# Patient Record
Sex: FEMALE | Race: WHITE | NOT HISPANIC OR LATINO | Employment: FULL TIME | URBAN - METROPOLITAN AREA
[De-identification: names, ages, dates, MRNs, and addresses within clinical notes are randomized per-mention and may not be internally consistent; named-entity substitution may affect disease eponyms.]

---

## 2017-07-21 ENCOUNTER — OFFICE VISIT (OUTPATIENT)
Dept: OBSTETRICS AND GYNECOLOGY | Facility: CLINIC | Age: 27
End: 2017-07-21
Attending: OBSTETRICS & GYNECOLOGY
Payer: COMMERCIAL

## 2017-07-21 VITALS
WEIGHT: 193.13 LBS | HEIGHT: 62 IN | SYSTOLIC BLOOD PRESSURE: 100 MMHG | BODY MASS INDEX: 35.54 KG/M2 | DIASTOLIC BLOOD PRESSURE: 70 MMHG

## 2017-07-21 DIAGNOSIS — N92.6 IRREGULAR PERIODS: Primary | ICD-10-CM

## 2017-07-21 LAB
B-HCG UR QL: NEGATIVE
CTP QC/QA: YES

## 2017-07-21 PROCEDURE — 81025 URINE PREGNANCY TEST: CPT | Mod: QW,S$GLB,, | Performed by: OBSTETRICS & GYNECOLOGY

## 2017-07-21 PROCEDURE — 99999 PR PBB SHADOW E&M-EST. PATIENT-LVL II: CPT | Mod: PBBFAC,,, | Performed by: OBSTETRICS & GYNECOLOGY

## 2017-07-21 PROCEDURE — 99213 OFFICE O/P EST LOW 20 MIN: CPT | Mod: S$GLB,,, | Performed by: OBSTETRICS & GYNECOLOGY

## 2017-07-21 NOTE — PROGRESS NOTES
"CC:irregular menses    HPI:Armida Garrido is a 28 yo female who presents for discussion of new onset of irregular menses.  Prior to this month, had very regular menses.  Noted in June that she had a period 6/2/17, had light spotting 6/18/17, symptoms of menses 7/2-3 but no bleeding.  No current contraception, UPT is negative here.  Of note, the patient was dx'd with hashimoto's thyroiditis in 4/2017, met with endocrinology on 6/2017 and was started on magnesium and selenium, no other supplementation.  Interested in pregnancy in January.    ROS:  GENERAL: Feeling well overall. Denies fever or chills.   SKIN: Denies rash or lesions.   HEAD: Denies head injury or headache.   NODES: Denies enlarged lymph nodes.   CHEST: Denies chest pain or shortness of breath.   CARDIOVASCULAR: Denies palpitations or left sided chest pain.   ABDOMEN: No abdominal pain, constipation, diarrhea, nausea, vomiting or rectal bleeding.   URINARY: No dysuria, hematuria, or burning on urination.  REPRODUCTIVE: See HPI.   BREASTS: Denies pain, lumps, or nipple discharge.   HEMATOLOGIC: No easy bruisability or excessive bleeding.   MUSCULOSKELETAL: Denies joint pain or swelling.   NEUROLOGIC: Denies syncope or weakness.   PSYCHIATRIC: Denies depression, anxiety or mood swings.    PE:   /70   Ht 5' 2" (1.575 m)   Wt 87.6 kg (193 lb 2 oz)   LMP 06/02/2017 (Exact Date)   BMI 35.32 kg/m²     APPEARANCE: Well nourished, well developed, White female in no acute distress.  NODES: no cervical, supraclavicular, or inguinal lymphadenopathy  PELVIC: deferred  Discussion only visit, approximate face to face time was 20 minutes      Diagnosis:  1. Irregular periods        Plan:   Likely irregularities due to thyroid issues, offered OCPs to regulate, patient declines at this time.  Will discuss new mestrual irregularities with Endo and see if any changes are needed.    Orders Placed This Encounter    POCT urine pregnancy         Follow-up with me " as needed.    Fiorella Carpenter, DO

## 2018-04-03 ENCOUNTER — TELEPHONE (OUTPATIENT)
Dept: OBSTETRICS AND GYNECOLOGY | Facility: CLINIC | Age: 28
End: 2018-04-03

## 2018-04-03 ENCOUNTER — PATIENT MESSAGE (OUTPATIENT)
Dept: OBSTETRICS AND GYNECOLOGY | Facility: CLINIC | Age: 28
End: 2018-04-03

## 2018-04-03 DIAGNOSIS — N91.2 AMENORRHEA: Primary | ICD-10-CM

## 2018-04-04 ENCOUNTER — PATIENT MESSAGE (OUTPATIENT)
Dept: OBSTETRICS AND GYNECOLOGY | Facility: CLINIC | Age: 28
End: 2018-04-04

## 2018-04-12 ENCOUNTER — PATIENT MESSAGE (OUTPATIENT)
Dept: OBSTETRICS AND GYNECOLOGY | Facility: CLINIC | Age: 28
End: 2018-04-12

## 2018-04-12 DIAGNOSIS — E06.3 HASHIMOTO'S THYROIDITIS: Primary | ICD-10-CM

## 2018-04-13 NOTE — TELEPHONE ENCOUNTER
Pt stated that she has Hashimoto's thyroiditis and had lab work done stating her thyroid antibodies were 12. Pt wanted to know if there is any concern  she should have now that she  Is preg.

## 2018-05-03 ENCOUNTER — OFFICE VISIT (OUTPATIENT)
Dept: OBSTETRICS AND GYNECOLOGY | Facility: CLINIC | Age: 28
End: 2018-05-03
Attending: OBSTETRICS & GYNECOLOGY
Payer: COMMERCIAL

## 2018-05-03 ENCOUNTER — LAB VISIT (OUTPATIENT)
Dept: LAB | Facility: OTHER | Age: 28
End: 2018-05-03
Attending: OBSTETRICS & GYNECOLOGY
Payer: COMMERCIAL

## 2018-05-03 VITALS
DIASTOLIC BLOOD PRESSURE: 74 MMHG | BODY MASS INDEX: 37.13 KG/M2 | WEIGHT: 201.75 LBS | HEIGHT: 62 IN | SYSTOLIC BLOOD PRESSURE: 112 MMHG

## 2018-05-03 DIAGNOSIS — N91.2 AMENORRHEA: Primary | ICD-10-CM

## 2018-05-03 DIAGNOSIS — Z36.89 ESTABLISH GESTATIONAL AGE, ULTRASOUND: ICD-10-CM

## 2018-05-03 DIAGNOSIS — D25.9 LEIOMYOMA IN PREGNANCY, UTERINE, ANTEPARTUM: ICD-10-CM

## 2018-05-03 DIAGNOSIS — E06.3 HASHIMOTO'S THYROIDITIS: ICD-10-CM

## 2018-05-03 DIAGNOSIS — N91.2 AMENORRHEA: ICD-10-CM

## 2018-05-03 DIAGNOSIS — O34.10 LEIOMYOMA IN PREGNANCY, UTERINE, ANTEPARTUM: ICD-10-CM

## 2018-05-03 DIAGNOSIS — O36.80X0 ENCOUNTER TO DETERMINE FETAL VIABILITY OF PREGNANCY, SINGLE OR UNSPECIFIED FETUS: ICD-10-CM

## 2018-05-03 LAB
ABO + RH BLD: NORMAL
ANION GAP SERPL CALC-SCNC: 14 MMOL/L
BASOPHILS # BLD AUTO: 0.02 K/UL
BASOPHILS NFR BLD: 0.2 %
BLD GP AB SCN CELLS X3 SERPL QL: NORMAL
BUN SERPL-MCNC: 9 MG/DL
CALCIUM SERPL-MCNC: 9.4 MG/DL
CHLORIDE SERPL-SCNC: 99 MMOL/L
CO2 SERPL-SCNC: 21 MMOL/L
CREAT SERPL-MCNC: 0.7 MG/DL
DIFFERENTIAL METHOD: ABNORMAL
EOSINOPHIL # BLD AUTO: 0.1 K/UL
EOSINOPHIL NFR BLD: 0.4 %
ERYTHROCYTE [DISTWIDTH] IN BLOOD BY AUTOMATED COUNT: 14.4 %
EST. GFR  (AFRICAN AMERICAN): >60 ML/MIN/1.73 M^2
EST. GFR  (NON AFRICAN AMERICAN): >60 ML/MIN/1.73 M^2
ESTIMATED AVG GLUCOSE: 97 MG/DL
GLUCOSE SERPL-MCNC: 81 MG/DL
HBA1C MFR BLD HPLC: 5 %
HCT VFR BLD AUTO: 39.1 %
HGB BLD-MCNC: 12.7 G/DL
LYMPHOCYTES # BLD AUTO: 1.8 K/UL
LYMPHOCYTES NFR BLD: 15 %
MCH RBC QN AUTO: 28.3 PG
MCHC RBC AUTO-ENTMCNC: 32.5 G/DL
MCV RBC AUTO: 87 FL
MONOCYTES # BLD AUTO: 0.5 K/UL
MONOCYTES NFR BLD: 4.3 %
NEUTROPHILS # BLD AUTO: 9.7 K/UL
NEUTROPHILS NFR BLD: 79.9 %
PLATELET # BLD AUTO: 256 K/UL
PMV BLD AUTO: 11.6 FL
POTASSIUM SERPL-SCNC: 3.6 MMOL/L
RBC # BLD AUTO: 4.49 M/UL
SODIUM SERPL-SCNC: 134 MMOL/L
T4 FREE SERPL-MCNC: 1.15 NG/DL
THYROPEROXIDASE IGG SERPL-ACNC: 7.7 IU/ML
TSH SERPL DL<=0.005 MIU/L-ACNC: 1.18 UIU/ML
WBC # BLD AUTO: 12.09 K/UL

## 2018-05-03 PROCEDURE — 83036 HEMOGLOBIN GLYCOSYLATED A1C: CPT

## 2018-05-03 PROCEDURE — 3008F BODY MASS INDEX DOCD: CPT | Mod: CPTII,S$GLB,, | Performed by: OBSTETRICS & GYNECOLOGY

## 2018-05-03 PROCEDURE — 36415 COLL VENOUS BLD VENIPUNCTURE: CPT

## 2018-05-03 PROCEDURE — 86376 MICROSOMAL ANTIBODY EACH: CPT

## 2018-05-03 PROCEDURE — 84439 ASSAY OF FREE THYROXINE: CPT

## 2018-05-03 PROCEDURE — 86762 RUBELLA ANTIBODY: CPT

## 2018-05-03 PROCEDURE — 84443 ASSAY THYROID STIM HORMONE: CPT

## 2018-05-03 PROCEDURE — 99214 OFFICE O/P EST MOD 30 MIN: CPT | Mod: S$GLB,,, | Performed by: OBSTETRICS & GYNECOLOGY

## 2018-05-03 PROCEDURE — 85025 COMPLETE CBC W/AUTO DIFF WBC: CPT

## 2018-05-03 PROCEDURE — 87340 HEPATITIS B SURFACE AG IA: CPT

## 2018-05-03 PROCEDURE — 86850 RBC ANTIBODY SCREEN: CPT

## 2018-05-03 PROCEDURE — 86592 SYPHILIS TEST NON-TREP QUAL: CPT

## 2018-05-03 PROCEDURE — 99999 PR PBB SHADOW E&M-EST. PATIENT-LVL III: CPT | Mod: PBBFAC,,, | Performed by: OBSTETRICS & GYNECOLOGY

## 2018-05-03 PROCEDURE — 87491 CHLMYD TRACH DNA AMP PROBE: CPT

## 2018-05-03 PROCEDURE — 86703 HIV-1/HIV-2 1 RESULT ANTBDY: CPT

## 2018-05-03 PROCEDURE — 80048 BASIC METABOLIC PNL TOTAL CA: CPT

## 2018-05-03 PROCEDURE — 87086 URINE CULTURE/COLONY COUNT: CPT

## 2018-05-03 PROCEDURE — 76817 TRANSVAGINAL US OBSTETRIC: CPT | Mod: S$GLB,,, | Performed by: OBSTETRICS & GYNECOLOGY

## 2018-05-03 RX ORDER — CETIRIZINE HYDROCHLORIDE 10 MG/1
10 TABLET ORAL DAILY
COMMUNITY

## 2018-05-03 NOTE — PROCEDURES
Procedures   Obstetrical ultrasound completed today.  See report in imaging section of [facility].

## 2018-05-03 NOTE — PROGRESS NOTES
"HPI: Pt is a 27 y.o. female who presents complaining of missed menses and (+) home UPT. She denies vaginal bleeding or abdominal pain. She does have nausea and vomiting.  history of hashimotos, needs lab work, no current endocrinologist.  Interested in anueploidy screen, discussed NT.  LMP 3/4/18      ROS:  GENERAL: Feeling well overall.   SKIN: Denies rash or lesions.   HEAD: Denies head injury or headache.   NODES: Denies enlarged lymph nodes.   CHEST: Denies chest pain or shortness of breath.   CARDIOVASCULAR: Denies palpitations or left sided chest pain.   ABDOMEN: No abdominal pain, constipation, diarrhea or rectal bleeding.   URINARY: No dysuria, hematuria, or burning on urination.  REPRODUCTIVE: See HPI.   BREASTS: Denies pain, lumps, or nipple discharge.   HEMATOLOGIC: No easy bruisability or excessive bleeding.   MUSCULOSKELETAL: Denies joint pain or swelling.   NEUROLOGIC: Denies syncope or weakness.   PSYCHIATRIC: Denies depression, anxiety or mood swings.    PE:   /74   Ht 5' 2" (1.575 m)   Wt 91.5 kg (201 lb 11.5 oz)   LMP 03/04/2018   BMI 36.90 kg/m²     APPEARANCE: Well nourished, well developed, in no acute distress.  AFFECT: WNL, alert and oriented x 3.  SKIN: No acne or hirsutism.  NECK: Neck symmetric, without masses or thyromegaly.  NODES: No inguinal, cervical, axillary or femoral lymph node enlargement.  CHEST: Good respiratory effort.   ABDOMEN: Soft. No tenderness or masses. No hepatosplenomegaly. No hernias.  BREASTS: Symmetrical, no skin changes or visible lesions. No palpable masses, adenopathy, or nipple discharge bilaterally.  PELVIC: External female genitalia without lesions. Female hair distribution. Adequate perineal body, Normal urethral meatus. Vagina moist and well rugated without lesions or discharge. There is no significant cystocele or rectocele. Cervix pink without lesions, discharge or tenderness. Uterus is 8 week size, regular, mobile and nontender. Adnexa without " masses.  EXTREMITIES: No edema.    PROCEDURES:  - UPT: positive  - Urine dip: negative  - Dating U/S: to be scheduled with GynUS      Diagnosis:  1. Amenorrhea    2. Hashimoto's thyroiditis        Plan:     Orders Placed This Encounter    Urine culture    C. trachomatis/N. gonorrhoeae by AMP DNA Cervix    Basic metabolic panel    HIV-1 and HIV-2 antibodies    RPR    Hepatitis B surface antigen    Rubella antibody, IgG    Hemoglobin A1c    CBC auto differential    Cystic Fibrosis Mutation Panel    TSH    T4, free    THYROID PEROXIDASE ANTIBODY    Ambulatory Referral to Endocrinology    POCT urine pregnancy    Type & Screen - Ob Profile    US MFM Procedure ([facility])       - Rx: Prenatal vitamins  - She does want 1st trimester screening with MFM.     Patient was counseled today on routine 1st trimester precautions, including vaginal bleeding and abdominal pain. We also discussed proper weight gain based on the [place] [employer]'s recommendations based on her pre-pregnancy weight, foods to avoid in pregnancy (i.e. sushi, fish that are high in mercury, cold deli meat, and unpasteurized cheeses), environmental precautions such as cat litter, and prenatal vitamin options (i.e. stool softener, DHA).     Total face to face time spent with the patient was 30 minutes and over half spent in counseling on the above related issues.     Follow-up with me in 4 weeks for OB check

## 2018-05-03 NOTE — PROGRESS NOTES
HPI: Pt is a 27 y.o. female who presents complaining of missed menses and (+) home UPT. She denies vaginal bleeding or abdominal pain. She does have nausea and vomiting.   3/4/18    ROS:  GENERAL: Feeling well overall.   SKIN: Denies rash or lesions.   HEAD: Denies head injury or headache.   NODES: Denies enlarged lymph nodes.   CHEST: Denies chest pain or shortness of breath.   CARDIOVASCULAR: Denies palpitations or left sided chest pain.   ABDOMEN: No abdominal pain, constipation, diarrhea or rectal bleeding.   URINARY: No dysuria, hematuria, or burning on urination.  REPRODUCTIVE: See HPI.   BREASTS: Denies pain, lumps, or nipple discharge.   HEMATOLOGIC: No easy bruisability or excessive bleeding.   MUSCULOSKELETAL: Denies joint pain or swelling.   NEUROLOGIC: Denies syncope or weakness.   PSYCHIATRIC: Denies depression, anxiety or mood swings.    PE:   APPEARANCE: Well nourished, well developed, in no acute distress.  AFFECT: WNL, alert and oriented x 3.  SKIN: No acne or hirsutism.  NECK: Neck symmetric, without masses or thyromegaly.  NODES: No inguinal, cervical, axillary or femoral lymph node enlargement.  CHEST: Good respiratory effort.   ABDOMEN: Soft. No tenderness or masses. No hepatosplenomegaly. No hernias.  BREASTS: Symmetrical, no skin changes or visible lesions. No palpable masses, adenopathy, or nipple discharge bilaterally.  PELVIC: External female genitalia without lesions. Female hair distribution. Adequate perineal body, Normal urethral meatus. Vagina moist and well rugated without lesions or discharge. There is no significant cystocele or rectocele. Cervix pink without lesions, discharge or tenderness. Uterus is 8 week size, regular, mobile and nontender. Adnexa without masses.  EXTREMITIES: No edema.    PROCEDURES:  - UPT: positive  - Urine dip: negative  - Dating U/S: to be scheduled with GynUS      Diagnosis:  1. Amenorrhea    2. Hashimoto's thyroiditis        Plan:     Orders Placed This  Encounter    Urine culture    C. trachomatis/N. gonorrhoeae by AMP DNA Cervix    Basic metabolic panel    HIV-1 and HIV-2 antibodies    RPR    Hepatitis B surface antigen    Rubella antibody, IgG    Hemoglobin A1c    CBC auto differential    Cystic Fibrosis Mutation Panel    TSH    T4, free    Ambulatory Referral to Endocrinology    POCT urine pregnancy    Type & Screen - Ob Profile       - Rx: Prenatal vitamins  - She does want 1st trimester screening with MFM.     Patient was counseled today on routine 1st trimester precautions, including vaginal bleeding and abdominal pain. We also discussed proper weight gain based on the [place] [employer]'s recommendations based on her pre-pregnancy weight, foods to avoid in pregnancy (i.e. sushi, fish that are high in mercury, cold deli meat, and unpasteurized cheeses), environmental precautions such as cat litter, and prenatal vitamin options (i.e. stool softener, DHA).     Total face to face time spent with the patient was 30 minutes and over half spent in counseling on the above related issues.     Follow-up with me in 4 weeks for OB check

## 2018-05-04 LAB
C TRACH DNA SPEC QL NAA+PROBE: NOT DETECTED
HBV SURFACE AG SERPL QL IA: NEGATIVE
HIV 1+2 AB+HIV1 P24 AG SERPL QL IA: NEGATIVE
N GONORRHOEA DNA SPEC QL NAA+PROBE: NOT DETECTED
RPR SER QL: NORMAL
RUBV IGG SER-ACNC: 15 IU/ML
RUBV IGG SER-IMP: REACTIVE

## 2018-05-05 LAB — BACTERIA UR CULT: NO GROWTH

## 2018-05-10 ENCOUNTER — PATIENT MESSAGE (OUTPATIENT)
Dept: OBSTETRICS AND GYNECOLOGY | Facility: CLINIC | Age: 28
End: 2018-05-10

## 2018-05-18 ENCOUNTER — PATIENT MESSAGE (OUTPATIENT)
Dept: OBSTETRICS AND GYNECOLOGY | Facility: CLINIC | Age: 28
End: 2018-05-18

## 2018-05-19 ENCOUNTER — ROUTINE PRENATAL (OUTPATIENT)
Dept: OBSTETRICS AND GYNECOLOGY | Facility: CLINIC | Age: 28
End: 2018-05-19
Payer: COMMERCIAL

## 2018-05-19 VITALS — BODY MASS INDEX: 37.1 KG/M2 | DIASTOLIC BLOOD PRESSURE: 80 MMHG | SYSTOLIC BLOOD PRESSURE: 122 MMHG | WEIGHT: 202.81 LBS

## 2018-05-19 DIAGNOSIS — Z34.01 ENCOUNTER FOR SUPERVISION OF NORMAL FIRST PREGNANCY IN FIRST TRIMESTER: Primary | ICD-10-CM

## 2018-05-19 PROCEDURE — 99999 PR PBB SHADOW E&M-EST. PATIENT-LVL III: CPT | Mod: PBBFAC,,, | Performed by: ADVANCED PRACTICE MIDWIFE

## 2018-05-19 PROCEDURE — 0502F SUBSEQUENT PRENATAL CARE: CPT | Mod: S$GLB,,, | Performed by: ADVANCED PRACTICE MIDWIFE

## 2018-05-19 NOTE — PROGRESS NOTES
Pt in with report of onset of spotting during the week. Pt reports spotting was brown in color, denies any bleeding, lof.Exam today with scant amt pink discharge, cervix- LTC. POs FHTs per doppler. Small labial cyst noted L labia, no bleeding noted at site. Discussed spotting in early pregnancy- precautions given. Discussed labial cyst, advised warm compresses, gentle washing. RTC if worsens.

## 2018-05-31 ENCOUNTER — OFFICE VISIT (OUTPATIENT)
Dept: MATERNAL FETAL MEDICINE | Facility: CLINIC | Age: 28
End: 2018-05-31
Attending: OBSTETRICS & GYNECOLOGY
Payer: COMMERCIAL

## 2018-05-31 ENCOUNTER — ROUTINE PRENATAL (OUTPATIENT)
Dept: OBSTETRICS AND GYNECOLOGY | Facility: CLINIC | Age: 28
End: 2018-05-31
Attending: OBSTETRICS & GYNECOLOGY
Payer: COMMERCIAL

## 2018-05-31 ENCOUNTER — LAB VISIT (OUTPATIENT)
Dept: LAB | Facility: OTHER | Age: 28
End: 2018-05-31
Attending: OBSTETRICS & GYNECOLOGY
Payer: COMMERCIAL

## 2018-05-31 VITALS — BODY MASS INDEX: 37.22 KG/M2 | WEIGHT: 203.5 LBS | SYSTOLIC BLOOD PRESSURE: 100 MMHG | DIASTOLIC BLOOD PRESSURE: 64 MMHG

## 2018-05-31 VITALS — BODY MASS INDEX: 37.34 KG/M2 | WEIGHT: 204.13 LBS

## 2018-05-31 DIAGNOSIS — Z36.89 ENCOUNTER FOR FETAL ANATOMIC SURVEY: ICD-10-CM

## 2018-05-31 DIAGNOSIS — Z36.82 ENCOUNTER FOR ANTENATAL SCREENING FOR NUCHAL TRANSLUCENCY: Primary | ICD-10-CM

## 2018-05-31 DIAGNOSIS — N91.2 AMENORRHEA: ICD-10-CM

## 2018-05-31 DIAGNOSIS — Z36.82 ENCOUNTER FOR ANTENATAL SCREENING FOR NUCHAL TRANSLUCENCY: ICD-10-CM

## 2018-05-31 DIAGNOSIS — Z34.90 PREGNANCY WITH ONE FETUS, ANTEPARTUM: Primary | ICD-10-CM

## 2018-05-31 PROCEDURE — 81508 FTL CGEN ABNOR TWO PROTEINS: CPT

## 2018-05-31 PROCEDURE — 99999 PR PBB SHADOW E&M-EST. PATIENT-LVL I: CPT | Mod: PBBFAC,,, | Performed by: OBSTETRICS & GYNECOLOGY

## 2018-05-31 PROCEDURE — 0502F SUBSEQUENT PRENATAL CARE: CPT | Mod: S$GLB,,, | Performed by: OBSTETRICS & GYNECOLOGY

## 2018-05-31 PROCEDURE — 76813 OB US NUCHAL MEAS 1 GEST: CPT | Mod: S$GLB,,, | Performed by: OBSTETRICS & GYNECOLOGY

## 2018-05-31 PROCEDURE — 99999 PR PBB SHADOW E&M-EST. PATIENT-LVL II: CPT | Mod: PBBFAC,,, | Performed by: OBSTETRICS & GYNECOLOGY

## 2018-05-31 PROCEDURE — 99499 UNLISTED E&M SERVICE: CPT | Mod: S$GLB,,, | Performed by: OBSTETRICS & GYNECOLOGY

## 2018-05-31 PROCEDURE — 36415 COLL VENOUS BLD VENIPUNCTURE: CPT

## 2018-05-31 RX ORDER — FLUTICASONE PROPIONATE 50 MCG
1 SPRAY, SUSPENSION (ML) NASAL DAILY
Qty: 9.9 ML | Refills: 2 | Status: SHIPPED | OUTPATIENT
Start: 2018-05-31 | End: 2019-04-04

## 2018-05-31 NOTE — PROGRESS NOTES
NT today, reviewed course of care.  HA- rec tylenol and mag oxide, also chiropractor and getting new bite guard as has TMJ  Constipation: stool softener PRN  F/U 4 weeks, TSH and sequential II at that time.

## 2018-06-04 LAB
# FETUSES US: NORMAL
AGE AT DELIVERY: 29
B-HCG MOM SERPL: NORMAL
B-HCG SERPL-ACNC: 153.2 IU/ML
FET CRL US.MEAS: 63 MM
FET NASAL BONE LENGTH US.MEAS: NORMAL MM
FET NUCHAL FOLD MOM THICKNESS US.MEAS: NORMAL
FET NUCHAL FOLD THICKNESS US.MEAS: 1.7 MM
FET TS 21 RISK FROM MAT AGE: NORMAL
GA (DAYS): 5 D
GA (WEEKS): 12 WK
IDDM PATIENT QL: NORMAL
INTEGRATED SCN PATIENT-IMP: NEGATIVE
PAPP-A MOM SERPL: NORMAL
PAPP-A SERPL-MCNC: 698.7 NG/ML
SEQUENTIAL SCREEN I INTERP.: NORMAL
SMOKING STATUS FTND: NO
TS 18 RISK FETUS: NORMAL
TS 21 RISK FETUS: NORMAL
US DATE: NORMAL

## 2018-06-28 ENCOUNTER — ROUTINE PRENATAL (OUTPATIENT)
Dept: OBSTETRICS AND GYNECOLOGY | Facility: CLINIC | Age: 28
End: 2018-06-28
Attending: OBSTETRICS & GYNECOLOGY
Payer: COMMERCIAL

## 2018-06-28 ENCOUNTER — LAB VISIT (OUTPATIENT)
Dept: LAB | Facility: OTHER | Age: 28
End: 2018-06-28
Attending: OBSTETRICS & GYNECOLOGY
Payer: COMMERCIAL

## 2018-06-28 VITALS
BODY MASS INDEX: 38.91 KG/M2 | WEIGHT: 212.75 LBS | DIASTOLIC BLOOD PRESSURE: 68 MMHG | SYSTOLIC BLOOD PRESSURE: 110 MMHG

## 2018-06-28 DIAGNOSIS — Z34.90 PREGNANCY WITH ONE FETUS, ANTEPARTUM: Primary | ICD-10-CM

## 2018-06-28 DIAGNOSIS — E06.3 HASHIMOTO'S THYROIDITIS: ICD-10-CM

## 2018-06-28 DIAGNOSIS — Z34.90 PREGNANCY WITH ONE FETUS, ANTEPARTUM: ICD-10-CM

## 2018-06-28 LAB
T4 FREE SERPL-MCNC: 0.97 NG/DL
TSH SERPL DL<=0.005 MIU/L-ACNC: 0.94 UIU/ML

## 2018-06-28 PROCEDURE — 84439 ASSAY OF FREE THYROXINE: CPT

## 2018-06-28 PROCEDURE — 36415 COLL VENOUS BLD VENIPUNCTURE: CPT

## 2018-06-28 PROCEDURE — 81511 FTL CGEN ABNOR FOUR ANAL: CPT

## 2018-06-28 PROCEDURE — 84443 ASSAY THYROID STIM HORMONE: CPT

## 2018-06-28 PROCEDURE — 0502F SUBSEQUENT PRENATAL CARE: CPT | Mod: S$GLB,,, | Performed by: OBSTETRICS & GYNECOLOGY

## 2018-06-28 PROCEDURE — 99999 PR PBB SHADOW E&M-EST. PATIENT-LVL II: CPT | Mod: PBBFAC,,, | Performed by: OBSTETRICS & GYNECOLOGY

## 2018-06-28 NOTE — PROGRESS NOTES
Starting to feel some movement.  Nausea improved.  Headaches have improved somewhat with magnesium ppx.  Jaw feels better.   Sequential II and TSH today.  F/U 4 weeks.  U/S discussed

## 2018-07-02 LAB
# FETUSES US: NORMAL
AFP MOM SERPL: 0.56
AFP SERPL-MCNC: 16.4 NG/ML
AGE AT DELIVERY: 29
B-HCG MOM SERPL: 1.75
B-HCG SERPL-ACNC: 42.3 IU/ML
COLLECT DATE BLD: NORMAL
COLLECT DATE: NORMAL
FET NASAL BONE LENGTH US.MEAS: NORMAL MM
FET NUCHAL FOLD MOM THICKNESS US.MEAS: 1.33
FET NUCHAL FOLD THICKNESS US.MEAS: 1.7 MM
FET TS 21 RISK FROM MAT AGE: NORMAL
GA (DAYS): 5 D
GA (WEEKS): 16 WK
GA METHOD: NORMAL
GEST. AGE (DAYS) 2ND SAMPLE (SS2): 5
GEST. AGE (WKS) 1ST SAMPLE (SS2): 12
IDDM PATIENT QL: NORMAL
INHIBIN A MOM SERPL: 1.34
INHIBIN A SERPL-MCNC: 198.4 PG/ML
INTEGRATED SCN PATIENT-IMP: NEGATIVE
PAPP-A MOM SERPL: 1.11
PAPP-A SERPL-MCNC: 698.7 NG/ML
SEQUENTIAL SCREEN PART 2 INTERP: NORMAL
TS 18 RISK FETUS: NORMAL
TS 21 RISK FETUS: NORMAL
U ESTRIOL MOM SERPL: 1.18
U ESTRIOL SERPL-MCNC: 0.97 NG/ML

## 2018-07-08 ENCOUNTER — PATIENT MESSAGE (OUTPATIENT)
Dept: OBSTETRICS AND GYNECOLOGY | Facility: CLINIC | Age: 28
End: 2018-07-08

## 2018-07-17 ENCOUNTER — HOSPITAL ENCOUNTER (EMERGENCY)
Facility: HOSPITAL | Age: 28
Discharge: HOME | End: 2018-07-17
Attending: EMERGENCY MEDICINE
Payer: COMMERCIAL

## 2018-07-17 VITALS
TEMPERATURE: 98 F | RESPIRATION RATE: 16 BRPM | WEIGHT: 210 LBS | HEIGHT: 65 IN | HEART RATE: 73 BPM | OXYGEN SATURATION: 99 % | BODY MASS INDEX: 34.99 KG/M2 | DIASTOLIC BLOOD PRESSURE: 79 MMHG | SYSTOLIC BLOOD PRESSURE: 137 MMHG

## 2018-07-17 DIAGNOSIS — R10.9 ABDOMINAL PAIN DURING PREGNANCY IN SECOND TRIMESTER: Primary | ICD-10-CM

## 2018-07-17 DIAGNOSIS — E87.6 HYPOKALEMIA: ICD-10-CM

## 2018-07-17 DIAGNOSIS — O26.892 ABDOMINAL PAIN DURING PREGNANCY IN SECOND TRIMESTER: Primary | ICD-10-CM

## 2018-07-17 DIAGNOSIS — R10.11 RIGHT UPPER QUADRANT ABDOMINAL PAIN: ICD-10-CM

## 2018-07-17 LAB
ALBUMIN SERPL-MCNC: 2.8 G/DL (ref 3.3–5.5)
ALBUMIN SERPL-MCNC: 3.1 G/DL (ref 3.3–5.5)
ALP SERPL-CCNC: 64 U/L (ref 42–141)
ALP SERPL-CCNC: 70 U/L (ref 42–141)
B-HCG UR QL: POSITIVE
BILIRUB SERPL-MCNC: 0.4 MG/DL (ref 0.2–1.6)
BILIRUB SERPL-MCNC: 0.4 MG/DL (ref 0.2–1.6)
BILIRUBIN, POC UA: NEGATIVE
BLOOD, POC UA: NEGATIVE
BUN SERPL-MCNC: 8 MG/DL (ref 7–22)
CALCIUM SERPL-MCNC: 9.2 MG/DL (ref 8–10.3)
CHLORIDE SERPL-SCNC: 101 MMOL/L (ref 98–108)
CLARITY, POC UA: CLEAR
COLOR, POC UA: YELLOW
CREAT SERPL-MCNC: 0.5 MG/DL (ref 0.6–1.2)
CTP QC/QA: YES
GLUCOSE SERPL-MCNC: 95 MG/DL (ref 73–118)
GLUCOSE, POC UA: NEGATIVE
KETONES, POC UA: ABNORMAL
LEUKOCYTE EST, POC UA: NEGATIVE
NITRITE, POC UA: NEGATIVE
PH UR STRIP: 5.5 [PH]
POC ALT (SGPT): 20 U/L (ref 10–47)
POC ALT (SGPT): 23 U/L (ref 10–47)
POC AMYLASE: 45 U/L (ref 14–97)
POC AST (SGOT): 20 U/L (ref 11–38)
POC AST (SGOT): 20 U/L (ref 11–38)
POC GGT: 12 U/L (ref 5–65)
POC TCO2: 26 MMOL/L (ref 18–33)
POTASSIUM BLD-SCNC: 3.5 MMOL/L (ref 3.6–5.1)
PROTEIN, POC UA: NEGATIVE
PROTEIN, POC: 6.9 G/DL (ref 6.4–8.1)
PROTEIN, POC: 6.9 G/DL (ref 6.4–8.1)
SODIUM BLD-SCNC: 137 MMOL/L (ref 128–145)
SPECIFIC GRAVITY, POC UA: >=1.03
UROBILINOGEN, POC UA: 0.2 E.U./DL

## 2018-07-17 PROCEDURE — 82150 ASSAY OF AMYLASE: CPT

## 2018-07-17 PROCEDURE — 81025 URINE PREGNANCY TEST: CPT | Performed by: EMERGENCY MEDICINE

## 2018-07-17 PROCEDURE — 80053 COMPREHEN METABOLIC PANEL: CPT

## 2018-07-17 PROCEDURE — 85025 COMPLETE CBC W/AUTO DIFF WBC: CPT

## 2018-07-17 PROCEDURE — 25000003 PHARM REV CODE 250: Performed by: EMERGENCY MEDICINE

## 2018-07-17 PROCEDURE — 99284 EMERGENCY DEPT VISIT MOD MDM: CPT | Mod: 25

## 2018-07-17 RX ORDER — ACETAMINOPHEN 500 MG
1000 TABLET ORAL
Status: COMPLETED | OUTPATIENT
Start: 2018-07-17 | End: 2018-07-17

## 2018-07-17 RX ORDER — POTASSIUM CHLORIDE 20 MEQ/1
40 TABLET, EXTENDED RELEASE ORAL
Status: COMPLETED | OUTPATIENT
Start: 2018-07-17 | End: 2018-07-17

## 2018-07-17 RX ADMIN — LIDOCAINE HYDROCHLORIDE: 20 SOLUTION ORAL; TOPICAL at 08:07

## 2018-07-17 RX ADMIN — POTASSIUM CHLORIDE 40 MEQ: 1500 TABLET, EXTENDED RELEASE ORAL at 08:07

## 2018-07-17 RX ADMIN — ACETAMINOPHEN 1000 MG: 500 TABLET, FILM COATED ORAL at 09:07

## 2018-07-18 ENCOUNTER — ROUTINE PRENATAL (OUTPATIENT)
Dept: OBSTETRICS AND GYNECOLOGY | Facility: CLINIC | Age: 28
End: 2018-07-18
Attending: OBSTETRICS & GYNECOLOGY
Payer: COMMERCIAL

## 2018-07-18 ENCOUNTER — PATIENT MESSAGE (OUTPATIENT)
Dept: OBSTETRICS AND GYNECOLOGY | Facility: CLINIC | Age: 28
End: 2018-07-18

## 2018-07-18 ENCOUNTER — TELEPHONE (OUTPATIENT)
Dept: OBSTETRICS AND GYNECOLOGY | Facility: CLINIC | Age: 28
End: 2018-07-18

## 2018-07-18 VITALS
DIASTOLIC BLOOD PRESSURE: 70 MMHG | BODY MASS INDEX: 35.88 KG/M2 | WEIGHT: 215.63 LBS | SYSTOLIC BLOOD PRESSURE: 110 MMHG

## 2018-07-18 DIAGNOSIS — R10.11 RUQ PAIN: Primary | ICD-10-CM

## 2018-07-18 PROCEDURE — 0502F SUBSEQUENT PRENATAL CARE: CPT | Mod: S$GLB,,, | Performed by: OBSTETRICS & GYNECOLOGY

## 2018-07-18 PROCEDURE — 99999 PR PBB SHADOW E&M-EST. PATIENT-LVL II: CPT | Mod: PBBFAC,,, | Performed by: OBSTETRICS & GYNECOLOGY

## 2018-07-18 NOTE — ED PROVIDER NOTES
Encounter Date: 2018    SCRIBE #1 NOTE: I, Jimy Clarke, am scribing for, and in the presence of,  Dr. Tovar. I have scribed the entire note.       History     Chief Complaint   Patient presents with    Abdominal Pain     pt reports pain in right upper abdomen that radiates to her back. pt reports nausea but denies vomiting or diarrhea. pt reports being 19 weeks pregnant.     Time patient was seen by the provider: 7:40 PM      The patient is a pregnant 28 y.o. female  at approximately 19 weeks and 4 days gestation by LMP 2018 who presents to the ED with a complaint of progressively worsening RUQ abdominal pain that started intermittently 2 weeks ago but has since gotten worse and is constant today. She notes that her abdominal pain radiates to her right back. She describes the pain as burning and stabbing in quality. She rates the pain as a 5/10 in severity, but at it's worst it gets to an 8/10. She notes that eating and laying down at night makes the pain worse.  Her pain is alleviated by lying on her right side. She reports associated intermittent constipation and nausea. Her last bowel movement was today but scant.  She denies any fever, dysuria, frequency, hematuria, vaginal bleeding, vaginal discharge, or leakage of vaginal fluid .      She has been evaluated previously for this pregnancy by Dr. Carpenter at [facility]. She messaged them about this problem a week ago and was advised to go to urgent care.             The history is provided by the patient.     Review of patient's allergies indicates:   Allergen Reactions    Ibuprofen Hives     Past Medical History:   Diagnosis Date    Acne     Hashimoto's thyroiditis 2017     Past Surgical History:   Procedure Laterality Date    CYST REMOVAL      Left ear @ age 2    TONSILLECTOMY, ADENOIDECTOMY       Family History   Problem Relation Age of Onset    Miscarriages / Stillbirths Mother     Breast cancer Neg Hx     Cancer Neg Hx      Colon cancer Neg Hx     Diabetes Neg Hx     Eclampsia Neg Hx     Hypertension Neg Hx     Stroke Neg Hx      labor Neg Hx     Ovarian cancer Neg Hx      Social History   Substance Use Topics    Smoking status: Never Smoker    Smokeless tobacco: Never Used    Alcohol use Yes      Comment: social not since pregnancy     Review of Systems   Constitutional: Negative for fever.   Respiratory: Negative for cough and shortness of breath.    Cardiovascular: Negative for chest pain.   Gastrointestinal: Positive for abdominal pain (RUQ), constipation and nausea. Negative for diarrhea and vomiting.   Genitourinary: Negative for difficulty urinating, dysuria, hematuria, vaginal bleeding and vaginal discharge.   Neurological: Negative for dizziness, light-headedness and headaches.   All other systems reviewed and are negative.      Physical Exam     Initial Vitals [18 1850]   BP Pulse Resp Temp SpO2   (!) 159/87 88 18 98.7 °F (37.1 °C) 100 %      MAP       --         Physical Exam    Nursing note and vitals reviewed.  Constitutional: She appears well-developed and well-nourished.   HENT:   Head: Normocephalic and atraumatic.   Nose: Nose normal.   Eyes: Conjunctivae are normal.   Neck: Normal range of motion. Neck supple.   Cardiovascular: Normal rate.   Pulmonary/Chest: Effort normal.   Abdominal: Soft. Bowel sounds are normal. She exhibits no distension and no mass. There is no hepatosplenomegaly. There is no tenderness. There is no rigidity, no rebound, no guarding, no CVA tenderness, no tenderness at McBurney's point and negative Interiano's sign.            Musculoskeletal: Normal range of motion.   Neurological: She is alert and oriented to person, place, and time.   Skin: Skin is warm and dry.   Psychiatric: She has a normal mood and affect. Her behavior is normal.         ED Course   Procedures  Labs Reviewed   POCT URINE PREGNANCY - Abnormal; Notable for the following:        Result Value    POC Preg  Test, Ur Positive (*)     All other components within normal limits   POCT URINALYSIS W/O SCOPE - Abnormal; Notable for the following:     Glucose, UA Negative (*)     Bilirubin, UA Negative (*)     Ketones, UA 1+ (*)     Spec Grav UA >=1.030 (*)     Blood, UA Negative (*)     Protein, UA Negative (*)     Nitrite, UA Negative (*)     Leukocytes, UA Negative (*)     All other components within normal limits   POCT LIVER PANEL - Abnormal; Notable for the following:     Albumin, POC 3.1 (*)     All other components within normal limits   POCT CMP - Abnormal; Notable for the following:     Albumin, POC 2.8 (*)     POC Creatinine 0.5 (*)     POC Potassium 3.5 (*)     All other components within normal limits   POCT CBC   POCT URINALYSIS W/O SCOPE   POCT CMP   POCT LIVER PANEL          Imaging Results          US Abdomen Complete (Final result)  Result time 07/17/18 21:04:11    Final result by Quita Bueno MD (07/17/18 21:04:11)                 Impression:      1. No acute process seen.  Gallbladder contracted and suboptimally evaluated secondary to nonfasting status.  2. Mild hepatosplenomegaly.      Electronically signed by: Quita Bueno MD  Date:    07/17/2018  Time:    21:04             Narrative:    EXAMINATION:  US ABDOMEN COMPLETE    CLINICAL HISTORY:  Right upper quadrant pain    TECHNIQUE:  Complete abdominal ultrasound (including pancreas, aorta, liver, gallbladder, common bile duct, IVC, kidneys, and spleen) was performed.    COMPARISON:  None    FINDINGS:  The liver is mildly enlarged measuring 18.9cm.  Hepatic parenchyma is homogeneous without evidence for masses.  No intra- or extrahepatic biliary ductal dilatation. The common bile duct measures 0.4 cm.  The gallbladder is contracted and suboptimally evaluated.  No evidence for cholelithiasis.  Sonographic Interiano's sign is negative. The visualized portions of the pancreas, IVC, and aorta appear normal. The right kidney measures 10.5 cm.  Left kidney  measures 12.2 cm.  Spleen is mildly enlarged measuring 13.1 cm. No ascites.                                 Medical Decision Making:   Clinical Tests:   Lab Tests: Ordered and Reviewed       <> Summary of Lab: White count 14.7 H&H 12.1 and 35.6 platelets 213 MCV 84.2 RDW 13.3  Radiological Study: Ordered and Reviewed  ED Management:  I discussed with patient that the evaluation in the ED does not suggest any emergent or acutely life threatening condition requiring immediate intervention beyond what was provided in the ED, and I believe the patient is safe for discharge.  Regardless, an unremarkable evaluation in the ED does not preclude the development or presence of a serious or life threatening condition. As such, patient was instructed to return immediately for any worsening or change in current symptoms.              Scribe Attestation:   Scribe #1: I performed the above scribed service and the documentation accurately describes the services I performed. I attest to the accuracy of the note.          Labs Reviewed  Admission on 07/17/2018   Component Date Value Ref Range Status    POC Preg Test, Ur 07/17/2018 Positive* Negative Final     Acceptable 07/17/2018 Yes   Final    Albumin, POC 07/17/2018 3.1* 3.3 - 5.5 g/dL Final    Alkaline Phosphatase, POC 07/17/2018 64  42 - 141 U/L Final    ALT (SGPT), POC 07/17/2018 20  10 - 47 U/L Final    Amylase, POC 07/17/2018 45  14 - 97 U/L Final    AST (SGOT), POC 07/17/2018 20  11 - 38 U/L Final    POC GGT 07/17/2018 12  5 - 65 U/L Final    Bilirubin 07/17/2018 0.4  0.2 - 1.6 mg/dL Final    Protein 07/17/2018 6.9  6.4 - 8.1 g/dL Final    Albumin, POC 07/17/2018 2.8* 3.3 - 5.5 g/dL Final    Alkaline Phosphatase, POC 07/17/2018 70  42 - 141 U/L Final    ALT (SGPT), POC 07/17/2018 23  10 - 47 U/L Final    AST (SGOT), POC 07/17/2018 20  11 - 38 U/L Final    POC BUN 07/17/2018 8  7 - 22 mg/dL Final    Calcium, POC 07/17/2018 9.2  8.0 - 10.3 mg/dL  Final    POC Chloride 07/17/2018 101  98 - 108 mmol/L Final    POC Creatinine 07/17/2018 0.5* 0.6 - 1.2 mg/dL Final    POC Glucose 07/17/2018 95  73 - 118 mg/dL Final    POC Potassium 07/17/2018 3.5* 3.6 - 5.1 mmol/L Final    POC Sodium 07/17/2018 137  128 - 145 mmol/L Final    Bilirubin 07/17/2018 0.4  0.2 - 1.6 mg/dL Final    POC TCO2 07/17/2018 26  18 - 33 mmol/L Final    Protein 07/17/2018 6.9  6.4 - 8.1 g/dL Final    Glucose, UA 07/17/2018 Negative*  Final    Bilirubin, UA 07/17/2018 Negative*  Final    Ketones, UA 07/17/2018 1+*  Final    Spec Grav UA 07/17/2018 >=1.030*  Final    Blood, UA 07/17/2018 Negative*  Final    PH, UA 07/17/2018 5.5   Final    Protein, UA 07/17/2018 Negative*  Final    Urobilinogen, UA 07/17/2018 0.2  E.U./dL Final    Nitrite, UA 07/17/2018 Negative*  Final    Leukocytes, UA 07/17/2018 Negative*  Final    Color, UA 07/17/2018 Yellow   Final    Clarity, UA 07/17/2018 Clear   Final        Imaging Reviewed    Imaging Results          US Abdomen Complete (Final result)  Result time 07/17/18 21:04:11    Final result by Quita Bueno MD (07/17/18 21:04:11)                 Impression:      1. No acute process seen.  Gallbladder contracted and suboptimally evaluated secondary to nonfasting status.  2. Mild hepatosplenomegaly.      Electronically signed by: Quita Bueno MD  Date:    07/17/2018  Time:    21:04             Narrative:    EXAMINATION:  US ABDOMEN COMPLETE    CLINICAL HISTORY:  Right upper quadrant pain    TECHNIQUE:  Complete abdominal ultrasound (including pancreas, aorta, liver, gallbladder, common bile duct, IVC, kidneys, and spleen) was performed.    COMPARISON:  None    FINDINGS:  The liver is mildly enlarged measuring 18.9cm.  Hepatic parenchyma is homogeneous without evidence for masses.  No intra- or extrahepatic biliary ductal dilatation. The common bile duct measures 0.4 cm.  The gallbladder is contracted and suboptimally evaluated.  No  evidence for cholelithiasis.  Sonographic Interiano's sign is negative. The visualized portions of the pancreas, IVC, and aorta appear normal. The right kidney measures 10.5 cm.  Left kidney measures 12.2 cm.  Spleen is mildly enlarged measuring 13.1 cm. No ascites.                                Medications given in ED    Medications   (pyxis) gi cocktail (mylanta 30 mL, lidocaine 2 % viscous 10 mL, dicyclomine 10 mL) 50 mL ( Oral Given 7/17/18 2000)   potassium chloride SA CR tablet 40 mEq (40 mEq Oral Given 7/17/18 2057)   acetaminophen tablet 1,000 mg (1,000 mg Oral Given 7/17/18 2106)       This document was produced by a scribe under my direction and in my presence. I agree with the content of the note and have made any necessary edits.     Fili Tovar MD         Note was created using voice recognition software. Note may have occasional typographical errors that may not have been identified and edited despite good yonathan initial review prior to signing.          Clinical Impression:     1. Abdominal pain during pregnancy in second trimester    2. Right upper quadrant abdominal pain    3. Hypokalemia            Disposition:   Disposition: Discharged  Condition: Stable                        Fili Tovar MD  08/09/18 0162

## 2018-07-18 NOTE — PROGRESS NOTES
Presents to discuss RUQ pain, started with no inciting factor, no associated shortness of breath, worse at night, intensifies with laying on back, sometimes improves with actually laying on right side, some nausea, no vomiting.  Dull and achy throughout the day and worsens significantly with laying on back.  Went to the ED last night, labs largely normal, u/s of abdomen was non-diagnostic though she was not fasting.  TTP over ribs on the right and on sternum.  Discussed chiropractor and also repeat GB u/s.

## 2018-07-20 ENCOUNTER — OFFICE VISIT (OUTPATIENT)
Dept: MATERNAL FETAL MEDICINE | Facility: CLINIC | Age: 28
End: 2018-07-20
Attending: OBSTETRICS & GYNECOLOGY
Payer: COMMERCIAL

## 2018-07-20 DIAGNOSIS — Z36.89 ENCOUNTER FOR FETAL ANATOMIC SURVEY: ICD-10-CM

## 2018-07-20 DIAGNOSIS — O99.210 MATERNAL OBESITY AFFECTING PREGNANCY, ANTEPARTUM: ICD-10-CM

## 2018-07-20 PROCEDURE — 76811 OB US DETAILED SNGL FETUS: CPT | Mod: S$GLB,,, | Performed by: OBSTETRICS & GYNECOLOGY

## 2018-07-20 PROCEDURE — 99499 UNLISTED E&M SERVICE: CPT | Mod: S$GLB,,, | Performed by: OBSTETRICS & GYNECOLOGY

## 2018-07-20 NOTE — LETTER
July 20, 2018      Fiorella Carpenter,   [address] [address]           [facility]  [address]  [facility] [address]  Phone: [phone]          Patient: Armida Medina   MR Number: [id number]   YOB: 1990   Date of Visit: 7/20/2018       Dear Dr. Fiorella Carpenter:    Thank you for referring Armida Medina to me for evaluation. Attached you will find relevant portions of my assessment and plan of care.    If you have questions, please do not hesitate to call me. I look forward to following Armida Medina along with you.    Sincerely,    Cristina Garcia MD    Enclosure  CC:  No Recipients    If you would like to receive this communication electronically, please contact [email or web address] or [phone] to request more information on [facility] access.    For providers and/or their staff who would like to refer a patient to [facility], please contact us through our one-stop-shop provider referral line, [facility], at [phone].    If you feel you have received this communication in error or would no longer like to receive these types of communications, please e-mail [email or web address]

## 2018-07-23 DIAGNOSIS — Z36.89 ENCOUNTER FOR ULTRASOUND TO CHECK FETAL GROWTH: Primary | ICD-10-CM

## 2018-07-24 ENCOUNTER — HOSPITAL ENCOUNTER (OUTPATIENT)
Dept: RADIOLOGY | Facility: OTHER | Age: 28
Discharge: HOME | End: 2018-07-24
Attending: OBSTETRICS & GYNECOLOGY
Payer: COMMERCIAL

## 2018-07-24 DIAGNOSIS — R10.11 RUQ PAIN: ICD-10-CM

## 2018-07-24 PROCEDURE — 76705 ECHO EXAM OF ABDOMEN: CPT | Mod: 26,,, | Performed by: RADIOLOGY

## 2018-07-24 PROCEDURE — 76705 ECHO EXAM OF ABDOMEN: CPT | Mod: TC

## 2018-07-25 ENCOUNTER — PATIENT MESSAGE (OUTPATIENT)
Dept: OBSTETRICS AND GYNECOLOGY | Facility: CLINIC | Age: 28
End: 2018-07-25

## 2018-07-26 ENCOUNTER — TELEPHONE (OUTPATIENT)
Dept: OBSTETRICS AND GYNECOLOGY | Facility: CLINIC | Age: 28
End: 2018-07-26

## 2018-07-30 ENCOUNTER — ROUTINE PRENATAL (OUTPATIENT)
Dept: OBSTETRICS AND GYNECOLOGY | Facility: CLINIC | Age: 28
End: 2018-07-30
Payer: COMMERCIAL

## 2018-07-30 VITALS
BODY MASS INDEX: 36.25 KG/M2 | DIASTOLIC BLOOD PRESSURE: 63 MMHG | WEIGHT: 217.81 LBS | SYSTOLIC BLOOD PRESSURE: 114 MMHG

## 2018-07-30 DIAGNOSIS — Z3A.21 21 WEEKS GESTATION OF PREGNANCY: Primary | ICD-10-CM

## 2018-07-30 PROCEDURE — 0502F SUBSEQUENT PRENATAL CARE: CPT | Mod: S$GLB,,, | Performed by: NURSE PRACTITIONER

## 2018-07-30 PROCEDURE — 99999 PR PBB SHADOW E&M-EST. PATIENT-LVL III: CPT | Mod: PBBFAC,,, | Performed by: NURSE PRACTITIONER

## 2018-07-30 NOTE — PROGRESS NOTES
Here for routine OB appt at 21w1d, with no complaints. RUQ pain improved. Discussed GTT. + FM. Has not done gender reveal yet. Will f/u with MFM in 6 weeks for scan r/t fibroid. Anatomy WNL. Denies VB and cramping.  Pain, bleeding, and PTL precautions given.  F/U scheduled 4 weeks with GTT

## 2018-08-29 ENCOUNTER — ROUTINE PRENATAL (OUTPATIENT)
Dept: OBSTETRICS AND GYNECOLOGY | Facility: CLINIC | Age: 28
End: 2018-08-29
Attending: OBSTETRICS & GYNECOLOGY
Payer: COMMERCIAL

## 2018-08-29 ENCOUNTER — LAB VISIT (OUTPATIENT)
Dept: LAB | Facility: OTHER | Age: 28
End: 2018-08-29
Payer: COMMERCIAL

## 2018-08-29 VITALS
WEIGHT: 227.06 LBS | SYSTOLIC BLOOD PRESSURE: 108 MMHG | DIASTOLIC BLOOD PRESSURE: 70 MMHG | BODY MASS INDEX: 37.79 KG/M2

## 2018-08-29 DIAGNOSIS — E06.3 HASHIMOTO'S DISEASE: Primary | ICD-10-CM

## 2018-08-29 DIAGNOSIS — E06.3 HASHIMOTO'S THYROIDITIS: ICD-10-CM

## 2018-08-29 DIAGNOSIS — Z3A.21 21 WEEKS GESTATION OF PREGNANCY: ICD-10-CM

## 2018-08-29 DIAGNOSIS — Z34.90 PREGNANCY WITH ONE FETUS, ANTEPARTUM: ICD-10-CM

## 2018-08-29 LAB
BASOPHILS # BLD AUTO: 0.01 K/UL
BASOPHILS NFR BLD: 0.1 %
DIFFERENTIAL METHOD: ABNORMAL
EOSINOPHIL # BLD AUTO: 0.1 K/UL
EOSINOPHIL NFR BLD: 0.5 %
ERYTHROCYTE [DISTWIDTH] IN BLOOD BY AUTOMATED COUNT: 14 %
GLUCOSE SERPL-MCNC: 116 MG/DL
HCT VFR BLD AUTO: 34.6 %
HGB BLD-MCNC: 11.1 G/DL
LYMPHOCYTES # BLD AUTO: 1.5 K/UL
LYMPHOCYTES NFR BLD: 12.8 %
MCH RBC QN AUTO: 28.2 PG
MCHC RBC AUTO-ENTMCNC: 32.1 G/DL
MCV RBC AUTO: 88 FL
MONOCYTES # BLD AUTO: 0.5 K/UL
MONOCYTES NFR BLD: 4.4 %
NEUTROPHILS # BLD AUTO: 9.9 K/UL
NEUTROPHILS NFR BLD: 81.9 %
PLATELET # BLD AUTO: 232 K/UL
PMV BLD AUTO: 11.7 FL
RBC # BLD AUTO: 3.93 M/UL
WBC # BLD AUTO: 12.04 K/UL

## 2018-08-29 PROCEDURE — 0502F SUBSEQUENT PRENATAL CARE: CPT | Mod: S$GLB,,, | Performed by: OBSTETRICS & GYNECOLOGY

## 2018-08-29 PROCEDURE — 99999 PR PBB SHADOW E&M-EST. PATIENT-LVL III: CPT | Mod: PBBFAC,,, | Performed by: OBSTETRICS & GYNECOLOGY

## 2018-08-29 PROCEDURE — 82950 GLUCOSE TEST: CPT

## 2018-08-29 PROCEDURE — 85025 COMPLETE CBC W/AUTO DIFF WBC: CPT

## 2018-08-29 PROCEDURE — 36415 COLL VENOUS BLD VENIPUNCTURE: CPT

## 2018-08-29 NOTE — PROGRESS NOTES
Doing well, reviewed last anatomy and upcoming.  Will get TSH drawn with next ultrasound.  Ob glucose done today, all questions answered.  Precautions reviewed.  F/u in 4 weeks.

## 2018-09-04 ENCOUNTER — PROCEDURE VISIT (OUTPATIENT)
Dept: MATERNAL FETAL MEDICINE | Facility: CLINIC | Age: 28
End: 2018-09-04
Attending: OBSTETRICS & GYNECOLOGY
Payer: COMMERCIAL

## 2018-09-04 ENCOUNTER — LAB VISIT (OUTPATIENT)
Dept: LAB | Facility: OTHER | Age: 28
End: 2018-09-04
Attending: OBSTETRICS & GYNECOLOGY
Payer: COMMERCIAL

## 2018-09-04 DIAGNOSIS — E06.3 HASHIMOTO'S DISEASE: ICD-10-CM

## 2018-09-04 DIAGNOSIS — Z36.89 ENCOUNTER FOR ULTRASOUND TO CHECK FETAL GROWTH: ICD-10-CM

## 2018-09-04 DIAGNOSIS — D21.9 MYOMA: ICD-10-CM

## 2018-09-04 LAB
T4 FREE SERPL-MCNC: 0.88 NG/DL
TSH SERPL DL<=0.005 MIU/L-ACNC: 1.16 UIU/ML

## 2018-09-04 PROCEDURE — 36415 COLL VENOUS BLD VENIPUNCTURE: CPT

## 2018-09-04 PROCEDURE — 84439 ASSAY OF FREE THYROXINE: CPT

## 2018-09-04 PROCEDURE — 84443 ASSAY THYROID STIM HORMONE: CPT

## 2018-09-04 PROCEDURE — 76816 OB US FOLLOW-UP PER FETUS: CPT | Mod: S$GLB,,, | Performed by: OBSTETRICS & GYNECOLOGY

## 2018-09-26 ENCOUNTER — ROUTINE PRENATAL (OUTPATIENT)
Dept: OBSTETRICS AND GYNECOLOGY | Facility: CLINIC | Age: 28
End: 2018-09-26
Attending: OBSTETRICS & GYNECOLOGY
Payer: COMMERCIAL

## 2018-09-26 VITALS — SYSTOLIC BLOOD PRESSURE: 114 MMHG | DIASTOLIC BLOOD PRESSURE: 76 MMHG | BODY MASS INDEX: 39.11 KG/M2 | WEIGHT: 235 LBS

## 2018-09-26 DIAGNOSIS — Z34.90 PREGNANCY WITH ONE FETUS, ANTEPARTUM: ICD-10-CM

## 2018-09-26 DIAGNOSIS — O34.10 UTERINE FIBROID IN PREGNANCY: Primary | ICD-10-CM

## 2018-09-26 DIAGNOSIS — D25.9 UTERINE FIBROID IN PREGNANCY: Primary | ICD-10-CM

## 2018-09-26 DIAGNOSIS — E06.3 HASHIMOTO'S THYROIDITIS: ICD-10-CM

## 2018-09-26 PROCEDURE — 99999 PR PBB SHADOW E&M-EST. PATIENT-LVL III: CPT | Mod: PBBFAC,,, | Performed by: OBSTETRICS & GYNECOLOGY

## 2018-09-26 PROCEDURE — 0502F SUBSEQUENT PRENATAL CARE: CPT | Mod: S$GLB,,, | Performed by: OBSTETRICS & GYNECOLOGY

## 2018-09-26 NOTE — PROGRESS NOTES
Discussed peds and birth control options.   Will consider options  Thyroid looking good, discussed.

## 2018-10-02 ENCOUNTER — PATIENT MESSAGE (OUTPATIENT)
Dept: OBSTETRICS AND GYNECOLOGY | Facility: CLINIC | Age: 28
End: 2018-10-02

## 2018-10-03 ENCOUNTER — PROCEDURE VISIT (OUTPATIENT)
Dept: MATERNAL FETAL MEDICINE | Facility: CLINIC | Age: 28
End: 2018-10-03
Attending: OBSTETRICS & GYNECOLOGY
Payer: COMMERCIAL

## 2018-10-03 DIAGNOSIS — O34.10 UTERINE FIBROID IN PREGNANCY: ICD-10-CM

## 2018-10-03 DIAGNOSIS — D25.9 UTERINE FIBROID IN PREGNANCY: ICD-10-CM

## 2018-10-03 PROCEDURE — 76816 OB US FOLLOW-UP PER FETUS: CPT | Mod: S$GLB,,, | Performed by: PEDIATRICS

## 2018-10-03 PROCEDURE — 99499 UNLISTED E&M SERVICE: CPT | Mod: S$GLB,,, | Performed by: PEDIATRICS

## 2018-10-03 NOTE — PROGRESS NOTES
Indication  ========    Follow up Growth/CSP.    History  ======    General History  Height 165 cm  Height (ft) 5 ft  Height (in) 5 in  Other: BMI = 35    Pregnancy History  ==============    Maternal Lab Tests  Test: Sequential Screen  Result: negative  DSR 1:460  T18 1:40,000  Wants to know gender: no    Maternal Assessment  =================    Height 165 cm  Height (ft) 5 ft  Height (in) 5 in    Method  ======    Transabdominal ultrasound examination, 2D Color Doppler, Voluson E10. View: Good view.    Pregnancy  =========    Powell pregnancy. Number of fetuses: 1.    Dating  ======    Cycle: regular cycle  Ultrasound examination on: 10/3/2018  GA by U/S based upon: AC, BPD, Femur, HC  GA by U/S 30 w + 6 d  DAKSHA by U/S: 12/6/2018  Assigned: Dating performed on 05/3/2018, based on the LMP  Assigned GA 30 w + 3 d  Assigned DAKSHA: 12/9/2018    General Evaluation  ==============    Cardiac activity: present.  bpm.  Fetal movements: visualized.  Presentation: breech, head maternal RUQ.  Placenta:  Placental site: posterior, left.  Umbilical cord: Cord vessels: 3 vessel cord.  Amniotic fluid: Amount of AF: normal amount. MVP 7.2 cm.    Fetal Biometry  ============    Fetal Biometry  BPD 78.6 mm 31w 4d Hadlock  .7 mm 32w 6d Cruz  .0 mm 32w 1d Hadlock  .2 mm 30w 4d Hadlock  Femur 55.7 mm 29w 2d Hadlock  EFW 1,565 g 34% Man  Calculated by: Hadlock (BPD-HC-AC-FL)  EFW (lb) 3 lb  EFW (oz) 7 oz  Cephalic index 0.77  HC / AC 1.11  FL / BPD 0.71  FL / AC 0.21  MVP 7.2 cm   bpm    Fetal Anatomy  ============    Cranium: appears normal  Cavum septi pellucidi: normal  Posterior fossa: documented previously  4-chamber view: documented previously  Stomach: normal  Kidneys: normal  Bladder: normal  Wants to know gender: no  Other: A full anatomic survey has been previously performed.    Impression  =========    The fetal anatomic survey was completed today, and no fetal structural  abnormalities were noted. Bladder and CSP appear normal.    Interval fetal growth has been normal, and the AFV is normal.    NOTE:   Patient has history of Hashimoto's thyroiditis, but TSH and Free T4 are normal.  She is on no medications.          F/U as clinically indicated.        Recommendation  ==============    Thank you again for allowing us to participate in the care of your patients. If you have any questions concerning today's consultation, feel free  to contact me or one of my partners. We can be reached at [phone] during normal business hours. If you have a question after normal  business hours, please contact Labor and Delivery at [phone].

## 2018-10-10 ENCOUNTER — ROUTINE PRENATAL (OUTPATIENT)
Dept: OBSTETRICS AND GYNECOLOGY | Facility: CLINIC | Age: 28
End: 2018-10-10
Attending: OBSTETRICS & GYNECOLOGY
Payer: COMMERCIAL

## 2018-10-10 VITALS — WEIGHT: 239 LBS | BODY MASS INDEX: 39.77 KG/M2 | SYSTOLIC BLOOD PRESSURE: 118 MMHG | DIASTOLIC BLOOD PRESSURE: 76 MMHG

## 2018-10-10 DIAGNOSIS — O34.10 UTERINE FIBROID IN PREGNANCY: ICD-10-CM

## 2018-10-10 DIAGNOSIS — E06.3 HASHIMOTO'S THYROIDITIS: ICD-10-CM

## 2018-10-10 DIAGNOSIS — D25.9 UTERINE FIBROID IN PREGNANCY: ICD-10-CM

## 2018-10-10 DIAGNOSIS — Z34.90 PREGNANCY WITH ONE FETUS, ANTEPARTUM: Primary | ICD-10-CM

## 2018-10-10 PROCEDURE — 99999 PR PBB SHADOW E&M-EST. PATIENT-LVL III: CPT | Mod: PBBFAC,,, | Performed by: OBSTETRICS & GYNECOLOGY

## 2018-10-10 PROCEDURE — 0502F SUBSEQUENT PRENATAL CARE: CPT | Mod: S$GLB,,, | Performed by: OBSTETRICS & GYNECOLOGY

## 2018-10-12 NOTE — PROGRESS NOTES
Patient seen and examined.  No complaints, denies VB/LOF/Ctxns, reports good fetal movement. Precautions for Bleeding/ ROM/ Decreased fetal movement/ Pre-E reviewed.  Discussed fibroid and continued need for growth scans.  Doing well with chiropractor.  F/U scheduled 2 weeks

## 2018-10-24 ENCOUNTER — LAB VISIT (OUTPATIENT)
Dept: LAB | Facility: OTHER | Age: 28
End: 2018-10-24
Attending: OBSTETRICS & GYNECOLOGY
Payer: COMMERCIAL

## 2018-10-24 ENCOUNTER — CLINICAL SUPPORT (OUTPATIENT)
Dept: OBSTETRICS AND GYNECOLOGY | Facility: CLINIC | Age: 28
End: 2018-10-24
Attending: OBSTETRICS & GYNECOLOGY
Payer: COMMERCIAL

## 2018-10-24 VITALS
SYSTOLIC BLOOD PRESSURE: 118 MMHG | DIASTOLIC BLOOD PRESSURE: 70 MMHG | WEIGHT: 247.81 LBS | BODY MASS INDEX: 41.24 KG/M2

## 2018-10-24 DIAGNOSIS — J45.20 MILD INTERMITTENT CHILDHOOD ASTHMA WITHOUT COMPLICATION: ICD-10-CM

## 2018-10-24 DIAGNOSIS — E06.3 HASHIMOTO'S THYROIDITIS: ICD-10-CM

## 2018-10-24 DIAGNOSIS — E06.3 HASHIMOTO'S THYROIDITIS: Primary | ICD-10-CM

## 2018-10-24 DIAGNOSIS — O26.03 EXCESSIVE WEIGHT GAIN DURING PREGNANCY IN THIRD TRIMESTER: ICD-10-CM

## 2018-10-24 DIAGNOSIS — N91.2 AMENORRHEA: ICD-10-CM

## 2018-10-24 PROBLEM — J45.909 CHILDHOOD ASTHMA WITHOUT COMPLICATION: Status: ACTIVE | Noted: 2018-10-24

## 2018-10-24 LAB
T4 FREE SERPL-MCNC: 0.78 NG/DL
TSH SERPL DL<=0.005 MIU/L-ACNC: 0.89 UIU/ML

## 2018-10-24 PROCEDURE — 84439 ASSAY OF FREE THYROXINE: CPT

## 2018-10-24 PROCEDURE — 36415 COLL VENOUS BLD VENIPUNCTURE: CPT

## 2018-10-24 PROCEDURE — 99999 PR PBB SHADOW E&M-EST. PATIENT-LVL III: CPT | Mod: PBBFAC,,, | Performed by: OBSTETRICS & GYNECOLOGY

## 2018-10-24 PROCEDURE — 0502F PR SUBSEQUENT PRENATAL CARE: ICD-10-PCS | Mod: S$GLB,,, | Performed by: OBSTETRICS & GYNECOLOGY

## 2018-10-24 PROCEDURE — 90715 TDAP VACCINE 7 YRS/> IM: CPT | Mod: S$GLB,,, | Performed by: OBSTETRICS & GYNECOLOGY

## 2018-10-24 PROCEDURE — 99999 PR PBB SHADOW E&M-EST. PATIENT-LVL III: ICD-10-PCS | Mod: PBBFAC,,, | Performed by: OBSTETRICS & GYNECOLOGY

## 2018-10-24 PROCEDURE — 0502F SUBSEQUENT PRENATAL CARE: CPT | Mod: S$GLB,,, | Performed by: OBSTETRICS & GYNECOLOGY

## 2018-10-24 PROCEDURE — 90471 IMMUNIZATION ADMIN: CPT | Mod: S$GLB,,, | Performed by: OBSTETRICS & GYNECOLOGY

## 2018-10-24 PROCEDURE — 99999 PR PBB SHADOW E&M-EST. PATIENT-LVL II: CPT | Mod: PBBFAC,,,

## 2018-10-24 PROCEDURE — 81220 CFTR GENE COM VARIANTS: CPT

## 2018-10-24 PROCEDURE — 84443 ASSAY THYROID STIM HORMONE: CPT

## 2018-10-24 RX ORDER — ALBUTEROL SULFATE 90 UG/1
2 AEROSOL, METERED RESPIRATORY (INHALATION) EVERY 6 HOURS PRN
Qty: 18 G | Refills: 0 | Status: SHIPPED | OUTPATIENT
Start: 2018-10-24 | End: 2018-11-28

## 2018-10-24 NOTE — LETTER
October 24, 2018    Armida Medina  [address] [facility] [place] [address]             [facility] [address]  [address] [place] [address] [address]  [facility] [address]  Phone: [phone]  Fax: [phone] To Whom It May Concern:    Armida Medina is currently pregnant. She is currently 33 weeks and 3 days as today and patient estimated due date is 12/9/2018.If you have any questions or concerns, please don't hesitate to call [phone].          Sincerely,          Fiorella Carpenter DO

## 2018-10-25 ENCOUNTER — TELEPHONE (OUTPATIENT)
Dept: OBSTETRICS AND GYNECOLOGY | Facility: CLINIC | Age: 28
End: 2018-10-25

## 2018-10-25 ENCOUNTER — PATIENT MESSAGE (OUTPATIENT)
Dept: OBSTETRICS AND GYNECOLOGY | Facility: CLINIC | Age: 28
End: 2018-10-25

## 2018-10-27 LAB — CFTR MUT ANL BLD/T: NORMAL

## 2018-11-05 ENCOUNTER — PROCEDURE VISIT (OUTPATIENT)
Dept: MATERNAL FETAL MEDICINE | Facility: CLINIC | Age: 28
End: 2018-11-05
Attending: OBSTETRICS & GYNECOLOGY
Payer: COMMERCIAL

## 2018-11-05 DIAGNOSIS — O34.10 UTERINE FIBROID IN PREGNANCY: ICD-10-CM

## 2018-11-05 DIAGNOSIS — D25.9 UTERINE FIBROID IN PREGNANCY: ICD-10-CM

## 2018-11-05 PROCEDURE — 99499 UNLISTED E&M SERVICE: CPT | Mod: S$GLB,,, | Performed by: OBSTETRICS & GYNECOLOGY

## 2018-11-05 PROCEDURE — 76816 OB US FOLLOW-UP PER FETUS: CPT | Mod: S$GLB,,, | Performed by: OBSTETRICS & GYNECOLOGY

## 2018-11-08 ENCOUNTER — ROUTINE PRENATAL (OUTPATIENT)
Dept: OBSTETRICS AND GYNECOLOGY | Facility: CLINIC | Age: 28
End: 2018-11-08
Attending: OBSTETRICS & GYNECOLOGY
Payer: COMMERCIAL

## 2018-11-08 ENCOUNTER — LAB VISIT (OUTPATIENT)
Dept: LAB | Facility: OTHER | Age: 28
End: 2018-11-08
Attending: OBSTETRICS & GYNECOLOGY
Payer: COMMERCIAL

## 2018-11-08 ENCOUNTER — PATIENT MESSAGE (OUTPATIENT)
Dept: OBSTETRICS AND GYNECOLOGY | Facility: CLINIC | Age: 28
End: 2018-11-08

## 2018-11-08 VITALS
WEIGHT: 248.69 LBS | SYSTOLIC BLOOD PRESSURE: 110 MMHG | BODY MASS INDEX: 41.38 KG/M2 | DIASTOLIC BLOOD PRESSURE: 76 MMHG

## 2018-11-08 DIAGNOSIS — Z34.93 PREGNANCY WITH ONE FETUS IN THIRD TRIMESTER: ICD-10-CM

## 2018-11-08 DIAGNOSIS — Z34.93 PREGNANCY WITH ONE FETUS IN THIRD TRIMESTER: Primary | ICD-10-CM

## 2018-11-08 LAB
BASOPHILS # BLD AUTO: 0.02 K/UL
BASOPHILS NFR BLD: 0.2 %
DIFFERENTIAL METHOD: ABNORMAL
EOSINOPHIL # BLD AUTO: 0.1 K/UL
EOSINOPHIL NFR BLD: 0.5 %
ERYTHROCYTE [DISTWIDTH] IN BLOOD BY AUTOMATED COUNT: 14.8 %
HCT VFR BLD AUTO: 38.5 %
HGB BLD-MCNC: 12.1 G/DL
LYMPHOCYTES # BLD AUTO: 1.5 K/UL
LYMPHOCYTES NFR BLD: 12.1 %
MCH RBC QN AUTO: 27.4 PG
MCHC RBC AUTO-ENTMCNC: 31.4 G/DL
MCV RBC AUTO: 87 FL
MONOCYTES # BLD AUTO: 0.8 K/UL
MONOCYTES NFR BLD: 6.1 %
NEUTROPHILS # BLD AUTO: 10.2 K/UL
NEUTROPHILS NFR BLD: 80.8 %
PLATELET # BLD AUTO: 235 K/UL
PMV BLD AUTO: 13.2 FL
RBC # BLD AUTO: 4.42 M/UL
RPR SER QL: NORMAL
WBC # BLD AUTO: 12.56 K/UL

## 2018-11-08 PROCEDURE — 85025 COMPLETE CBC W/AUTO DIFF WBC: CPT

## 2018-11-08 PROCEDURE — 36415 COLL VENOUS BLD VENIPUNCTURE: CPT

## 2018-11-08 PROCEDURE — 86703 HIV-1/HIV-2 1 RESULT ANTBDY: CPT

## 2018-11-08 PROCEDURE — 99999 PR PBB SHADOW E&M-EST. PATIENT-LVL III: CPT | Mod: PBBFAC,,, | Performed by: OBSTETRICS & GYNECOLOGY

## 2018-11-08 PROCEDURE — 87081 CULTURE SCREEN ONLY: CPT

## 2018-11-08 PROCEDURE — 86592 SYPHILIS TEST NON-TREP QUAL: CPT

## 2018-11-08 PROCEDURE — 0502F SUBSEQUENT PRENATAL CARE: CPT | Mod: S$GLB,,, | Performed by: OBSTETRICS & GYNECOLOGY

## 2018-11-08 NOTE — PROGRESS NOTES
Patient seen and examined.  No complaints, denies VB/LOF/Ctxns, reports good fetal movement. Precautions for Bleeding/ ROM/ Decreased fetal movement/ Pre-E reviewed.  Discussed labs  options for management of breech discussed- will consider and let me know.  F/U scheduled 1 weeks

## 2018-11-09 LAB — HIV 1+2 AB+HIV1 P24 AG SERPL QL IA: NEGATIVE

## 2018-11-10 LAB — BACTERIA SPEC AEROBE CULT: NORMAL

## 2018-11-16 ENCOUNTER — ROUTINE PRENATAL (OUTPATIENT)
Dept: OBSTETRICS AND GYNECOLOGY | Facility: CLINIC | Age: 28
End: 2018-11-16
Attending: OBSTETRICS & GYNECOLOGY
Payer: COMMERCIAL

## 2018-11-16 VITALS
BODY MASS INDEX: 42.59 KG/M2 | DIASTOLIC BLOOD PRESSURE: 70 MMHG | WEIGHT: 255.94 LBS | SYSTOLIC BLOOD PRESSURE: 108 MMHG

## 2018-11-16 DIAGNOSIS — E06.3 HASHIMOTO'S DISEASE: Primary | ICD-10-CM

## 2018-11-16 PROCEDURE — 0502F SUBSEQUENT PRENATAL CARE: CPT | Mod: S$GLB,,, | Performed by: OBSTETRICS & GYNECOLOGY

## 2018-11-16 PROCEDURE — 99999 PR PBB SHADOW E&M-EST. PATIENT-LVL III: CPT | Mod: PBBFAC,,, | Performed by: OBSTETRICS & GYNECOLOGY

## 2018-11-16 NOTE — PROGRESS NOTES
Patient seen and examined.  No complaints, denies VB/LOF/Ctxns, reports good fetal movement. Precautions for Bleeding/ ROM/ Decreased fetal movement/ Pre-E reviewed.  Verified still breech on U/S today  F/U scheduled 1 weeks

## 2018-11-21 ENCOUNTER — ANESTHESIA EVENT (OUTPATIENT)
Dept: OBSTETRICS AND GYNECOLOGY | Facility: OTHER | Age: 28
End: 2018-11-21
Payer: COMMERCIAL

## 2018-11-21 ENCOUNTER — HOSPITAL ENCOUNTER (INPATIENT)
Facility: OTHER | Age: 28
LOS: 3 days | Discharge: HOME | End: 2018-11-24
Attending: OBSTETRICS & GYNECOLOGY | Admitting: OBSTETRICS & GYNECOLOGY
Payer: COMMERCIAL

## 2018-11-21 ENCOUNTER — ROUTINE PRENATAL (OUTPATIENT)
Dept: OBSTETRICS AND GYNECOLOGY | Facility: CLINIC | Age: 28
End: 2018-11-21
Payer: COMMERCIAL

## 2018-11-21 ENCOUNTER — ANESTHESIA (OUTPATIENT)
Dept: OBSTETRICS AND GYNECOLOGY | Facility: OTHER | Age: 28
End: 2018-11-21
Payer: COMMERCIAL

## 2018-11-21 VITALS
BODY MASS INDEX: 42.89 KG/M2 | DIASTOLIC BLOOD PRESSURE: 92 MMHG | WEIGHT: 257.69 LBS | SYSTOLIC BLOOD PRESSURE: 140 MMHG

## 2018-11-21 DIAGNOSIS — Z98.891 S/P CESAREAN SECTION: Primary | ICD-10-CM

## 2018-11-21 DIAGNOSIS — O14.13 PREECLAMPSIA, SEVERE, THIRD TRIMESTER: ICD-10-CM

## 2018-11-21 DIAGNOSIS — Z3A.37 37 WEEKS GESTATION OF PREGNANCY: ICD-10-CM

## 2018-11-21 DIAGNOSIS — Z3A.37 37 WEEKS GESTATION OF PREGNANCY: Primary | ICD-10-CM

## 2018-11-21 LAB
ABO + RH BLD: NORMAL
ALBUMIN SERPL BCP-MCNC: 2.6 G/DL
ALP SERPL-CCNC: 154 U/L
ALT SERPL W/O P-5'-P-CCNC: 9 U/L
ANION GAP SERPL CALC-SCNC: 9 MMOL/L
AST SERPL-CCNC: 11 U/L
BASOPHILS # BLD AUTO: 0.02 K/UL
BASOPHILS NFR BLD: 0.1 %
BILIRUB SERPL-MCNC: 0.1 MG/DL
BLD GP AB SCN CELLS X3 SERPL QL: NORMAL
BUN SERPL-MCNC: 12 MG/DL
CALCIUM SERPL-MCNC: 9.5 MG/DL
CHLORIDE SERPL-SCNC: 105 MMOL/L
CO2 SERPL-SCNC: 23 MMOL/L
CREAT SERPL-MCNC: 0.7 MG/DL
CREAT UR-MCNC: 138.2 MG/DL
DIFFERENTIAL METHOD: ABNORMAL
EOSINOPHIL # BLD AUTO: 0.1 K/UL
EOSINOPHIL NFR BLD: 0.7 %
ERYTHROCYTE [DISTWIDTH] IN BLOOD BY AUTOMATED COUNT: 15.1 %
EST. GFR  (AFRICAN AMERICAN): >60 ML/MIN/1.73 M^2
EST. GFR  (NON AFRICAN AMERICAN): >60 ML/MIN/1.73 M^2
GLUCOSE SERPL-MCNC: 85 MG/DL
HCT VFR BLD AUTO: 37.6 %
HGB BLD-MCNC: 12 G/DL
LYMPHOCYTES # BLD AUTO: 2.1 K/UL
LYMPHOCYTES NFR BLD: 15.8 %
MCH RBC QN AUTO: 27.8 PG
MCHC RBC AUTO-ENTMCNC: 31.9 G/DL
MCV RBC AUTO: 87 FL
MONOCYTES # BLD AUTO: 0.9 K/UL
MONOCYTES NFR BLD: 6.7 %
NEUTROPHILS # BLD AUTO: 10.2 K/UL
NEUTROPHILS NFR BLD: 76.4 %
PLATELET # BLD AUTO: 220 K/UL
PMV BLD AUTO: 12.9 FL
POTASSIUM SERPL-SCNC: 4.4 MMOL/L
PROT SERPL-MCNC: 7.1 G/DL
PROT UR-MCNC: 896 MG/DL
PROT/CREAT UR: 6.48 MG/G{CREAT}
RBC # BLD AUTO: 4.32 M/UL
SODIUM SERPL-SCNC: 137 MMOL/L
WBC # BLD AUTO: 13.37 K/UL

## 2018-11-21 PROCEDURE — 96374 THER/PROPH/DIAG INJ IV PUSH: CPT

## 2018-11-21 PROCEDURE — 82570 ASSAY OF URINE CREATININE: CPT | Mod: 91

## 2018-11-21 PROCEDURE — 86901 BLOOD TYPING SEROLOGIC RH(D): CPT

## 2018-11-21 PROCEDURE — 99285 EMERGENCY DEPT VISIT HI MDM: CPT | Mod: 25

## 2018-11-21 PROCEDURE — 37000009 HC ANESTHESIA EA ADD 15 MINS: Performed by: OBSTETRICS & GYNECOLOGY

## 2018-11-21 PROCEDURE — 59510 CESAREAN DELIVERY: CPT | Mod: ,,, | Performed by: ANESTHESIOLOGY

## 2018-11-21 PROCEDURE — 63600175 PHARM REV CODE 636 W HCPCS: Performed by: STUDENT IN AN ORGANIZED HEALTH CARE EDUCATION/TRAINING PROGRAM

## 2018-11-21 PROCEDURE — 25000003 PHARM REV CODE 250: Performed by: OBSTETRICS & GYNECOLOGY

## 2018-11-21 PROCEDURE — 27800517 HC TRAY,EPIDURAL-CONTINUOUS: Performed by: ANESTHESIOLOGY

## 2018-11-21 PROCEDURE — 37000008 HC ANESTHESIA 1ST 15 MINUTES: Performed by: OBSTETRICS & GYNECOLOGY

## 2018-11-21 PROCEDURE — 99999 PR PBB SHADOW E&M-EST. PATIENT-LVL III: CPT | Mod: PBBFAC,,, | Performed by: NURSE PRACTITIONER

## 2018-11-21 PROCEDURE — 36000684 HC CESAREAN SECTION, UNSCHEDULED

## 2018-11-21 PROCEDURE — 11000001 HC ACUTE MED/SURG PRIVATE ROOM

## 2018-11-21 PROCEDURE — 63600175 PHARM REV CODE 636 W HCPCS: Performed by: ANESTHESIOLOGY

## 2018-11-21 PROCEDURE — 59510 CESAREAN DELIVERY: CPT | Mod: ,,, | Performed by: OBSTETRICS & GYNECOLOGY

## 2018-11-21 PROCEDURE — 25000003 PHARM REV CODE 250: Performed by: STUDENT IN AN ORGANIZED HEALTH CARE EDUCATION/TRAINING PROGRAM

## 2018-11-21 PROCEDURE — 51702 INSERT TEMP BLADDER CATH: CPT

## 2018-11-21 PROCEDURE — 0502F SUBSEQUENT PRENATAL CARE: CPT | Mod: S$GLB,,, | Performed by: NURSE PRACTITIONER

## 2018-11-21 PROCEDURE — 63600175 PHARM REV CODE 636 W HCPCS: Performed by: OBSTETRICS & GYNECOLOGY

## 2018-11-21 PROCEDURE — 85025 COMPLETE CBC W/AUTO DIFF WBC: CPT | Mod: 91

## 2018-11-21 PROCEDURE — 80053 COMPREHEN METABOLIC PANEL: CPT | Mod: 91

## 2018-11-21 PROCEDURE — 96376 TX/PRO/DX INJ SAME DRUG ADON: CPT

## 2018-11-21 PROCEDURE — 25000003 PHARM REV CODE 250: Performed by: ANESTHESIOLOGY

## 2018-11-21 RX ORDER — FENTANYL/BUPIVACAINE/NS/PF 2MCG/ML-.1
PLASTIC BAG, INJECTION (ML) INJECTION
Status: DISPENSED
Start: 2018-11-21 | End: 2018-11-22

## 2018-11-21 RX ORDER — OXYTOCIN/RINGER'S LACTATE 20/1000 ML
41.7 PLASTIC BAG, INJECTION (ML) INTRAVENOUS CONTINUOUS
Status: ACTIVE | OUTPATIENT
Start: 2018-11-21 | End: 2018-11-21

## 2018-11-21 RX ORDER — ACETAMINOPHEN 10 MG/ML
INJECTION, SOLUTION INTRAVENOUS
Status: DISCONTINUED | OUTPATIENT
Start: 2018-11-21 | End: 2018-11-21

## 2018-11-21 RX ORDER — MAGNESIUM SULFATE HEPTAHYDRATE 40 MG/ML
2 INJECTION, SOLUTION INTRAVENOUS CONTINUOUS
Status: DISCONTINUED | OUTPATIENT
Start: 2018-11-21 | End: 2018-11-22

## 2018-11-21 RX ORDER — LABETALOL HYDROCHLORIDE 5 MG/ML
40 INJECTION, SOLUTION INTRAVENOUS ONCE
Status: COMPLETED | OUTPATIENT
Start: 2018-11-21 | End: 2018-11-21

## 2018-11-21 RX ORDER — OXYTOCIN/RINGER'S LACTATE 20/1000 ML
333 PLASTIC BAG, INJECTION (ML) INTRAVENOUS CONTINUOUS
Status: ACTIVE | OUTPATIENT
Start: 2018-11-21 | End: 2018-11-21

## 2018-11-21 RX ORDER — ONDANSETRON HYDROCHLORIDE 4 MG/5ML
4 SOLUTION ORAL ONCE
Status: DISCONTINUED | OUTPATIENT
Start: 2018-11-21 | End: 2018-11-24 | Stop reason: HOSPADM

## 2018-11-21 RX ORDER — SODIUM CHLORIDE, SODIUM LACTATE, POTASSIUM CHLORIDE, CALCIUM CHLORIDE 600; 310; 30; 20 MG/100ML; MG/100ML; MG/100ML; MG/100ML
INJECTION, SOLUTION INTRAVENOUS CONTINUOUS PRN
Status: DISCONTINUED | OUTPATIENT
Start: 2018-11-21 | End: 2018-11-21

## 2018-11-21 RX ORDER — ACETAMINOPHEN 500 MG
1000 TABLET ORAL ONCE
Status: COMPLETED | OUTPATIENT
Start: 2018-11-21 | End: 2018-11-21

## 2018-11-21 RX ORDER — ONDANSETRON 2 MG/ML
INJECTION INTRAMUSCULAR; INTRAVENOUS
Status: DISCONTINUED | OUTPATIENT
Start: 2018-11-21 | End: 2018-11-21

## 2018-11-21 RX ORDER — MORPHINE SULFATE 0.5 MG/ML
INJECTION, SOLUTION EPIDURAL; INTRATHECAL; INTRAVENOUS
Status: DISCONTINUED | OUTPATIENT
Start: 2018-11-21 | End: 2018-11-21

## 2018-11-21 RX ORDER — ALBUTEROL SULFATE 0.83 MG/ML
2.5 SOLUTION RESPIRATORY (INHALATION) EVERY 4 HOURS PRN
Status: CANCELLED | OUTPATIENT
Start: 2018-11-21

## 2018-11-21 RX ORDER — ONDANSETRON 8 MG/1
8 TABLET, ORALLY DISINTEGRATING ORAL EVERY 8 HOURS PRN
Status: CANCELLED | OUTPATIENT
Start: 2018-11-21

## 2018-11-21 RX ORDER — MAGNESIUM SULFATE HEPTAHYDRATE 40 MG/ML
4 INJECTION, SOLUTION INTRAVENOUS ONCE
Status: COMPLETED | OUTPATIENT
Start: 2018-11-21 | End: 2018-11-21

## 2018-11-21 RX ORDER — SODIUM CITRATE AND CITRIC ACID MONOHYDRATE 334; 500 MG/5ML; MG/5ML
30 SOLUTION ORAL
Status: DISCONTINUED | OUTPATIENT
Start: 2018-11-21 | End: 2018-11-24 | Stop reason: HOSPADM

## 2018-11-21 RX ORDER — LABETALOL HYDROCHLORIDE 5 MG/ML
80 INJECTION, SOLUTION INTRAVENOUS ONCE
Status: COMPLETED | OUTPATIENT
Start: 2018-11-21 | End: 2018-11-21

## 2018-11-21 RX ORDER — SODIUM CHLORIDE, SODIUM LACTATE, POTASSIUM CHLORIDE, CALCIUM CHLORIDE 600; 310; 30; 20 MG/100ML; MG/100ML; MG/100ML; MG/100ML
1000 INJECTION, SOLUTION INTRAVENOUS CONTINUOUS
Status: ACTIVE | OUTPATIENT
Start: 2018-11-21 | End: 2018-11-22

## 2018-11-21 RX ORDER — OXYTOCIN/RINGER'S LACTATE 20/1000 ML
10 PLASTIC BAG, INJECTION (ML) INTRAVENOUS ONCE
Status: DISCONTINUED | OUTPATIENT
Start: 2018-11-21 | End: 2018-11-24 | Stop reason: HOSPADM

## 2018-11-21 RX ORDER — ONDANSETRON 2 MG/ML
INJECTION INTRAMUSCULAR; INTRAVENOUS
Status: COMPLETED
Start: 2018-11-21 | End: 2018-11-21

## 2018-11-21 RX ORDER — ONDANSETRON 2 MG/ML
4 INJECTION INTRAMUSCULAR; INTRAVENOUS ONCE
Status: COMPLETED | OUTPATIENT
Start: 2018-11-21 | End: 2018-11-21

## 2018-11-21 RX ORDER — MUPIROCIN 20 MG/G
OINTMENT TOPICAL
Status: CANCELLED | OUTPATIENT
Start: 2018-11-21

## 2018-11-21 RX ORDER — FENTANYL CITRATE 50 UG/ML
INJECTION, SOLUTION INTRAMUSCULAR; INTRAVENOUS
Status: DISCONTINUED | OUTPATIENT
Start: 2018-11-21 | End: 2018-11-21

## 2018-11-21 RX ORDER — MISOPROSTOL 200 UG/1
800 TABLET ORAL
Status: DISCONTINUED | OUTPATIENT
Start: 2018-11-21 | End: 2018-11-24 | Stop reason: HOSPADM

## 2018-11-21 RX ORDER — NIFEDIPINE 20 MG/1
20 CAPSULE ORAL ONCE
Status: DISCONTINUED | OUTPATIENT
Start: 2018-11-21 | End: 2018-11-21

## 2018-11-21 RX ORDER — NALBUPHINE HYDROCHLORIDE 10 MG/ML
INJECTION, SOLUTION INTRAMUSCULAR; INTRAVENOUS; SUBCUTANEOUS
Status: DISCONTINUED | OUTPATIENT
Start: 2018-11-21 | End: 2018-11-21

## 2018-11-21 RX ORDER — BUPIVACAINE HYDROCHLORIDE 2.5 MG/ML
INJECTION, SOLUTION EPIDURAL; INFILTRATION; INTRACAUDAL
Status: DISPENSED
Start: 2018-11-21 | End: 2018-11-22

## 2018-11-21 RX ORDER — SODIUM CHLORIDE, SODIUM LACTATE, POTASSIUM CHLORIDE, CALCIUM CHLORIDE 600; 310; 30; 20 MG/100ML; MG/100ML; MG/100ML; MG/100ML
INJECTION, SOLUTION INTRAVENOUS CONTINUOUS
Status: DISCONTINUED | OUTPATIENT
Start: 2018-11-21 | End: 2018-11-24 | Stop reason: HOSPADM

## 2018-11-21 RX ORDER — OXYTOCIN/RINGER'S LACTATE 20/1000 ML
41.65 PLASTIC BAG, INJECTION (ML) INTRAVENOUS CONTINUOUS
Status: DISPENSED | OUTPATIENT
Start: 2018-11-21 | End: 2018-11-22

## 2018-11-21 RX ORDER — CEFAZOLIN SODIUM 1 G/3ML
INJECTION, POWDER, FOR SOLUTION INTRAMUSCULAR; INTRAVENOUS
Status: DISCONTINUED | OUTPATIENT
Start: 2018-11-21 | End: 2018-11-21

## 2018-11-21 RX ORDER — FENTANYL CITRATE 50 UG/ML
INJECTION, SOLUTION INTRAMUSCULAR; INTRAVENOUS
Status: COMPLETED
Start: 2018-11-21 | End: 2018-11-21

## 2018-11-21 RX ORDER — LIDOCAINE HCL/EPINEPHRINE/PF 2%-1:200K
VIAL (ML) INJECTION
Status: DISCONTINUED | OUTPATIENT
Start: 2018-11-21 | End: 2018-11-21

## 2018-11-21 RX ORDER — OXYTOCIN 10 [USP'U]/ML
INJECTION, SOLUTION INTRAMUSCULAR; INTRAVENOUS
Status: DISCONTINUED | OUTPATIENT
Start: 2018-11-21 | End: 2018-11-21

## 2018-11-21 RX ORDER — ONDANSETRON 2 MG/ML
4 INJECTION INTRAMUSCULAR; INTRAVENOUS EVERY 6 HOURS PRN
Status: DISCONTINUED | OUTPATIENT
Start: 2018-11-21 | End: 2018-11-24 | Stop reason: HOSPADM

## 2018-11-21 RX ORDER — CALCIUM GLUCONATE 98 MG/ML
1 INJECTION, SOLUTION INTRAVENOUS
Status: DISCONTINUED | OUTPATIENT
Start: 2018-11-21 | End: 2018-11-22

## 2018-11-21 RX ORDER — NIFEDIPINE 10 MG/1
10 CAPSULE ORAL ONCE
Status: COMPLETED | OUTPATIENT
Start: 2018-11-21 | End: 2018-11-21

## 2018-11-21 RX ORDER — NALBUPHINE HYDROCHLORIDE 10 MG/ML
INJECTION, SOLUTION INTRAMUSCULAR; INTRAVENOUS; SUBCUTANEOUS
Status: COMPLETED
Start: 2018-11-21 | End: 2018-11-21

## 2018-11-21 RX ORDER — LABETALOL HYDROCHLORIDE 5 MG/ML
20 INJECTION, SOLUTION INTRAVENOUS ONCE
Status: COMPLETED | OUTPATIENT
Start: 2018-11-21 | End: 2018-11-21

## 2018-11-21 RX ORDER — PHENYLEPHRINE HYDROCHLORIDE 10 MG/ML
INJECTION INTRAVENOUS
Status: DISCONTINUED | OUTPATIENT
Start: 2018-11-21 | End: 2018-11-21

## 2018-11-21 RX ADMIN — SODIUM CHLORIDE, SODIUM LACTATE, POTASSIUM CHLORIDE, AND CALCIUM CHLORIDE 1000 ML: .6; .31; .03; .02 INJECTION, SOLUTION INTRAVENOUS at 06:11

## 2018-11-21 RX ADMIN — Medication 5 ML: at 07:11

## 2018-11-21 RX ADMIN — LIDOCAINE HYDROCHLORIDE,EPINEPHRINE BITARTRATE 10 ML: 20; .005 INJECTION, SOLUTION EPIDURAL; INFILTRATION; INTRACAUDAL; PERINEURAL at 08:11

## 2018-11-21 RX ADMIN — LABETALOL HYDROCHLORIDE 40 MG: 5 INJECTION INTRAVENOUS at 06:11

## 2018-11-21 RX ADMIN — SODIUM CITRATE AND CITRIC ACID MONOHYDRATE 30 ML: 500; 334 SOLUTION ORAL at 06:11

## 2018-11-21 RX ADMIN — FENTANYL CITRATE 100 MCG: 50 INJECTION, SOLUTION INTRAMUSCULAR; INTRAVENOUS at 07:11

## 2018-11-21 RX ADMIN — OXYTOCIN 3 UNITS: 10 INJECTION, SOLUTION INTRAMUSCULAR; INTRAVENOUS at 08:11

## 2018-11-21 RX ADMIN — LIDOCAINE HYDROCHLORIDE,EPINEPHRINE BITARTRATE 5 ML: 20; .005 INJECTION, SOLUTION EPIDURAL; INFILTRATION; INTRACAUDAL; PERINEURAL at 08:11

## 2018-11-21 RX ADMIN — LABETALOL HYDROCHLORIDE 80 MG: 5 INJECTION INTRAVENOUS at 06:11

## 2018-11-21 RX ADMIN — Medication 1.5 MG: at 08:11

## 2018-11-21 RX ADMIN — MAGNESIUM SULFATE HEPTAHYDRATE 4 G: 40 INJECTION, SOLUTION INTRAVENOUS at 06:11

## 2018-11-21 RX ADMIN — CEFAZOLIN 2 G: 330 INJECTION, POWDER, FOR SOLUTION INTRAMUSCULAR; INTRAVENOUS at 08:11

## 2018-11-21 RX ADMIN — PHENYLEPHRINE HYDROCHLORIDE 100 MCG: 10 INJECTION INTRAVENOUS at 08:11

## 2018-11-21 RX ADMIN — PROMETHAZINE HYDROCHLORIDE 6.25 MG: 25 INJECTION INTRAMUSCULAR; INTRAVENOUS at 11:11

## 2018-11-21 RX ADMIN — ACETAMINOPHEN 1000 MG: 500 TABLET, FILM COATED ORAL at 05:11

## 2018-11-21 RX ADMIN — PHENYLEPHRINE HYDROCHLORIDE 100 MCG: 10 INJECTION INTRAVENOUS at 09:11

## 2018-11-21 RX ADMIN — ONDANSETRON 4 MG: 2 INJECTION INTRAMUSCULAR; INTRAVENOUS at 11:11

## 2018-11-21 RX ADMIN — NIFEDIPINE 10 MG: 10 CAPSULE, LIQUID FILLED ORAL at 05:11

## 2018-11-21 RX ADMIN — ONDANSETRON 4 MG: 2 INJECTION INTRAMUSCULAR; INTRAVENOUS at 08:11

## 2018-11-21 RX ADMIN — ACETAMINOPHEN 1000 MG: 10 INJECTION, SOLUTION INTRAVENOUS at 08:11

## 2018-11-21 RX ADMIN — Medication 41.65 MILLI-UNITS/MIN: at 09:11

## 2018-11-21 RX ADMIN — PHENYLEPHRINE HYDROCHLORIDE 200 MCG: 10 INJECTION INTRAVENOUS at 08:11

## 2018-11-21 RX ADMIN — NALBUPHINE HYDROCHLORIDE 2.5 MG: 10 INJECTION, SOLUTION INTRAMUSCULAR; INTRAVENOUS; SUBCUTANEOUS at 08:11

## 2018-11-21 RX ADMIN — MAGNESIUM SULFATE HEPTAHYDRATE 2 G/HR: 40 INJECTION, SOLUTION INTRAVENOUS at 09:11

## 2018-11-21 RX ADMIN — SODIUM CHLORIDE, SODIUM LACTATE, POTASSIUM CHLORIDE, AND CALCIUM CHLORIDE: 600; 310; 30; 20 INJECTION, SOLUTION INTRAVENOUS at 08:11

## 2018-11-21 RX ADMIN — LABETALOL HYDROCHLORIDE 20 MG: 5 INJECTION INTRAVENOUS at 06:11

## 2018-11-21 NOTE — PROGRESS NOTES
Here for routine OB appt at 37w3d, with no complaints.  Reports good FM.  Denies LOF, denies VB, denies contractions. Baby still breech, planning for scheduled c/s on 12/4.   Reviewed warning signs of Labor and Preeclampsia.  Daily FM counts reinforced. Reviewed labs and GBS negative. Initial BP elevated 140/92, repeat 150/100. Needs thyroid labs today. Denies headaches, vision changes, or chest pain.  F/U scheduled 1 week  Labs today including pre-eclampsia labs

## 2018-11-22 LAB
BASOPHILS # BLD AUTO: 0.02 K/UL
BASOPHILS NFR BLD: 0.1 %
DIFFERENTIAL METHOD: ABNORMAL
EOSINOPHIL # BLD AUTO: 0 K/UL
EOSINOPHIL NFR BLD: 0 %
ERYTHROCYTE [DISTWIDTH] IN BLOOD BY AUTOMATED COUNT: 15.3 %
HCT VFR BLD AUTO: 32.1 %
HGB BLD-MCNC: 10.3 G/DL
LYMPHOCYTES # BLD AUTO: 1.8 K/UL
LYMPHOCYTES NFR BLD: 10 %
MCH RBC QN AUTO: 28.1 PG
MCHC RBC AUTO-ENTMCNC: 32.1 G/DL
MCV RBC AUTO: 88 FL
MONOCYTES # BLD AUTO: 0.8 K/UL
MONOCYTES NFR BLD: 4.1 %
NEUTROPHILS # BLD AUTO: 15.7 K/UL
NEUTROPHILS NFR BLD: 85.5 %
PLATELET # BLD AUTO: 198 K/UL
PMV BLD AUTO: 12.6 FL
RBC # BLD AUTO: 3.67 M/UL
WBC # BLD AUTO: 18.38 K/UL

## 2018-11-22 PROCEDURE — 25000003 PHARM REV CODE 250: Performed by: STUDENT IN AN ORGANIZED HEALTH CARE EDUCATION/TRAINING PROGRAM

## 2018-11-22 PROCEDURE — 36415 COLL VENOUS BLD VENIPUNCTURE: CPT

## 2018-11-22 PROCEDURE — 63600175 PHARM REV CODE 636 W HCPCS: Performed by: STUDENT IN AN ORGANIZED HEALTH CARE EDUCATION/TRAINING PROGRAM

## 2018-11-22 PROCEDURE — 11000001 HC ACUTE MED/SURG PRIVATE ROOM

## 2018-11-22 PROCEDURE — 85025 COMPLETE CBC W/AUTO DIFF WBC: CPT

## 2018-11-22 RX ORDER — OXYCODONE HYDROCHLORIDE 5 MG/1
10 TABLET ORAL EVERY 4 HOURS PRN
Status: DISCONTINUED | OUTPATIENT
Start: 2018-11-22 | End: 2018-11-24 | Stop reason: HOSPADM

## 2018-11-22 RX ORDER — CETIRIZINE HYDROCHLORIDE 5 MG/1
5 TABLET ORAL DAILY
Status: DISCONTINUED | OUTPATIENT
Start: 2018-11-22 | End: 2018-11-24 | Stop reason: HOSPADM

## 2018-11-22 RX ORDER — DIPHENHYDRAMINE HCL 25 MG
25 CAPSULE ORAL EVERY 4 HOURS PRN
Status: DISCONTINUED | OUTPATIENT
Start: 2018-11-22 | End: 2018-11-24 | Stop reason: HOSPADM

## 2018-11-22 RX ORDER — OXYCODONE AND ACETAMINOPHEN 5; 325 MG/1; MG/1
1 TABLET ORAL EVERY 4 HOURS PRN
Status: DISCONTINUED | OUTPATIENT
Start: 2018-11-22 | End: 2018-11-24 | Stop reason: HOSPADM

## 2018-11-22 RX ORDER — SODIUM CHLORIDE, SODIUM LACTATE, POTASSIUM CHLORIDE, CALCIUM CHLORIDE 600; 310; 30; 20 MG/100ML; MG/100ML; MG/100ML; MG/100ML
INJECTION, SOLUTION INTRAVENOUS CONTINUOUS
Status: ACTIVE | OUTPATIENT
Start: 2018-11-22 | End: 2018-11-22

## 2018-11-22 RX ORDER — HYDROCORTISONE 25 MG/G
CREAM TOPICAL 3 TIMES DAILY PRN
Status: DISCONTINUED | OUTPATIENT
Start: 2018-11-22 | End: 2018-11-24 | Stop reason: HOSPADM

## 2018-11-22 RX ORDER — MUPIROCIN 20 MG/G
1 OINTMENT TOPICAL 2 TIMES DAILY
Status: DISCONTINUED | OUTPATIENT
Start: 2018-11-22 | End: 2018-11-24 | Stop reason: HOSPADM

## 2018-11-22 RX ORDER — DIPHENHYDRAMINE HYDROCHLORIDE 50 MG/ML
25 INJECTION INTRAMUSCULAR; INTRAVENOUS EVERY 4 HOURS PRN
Status: DISCONTINUED | OUTPATIENT
Start: 2018-11-22 | End: 2018-11-24 | Stop reason: HOSPADM

## 2018-11-22 RX ORDER — ACETAMINOPHEN 325 MG/1
650 TABLET ORAL EVERY 6 HOURS PRN
Status: DISCONTINUED | OUTPATIENT
Start: 2018-11-22 | End: 2018-11-24 | Stop reason: HOSPADM

## 2018-11-22 RX ORDER — OXYCODONE HYDROCHLORIDE 5 MG/1
5 TABLET ORAL EVERY 4 HOURS PRN
Status: DISCONTINUED | OUTPATIENT
Start: 2018-11-22 | End: 2018-11-24 | Stop reason: HOSPADM

## 2018-11-22 RX ORDER — ACETAMINOPHEN 325 MG/1
650 TABLET ORAL EVERY 6 HOURS
Status: DISPENSED | OUTPATIENT
Start: 2018-11-22 | End: 2018-11-23

## 2018-11-22 RX ORDER — BISACODYL 10 MG
10 SUPPOSITORY, RECTAL RECTAL ONCE AS NEEDED
Status: ACTIVE | OUTPATIENT
Start: 2018-11-22 | End: 2018-11-22

## 2018-11-22 RX ORDER — ADHESIVE BANDAGE
30 BANDAGE TOPICAL 2 TIMES DAILY PRN
Status: DISCONTINUED | OUTPATIENT
Start: 2018-11-22 | End: 2018-11-24 | Stop reason: HOSPADM

## 2018-11-22 RX ORDER — SIMETHICONE 80 MG
1 TABLET,CHEWABLE ORAL EVERY 6 HOURS PRN
Status: DISCONTINUED | OUTPATIENT
Start: 2018-11-22 | End: 2018-11-24 | Stop reason: HOSPADM

## 2018-11-22 RX ORDER — NALBUPHINE HYDROCHLORIDE 10 MG/ML
2.5 INJECTION, SOLUTION INTRAMUSCULAR; INTRAVENOUS; SUBCUTANEOUS
Status: DISCONTINUED | OUTPATIENT
Start: 2018-11-22 | End: 2018-11-24 | Stop reason: HOSPADM

## 2018-11-22 RX ORDER — OXYCODONE AND ACETAMINOPHEN 10; 325 MG/1; MG/1
1 TABLET ORAL EVERY 4 HOURS PRN
Status: DISCONTINUED | OUTPATIENT
Start: 2018-11-22 | End: 2018-11-24 | Stop reason: HOSPADM

## 2018-11-22 RX ORDER — DOCUSATE SODIUM 100 MG/1
200 CAPSULE, LIQUID FILLED ORAL 2 TIMES DAILY
Status: DISCONTINUED | OUTPATIENT
Start: 2018-11-22 | End: 2018-11-24 | Stop reason: HOSPADM

## 2018-11-22 RX ORDER — ACETAMINOPHEN 325 MG/1
650 TABLET ORAL EVERY 6 HOURS
Status: DISCONTINUED | OUTPATIENT
Start: 2018-11-23 | End: 2018-11-22

## 2018-11-22 RX ORDER — BUTALBITAL, ACETAMINOPHEN AND CAFFEINE 50; 325; 40 MG/1; MG/1; MG/1
1 TABLET ORAL ONCE
Status: COMPLETED | OUTPATIENT
Start: 2018-11-22 | End: 2018-11-22

## 2018-11-22 RX ORDER — AMOXICILLIN 250 MG
1 CAPSULE ORAL NIGHTLY PRN
Status: DISCONTINUED | OUTPATIENT
Start: 2018-11-22 | End: 2018-11-24 | Stop reason: HOSPADM

## 2018-11-22 RX ADMIN — DOCUSATE SODIUM 200 MG: 100 CAPSULE, LIQUID FILLED ORAL at 08:11

## 2018-11-22 RX ADMIN — ACETAMINOPHEN 650 MG: 325 TABLET ORAL at 03:11

## 2018-11-22 RX ADMIN — NALBUPHINE HYDROCHLORIDE 2.5 MG: 10 INJECTION, SOLUTION INTRAMUSCULAR; INTRAVENOUS; SUBCUTANEOUS at 03:11

## 2018-11-22 RX ADMIN — OXYCODONE HYDROCHLORIDE 10 MG: 5 TABLET ORAL at 01:11

## 2018-11-22 RX ADMIN — NALBUPHINE HYDROCHLORIDE 2.5 MG: 10 INJECTION, SOLUTION INTRAMUSCULAR; INTRAVENOUS; SUBCUTANEOUS at 09:11

## 2018-11-22 RX ADMIN — ACETAMINOPHEN 650 MG: 325 TABLET ORAL at 09:11

## 2018-11-22 RX ADMIN — MUPIROCIN 1 G: 20 OINTMENT TOPICAL at 09:11

## 2018-11-22 RX ADMIN — NALBUPHINE HYDROCHLORIDE 2.5 MG: 10 INJECTION, SOLUTION INTRAMUSCULAR; INTRAVENOUS; SUBCUTANEOUS at 05:11

## 2018-11-22 RX ADMIN — DIPHENHYDRAMINE HYDROCHLORIDE 25 MG: 25 CAPSULE ORAL at 02:11

## 2018-11-22 RX ADMIN — BUTALBITAL, ACETAMINOPHEN AND CAFFEINE 1 TABLET: 50; 325; 40 TABLET ORAL at 06:11

## 2018-11-22 RX ADMIN — PROMETHAZINE HYDROCHLORIDE 6.25 MG: 25 INJECTION, SOLUTION INTRAMUSCULAR; INTRAVENOUS at 12:11

## 2018-11-22 RX ADMIN — OXYCODONE HYDROCHLORIDE 5 MG: 5 TABLET ORAL at 02:11

## 2018-11-22 RX ADMIN — OXYCODONE HYDROCHLORIDE 10 MG: 5 TABLET ORAL at 09:11

## 2018-11-22 RX ADMIN — DOCUSATE SODIUM 200 MG: 100 CAPSULE, LIQUID FILLED ORAL at 09:11

## 2018-11-22 RX ADMIN — OXYCODONE HYDROCHLORIDE 10 MG: 5 TABLET ORAL at 04:11

## 2018-11-22 RX ADMIN — CETIRIZINE HYDROCHLORIDE 5 MG: 5 TABLET, FILM COATED ORAL at 08:11

## 2018-11-22 NOTE — ANESTHESIA POSTPROCEDURE EVALUATION
"Anesthesia Post Evaluation    Patient: Armida Medina    Procedure(s) Performed: Procedure(s) (LRB):   SECTION (N/A)    Final Anesthesia Type: epidural  Patient location during evaluation: floor  Patient participation: Yes- Able to Participate  Level of consciousness: awake and alert  Post-procedure vital signs: reviewed and stable  Pain management: adequate  Airway patency: patent  PONV status at discharge: No PONV  Anesthetic complications: no      Cardiovascular status: blood pressure returned to baseline  Respiratory status: unassisted  Hydration status: euvolemic  Follow-up not needed.        Visit Vitals  /71   Pulse 70   Temp 36.8 °C (98.3 °F) (Oral)   Resp 18   Ht 5' 5" (1.651 m)   Wt 116.9 kg (257 lb 11.5 oz)   LMP 2018   SpO2 (!) 92%   Breastfeeding? Unknown   BMI 42.89 kg/m²       Pain/David Score: Pain Rating Prior to Med Admin: 7 (2018  9:17 AM)  Pain Rating Post Med Admin: 4 (2018 10:00 AM)        "

## 2018-11-22 NOTE — H&P
"   History and Physical     28 y.o.  @ 37w3d p/w severe range Bps and headache. Denies past medical history or past surgical history. She states she has a fibroid "at the top of her uterus."    In the OB ED BPs were severe-range despite 10 mg procardia and 20 mg IV labetalol. She also has a HA. US confirms breech presentation. She was diagnosed with pre-E with SF, started on magnesium, and taken to the OR for delivery.           Review of patient's allergies indicates:   Allergen Reactions    Ibuprofen Hives           Past Medical History:   Diagnosis Date    Acne      Hashimoto's thyroiditis 2017            Past Surgical History:   Procedure Laterality Date    CYST REMOVAL         Left ear @ age 2    TONSILLECTOMY, ADENOIDECTOMY                Family History   Problem Relation Age of Onset    Miscarriages / Stillbirths Mother      Breast cancer Neg Hx      Cancer Neg Hx      Colon cancer Neg Hx      Diabetes Neg Hx      Eclampsia Neg Hx      Hypertension Neg Hx      Stroke Neg Hx       labor Neg Hx      Ovarian cancer Neg Hx        Social History            Tobacco Use    Smoking status: Never Smoker    Smokeless tobacco: Never Used   Substance Use Topics    Alcohol use: Yes       Comment: social not since pregnancy    Drug use: No      Review of Systems   Constitutional: Negative for fever.   Eyes: Positive for visual disturbance.   Respiratory: Negative for shortness of breath.    Cardiovascular: Negative for chest pain.   Gastrointestinal: Negative for abdominal pain.   Genitourinary: Negative for vaginal bleeding.   Musculoskeletal: Negative for back pain.   Skin: Negative for rash.   Neurological: Positive for headaches. Negative for light-headedness.   Hematological: Does not bruise/bleed easily.   Psychiatric/Behavioral: The patient is not nervous/anxious.          Physical Exam             Initial Vitals [18 1736]   BP Pulse Resp Temp SpO2   (!) 215/114 88 -- -- --     "   MAP           --              Physical Exam     Vitals reviewed.  Constitutional: She appears well-developed and well-nourished. She is not diaphoretic. No distress.   Cardiovascular: Normal rate, regular rhythm, normal heart sounds and intact distal pulses.   Pulmonary/Chest: Breath sounds normal.   Abdominal: Soft. There is no tenderness. There is no guarding.   Neurological: She is alert and oriented to person, place, and time. She has normal strength. No sensory deficit.   Psychiatric: She has a normal mood and affect. Her behavior is normal. Judgment and thought content normal.            ED Course   Fetal non-stress test  Date/Time: 11/21/2018 6:11 PM  Performed by: Jigna Camara MD  Authorized by: Jigna Camara MD      Nonstress Test:     Variability:  6-25 BPM    Decelerations:  None    Accelerations:  15 bpm    Baseline:  140    Contractions:  Not present  Biophysical Profile:     Nonstress Test Interpretation: reactive      Overall Impression:  Reassuring        Labs Reviewed   COMPREHENSIVE METABOLIC PANEL   CBC W/ AUTO DIFFERENTIAL   PROTEIN / CREATININE RATIO, URINE   TYPE & SCREEN         Imaging Results    None            Medical Decision Making:   ED Management:  Breech presentation  Severe PreE by blood pressures and protein on urine dip  Procardia 10mg given in RENATA prior to IV  Continued severe range BP - labetalol 20mg IV push ordered  PreE labs pending  Tylenol for headache  MgSO4 to prevent seizures  Consents signed for primary CS for breech fetal presentation confirmed on BSUS, consents for blood done      Clinical Impression:   The primary encounter diagnosis was Preeclampsia, severe, third trimester. A diagnosis of 37 weeks gestation of pregnancy was also pertinent to this visit.     Ignacio Perera M.D.  Obstetrics & Gynecology  PGY-2     Sawyer Lake MD

## 2018-11-22 NOTE — ANESTHESIA PREPROCEDURE EVALUATION
11/21/2018  Armida Medina is a 28 y.o., female on her 1st pregnancy who had a HA today and took her BP at work and it was 's. She came to Ob ED with SBP >200 and an intermittent HA which is better after Tylenol. She received Procardia and 40 mg of IV labetalol. She is also getting bolused with Magnesium. BP is slowly coming down. Pt denies any other symptoms. Baby was confirmed as breech by US.     Anesthesia Evaluation         Review of Systems  Anesthesia Hx:  No problems with previous Anesthesia GETA before without issues for a tonsillectomy   Social:  Social Alcohol Use, Non-Smoker   Cardiovascular:   Exercise tolerance: good Hypertension    Pulmonary:  Pulmonary Normal    Endocrine:  Endocrine Normal        Physical Exam  General:  Well nourished, Obesity    Airway/Jaw/Neck:  Airway Findings: Mouth Opening: Normal Tongue: Normal  General Airway Assessment: Adult  Mallampati: II  TM Distance: Normal, at least 6 cm  Jaw/Neck Findings:     Neck ROM: Normal ROM      Dental:  Dental Findings: In tact   Chest/Lungs:  Chest/Lungs Findings: Clear to auscultation, Normal Respiratory Rate     Heart/Vascular:  Heart Findings: Rate: Normal  Rhythm: Regular Rhythm  Sounds: Normal        Mental Status:  Mental Status Findings:  Cooperative, Alert and Oriented         Anesthesia Plan  Type of Anesthesia, risks & benefits discussed:  Anesthesia Type:  spinal  Patient's Preference:   Intra-op Monitoring Plan: standard ASA monitors  Intra-op Monitoring Plan Comments:   Post Op Pain Control Plan: multimodal analgesia, intrathecal opioid and IV/PO Opioids PRN  Post Op Pain Control Plan Comments:   Induction:    Beta Blocker:         Informed Consent: Patient understands risks and agrees with Anesthesia plan.  Questions answered. Anesthesia consent signed with patient.  ASA Score: 3  emergent   Day of Surgery  Review of History & Physical:            Ready For Surgery From Anesthesia Perspective.

## 2018-11-22 NOTE — PROGRESS NOTES
POSTPARTUM PROGRESS NOTE     Armida Medina is a 28 y.o. female POD #1 status post Primary  section at 37w3d in a pregnancy complicated by pre-E with SF, including HA and elevated BP. Patient is doing well this morning. She denies nausea, vomiting, fever or chills.  Patient reports moderate abdominal pain that is adequately relieved by IV pain medications. Lochia is mild and stable. Patient is voiding via bar catheter. She has not passed flatus, and has not had BM.  Patient does plan to breast feed. Undecided for contraception.    She c/o itching since anesthesia.    Objective:       Temp:  [97.9 °F (36.6 °C)] 97.9 °F (36.6 °C)  Pulse:  [] 62  Resp:  [16-18] 16  SpO2:  [93 %-98 %] 94 %  BP: (100-237)/() 143/81    General:   alert, appears stated age and cooperative   Lungs:   clear to auscultation bilaterally   Heart:   regular rate and rhythm, S1, S2 normal, no murmur, click, rub or gallop   Abdomen:  soft, non-tender; bowel sounds normal; no masses,  no organomegaly   Uterus:  firm located at the umblicus.    Incision: Bandage in place, clean, dry and intact   Extremities: peripheral pulses normal, no pedal edema, no clubbing or cyanosis     Lab Review  No results found for this or any previous visit (from the past 4 hour(s)).    I/O    Intake/Output Summary (Last 24 hours) at 2018 0350  Last data filed at 2018 0319  Gross per 24 hour   Intake 569.17 ml   Output 1435 ml   Net -865.83 ml        Assessment:     Patient Active Problem List   Diagnosis    Pregnancy with one fetus, antepartum    Hashimoto's thyroiditis    Childhood asthma without complication    Excessive weight gain during pregnancy in third trimester    Preeclampsia, severe, third trimester        Plan:   1. Postpartum care:  - Patient doing well. Continue routine management and advances.  - Continue IVPO pain meds. Pain well controlled.  - Heme: H/h . Post-op CBC pending  - remove bar 24 hours after  delivery  - Encourage ambulation  - Circumcision - baby in NICU  - Contraception - declines at present  - Lactation plans to breast feed/pump    2. Pre-E with SF  - no symptoms of pre-e. Has not required BP meds since delivery  - On mag sulfate for seizure ppx; continue fo r24 hours PP (delivered at 2030 on 11/21)  - no signs or symptoms of mag toxicity   - BP: (100-237)/() 130/69  - UOP:  mL/hr    Dispo: As patient meets milestones, will plan to discharge on POD#3-4.    Ignacio Perera

## 2018-11-22 NOTE — L&D DELIVERY NOTE
[facility]   Section   Operative Note    SUMMARY     Procedure Date: 2018    Procedure: Low transverse  delivery via Pfannenstiel incision    Indications: Pre eclampsia with severe features    Pre-operative Diagnosis: IUP at 37w3d gestational age    Post-operative Diagnosis: Same    Surgeon: Dr. Sawyer Lake     Assistants: Dr. Ignacio Perera, PGY-2    Anesthesia:  Spinal anesthesia    Findings:   1. IUP  2. Fetus in the breech position  3. Single viable male infant delivered via low transverse uterine incision  4. Umbilical arterial blood and cord blood samples obtained  5. Placenta delivered spontaneously  6. Large (approximately 10cm) submucosal fundal fibroid    Complications:  None. Patient tolerated the procedure well.     Estimated Blood Loss: 820mL           Total IV Fluids: 2000mL     UOP: 100mL    Specimens:   1. Single viable male infant  2. Placenta (discarded)    PreOp CBC:   Recent Labs   Lab 18  1840   WBC 13.37*   RBC 4.32   HGB 12.0   HCT 37.6      MCV 87   MCH 27.8   MCHC 31.9*              Disposition: PACU - hemodynamically stable           Condition: Stable.    Procedure Details:     The patient was seen in the OB ED, where BP was severely elevated despote IV BP medications. The indication for delivery was explained. The risks, benefits, complications, treatment options, and expected outcomes were discussed with the patient. Questions were answered, the patient voiced understanding and gave verbal and written informed consent for  delivery as well as blood transfusion if necessary.     Interval history and exam of the surgery site were done. The patient was taken to the operating room, was identified as Armida Medina, and the procedure verified as  delivery. A Time-Out was held and the above information was confirmed by the patient.    After induction of spinal anesthesia, the patient was placed in the dorsal supine position  with a left lateral tilt. A bar catheter was placed. She was prepped and draped in the usual sterile fashion. Preoperative antibiotics were administered and an allis test was performed demonstrating adequate anesthesia.  A Pfannenstiel incision was made and carried down through the subcutaneous tissue to the fascia. Fascial incision was made and extended transversely with curved denney scissors. The fascia was grasped with Kocher clamps and  from the underlying rectus tissue superiorly and inferiorly. The peritoneum was identified, found to be free of adherent bowel, and was entered bluntly. Peritoneal incision was extended vertically with lateral blunt traction. The bladder blade was inserted, the vesico-uterine peritoneum was elevated and incised transversely, and the bladder flap was freed from the lower uterine segment. The bladder blade was reinserted to keep the bladder out of the operating field. A low transverse uterine incision was made with knife and extended horizontally with finger fracture in the cephalocaudad direction. The amniotic sac was ruptured and the infant was noted to be in the breech position. The fetal pelvis was brought to the incision and elevated out of the maternal pelvis. The patient delivered a single viable male infant without difficulty.  Infant weighed 2950 grams with Apgar scores of 8 and 9 at one and five minutes respectively. The oropharynx and nares of the infant were suctioned, and the umbilical cord was clamped and cut. The infant was handed to the NICU team. Umbilical artery blood and cord blood was obtained for evaluation. The placenta was delivered intact, appeared normal, and was discarded. The uterus was exteriorized. The uterine cavity was swept with a clean lap. A large fundal fibroid was appreciated, projecting into the endometrial cavity. The uterine incision was closed with running locked sutures of  #1 chromic. Tension was released from the uterus and the  uterine incision was examined and was noted to be hemostatic. The uterine outline, tubes and ovaries were identified and appeared normal. The posterior cul de sac was irrigated and the uterus was returned to the abdominal cavity. The uterine incision was reinspected and good hemostasis was noted. The abdominal cavity was irrigated to remove clots. The rectus muscles were brought together with #1 chromic in a figure-of-eight suture. The fascia was then reapproximated with running sutures of looped 0 PDS. The subcutaneous fat layer was reapproximated with interrupted sutures in plain gut. The skin incision was closed with 4-0 Monocryl in a running fashion and the area was cleaned and sterile dressing placed.    Instrument, sponge, and needle counts were correct prior the abdominal closure and at the conclusion of the case.     Armida Medina tolerated procedure well and was transferred to recovery in stable condition.    Ignacio Perera M.D.  Obstetrics & Gynecology  PGY-2     Delivery Information for  Héctor Medina    Birth information:  YOB: 2019   Time of birth: 8:41 PM   Sex: male   Head Delivery Date/Time: 2018  8:41 PM   Delivery type: , Low Transverse   Gestational Age: 37w3d    Delivery Providers    Delivering clinician:  Sawyer Lake Jr., MD   Provider Role    Ignacio Perera MD Resident    Kelsey Cope RN Registered Nurse    Ayanna Mead RN Registered Nurse    Cami ZAMBRANO [facility]    Jenise Chaudhary MD Anesthesiologist    José Miguel Adkins MD Resident            Measurements    Weight:  2950 g  Length:  48.9 cm  Head circumference:  34.9 cm  Chest circumference:  30.5 cm         Apgars    Living status:  Living  Apgars:   1 min.:   5 min.:   10 min.:   15 min.:   20 min.:     Skin color:   0  1       Heart rate:   2  2       Reflex irritability:   2  2       Muscle tone:   2  2       Respiratory effort:   2  2       Total:    8  9       Apgars assigned by:  NICU         Operative Delivery    Forceps attempted?:  No  Vacuum extractor attempted?:  No         Shoulder Dystocia    Shoulder dystocia present?:  No           Presentation    Presentation:  Kali Breech           Interventions/Resuscitation    Method:  NICU Attended       Cord    Vessels:  3 vessels  Complications:  None  Delayed Cord Clamping?:  No  Cord Clamped Date/Time:  2018  8:41 PM  Cord Blood Disposition:  Sent with Baby  Gases Sent?:  No  Stem Cell Collection (by MD):  No       Placenta    Placenta delivery date/time:  2018  Placenta removal:  Manual removal  Placenta appearance:  Intact  Placenta disposition:  discarded           Labor Events:       labor: No     Labor Onset Date/Time:         Dilation Complete Date/Time:         Start Pushing Date/Time:       Rupture Date/Time:              Rupture type:           Fluid Amount:        Fluid Color:        Fluid Odor:        Membrane Status (PeriCalm):        Rupture Date/Time (PeriCalm):        Fluid Amount (PeriCalm):        Fluid Color (PeriCalm):         steroids: None     Antibiotics given for GBS: No     Induction: none     Indications for induction:        Augmentation:       Indications for augmentation:       Labor complications: None     Additional complications:          Cervical ripening:                     Delivery:      Episiotomy:       Indication for Episiotomy:       Perineal Lacerations:   Repaired:      Periurethral Laceration:   Repaired:     Labial Laceration:   Repaired:     Sulcus Laceration:   Repaired:     Vaginal Laceration:   Repaired:     Cervical Laceration:   Repaired:     Repair suture:       Repair # of packets:       Vaginal delivery QBL (mL): 0      QBL (mL): 820     Combined Blood Loss (mL): 820     Vaginal Sweep Performed:       Surgicount Correct: Yes       Other providers:       Anesthesia    Method:  Spinal          Details (if  applicable):  Trial of Labor No    Categorization: Primary    Priority: Emergency   Indications for : Breech;Other (Add Comments)   Incision Type: low transverse     Additional  information:  Forceps:    Vacuum:    Breech:    Observed anomalies    Other (Comments): C/S for severe pre-eclampsia. NICU called to attend delivery due to breech presentation.         Sawyer Lake MD

## 2018-11-22 NOTE — ANESTHESIA PROCEDURE NOTES
Epidural    Patient location during procedure: pre-op   Reason for block: primary anesthetic   Diagnosis: Preeclampsia/Breech for CS   Start time: 11/21/2018 7:43 PM  Timeout: 11/21/2018 7:43 PM  Staffing  Anesthesiologist: Jenise Chaudhary MD  Performed: anesthesiologist   Preanesthetic Checklist  Completed: patient identified, site marked, surgical consent, pre-op evaluation, timeout performed, IV checked, risks and benefits discussed, monitors and equipment checked, anesthesia consent given, hand hygiene performed and patient being monitored  Preparation  Patient position: sitting  Prep: ChloraPrep  Patient monitoring: Pulse Ox and Blood Pressure  Epidural  Skin Anesthetic: lidocaine 1%  Skin Wheal: 5 mL  Administration type: single shot  Approach: midline  Interspace: L4-5  Injection technique: FRANC air  Needle and Epidural Catheter  Needle type: Tuohy   Needle gauge: 17  Needle length: 3.5 inches  Needle insertion depth: 7 cm  Catheter type: springwound and multi-orifice  Catheter size: 19 G  Catheter at skin depth: 11 cm  Test dose: 3 mL of lidocaine 1.5% with Epi 1-to-200,000  Additional Documentation: incremental injection, no significant pain on injection, negative aspiration for heme and CSF, no significant complaints from patient, no signs/symptoms of IV or SA injection and no paresthesia on injection  Needle localization: anatomical landmarks  Assessment  Ease of block: easy  Patient's tolerance of the procedure: comfortable throughout block and no complaints  Post dural Puncture Headache?: No

## 2018-11-22 NOTE — PROGRESS NOTES
"[employer]hony pump, tubing, collections containers and labels brought to bedside.  Discussed proper pump setting of initiation phase.  Instructed on proper usage of pump and to adjust suction according to maximum comfort level.  Verified appropriate flange fit.  Educated on the frequency and duration of pumping in order to promote and maintain a full milk supply.  Hands on pumping technique reviewed.  Encouraged hand expression after pumping.  Instructed on cleaning of breast pump parts.  Written instructions also given.  Pt verbalized understanding and appropriate recall for proper milk handling, collection, labeling, storage and transportation.

## 2018-11-22 NOTE — ED PROVIDER NOTES
"Encounter Date: 2018       History   No chief complaint on file.    28 y.o.  @ 37w3d p/w severe range Bps and headache. Denies past medical history or past surgical history. She states she has a fibroid "at the top of her uterus"          Review of patient's allergies indicates:   Allergen Reactions    Ibuprofen Hives     Past Medical History:   Diagnosis Date    Acne     Hashimoto's thyroiditis 2017     Past Surgical History:   Procedure Laterality Date    CYST REMOVAL      Left ear @ age 2    TONSILLECTOMY, ADENOIDECTOMY       Family History   Problem Relation Age of Onset    Miscarriages / Stillbirths Mother     Breast cancer Neg Hx     Cancer Neg Hx     Colon cancer Neg Hx     Diabetes Neg Hx     Eclampsia Neg Hx     Hypertension Neg Hx     Stroke Neg Hx      labor Neg Hx     Ovarian cancer Neg Hx      Social History     Tobacco Use    Smoking status: Never Smoker    Smokeless tobacco: Never Used   Substance Use Topics    Alcohol use: Yes     Comment: social not since pregnancy    Drug use: No     Review of Systems   Constitutional: Negative for fever.   Eyes: Positive for visual disturbance.   Respiratory: Negative for shortness of breath.    Cardiovascular: Negative for chest pain.   Gastrointestinal: Negative for abdominal pain.   Genitourinary: Negative for vaginal bleeding.   Musculoskeletal: Negative for back pain.   Skin: Negative for rash.   Neurological: Positive for headaches. Negative for light-headedness.   Hematological: Does not bruise/bleed easily.   Psychiatric/Behavioral: The patient is not nervous/anxious.        Physical Exam     Initial Vitals [18 1736]   BP Pulse Resp Temp SpO2   (!) 215/114 88 -- -- --      MAP       --         Physical Exam    Vitals reviewed.  Constitutional: She appears well-developed and well-nourished. She is not diaphoretic. No distress.   Cardiovascular: Normal rate, regular rhythm, normal heart sounds and intact distal " pulses.   Pulmonary/Chest: Breath sounds normal.   Abdominal: Soft. There is no tenderness. There is no guarding.   Neurological: She is alert and oriented to person, place, and time. She has normal strength. No sensory deficit.   Psychiatric: She has a normal mood and affect. Her behavior is normal. Judgment and thought content normal.         ED Course   Fetal non-stress test  Date/Time: 11/21/2018 6:11 PM  Performed by: Jigna Camara MD  Authorized by: Jigna Camara MD     Nonstress Test:     Variability:  6-25 BPM    Decelerations:  None    Accelerations:  15 bpm    Baseline:  140    Contractions:  Not present  Biophysical Profile:     Nonstress Test Interpretation: reactive      Overall Impression:  Reassuring      Labs Reviewed   COMPREHENSIVE METABOLIC PANEL   CBC W/ AUTO DIFFERENTIAL   PROTEIN / CREATININE RATIO, URINE   TYPE & SCREEN          Imaging Results    None          Medical Decision Making:   ED Management:  Breech presentation  Severe PreE by blood pressures and protein on urine dip  Procardia 10mg given in RENATA prior to IV  Continued severe range BP - labetalol 20mg IV push ordered  PreE labs pending  Tylenol for headache  MgSO4 to prevent seizures  Consents signed for primary CS for breech fetal presentation confirmed on BSUS, consents for blood done                      Clinical Impression:   The primary encounter diagnosis was Preeclampsia, severe, third trimester. A diagnosis of 37 weeks gestation of pregnancy was also pertinent to this visit.                             Jigna Camara MD  11/21/18 1814       Jigna Camara MD  11/21/18 1816

## 2018-11-22 NOTE — PROGRESS NOTES
Dr. Adkins notified of the following: pt c/o persistent nausea despite receiving zofran IV and phenergan, dizziness, and a HA that is worse when she moves her head. Pt's pulse ox 94-95%. MD to evaluate pt in person. Pt safety maintained. Will continue to monitor.     0031: Dr. Adkins at bedside.     0040: Dr. Adkins orders pt to receive a 500 cc LR bolus and a second dose of 6.25 mg of phenergan. MD states ok to run bolus with mag. No other orders given. Pt safety maintained. Will continue to monitor.

## 2018-11-22 NOTE — LACTATION NOTE
11/22/18 0900   Maternal Infant Assessment   Breast Density soft;Bilateral:   Areola elastic;Bilateral:   Nipple(s) flat;Bilateral:  (inverted tips)   Pain/Comfort Assessments   Pain Assessment Performed Yes       Number Scale   Presence of Pain denies   Location - Side Bilateral   Location nipple(s)   Pain Rating: Activity 0   Maternal Infant Feeding   Maternal Preparation hand hygiene   Maternal Emotional State assist needed  (with use of breastpump and hand expression; )   Comfort Measures Before/During Feeding moist heat to breast(s)   Time Spent (min) 30-60 min   Comfort Measures Following Feeding expressed milk applied   Equipment Type/Education   Breast Pump Type double electric, hospital grade   Breast Pump Flange Type hard   Breast Pump Flange Size 24 mm   Breast Pumping pumped until emptied;Bilateral Breasts:   Pumping Frequency (times) (second breastpumping)   Lactation Interventions   Attachment Promotion counseling provided;family involvement promoted;privacy provided   Breastfeeding Assistance electric breast pump used;milk expression/pumping;support offered   Maternal Breastfeeding Support encouragement offered;lactation counseling provided;maternal hydration promoted;maternal nutrition promoted   With patient's permission assisted with use of breastpump and hand expression; advised her on imagery, relaxation and breastpumping techniques to facilitate milk transfer; patient pumped glistens of colostrum and hand expressed gtts of colostrum into storage container; provided NICU Lactation folder and basic NICU lactation education; partner at bedside;

## 2018-11-22 NOTE — TRANSFER OF CARE
"Anesthesia Transfer of Care Note    Patient: Armida Medina    Procedure(s) Performed: Procedure(s) (LRB):   SECTION (N/A)    Patient location: Labor and Delivery    Anesthesia Type: epidural    Transport from OR: Transported from OR on room air with adequate spontaneous ventilation    Post pain: adequate analgesia    Post assessment: no apparent anesthetic complications    Post vital signs: stable    Level of consciousness: awake, alert and oriented    Nausea/Vomiting: no nausea/vomiting    Complications: none          Last vitals:   Visit Vitals  /73   Pulse 67   Resp 18   Ht 5' 5" (1.651 m)   Wt 116.9 kg (257 lb 11.5 oz)   LMP 2018   SpO2 97%   BMI 42.89 kg/m²     "

## 2018-11-23 PROCEDURE — 99024 POSTOP FOLLOW-UP VISIT: CPT | Mod: ,,, | Performed by: OBSTETRICS & GYNECOLOGY

## 2018-11-23 PROCEDURE — 25000003 PHARM REV CODE 250: Performed by: STUDENT IN AN ORGANIZED HEALTH CARE EDUCATION/TRAINING PROGRAM

## 2018-11-23 PROCEDURE — 11000001 HC ACUTE MED/SURG PRIVATE ROOM

## 2018-11-23 RX ORDER — LABETALOL 200 MG/1
200 TABLET, FILM COATED ORAL EVERY 12 HOURS
Status: DISCONTINUED | OUTPATIENT
Start: 2018-11-23 | End: 2018-11-24 | Stop reason: HOSPADM

## 2018-11-23 RX ADMIN — SIMETHICONE CHEW TAB 80 MG 80 MG: 80 TABLET ORAL at 04:11

## 2018-11-23 RX ADMIN — SIMETHICONE CHEW TAB 80 MG 80 MG: 80 TABLET ORAL at 09:11

## 2018-11-23 RX ADMIN — MUPIROCIN 1 G: 20 OINTMENT TOPICAL at 09:11

## 2018-11-23 RX ADMIN — OXYCODONE HYDROCHLORIDE AND ACETAMINOPHEN 1 TABLET: 10; 325 TABLET ORAL at 10:11

## 2018-11-23 RX ADMIN — DOCUSATE SODIUM 200 MG: 100 CAPSULE, LIQUID FILLED ORAL at 10:11

## 2018-11-23 RX ADMIN — LABETALOL HYDROCHLORIDE 200 MG: 200 TABLET, FILM COATED ORAL at 09:11

## 2018-11-23 RX ADMIN — LABETALOL HYDROCHLORIDE 200 MG: 200 TABLET, FILM COATED ORAL at 10:11

## 2018-11-23 RX ADMIN — DOCUSATE SODIUM 200 MG: 100 CAPSULE, LIQUID FILLED ORAL at 09:11

## 2018-11-23 RX ADMIN — OXYCODONE HYDROCHLORIDE AND ACETAMINOPHEN 1 TABLET: 10; 325 TABLET ORAL at 07:11

## 2018-11-23 RX ADMIN — OXYCODONE HYDROCHLORIDE AND ACETAMINOPHEN 1 TABLET: 10; 325 TABLET ORAL at 01:11

## 2018-11-23 RX ADMIN — OXYCODONE HYDROCHLORIDE AND ACETAMINOPHEN 1 TABLET: 10; 325 TABLET ORAL at 12:11

## 2018-11-23 NOTE — PROGRESS NOTES
Pt complaining of dull headache 3/10. /78. . MD notified. Awaiting new orders. Continue to monitor.

## 2018-11-23 NOTE — LACTATION NOTE
Pt desires assistance latching infant. Unable to sustain latch on either breast in FB or cr-cr; infant tongue thrusts nipple out of mouth and unable to pull breast deep enough in mouth. LC provided teaching and demonstration of nipple shield use, including risks and benefits, and need to pump after each feeding. LC assisted pt to apply nipple shield and latch infant, and to ensure deeper latch. With assist, pt able to latch infant deeply with active sucks, rocker jaw motion, and occasional swallows. After feeding on both sides, LC assists pt to double pump both breasts on initiate, and then demonstrated pt how to finger feed the 1ml of EBM output from pump. Infant still rooting. Pt decides to provide formula supplement after feeding, in cup. LC assisted pt to cup feed 10ml in slow paced manner. LC encouraged pt to feed infant on demand, breastfeed on both breasts STS, then pump and provide supplement via cup. All questions answered; pt verbalized understanding.

## 2018-11-23 NOTE — DISCHARGE SUMMARY
Delivery Discharge Summary  Obstetrics      Primary OB Clinician: Sawyer Lake Jr., MD      Admission date: 2018  Discharge date: 2018    Disposition: To home, self care    Discharge Diagnosis List:      Patient Active Problem List   Diagnosis    Pregnancy with one fetus, antepartum    Hashimoto's thyroiditis    Childhood asthma without complication    Excessive weight gain during pregnancy in third trimester    Preeclampsia, severe, third trimester    S/P  section       Procedure: , due to breech presentation    Hospital Course:  Armida Medina is a 28 y.o. now , POD #3 who was admitted on 2018 at 37w3d for pre-eclampsia with severe features being BP and headache. Patient was subsequently admitted to labor and delivery unit with signed consents. Decision made to proceed with  as baby was breech.    Please see delivery note for further details. Her postpartum course was complicated by pre-eclampsia with severe features. She received magnesium sulfate for 24 hours after delivery. She continued to have elevated BP and was started on labetalol 200 mg BID. On discharge day, patient's pain is controlled with oral pain medications. Pt is tolerating ambulation without SOB or CP, and regular diet without N/V. Reports lochia is mild. Denies any HA, vision changes, F/C, LE swelling. Denies any breast pain/soreness.    Pt in stable condition and ready for discharge. She has been instructed to start and/or continue medications and follow up with her obstetrics provider as listed below.    Pertinent studies:  Postpartum CBC  Lab Results   Component Value Date    WBC 18.38 (H) 2018    HGB 10.3 (L) 2018    HCT 32.1 (L) 2018    MCV 88 2018     2018       Immunization History   Administered Date(s) Administered    Tdap 10/24/2018        Delivery:    Episiotomy:     Lacerations:     Repair suture:     Repair # of packets:     Blood  loss (ml): 0     Birth information:  YOB: 2019   Time of birth: 8:41 PM   Sex: male   Delivery type: , Low Transverse   Gestational Age: 37w3d    Delivery Clinician:      Other providers:       Additional  information:  Forceps:    Vacuum:    Breech:    Observed anomalies      Living?:           APGARS  One minute Five minutes Ten minutes   Skin color:         Heart rate:         Grimace:         Muscle tone:         Breathing:         Totals: 8  9        Placenta: Delivered:       appearance      Patient Instructions:   Current Discharge Medication List      START taking these medications    Details   docusate sodium (COLACE) 100 MG capsule Take 2 capsules (200 mg total) by mouth 2 (two) times daily.  Refills: 0      labetalol (NORMODYNE) 200 MG tablet Take 1 tablet (200 mg total) by mouth every 12 (twelve) hours.  Qty: 60 tablet, Refills: 11      oxyCODONE-acetaminophen (PERCOCET) 5-325 mg per tablet Take 1 tablet by mouth every 4 (four) hours as needed.  Qty: 20 tablet, Refills: 0         CONTINUE these medications which have NOT CHANGED    Details   albuterol (VENTOLIN HFA) 90 mcg/actuation inhaler Inhale 2 puffs into the lungs every 6 (six) hours as needed for Wheezing. Rescue  Qty: 18 g, Refills: 0    Associated Diagnoses: Mild intermittent childhood asthma without complication      cetirizine (ZYRTEC) 10 MG tablet Take 10 mg by mouth once daily.      fluticasone (FLONASE) 50 mcg/actuation nasal spray 1 spray (50 mcg total) by Each Nare route once daily.  Qty: 9.9 mL, Refills: 2      prenatal no122/iron/folic acid (PRENATAL MULTI ORAL) Take by mouth.             Discharge Procedure Orders   Diet Adult Regular     Other restrictions (specify):   Order Comments: Pelvic rest for at least 6 weeks. Nothing in vagina - no sex, tampons, or douching for 6 weeks     Notify your health care provider if you experience any of the following:   Order Comments: Heavy vaginal bleeding saturating more  than 1 pad per hour for at least 2 hours.     Notify your health care provider if you experience any of the following:  increased confusion or weakness     Notify your health care provider if you experience any of the following:  persistent dizziness, light-headedness, or visual disturbances     Notify your health care provider if you experience any of the following:  worsening rash     Notify your health care provider if you experience any of the following:  severe persistent headache     Notify your health care provider if you experience any of the following:  difficulty breathing or increased cough     Notify your health care provider if you experience any of the following:  redness, tenderness, or signs of infection (pain, swelling, redness, odor or green/yellow discharge around incision site)     Notify your health care provider if you experience any of the following:  severe uncontrolled pain     Notify your health care provider if you experience any of the following:  persistent nausea and vomiting or diarrhea     Notify your health care provider if you experience any of the following:  temperature >100.4     Activity as tolerated       Follow-up Information     Fiorella Carpenter DO. Schedule an appointment as soon as possible for a visit in 1 week.    Specialty:  Obstetrics and Gynecology  Why:  Blood Pressure Check  Contact information:  [address] [place] [id number]  [phone]                    Armida Nguyễn MD  OBGYN PGY-1

## 2018-11-23 NOTE — PROGRESS NOTES
"Pt reports she pumped 5x yesterday, slept through the night. LC encouraged pt to pump at least once at night. Pt reports she has attempted to latch baby this morning and will try again today at 11am. LC provided tips for latching with inverted tips. LC assisted pt to double pump breasts, with warm compresses, breast massage before and during pumping, and hand expressio afterwards. Pt c/o discomfort with pumping on R nipple; LC noted areola pulling into flange. LC switched flange to 21mm and pt reported "no pain". Gtts obtained. EBM labeled and stored in refrigerator. LC demonstrated use of sterilization bag and sterilized pump parts. All questions answered. LC name and number on white board.   "

## 2018-11-23 NOTE — NURSING
Upper level notified that pt's last bp was 147/78 and asked if it was ok for pt to get off of the mag since its been 24 hours since she's been on it. Doctor said that it was fine for pt to get off of the magnesium and to recheck pt's bp in an hour.

## 2018-11-23 NOTE — PLAN OF CARE
Problem: Patient Care Overview  Goal: Plan of Care Review  Outcome: Outcome(s) achieved Date Met: 11/23/18  Based on pt's stated feeding goals, the Plan of care for today is for pt to do skin-to-skin, to feed frequently on demand, to observe for signs of effective milk transfer, to monitor voids and stools, and to call for assistance. Pt to offer both breasts, use nipple shield if unable to latch, then to follow feeding with double breast pumping and provide infant supplement with cup or spoon.  Pt verbalizes understanding.

## 2018-11-23 NOTE — PROGRESS NOTES
POSTPARTUM PROGRESS NOTE     Armida Medina is a 28 y.o. female POD #2 status post Primary  section at 37w3d in a pregnancy complicated by pre-E with SF, including HA and elevated BP. Patient is doing well this morning. She denies nausea, vomiting, fever or chills.  Patient reports moderate abdominal pain that is adequately relieved by IV pain medications. Lochia is mild and stable. Patient is voiding and ambulating without difficulty. She has not passed flatus, and has not had BM.  Patient does plan to breast feed. Undecided for contraception.      Objective:       Temp:  [97.8 °F (36.6 °C)-98.8 °F (37.1 °C)] 98.8 °F (37.1 °C)  Pulse:  [] 84  Resp:  [18] 18  SpO2:  [92 %-99 %] 97 %  BP: (100-173)/(56-88) 158/88    General:   alert, appears stated age and cooperative   Lungs:   clear to auscultation bilaterally   Heart:   regular rate and rhythm, S1, S2 normal, no murmur, click, rub or gallop   Abdomen:  soft, non-tender; bowel sounds normal; no masses,  no organomegaly   Uterus:  firm located at the umblicus.    Incision: Incision healing well, no erythema, drainage   Extremities: peripheral pulses normal, no pedal edema, no clubbing or cyanosis     Lab Review  No results found for this or any previous visit (from the past 4 hour(s)).    I/O    Intake/Output Summary (Last 24 hours) at 2018 0024  Last data filed at 2018  Gross per 24 hour   Intake 1010 ml   Output 4175 ml   Net -3165 ml        Assessment:     Patient Active Problem List   Diagnosis    Pregnancy with one fetus, antepartum    Hashimoto's thyroiditis    Childhood asthma without complication    Excessive weight gain during pregnancy in third trimester    Preeclampsia, severe, third trimester        Plan:   1. Postpartum care:  - Patient doing well. Continue routine management and advances.  - Continue IVPO pain meds. Pain well controlled.  - Heme: H/h 12 > 10.3/32.1  - Encourage ambulation  - Circumcision - baby  in NICU  - Contraception - declines at present  - Lactation plans to breast feed/pump    2. Pre-E with SF  - s/p magnesium for seizure prophylaxis  - asymptomatic  - no symptoms of pre-e  - Has not required BP meds since delivery  - BP: (109-173)/(56-88) 138/70  - will continue to monitor      Dispo: As patient meets milestones, will plan to discharge on POD#3-4.      Digna Cherry MD   OBGYN, PGY-1

## 2018-11-23 NOTE — PROGRESS NOTES
POSTPARTUM PROGRESS NOTE     Armida Medina is a 28 y.o. female POD #3 status post Primary  section at 37w3d in a pregnancy complicated by pre-eclampsia with severe features. Patient is doing well this morning. She denies nausea, vomiting, fever or chills. No headache, vision changes, chest pain, shortness of breath, RUQ pain.  Patient reports moderate abdominal pain that is adequately relieved by oral pain medications. Lochia is mild and stable. Patient is voiding without difficulty and ambulating with no difficulty. She has not passed flatus, and has not had BM.  Patient does plan to breast feed. Undecided for contraception.     Objective:       Temp:  [97.8 °F (36.6 °C)-98.8 °F (37.1 °C)] 98.4 °F (36.9 °C)  Pulse:  [72-86] 81  Resp:  [18] 18  SpO2:  [95 %-99 %] 98 %  BP: (117-173)/(60-88) 148/84    General:   alert, appears stated age and cooperative   Lungs:   clear to auscultation bilaterally   Heart:   regular rate and rhythm, S1, S2 normal, no murmur, click, rub or gallop   Abdomen:  soft, non-tender; bowel sounds normal; no masses,  no organomegaly   Uterus:  firm located at the umblicus.        Incision: Clean, dry and intact   Extremities: no edema, redness or tenderness in the calves or thighs     Lab Review  No results found for this or any previous visit (from the past 4 hour(s)).    I/O    Intake/Output Summary (Last 24 hours) at 2018 1221  Last data filed at 2018 0400  Gross per 24 hour   Intake 960 ml   Output 2570 ml   Net -1610 ml        Assessment:     Patient Active Problem List   Diagnosis    Pregnancy with one fetus, antepartum    Hashimoto's thyroiditis    Childhood asthma without complication    Excessive weight gain during pregnancy in third trimester    Preeclampsia, severe, third trimester        Plan:   1. Postpartum care:  - Patient doing well. Continue routine management and advances.  - Continue IVPO pain meds. Pain well controlled.  - Heme: H/h  >  10.3/32.1  - Encourage ambulation  - Circumcision - deferred, baby in NICU  - Contraception - declines at present  - Lactation plans to breast feed/pump     2. Pre-E with SF  - BP: (128-172)/(60-84) 128/60  - Started labetalol 200 mg BID yesterday  - s/p magnesium for seizure prophylaxis  - Asymptomatic      Dispo: As patient meets milestones, will plan to discharge POD #4.    Armida Nguyễn MD  OBGYN PGY-1

## 2018-11-24 VITALS
RESPIRATION RATE: 18 BRPM | TEMPERATURE: 98 F | BODY MASS INDEX: 42.93 KG/M2 | HEIGHT: 65 IN | SYSTOLIC BLOOD PRESSURE: 135 MMHG | DIASTOLIC BLOOD PRESSURE: 71 MMHG | HEART RATE: 82 BPM | OXYGEN SATURATION: 97 % | WEIGHT: 257.69 LBS

## 2018-11-24 PROBLEM — Z98.891 S/P CESAREAN SECTION: Status: ACTIVE | Noted: 2018-11-24

## 2018-11-24 PROCEDURE — 25000003 PHARM REV CODE 250: Performed by: STUDENT IN AN ORGANIZED HEALTH CARE EDUCATION/TRAINING PROGRAM

## 2018-11-24 RX ORDER — LABETALOL 200 MG/1
200 TABLET, FILM COATED ORAL EVERY 12 HOURS
Qty: 60 TABLET | Refills: 11 | Status: SHIPPED | OUTPATIENT
Start: 2018-11-24 | End: 2019-04-04

## 2018-11-24 RX ORDER — OXYCODONE AND ACETAMINOPHEN 5; 325 MG/1; MG/1
1 TABLET ORAL EVERY 4 HOURS PRN
Qty: 20 TABLET | Refills: 0 | Status: SHIPPED | OUTPATIENT
Start: 2018-11-24 | End: 2018-11-28

## 2018-11-24 RX ORDER — DOCUSATE SODIUM 100 MG/1
200 CAPSULE, LIQUID FILLED ORAL 2 TIMES DAILY
Refills: 0 | COMMUNITY
Start: 2018-11-24 | End: 2018-11-28

## 2018-11-24 RX ADMIN — OXYCODONE HYDROCHLORIDE AND ACETAMINOPHEN 1 TABLET: 10; 325 TABLET ORAL at 05:11

## 2018-11-24 RX ADMIN — DOCUSATE SODIUM 200 MG: 100 CAPSULE, LIQUID FILLED ORAL at 08:11

## 2018-11-24 RX ADMIN — OXYCODONE HYDROCHLORIDE AND ACETAMINOPHEN 1 TABLET: 5; 325 TABLET ORAL at 12:11

## 2018-11-24 RX ADMIN — LABETALOL HYDROCHLORIDE 200 MG: 200 TABLET, FILM COATED ORAL at 08:11

## 2018-11-24 NOTE — PLAN OF CARE
Problem: Breastfeeding (Adult,Obstetrics,Pediatric)  Goal: Signs and Symptoms of Listed Potential Problems Will be Absent, Minimized or Managed (Breastfeeding)  Signs and symptoms of listed potential problems will be absent, minimized or managed by discharge/transition of care (reference Breastfeeding (Adult,Obstetrics,Pediatric) CPG).  Outcome: Ongoing (interventions implemented as appropriate)  Provided lactation discharge instructions;  Requested patient call lactation for latch assistance; patient will breastfeed baby on cue until content at least 8 times in 24 hours observing for signs of milk transfer, using nipple shield prn; she will use breastpump for breast stimulation pc if nipple shield used; she will wake baby prn;

## 2018-11-24 NOTE — PLAN OF CARE
Problem: Patient Care Overview  Goal: Plan of Care Review  Outcome: Outcome(s) achieved Date Met: 11/24/18  Pt ambulating, voiding, and passing flatus. Pt tolerating PO well and there is no SS of distress at the time. Pts pain well controlled throughout shift by oral pain medication. Pt's bleeding has been light throughout shift and fundus is firm.  MB care guide reviewed on previous shift. All questions answered. Pt verbalized understanding to follow up with provider in 1 week for BP check and 6 weeks for post partum check. ID band verified. Pt stable at this time.

## 2018-11-24 NOTE — LACTATION NOTE
11/24/18 1205   Lactation Interventions   Attachment Promotion counseling provided;face-to-face positioning promoted;family involvement promoted;infant-mother separation minimized;privacy provided;role responsibility promoted;skin-to-skin contact encouraged   Breastfeeding Assistance both breasts offered each feeding;feeding cue recognition promoted;milk expression/pumping;support offered   Maternal Breastfeeding Support diary/feeding log utilized;encouragement offered;lactation counseling provided;maternal hydration promoted;maternal nutrition promoted;maternal rest encouraged   Patient using nipple shield while breastfeeding; requested she call lactation for assistance with breastfeeding and use of breastpump/hand expression; provided lactation discharge education; reviewed Mother's Breastfeeding Guide; patient's partner at bedside;

## 2018-11-24 NOTE — LACTATION NOTE
Patient preparing for breakfast; she requested lactation return later; provided patient with this consultant's [employer] phone # and asked that she call lactation when ready for breastfeeding assistance;

## 2018-11-25 ENCOUNTER — NURSE TRIAGE (OUTPATIENT)
Dept: ADMINISTRATIVE | Facility: CLINIC | Age: 28
End: 2018-11-25

## 2018-11-25 ENCOUNTER — OFFICE VISIT (OUTPATIENT)
Dept: URGENT CARE | Facility: CLINIC | Age: 28
End: 2018-11-25
Payer: COMMERCIAL

## 2018-11-25 VITALS
HEIGHT: 65 IN | TEMPERATURE: 97 F | HEART RATE: 84 BPM | WEIGHT: 257 LBS | DIASTOLIC BLOOD PRESSURE: 90 MMHG | OXYGEN SATURATION: 98 % | SYSTOLIC BLOOD PRESSURE: 152 MMHG | BODY MASS INDEX: 42.82 KG/M2

## 2018-11-25 DIAGNOSIS — R35.0 FREQUENCY OF URINATION: Primary | ICD-10-CM

## 2018-11-25 DIAGNOSIS — N30.01 ACUTE CYSTITIS WITH HEMATURIA: ICD-10-CM

## 2018-11-25 LAB
BILIRUB UR QL STRIP: NEGATIVE
GLUCOSE UR QL STRIP: NEGATIVE
KETONES UR QL STRIP: NEGATIVE
LEUKOCYTE ESTERASE UR QL STRIP: POSITIVE
PH, POC UA: 7.5 (ref 5–8)
POC BLOOD, URINE: POSITIVE
POC NITRATES, URINE: POSITIVE
PROT UR QL STRIP: POSITIVE
SP GR UR STRIP: 1 (ref 1–1.03)
UROBILINOGEN UR STRIP-ACNC: ABNORMAL (ref 0.1–1.1)

## 2018-11-25 PROCEDURE — 81003 URINALYSIS AUTO W/O SCOPE: CPT | Mod: QW,S$GLB,, | Performed by: NURSE PRACTITIONER

## 2018-11-25 PROCEDURE — 99203 OFFICE O/P NEW LOW 30 MIN: CPT | Mod: 25,S$GLB,, | Performed by: NURSE PRACTITIONER

## 2018-11-25 PROCEDURE — 3008F BODY MASS INDEX DOCD: CPT | Mod: CPTII,S$GLB,, | Performed by: NURSE PRACTITIONER

## 2018-11-25 RX ORDER — PHENAZOPYRIDINE HYDROCHLORIDE 100 MG/1
100 TABLET, FILM COATED ORAL 3 TIMES DAILY PRN
Qty: 20 TABLET | Refills: 0 | Status: SHIPPED | OUTPATIENT
Start: 2018-11-25 | End: 2019-04-04

## 2018-11-25 RX ORDER — NITROFURANTOIN 25; 75 MG/1; MG/1
100 CAPSULE ORAL 2 TIMES DAILY
Qty: 10 CAPSULE | Refills: 0 | Status: SHIPPED | OUTPATIENT
Start: 2018-11-25 | End: 2018-11-30

## 2018-11-25 NOTE — PROGRESS NOTES
"Subjective:       Patient ID: Armida Medina is a 28 y.o. female.    Vitals:  height is 5' 5" (1.651 m) and weight is 116.6 kg (257 lb). Her temperature is 97.2 °F (36.2 °C). Her blood pressure is 152/90 (abnormal) and her pulse is 84. Her oxygen saturation is 98%.     Chief Complaint: Urinary Tract Infection    Pt reports she had a  5 days ago , after the bar was pulled 4 days ago she began to having increase in urinary frequency with flank pain       Urinary Tract Infection    This is a new problem. The current episode started in the past 7 days. The pain is at a severity of 9/10. The pain is mild. She is not sexually active. Associated symptoms include flank pain, frequency and urgency. Pertinent negatives include no chills, hematuria, nausea, vomiting or rash. She has tried nothing for the symptoms.       Constitution: Negative for chills and fever.   Neck: Negative for painful lymph nodes.   Gastrointestinal: Negative for abdominal pain, nausea and vomiting.   Genitourinary: Positive for dysuria, frequency, urgency and flank pain. Negative for urine decreased, hematuria, history of kidney stones, painful menstruation, irregular menstruation, missed menses, heavy menstrual bleeding, ovarian cysts, genital trauma, vaginal pain, vaginal discharge, vaginal bleeding, vaginal sores, vaginal odor, painful intercourse, genital sore, painful ejaculation and pelvic pain.   Musculoskeletal: Negative for back pain.   Skin: Negative for rash and lesion.   Hematologic/Lymphatic: Negative for swollen lymph nodes.       Objective:      Physical Exam   Constitutional: She is oriented to person, place, and time. She appears well-developed and well-nourished.   HENT:   Head: Normocephalic and atraumatic.   Right Ear: External ear normal.   Left Ear: External ear normal.   Nose: Nose normal. No nasal deformity. No epistaxis.   Mouth/Throat: Oropharynx is clear and moist and mucous membranes are normal.   Eyes: " Conjunctivae and lids are normal.   Neck: Trachea normal, normal range of motion and phonation normal. Neck supple.   Cardiovascular: Normal rate, regular rhythm, normal heart sounds and normal pulses.   Pulmonary/Chest: Effort normal and breath sounds normal.   Abdominal: Soft. Normal appearance and bowel sounds are normal. She exhibits no distension and no mass. There is no tenderness. There is no CVA tenderness.   Neurological: She is alert and oriented to person, place, and time.   Skin: Skin is warm, dry and intact.   Psychiatric: She has a normal mood and affect. Her speech is normal and behavior is normal. Cognition and memory are normal.   Nursing note and vitals reviewed.      Results for orders placed or performed in visit on 11/25/18   POCT Urinalysis, Dipstick, Automated, W/O Scope   Result Value Ref Range    POC Blood, Urine Positive (A) Negative    POC Bilirubin, Urine Negative Negative    POC Urobilinogen, Urine neg 0.1 - 1.1    POC Ketones, Urine Negative Negative    POC Protein, Urine Positive (A) Negative    POC Nitrates, Urine Positive (A) Negative    POC Glucose, Urine Negative Negative    pH, UA 7.5 5 - 8    POC Specific Gravity, Urine 1.005 1.003 - 1.029    POC Leukocytes, Urine Positive (A) Negative       Assessment:       1. Frequency of urination    2. Acute cystitis with hematuria        Plan:         Frequency of urination  -     POCT Urinalysis, Dipstick, Automated, W/O Scope    Acute cystitis with hematuria    Other orders  -     phenazopyridine (PYRIDIUM) 100 MG tablet; Take 1 tablet (100 mg total) by mouth 3 (three) times daily as needed for Pain.  Dispense: 20 tablet; Refill: 0  -     nitrofurantoin, macrocrystal-monohydrate, (MACROBID) 100 MG capsule; Take 1 capsule (100 mg total) by mouth 2 (two) times daily. for 5 days  Dispense: 10 capsule; Refill: 0      Mother breastfeeding and denies child having hyperbilirubinemia or GP6D.       Understanding Urinary Tract Infections  (UTIs)  Most UTIs are caused by bacteria, although they may also be caused by viruses or fungi. Bacteria from the bowel are the most common source of infection. The infection may start because of any of the following:  · Sexual activity. During sex, bacteria can travel from the penis, vagina, or rectum into the urethra.   · Bacteria on the skin outside the rectum may travel into the urethra. This is more common in women since the rectum and urethra are closer to each other than in men. Wiping from front to back after using the toilet and keeping the area clean can help prevent germs from getting to the urethra.  · Blockage of urine flow through the urinary tract. If urine sits too long, germs may start to grow out of control.      Parts of the urinary tract  The infection can occur in any part of the urinary tract.  · The kidneys collect and store urine.  · The ureters carry urine from the kidneys to the bladder.  · The bladder holds urine until you are ready to let it out.  · The urethra carries urine from the bladder out of the body. It is shorter in women, so bacteria can move through it more easily. The urethra is longer in men, so a UTI is less likely to reach the bladder or kidneys in men.  Date Last Reviewed: 1/1/2017  © 4076-8978 [employer]. [address] [place] [address], [place] [id number]. All rights reserved. This information is not intended as a substitute for professional medical care. Always follow your healthcare professional's instructions.    Please return here or go to the Emergency Department for any concerns or worsening of condition.  If you were prescribed antibiotics, please take them to completion.  If you were prescribed a narcotic medication, do not drive or operate heavy equipment or machinery while taking these medications.  Please follow up with your primary care doctor or specialist as needed.  Please drink plenty of fluids.  Please get plenty of rest.  If you were prescribed  Pyridium (phenazopyridine), please be aware that if you wear contact lens that this medication may stain your contacts.  While taking this medication it is recommended that you do not wear your contacts until 24 hours after your last dose.  Please follow up with your primary care doctor or specialist as needed.    If you  smoke, please stop smoking.

## 2018-11-25 NOTE — PATIENT INSTRUCTIONS
Understanding Urinary Tract Infections (UTIs)  Most UTIs are caused by bacteria, although they may also be caused by viruses or fungi. Bacteria from the bowel are the most common source of infection. The infection may start because of any of the following:  · Sexual activity. During sex, bacteria can travel from the penis, vagina, or rectum into the urethra.   · Bacteria on the skin outside the rectum may travel into the urethra. This is more common in women since the rectum and urethra are closer to each other than in men. Wiping from front to back after using the toilet and keeping the area clean can help prevent germs from getting to the urethra.  · Blockage of urine flow through the urinary tract. If urine sits too long, germs may start to grow out of control.      Parts of the urinary tract  The infection can occur in any part of the urinary tract.  · The kidneys collect and store urine.  · The ureters carry urine from the kidneys to the bladder.  · The bladder holds urine until you are ready to let it out.  · The urethra carries urine from the bladder out of the body. It is shorter in women, so bacteria can move through it more easily. The urethra is longer in men, so a UTI is less likely to reach the bladder or kidneys in men.  Date Last Reviewed: 1/1/2017  © 3366-4750 [employer]. [address] [place] [address], [place] [address]. All rights reserved. This information is not intended as a substitute for professional medical care. Always follow your healthcare professional's instructions.    Please return here or go to the Emergency Department for any concerns or worsening of condition.  If you were prescribed antibiotics, please take them to completion.  If you were prescribed a narcotic medication, do not drive or operate heavy equipment or machinery while taking these medications.  Please follow up with your primary care doctor or specialist as needed.  Please drink plenty of fluids.  Please get  plenty of rest.  If you were prescribed Pyridium (phenazopyridine), please be aware that if you wear contact lens that this medication may stain your contacts.  While taking this medication it is recommended that you do not wear your contacts until 24 hours after your last dose.  Please follow up with your primary care doctor or specialist as needed.    If you  smoke, please stop smoking.

## 2018-11-25 NOTE — TELEPHONE ENCOUNTER
Reason for Disposition   All other patients with painful urination(Exception: [1] EITHER frequency or urgency AND [2] has on-call doctor)   [1] Painful urination AND [2] EITHER frequency or urgency    Protocols used: ST URINATION PAIN - FEMALE-A-, ST POSTPARTUM - URINATION PAIN-A-AH    Advised UC.

## 2018-11-26 ENCOUNTER — TELEPHONE (OUTPATIENT)
Dept: OBSTETRICS AND GYNECOLOGY | Facility: CLINIC | Age: 28
End: 2018-11-26

## 2018-11-26 ENCOUNTER — TELEPHONE (OUTPATIENT)
Dept: LACTATION | Facility: CLINIC | Age: 28
End: 2018-11-26

## 2018-11-26 NOTE — TELEPHONE ENCOUNTER
Pt continues to have difficulty with breastfeeding baby. Discuss the breastfeeding of a baby born at 37 weeks. Encouraged pt to continue to pump at least 8 times daily for 20-30 minutes using spectra breastpump. Recommend to pt if baby is not nursing after 10 minutes to pump  and bottle feed ebm.  Pt aware of opc. Pt has just left peds office and ped has given pt some added suggestions on breastfeeding. Support and encouragement given.

## 2018-11-26 NOTE — TELEPHONE ENCOUNTER
----- Message from Vu Patricia sent at 11/26/2018  4:03 PM CST -----  PLEASE CALL PT SHE NEEDS TO SCHEDULE A APPT FOR A B/P -[phone]

## 2018-11-27 ENCOUNTER — HOSPITAL ENCOUNTER (EMERGENCY)
Facility: OTHER | Age: 28
Discharge: HOME | End: 2018-11-27
Attending: OBSTETRICS & GYNECOLOGY
Payer: COMMERCIAL

## 2018-11-27 ENCOUNTER — TELEPHONE (OUTPATIENT)
Dept: OBSTETRICS AND GYNECOLOGY | Facility: CLINIC | Age: 28
End: 2018-11-27

## 2018-11-27 ENCOUNTER — PATIENT MESSAGE (OUTPATIENT)
Dept: OBSTETRICS AND GYNECOLOGY | Facility: CLINIC | Age: 28
End: 2018-11-27

## 2018-11-27 VITALS
HEART RATE: 103 BPM | SYSTOLIC BLOOD PRESSURE: 142 MMHG | TEMPERATURE: 99 F | RESPIRATION RATE: 19 BRPM | OXYGEN SATURATION: 100 % | DIASTOLIC BLOOD PRESSURE: 69 MMHG

## 2018-11-27 DIAGNOSIS — G44.209 TENSION-TYPE HEADACHE, NOT INTRACTABLE, UNSPECIFIED CHRONICITY PATTERN: ICD-10-CM

## 2018-11-27 DIAGNOSIS — L23.9 ALLERGIC CONTACT DERMATITIS, UNSPECIFIED TRIGGER: Primary | ICD-10-CM

## 2018-11-27 DIAGNOSIS — N30.00 ACUTE CYSTITIS WITHOUT HEMATURIA: ICD-10-CM

## 2018-11-27 PROCEDURE — 99283 EMERGENCY DEPT VISIT LOW MDM: CPT

## 2018-11-27 PROCEDURE — 87086 URINE CULTURE/COLONY COUNT: CPT

## 2018-11-27 PROCEDURE — 99284 EMERGENCY DEPT VISIT MOD MDM: CPT | Mod: ,,, | Performed by: OBSTETRICS & GYNECOLOGY

## 2018-11-27 PROCEDURE — 25000003 PHARM REV CODE 250: Performed by: STUDENT IN AN ORGANIZED HEALTH CARE EDUCATION/TRAINING PROGRAM

## 2018-11-27 RX ORDER — ACETAMINOPHEN 500 MG
1000 TABLET ORAL ONCE
Status: COMPLETED | OUTPATIENT
Start: 2018-11-27 | End: 2018-11-27

## 2018-11-27 RX ORDER — LABETALOL 200 MG/1
200 TABLET, FILM COATED ORAL ONCE
Status: COMPLETED | OUTPATIENT
Start: 2018-11-27 | End: 2018-11-27

## 2018-11-27 RX ORDER — HYDROCORTISONE 25 MG/G
CREAM TOPICAL 2 TIMES DAILY
Qty: 1 TUBE | Refills: 2 | Status: SHIPPED | OUTPATIENT
Start: 2018-11-27 | End: 2019-04-04

## 2018-11-27 RX ORDER — DIPHENHYDRAMINE HCL 25 MG
25 CAPSULE ORAL ONCE
Status: COMPLETED | OUTPATIENT
Start: 2018-11-27 | End: 2018-11-27

## 2018-11-27 RX ADMIN — LABETALOL HYDROCHLORIDE 200 MG: 200 TABLET, FILM COATED ORAL at 06:11

## 2018-11-27 RX ADMIN — DIPHENHYDRAMINE HYDROCHLORIDE 25 MG: 25 CAPSULE ORAL at 06:11

## 2018-11-27 RX ADMIN — ACETAMINOPHEN 1000 MG: 500 TABLET, FILM COATED ORAL at 06:11

## 2018-11-27 NOTE — TELEPHONE ENCOUNTER
Spoke with patient regarding patient having a low grade fever and a rash. Advised patient per Dr Carpenter she wants patient to go to the ER.

## 2018-11-28 ENCOUNTER — OFFICE VISIT (OUTPATIENT)
Dept: OBSTETRICS AND GYNECOLOGY | Facility: CLINIC | Age: 28
End: 2018-11-28
Payer: COMMERCIAL

## 2018-11-28 VITALS
WEIGHT: 233.94 LBS | DIASTOLIC BLOOD PRESSURE: 80 MMHG | BODY MASS INDEX: 38.98 KG/M2 | SYSTOLIC BLOOD PRESSURE: 122 MMHG | HEIGHT: 65 IN

## 2018-11-28 DIAGNOSIS — Z98.891 S/P C-SECTION: ICD-10-CM

## 2018-11-28 DIAGNOSIS — Z01.30 BP CHECK: Primary | ICD-10-CM

## 2018-11-28 DIAGNOSIS — R30.9 PAIN PASSING URINE: ICD-10-CM

## 2018-11-28 DIAGNOSIS — O26.86 PRURITIC URTICARIAL PAPULES AND PLAQUES OF PREGNANCY (PUPPP): ICD-10-CM

## 2018-11-28 PROCEDURE — 99999 PR PBB SHADOW E&M-EST. PATIENT-LVL III: CPT | Mod: PBBFAC,,, | Performed by: NURSE PRACTITIONER

## 2018-11-28 PROCEDURE — 3008F BODY MASS INDEX DOCD: CPT | Mod: CPTII,S$GLB,, | Performed by: NURSE PRACTITIONER

## 2018-11-28 PROCEDURE — 99213 OFFICE O/P EST LOW 20 MIN: CPT | Mod: S$GLB,,, | Performed by: NURSE PRACTITIONER

## 2018-11-28 RX ORDER — BETAMETHASONE DIPROPIONATE 0.5 MG/ML
LOTION, AUGMENTED TOPICAL 2 TIMES DAILY
Qty: 60 ML | Refills: 1 | Status: SHIPPED | OUTPATIENT
Start: 2018-11-28 | End: 2019-04-04

## 2018-11-28 RX ORDER — METHYLPREDNISOLONE 4 MG/1
TABLET ORAL
Qty: 1 PACKAGE | Refills: 0 | Status: SHIPPED | OUTPATIENT
Start: 2018-11-28 | End: 2019-04-04

## 2018-11-28 NOTE — PROGRESS NOTES
CC: PP BP check    HPI: Pt is a 28 y.o.  female who presents for a PP BP check.  BP today is 122/80.  Denies any HA, BV or epigastric pain.  She is currently on Labetalol 200 mg BID.  Pt is c/o extreme pain at the end of her urine stream- reports she calls out in pain and is in tears with void.  She is currently on day 4 of Macrobid.  In and out cath Urine culture is still pending.  Reports some low grade fevers. Pt also c/o pruritic rash to her shins, forearms and abdomen. She is using hydrocortisone with no relief.      ROS:  GENERAL: Feeling well overall. Denies fever or chills.   SKIN: + rash.  No lesions.   HEAD: Denies head injury or headache.   NODES: Denies enlarged lymph nodes.   CHEST: Denies chest pain or shortness of breath.   CARDIOVASCULAR: Denies palpitations or left sided chest pain.   ABDOMEN: No abdominal pain, constipation, diarrhea, nausea, vomiting or rectal bleeding.   URINARY: No dysuria, hematuria, or burning on urination.  REPRODUCTIVE: See HPI.   BREASTS: Denies pain, lumps, or nipple discharge.   HEMATOLOGIC: No easy bruisability or excessive bleeding.   MUSCULOSKELETAL: Denies joint pain or swelling.   NEUROLOGIC: Denies syncope or weakness.   PSYCHIATRIC: Denies depression, anxiety or mood swings.    PE:   APPEARANCE: Well nourished, well developed, White female in no acute distress.  VULVA: No lesions. Normal external female genitalia.  URETHRAL MEATUS: Normal size and location, no lesions, no prolapse.  URETHRA: No masses, tenderness, or prolapse.  VAGINA: Moist. No lesions or lacerations noted. No abnormal discharge present. No odor present.   CERVIX: No lesions or discharge. No cervical motion tenderness.   UTERUS: Normal size, regular shape, mobile, non-tender.  ADNEXA: No tenderness. No fullness or masses palpated in the adnexal regions.   ANUS PERINEUM: Normal.  INTEGUMENTARY: + raised erythematous bumps to bilateral shins, forearms and to abdomen    Diagnosis:  1. BP check     2. Pruritic urticarial papules and plaques of pregnancy (puppp)    3. Pain passing urine    4. S/P     5. Pre-eclampsia, delivered        Plan:   Reviewed S/S of Pre E.  Discussed still at risk for Pre E until 6 weeks PP  BP stable on Labetalol  Continue Labetalol   Continue Macrobid- awaiting UC results  Uribel PRN for bladder spasms  Medrol dose pack and Diprolene lotion for suspected PP PUPPS  Discussed comfort measures - cool, wet compresses, oatmeal baths, and antihistamines (such as Benadryl or Zyrtec).   ER precautions reviewed   Plan of care reviewed with Dr. Carpenter          Orders Placed This Encounter    methen-m.blue-s.phos-phsal-hyo (URIBEL) 118-10-40.8-36 mg Cap    methylPREDNISolone (MEDROL DOSEPACK) 4 mg tablet    augmented betamethasone (DIPROLENE) 0.05 % lotion         Follow-up with Dr. Carpenter for PP visit in 5 weeks or  PRN no resolution of symptoms.    Shaina Donovan, LASHAYP-C

## 2018-11-28 NOTE — ED PROVIDER NOTES
Encounter Date: 2018       History     Chief Complaint   Patient presents with    Rash     legs and belly button      28 y.o.  POD#6 s/p primary CS for PreE with severe features. She was discharged home on labetalol 200mg BID. Today she came in for rash that developed a few days ago. She has not used any new creams, soaps, perfume. She also complains of a mild headache. She was diagnosed with a UTI on  (urine dip with +nitrates) but a culture was not sent and she is on Macrobid 100mg BID but is still having UTI symptoms.          Review of patient's allergies indicates:   Allergen Reactions    Ibuprofen Hives     Past Medical History:   Diagnosis Date    Acne     Hashimoto's thyroiditis 2017     Past Surgical History:   Procedure Laterality Date     SECTION N/A 2018    Procedure:  SECTION;  Surgeon: Sawyer Lake Jr., MD;  [facility];  Service: OB/GYN;  Laterality: N/A;     SECTION N/A 2018    Performed by Sawyer Lake Jr., MD [facility]&    CYST REMOVAL      Left ear @ age 2    TONSILLECTOMY, ADENOIDECTOMY       Family History   Problem Relation Age of Onset    Miscarriages / Stillbirths Mother     Breast cancer Neg Hx     Cancer Neg Hx     Colon cancer Neg Hx     Diabetes Neg Hx     Eclampsia Neg Hx     Hypertension Neg Hx     Stroke Neg Hx      labor Neg Hx     Ovarian cancer Neg Hx      Social History     Tobacco Use    Smoking status: Never Smoker    Smokeless tobacco: Never Used   Substance Use Topics    Alcohol use: Yes     Comment: social not since pregnancy    Drug use: No     Review of Systems   Constitutional: Negative for fever.   Eyes: Negative for visual disturbance.   Respiratory: Negative for shortness of breath.    Cardiovascular: Negative for chest pain.   Gastrointestinal: Negative for abdominal pain.   Genitourinary: Positive for dysuria. Negative for vaginal bleeding.   Musculoskeletal: Negative  for back pain.   Skin: Positive for rash and wound.   Neurological: Positive for headaches. Negative for light-headedness.   Hematological: Does not bruise/bleed easily.   Psychiatric/Behavioral: The patient is not nervous/anxious.        Physical Exam     Initial Vitals [11/27/18 1744]   BP Pulse Resp Temp SpO2   (!) 173/96 96 19 98.8 °F (37.1 °C) 97 %      MAP       --         Physical Exam    Vitals reviewed.  Constitutional: She appears well-developed and well-nourished. She is not diaphoretic. No distress.   Cardiovascular: Normal rate, regular rhythm, normal heart sounds and intact distal pulses.   Pulmonary/Chest: Breath sounds normal.   Abdominal: Soft. There is no tenderness. There is no guarding.   Firm fundus below umbilicus   Neurological: She is alert and oriented to person, place, and time. She has normal strength. No sensory deficit.   Skin: Rash (small erythematous bumps in bilateral pattern along anterior shins, abdominal fold, and forearm ) noted.   Psychiatric: She has a normal mood and affect. Her behavior is normal. Judgment and thought content normal.         ED Course   Procedures  Labs Reviewed   CULTURE, URINE          Imaging Results    None          Medical Decision Making:   ED Management:  BP series: 157/72, 149/66, 142/69; patient received her evening dose of labetalol 200 BID in [place]  HA resolved with tylenol   Patient with continued sxs of UTI after starting Macrobid BID a few days ago. Straight cath for urine culture was sent off to determine if her antibiotics need to be changed.  Rash is consistent with contact dermatitis and steroid cream Rx provided. Discussed for her to follow up with primary care physician if not improving within 2 days of using steroid cream.  Questions answered.                   ED Course as of Nov 28 0949   Tue Nov 27, 2018   1824 Contact dermatitis on b/l leg shins, abdomen, forearms  [LB]   1850 [place] resolving  [LB]      ED Course User Index  [LB] Jigna ZAMBRANO  MD Jax     Clinical Impression:   The primary encounter diagnosis was Allergic contact dermatitis, unspecified trigger. Diagnoses of Acute cystitis without hematuria, Tension-type headache, not intractable, unspecified chronicity pattern, and Postpartum hypertension were also pertinent to this visit.                             Jigna Camara MD  11/27/18 1902       Jigna Camara MD  11/28/18 0955

## 2018-11-28 NOTE — DISCHARGE INSTRUCTIONS
Prescription cream sent to pharmacy.  Notified to follow up with doctor in 2 days if not better.  Pt verbalizes understanding.

## 2018-11-29 ENCOUNTER — PATIENT MESSAGE (OUTPATIENT)
Dept: OBSTETRICS AND GYNECOLOGY | Facility: CLINIC | Age: 28
End: 2018-11-29

## 2018-11-29 LAB — BACTERIA UR CULT: NO GROWTH

## 2018-11-30 ENCOUNTER — HOSPITAL ENCOUNTER (OUTPATIENT)
Dept: RADIOLOGY | Facility: HOSPITAL | Age: 28
Discharge: HOME | End: 2018-11-30
Attending: OBSTETRICS & GYNECOLOGY
Payer: COMMERCIAL

## 2018-11-30 ENCOUNTER — TELEPHONE (OUTPATIENT)
Dept: OBSTETRICS AND GYNECOLOGY | Facility: CLINIC | Age: 28
End: 2018-11-30

## 2018-11-30 ENCOUNTER — TELEPHONE (OUTPATIENT)
Dept: LACTATION | Facility: CLINIC | Age: 28
End: 2018-11-30

## 2018-11-30 DIAGNOSIS — R10.84 GENERALIZED ABDOMINAL PAIN: ICD-10-CM

## 2018-11-30 DIAGNOSIS — R10.84 GENERALIZED ABDOMINAL PAIN: Primary | ICD-10-CM

## 2018-11-30 PROCEDURE — 74177 CT ABD & PELVIS W/CONTRAST: CPT | Mod: 26,,, | Performed by: RADIOLOGY

## 2018-11-30 PROCEDURE — 74177 CT ABD & PELVIS W/CONTRAST: CPT | Mod: TC

## 2018-11-30 PROCEDURE — 25500020 PHARM REV CODE 255: Performed by: OBSTETRICS & GYNECOLOGY

## 2018-11-30 RX ADMIN — IOHEXOL 15 ML: 300 INJECTION, SOLUTION INTRAVENOUS at 05:11

## 2018-11-30 RX ADMIN — IOHEXOL 100 ML: 350 INJECTION, SOLUTION INTRAVENOUS at 05:11

## 2018-11-30 NOTE — TELEPHONE ENCOUNTER
"Pt reports she stopped BFing, is now just giving formula. Pt reports it was "too difficult" and she is happy just to give formula. Pt denies any breast discomfort or severe engorgement. Instructed pt to wear supportive bra and use ice packs as needed. Pt verbalized understanding.  "

## 2018-12-06 ENCOUNTER — PATIENT MESSAGE (OUTPATIENT)
Dept: OBSTETRICS AND GYNECOLOGY | Facility: CLINIC | Age: 28
End: 2018-12-06

## 2018-12-06 ENCOUNTER — TELEPHONE (OUTPATIENT)
Dept: OBSTETRICS AND GYNECOLOGY | Facility: CLINIC | Age: 28
End: 2018-12-06

## 2018-12-07 ENCOUNTER — LAB VISIT (OUTPATIENT)
Dept: LAB | Facility: HOSPITAL | Age: 28
End: 2018-12-07
Attending: OBSTETRICS & GYNECOLOGY
Payer: COMMERCIAL

## 2018-12-07 ENCOUNTER — OFFICE VISIT (OUTPATIENT)
Dept: DERMATOLOGY | Facility: CLINIC | Age: 28
End: 2018-12-07
Payer: COMMERCIAL

## 2018-12-07 ENCOUNTER — ROUTINE PRENATAL (OUTPATIENT)
Dept: OBSTETRICS AND GYNECOLOGY | Facility: CLINIC | Age: 28
End: 2018-12-07
Attending: OBSTETRICS & GYNECOLOGY
Payer: COMMERCIAL

## 2018-12-07 VITALS — DIASTOLIC BLOOD PRESSURE: 80 MMHG | SYSTOLIC BLOOD PRESSURE: 124 MMHG

## 2018-12-07 DIAGNOSIS — L27.0 DRUG ERUPTION: ICD-10-CM

## 2018-12-07 DIAGNOSIS — O26.03 EXCESSIVE WEIGHT GAIN DURING PREGNANCY IN THIRD TRIMESTER: ICD-10-CM

## 2018-12-07 DIAGNOSIS — J45.20 MILD INTERMITTENT CHILDHOOD ASTHMA WITHOUT COMPLICATION: ICD-10-CM

## 2018-12-07 DIAGNOSIS — Z34.90 PREGNANCY WITH ONE FETUS, ANTEPARTUM: ICD-10-CM

## 2018-12-07 DIAGNOSIS — L29.9 PRURITUS: ICD-10-CM

## 2018-12-07 DIAGNOSIS — E06.3 HASHIMOTO'S THYROIDITIS: ICD-10-CM

## 2018-12-07 DIAGNOSIS — R21 RASH: Primary | ICD-10-CM

## 2018-12-07 DIAGNOSIS — R21 RASH: ICD-10-CM

## 2018-12-07 LAB
ALBUMIN SERPL BCP-MCNC: 2.9 G/DL
ALP SERPL-CCNC: 162 U/L
ALT SERPL W/O P-5'-P-CCNC: 14 U/L
ANION GAP SERPL CALC-SCNC: 11 MMOL/L
AST SERPL-CCNC: 11 U/L
BASOPHILS # BLD AUTO: 0.07 K/UL
BASOPHILS NFR BLD: 0.6 %
BILIRUB SERPL-MCNC: 0.3 MG/DL
BUN SERPL-MCNC: 10 MG/DL
CALCIUM SERPL-MCNC: 9.7 MG/DL
CHLORIDE SERPL-SCNC: 103 MMOL/L
CO2 SERPL-SCNC: 27 MMOL/L
CREAT SERPL-MCNC: 0.7 MG/DL
DIFFERENTIAL METHOD: ABNORMAL
EOSINOPHIL # BLD AUTO: 0.8 K/UL
EOSINOPHIL NFR BLD: 6.7 %
ERYTHROCYTE [DISTWIDTH] IN BLOOD BY AUTOMATED COUNT: 15.1 %
EST. GFR  (AFRICAN AMERICAN): >60 ML/MIN/1.73 M^2
EST. GFR  (NON AFRICAN AMERICAN): >60 ML/MIN/1.73 M^2
GLUCOSE SERPL-MCNC: 75 MG/DL
HCT VFR BLD AUTO: 37.3 %
HGB BLD-MCNC: 11.1 G/DL
IMM GRANULOCYTES # BLD AUTO: 0.07 K/UL
IMM GRANULOCYTES NFR BLD AUTO: 0.6 %
LYMPHOCYTES # BLD AUTO: 1.8 K/UL
LYMPHOCYTES NFR BLD: 14.8 %
MCH RBC QN AUTO: 26.4 PG
MCHC RBC AUTO-ENTMCNC: 29.8 G/DL
MCV RBC AUTO: 89 FL
MONOCYTES # BLD AUTO: 0.7 K/UL
MONOCYTES NFR BLD: 5.3 %
NEUTROPHILS # BLD AUTO: 8.9 K/UL
NEUTROPHILS NFR BLD: 72 %
NRBC BLD-RTO: 0 /100 WBC
PLATELET # BLD AUTO: 446 K/UL
PMV BLD AUTO: 10.7 FL
POTASSIUM SERPL-SCNC: 3.8 MMOL/L
PROT SERPL-MCNC: 7.7 G/DL
RBC # BLD AUTO: 4.21 M/UL
SODIUM SERPL-SCNC: 141 MMOL/L
T4 FREE SERPL-MCNC: 1.11 NG/DL
TSH SERPL DL<=0.005 MIU/L-ACNC: 1.49 UIU/ML
WBC # BLD AUTO: 12.31 K/UL

## 2018-12-07 PROCEDURE — 99213 OFFICE O/P EST LOW 20 MIN: CPT | Mod: S$GLB,,, | Performed by: OBSTETRICS & GYNECOLOGY

## 2018-12-07 PROCEDURE — 80053 COMPREHEN METABOLIC PANEL: CPT

## 2018-12-07 PROCEDURE — 88305 TISSUE EXAM BY PATHOLOGIST: CPT | Performed by: PATHOLOGY

## 2018-12-07 PROCEDURE — 85025 COMPLETE CBC W/AUTO DIFF WBC: CPT

## 2018-12-07 PROCEDURE — 11100 PR BIOPSY OF SKIN LESION: CPT | Mod: S$GLB,,, | Performed by: DERMATOLOGY

## 2018-12-07 PROCEDURE — 36415 COLL VENOUS BLD VENIPUNCTURE: CPT | Mod: PO

## 2018-12-07 PROCEDURE — 84443 ASSAY THYROID STIM HORMONE: CPT

## 2018-12-07 PROCEDURE — 84439 ASSAY OF FREE THYROXINE: CPT

## 2018-12-07 PROCEDURE — 99203 OFFICE O/P NEW LOW 30 MIN: CPT | Mod: S$GLB,,, | Performed by: DERMATOLOGY

## 2018-12-07 RX ORDER — BETAMETHASONE SODIUM PHOSPHATE AND BETAMETHASONE ACETATE 3; 3 MG/ML; MG/ML
6 INJECTION, SUSPENSION INTRA-ARTICULAR; INTRALESIONAL; INTRAMUSCULAR; SOFT TISSUE
Status: DISCONTINUED | OUTPATIENT
Start: 2018-12-07 | End: 2019-04-04

## 2018-12-07 RX ORDER — TRIAMCINOLONE ACETONIDE 40 MG/ML
40 INJECTION, SUSPENSION INTRA-ARTICULAR; INTRAMUSCULAR
Status: DISCONTINUED | OUTPATIENT
Start: 2018-12-07 | End: 2019-04-04

## 2018-12-07 RX ORDER — TRIAMCINOLONE ACETONIDE 1 MG/G
CREAM TOPICAL
Qty: 454 G | Refills: 1 | Status: SHIPPED | OUTPATIENT
Start: 2018-12-07 | End: 2019-04-04

## 2018-12-07 NOTE — PROGRESS NOTES
"  Subjective:       Patient ID:  Armida Medina is a 28 y.o. female who presents for   Chief Complaint   Patient presents with    Rash     all over     Rash  - Initial  Affected locations: left lower leg, right lower leg, left upper leg, right upper leg, left arm and right arm  Duration: 2 weeks  Signs / symptoms: itching, redness and spreading  Severity: moderate  Timing: constant  Aggravated by: heat  Relieving factors/Treatments tried: OTC hydrocortisone, Rx topical steroids and antihistamines (and medrol dose pack)  Improvement on treatment: no relief    Itching started on lower legs immediately following  delivery on ; rash started on lower legs 5-6 days later (on  or ). Started near ankles and has since worked its way up. Was splotchy at first, now starting to coalesce. Now on arms as well.    She got one dose of Ancef at the time of delivery.  She was started on labetalol after delivery for pre-eclampsia--stopped 2 days ago.  She took Macrobid for UTI on .    Review of Systems   Constitutional: Positive for fever ("low grade" 98-99, highest was 100.3 ). Negative for chills, fatigue and malaise.   HENT: Negative for mouth sores, trouble swallowing, lip swelling and tongue swelling.         No facial swelling   Eyes: Negative for itching and eye irritation.   Genitourinary: Negative for genital sores.   Musculoskeletal: Negative for joint swelling and arthralgias.   Skin: Positive for itching, rash and dry skin. Negative for recent sunburn.   Hematologic/Lymphatic: Does not bruise/bleed easily.      Patient reports a history of "mild psoriasis." No history of eczema.    Objective:    Physical Exam   Constitutional: She appears well-developed and well-nourished. No distress.   HENT:   Mouth/Throat: Lips normal.    Eyes: Lids are normal.  No conjunctival no injection.   Neurological: She is alert and oriented to person, place, and time. She is not disoriented.   Psychiatric: She " has a normal mood and affect.   Skin:   Areas Examined (abnormalities noted in diagram):   Head / Face Inspection Performed  Neck Inspection Performed  Chest / Axilla Inspection Performed  Abdomen Inspection Performed  Back Inspection Performed  RUE Inspected  LUE Inspection Performed  RLE Inspected  LLE Inspection Performed  Nails and Digits Inspection Performed              Diagram Legend     Erythematous scaling macule/papule c/w actinic keratosis       Vascular papule c/w angioma      Pigmented verrucoid papule/plaque c/w seborrheic keratosis      Yellow umbilicated papule c/w sebaceous hyperplasia      Irregularly shaped tan macule c/w lentigo     1-2 mm smooth white papules consistent with Milia      Movable subcutaneous cyst with punctum c/w epidermal inclusion cyst      Subcutaneous movable cyst c/w pilar cyst      Firm pink to brown papule c/w dermatofibroma      Pedunculated fleshy papule(s) c/w skin tag(s)      Evenly pigmented macule c/w junctional nevus     Mildly variegated pigmented, slightly irregular-bordered macule c/w mildly atypical nevus      Flesh colored to evenly pigmented papule c/w intradermal nevus       Pink pearly papule/plaque c/w basal cell carcinoma      Erythematous hyperkeratotic cursted plaque c/w SCC      Surgical scar with no sign of skin cancer recurrence      Open and closed comedones      Inflammatory papules and pustules      Verrucoid papule consistent consistent with wart     Erythematous eczematous patches and plaques     Dystrophic onycholytic nail with subungual debris c/w onychomycosis     Umbilicated papule    Erythematous-base heme-crusted tan verrucoid plaque consistent with inflamed seborrheic keratosis     Erythematous Silvery Scaling Plaque c/w Psoriasis     See annotation      Assessment / Plan:      Pathology Orders:     Normal Orders This Visit    Tissue Specimen To Pathology, Dermatology     Questions:    Directional Terms:  Other(comment)    Clinical  information:  drug eruption vs other    Specific Site:  Right thigh        Rash  -     Tissue Specimen To Pathology, Dermatology  Punch biopsy procedure note:  Risk, benefits, and alternatives are discussed with the patient, including risk of infection, scar, recurrence, and need for additional treatment of site. The patient agrees to the procedure by verbal consent. The area is marked and prepped with alcohol.  Approximately 1 mL of lidocaine 1% with epinephrine is used for local anesthesia. A 4 mm punch is used to remove part or all of the lesion. The specimen is sent for pathology. Hemostasis is obtained and the defect is closed with 4-0 nylon. The area is then dressed and bandaged. The patient tolerated the procedure well without adverse event. Written instructions on wound care were given and were reviewed with the patient, who is to call for any signs of bleeding or infection. The patient will be notified of the pathology results.    Drug eruption, suspected  Since pruritus started immediately following delivery, suspect Ancef as the trigger of this eruption; however, cannot rule out labetalol or Macrobid.  Will check labs to rule out DRESS syndrome:  -     CBC auto differential; Future  -     Comprehensive metabolic panel; Future  -  Will follow labs TSH and free T4 as ordered by Dr. Carpenter  If concern for DRESS based on biopsy and labs, will check EKG, and consider CXR if symptomatic.    -     triamcinolone acetonide 0.1% (KENALOG) 0.1 % cream; Apply to affected areas of body bid prn rash  Dispense: 454 g; Refill: 1    -     betamethasone acetate-betamethasone sodium phosphate injection 6 mg IM x 1 today  -     triamcinolone acetonide injection 40 mg IM x 1 today    Pruritus  Recommended CeraVe Itch Relief cream/lotion or Sarna sensitive lotion. Can store these in the refrigerator for an added cooling effect.    Hashimoto's thyroiditis  Will follow labs TSH and free T4 as ordered by Dr. Carpenter as DRESS syndrome  can cause thyroiditis as well.    Follow-up in about 2 weeks (around 12/21/2018) for follow up and suture removal.

## 2018-12-07 NOTE — LETTER
December 7, 2018      Fiorella Carpenter,   [address] [address]           [facility] - Dermatology  [address] [place] [id number]  Phone: [phone]  Fax: [phone]          Patient: Armida Medina   MR Number: [id number]   YOB: 1990   Date of Visit: 12/7/2018       Dear Dr. Fiorella Carpenter:    Thank you for referring Armida Medina to me for evaluation. Attached you will find relevant portions of my assessment and plan of care.    If you have questions, please do not hesitate to call me. I look forward to following Armida Medina along with you.    Sincerely,    Teresa Landaverde MD    Enclosure  CC:  No Recipients    If you would like to receive this communication electronically, please contact [email or web address] or [phone] to request more information on [facility] access.    For providers and/or their staff who would like to refer a patient to [facility], please contact us through our one-stop-shop provider referral line, [facility], at [phone].    If you feel you have received this communication in error or would no longer like to receive these types of communications, please e-mail [email or web address]

## 2018-12-10 DIAGNOSIS — L27.0 DRUG ERUPTION: Primary | ICD-10-CM

## 2018-12-17 ENCOUNTER — TELEPHONE (OUTPATIENT)
Dept: OBSTETRICS AND GYNECOLOGY | Facility: CLINIC | Age: 28
End: 2018-12-17

## 2018-12-18 ENCOUNTER — LAB VISIT (OUTPATIENT)
Dept: LAB | Facility: HOSPITAL | Age: 28
End: 2018-12-18
Attending: DERMATOLOGY
Payer: COMMERCIAL

## 2018-12-18 DIAGNOSIS — L27.0 DRUG ERUPTION: ICD-10-CM

## 2018-12-18 LAB
ALBUMIN SERPL BCP-MCNC: 3.2 G/DL
ALP SERPL-CCNC: 148 U/L
ALT SERPL W/O P-5'-P-CCNC: 15 U/L
ANION GAP SERPL CALC-SCNC: 12 MMOL/L
AST SERPL-CCNC: 13 U/L
BASOPHILS # BLD AUTO: 0.06 K/UL
BASOPHILS NFR BLD: 0.5 %
BILIRUB SERPL-MCNC: 0.3 MG/DL
BUN SERPL-MCNC: 13 MG/DL
CALCIUM SERPL-MCNC: 9.2 MG/DL
CHLORIDE SERPL-SCNC: 105 MMOL/L
CO2 SERPL-SCNC: 23 MMOL/L
CREAT SERPL-MCNC: 0.8 MG/DL
DIFFERENTIAL METHOD: ABNORMAL
EOSINOPHIL # BLD AUTO: 0.4 K/UL
EOSINOPHIL NFR BLD: 3.7 %
ERYTHROCYTE [DISTWIDTH] IN BLOOD BY AUTOMATED COUNT: 15.1 %
EST. GFR  (AFRICAN AMERICAN): >60 ML/MIN/1.73 M^2
EST. GFR  (NON AFRICAN AMERICAN): >60 ML/MIN/1.73 M^2
GLUCOSE SERPL-MCNC: 96 MG/DL
HCT VFR BLD AUTO: 38.7 %
HGB BLD-MCNC: 11.6 G/DL
IMM GRANULOCYTES # BLD AUTO: 0.03 K/UL
IMM GRANULOCYTES NFR BLD AUTO: 0.3 %
LYMPHOCYTES # BLD AUTO: 1.7 K/UL
LYMPHOCYTES NFR BLD: 15.8 %
MCH RBC QN AUTO: 26.2 PG
MCHC RBC AUTO-ENTMCNC: 30 G/DL
MCV RBC AUTO: 88 FL
MONOCYTES # BLD AUTO: 0.5 K/UL
MONOCYTES NFR BLD: 4.5 %
NEUTROPHILS # BLD AUTO: 8.3 K/UL
NEUTROPHILS NFR BLD: 75.2 %
NRBC BLD-RTO: 0 /100 WBC
PLATELET # BLD AUTO: 377 K/UL
PMV BLD AUTO: 11.3 FL
POTASSIUM SERPL-SCNC: 4 MMOL/L
PROT SERPL-MCNC: 7.5 G/DL
RBC # BLD AUTO: 4.42 M/UL
SODIUM SERPL-SCNC: 140 MMOL/L
WBC # BLD AUTO: 11.04 K/UL

## 2018-12-18 PROCEDURE — 85025 COMPLETE CBC W/AUTO DIFF WBC: CPT

## 2018-12-18 PROCEDURE — 80053 COMPREHEN METABOLIC PANEL: CPT

## 2018-12-18 PROCEDURE — 36415 COLL VENOUS BLD VENIPUNCTURE: CPT | Mod: PO

## 2018-12-18 NOTE — PROGRESS NOTES
Patient here for BP check, also has noted a diffuse, very itchy rash, minimal improvement after steroids and topical creams.  Bp is stable, no issues.  Will see Derm today  F/U as scheduled or if elevated BP.

## 2018-12-20 ENCOUNTER — OFFICE VISIT (OUTPATIENT)
Dept: DERMATOLOGY | Facility: CLINIC | Age: 28
End: 2018-12-20
Payer: COMMERCIAL

## 2018-12-20 DIAGNOSIS — L85.3 XEROSIS CUTIS: ICD-10-CM

## 2018-12-20 DIAGNOSIS — L29.9 PRURITUS: ICD-10-CM

## 2018-12-20 DIAGNOSIS — L81.0 POSTINFLAMMATORY HYPERPIGMENTATION: ICD-10-CM

## 2018-12-20 DIAGNOSIS — L27.0 DRUG ERUPTION: Primary | ICD-10-CM

## 2018-12-20 PROCEDURE — 99214 OFFICE O/P EST MOD 30 MIN: CPT | Mod: S$GLB,,, | Performed by: DERMATOLOGY

## 2018-12-20 NOTE — PROGRESS NOTES
Subjective:       Patient ID:  Armida Medina is a 28 y.o. female who presents for   Chief Complaint   Patient presents with    Rash     follow up, itching      Pt is here today for a follow up on rash. Pt states that she is doing much better and has not had to use triamcinolone for 3 days.   She complains of discoloration on legs where rash was. No longer itching or red.      Rash  - Follow-up  Symptom course: improving  Currently using: triamcinolone cream.  Affected locations: right buttock  Signs / symptoms: itching  Severity: mild    Dry Skin  - Initial  Affected locations: diffuse  Duration: several months.  Signs / symptoms: itching and dryness  Severity: mild to moderate  Timing: constant  Aggravated by: cold weather  Relieving factors/Treatments tried: nothing      Review of Systems   Skin: Positive for itching, rash and dry skin.   Hematologic/Lymphatic: Does not bruise/bleed easily.        Objective:    Physical Exam   Constitutional: She appears well-developed and well-nourished. No distress.   Eyes: Lids are normal.  No conjunctival no injection.   Neurological: She is alert and oriented to person, place, and time. She is not disoriented.   Psychiatric: She has a normal mood and affect.   Skin:   Areas Examined (abnormalities noted in diagram):   Head / Face Inspection Performed  Neck Inspection Performed  Genitals / Buttocks / Groin Inspection Performed  Back Inspection Performed  RUE Inspected  LUE Inspection Performed  RLE Inspected  LLE Inspection Performed              Diagram Legend     Erythematous scaling macule/papule c/w actinic keratosis       Vascular papule c/w angioma      Pigmented verrucoid papule/plaque c/w seborrheic keratosis      Yellow umbilicated papule c/w sebaceous hyperplasia      Irregularly shaped tan macule c/w lentigo     1-2 mm smooth white papules consistent with Milia      Movable subcutaneous cyst with punctum c/w epidermal inclusion cyst      Subcutaneous movable  cyst c/w pilar cyst      Firm pink to brown papule c/w dermatofibroma      Pedunculated fleshy papule(s) c/w skin tag(s)      Evenly pigmented macule c/w junctional nevus     Mildly variegated pigmented, slightly irregular-bordered macule c/w mildly atypical nevus      Flesh colored to evenly pigmented papule c/w intradermal nevus       Pink pearly papule/plaque c/w basal cell carcinoma      Erythematous hyperkeratotic cursted plaque c/w SCC      Surgical scar with no sign of skin cancer recurrence      Open and closed comedones      Inflammatory papules and pustules      Verrucoid papule consistent consistent with wart     Erythematous eczematous patches and plaques     Dystrophic onycholytic nail with subungual debris c/w onychomycosis     Umbilicated papule    Erythematous-base heme-crusted tan verrucoid plaque consistent with inflamed seborrheic keratosis     Erythematous Silvery Scaling Plaque c/w Psoriasis     See annotation      Assessment / Plan:        Drug eruption  Pruritus  Repeat labs were reviewed and are within normal limits.  Improving. Continue TAC 0.1% cream on any residual areas of itch/rash/redness.  Rec'd avoiding cephalosporins in the future.    Xerosis cutis  - Good skin care regimen was discussed, including limiting to one bath or shower per day, using lukewarm water with mild soap, and applying a moisturizing cream to skin 1-2 times per day.   - Recommended Dove sensitive skin soap, CeraVe moisturizing cream or healing ointment, and All Free and Clear detergent.  - Handout was reviewed with and provided to the patient.    Postinflammatory hyperpigmentation  This is a normal discoloration of the skin after an inflammatory process has resolved. It may take months to years to fade.  Patient instructed on importance of daily sun protection with a broad-spectrum sunscreen of SPF 30 or higher. Sun avoidance and topical protection discussed.     Suture was removed.    Follow-up if symptoms worsen  or fail to improve.

## 2019-01-03 ENCOUNTER — POSTPARTUM VISIT (OUTPATIENT)
Dept: OBSTETRICS AND GYNECOLOGY | Facility: CLINIC | Age: 29
End: 2019-01-03
Attending: OBSTETRICS & GYNECOLOGY
Payer: COMMERCIAL

## 2019-01-03 VITALS
WEIGHT: 222.44 LBS | HEIGHT: 63 IN | DIASTOLIC BLOOD PRESSURE: 72 MMHG | SYSTOLIC BLOOD PRESSURE: 108 MMHG | BODY MASS INDEX: 39.41 KG/M2

## 2019-01-03 DIAGNOSIS — E06.3 HASHIMOTO'S THYROIDITIS: ICD-10-CM

## 2019-01-03 DIAGNOSIS — Z98.891 S/P CESAREAN SECTION: ICD-10-CM

## 2019-01-03 DIAGNOSIS — O14.13 PREECLAMPSIA, SEVERE, THIRD TRIMESTER: ICD-10-CM

## 2019-01-03 PROCEDURE — 99999 PR PBB SHADOW E&M-EST. PATIENT-LVL III: ICD-10-PCS | Mod: PBBFAC,,, | Performed by: OBSTETRICS & GYNECOLOGY

## 2019-01-03 PROCEDURE — 99999 PR PBB SHADOW E&M-EST. PATIENT-LVL III: CPT | Mod: PBBFAC,,, | Performed by: OBSTETRICS & GYNECOLOGY

## 2019-01-23 ENCOUNTER — PATIENT MESSAGE (OUTPATIENT)
Dept: OBSTETRICS AND GYNECOLOGY | Facility: CLINIC | Age: 29
End: 2019-01-23

## 2019-01-31 NOTE — PROGRESS NOTES
U/S discussed. Size appropriate for dates, though measuring ahead, breech.  tdap today as well.  precautions given.  F/U in 2 weeks

## 2019-02-22 RX ORDER — NORGESTIMATE AND ETHINYL ESTRADIOL 0.25-0.035
1 KIT ORAL DAILY
Qty: 30 TABLET | Refills: 11 | Status: SHIPPED | OUTPATIENT
Start: 2019-02-22 | End: 2019-04-04

## 2019-03-13 PROBLEM — R19.7 DIARRHEA: Status: ACTIVE | Noted: 2019-03-13

## 2019-03-13 PROBLEM — O14.13 PREECLAMPSIA, SEVERE, THIRD TRIMESTER: Status: RESOLVED | Noted: 2018-11-21 | Resolved: 2019-03-13

## 2019-03-13 PROBLEM — D64.9 NORMOCYTIC ANEMIA: Status: ACTIVE | Noted: 2019-03-13

## 2019-04-03 NOTE — PROGRESS NOTES
Subjective:       Patient ID: Armida Medina is a 28 y.o. female who  has a past medical history of Acne, Allergy, Asthma, Hashimoto's thyroiditis (06/2017), and Preeclampsia, severe, third trimester (11/21/2018).    Chief Complaint: Establish Care and Diarrhea     History was obtained from the patient and supplemented through chart review.  The patient has not seen a PCP in our system.    Followed by OBGYN for postpartum care.    Has a 5 month old baby.  Is not breast feeding.  Works as a nurse for [employer].  Was referred to me by Bambi and Dr. Anton.    HPI    Diarrhea:  This started 5 months ago about a week the birth of her child.  Was treated with Macrobid at the time for uncomplicated UTI.  Since then, it has not had any other antibiotics.  Does work at a dialysis center.  Will have very watery diarrhea followed by constipation.  No blood or mucous in her stool.  Can be yellow and burns.  Sometimes has large volume diarrhea.  Is not particularly foul smelling.  Will have diffuse abdominal cramping at night but sometimes has this without diarrhea.  Denies abdominal pain. Denies symptoms of heartburn or sour taste in the back of her throat.  Is not associated with stress.  No association with food, except possibly with fried food.  H/o dairy intolerance and only has it with coffee.  Will have lactose free milk with cereal.  Diarrhea went away for a few weeks.  Varies and occurs about once to twice a week.  Denies fever.  Has not travel to developing countries.  Had slight nausea today.  No vomiting; is able tolerate p.o..    Denies Urinary symptoms such as dysuria, vaginal discharge.  Has had 2 menstrual cycles since the birth of her child.  Her periods have been heavy.  Her OBGYN, Dr. Carpenter suggested birth control, but she did not take it due to concern for side effects.  The abdominal cramping is not related to her periods.  History of Hashimoto's.     History of PreEclampsia:    Was taking labetalol 200.   This was able to be discontinued a week after birth.     Hashimoto's thyroiditis:  Prior to pregnancy.  Was monitored during pregnancy.  Had mild elevation of TPO  to 7.7.  TFTs have been normal.  Mom and maternal grandmother with hypothyroidism.    Fatigue has improved.  Has had difficulty losing weight despite diet and exercise.  Has hair loss.  Has always had cold intolerance.  She denies skin changes, palpitations.  Lab Results   Component Value Date    TSH 1.492 12/07/2018    FREET4 1.11 12/07/2018     Normocytic anemia:  Noted during pregnancy.  Has had heavier menstrual cycles since pregnancy.  Follows with OBGYN as above.  No hematochezia.  No results found for: IRON, TIBC, FERRITIN, SATURATEDIRO  No results found for: SIQYCMAW15     Childhood asthma:  Symptoms are well controlled.  No acute issues.  Did use a rescue inhaler last week after going to the gym.    Obesity:    BMI 39.  Difficulty with weight loss despite diet and exercise.  Will discuss during next visit.    Review of Systems   Constitutional: Positive for activity change. Negative for fever and unexpected weight change.   HENT: Negative for hearing loss, rhinorrhea and trouble swallowing.    Eyes: Negative for discharge and visual disturbance.   Respiratory: Negative for chest tightness and wheezing.    Cardiovascular: Negative for chest pain and palpitations.   Gastrointestinal: Positive for constipation and diarrhea. Negative for blood in stool and vomiting.   Endocrine: Negative for polydipsia and polyuria.   Genitourinary: Negative for difficulty urinating, dysuria, hematuria and menstrual problem.   Musculoskeletal: Negative for arthralgias, joint swelling and neck pain.   Skin: Negative for rash and wound.   Neurological: Negative for weakness and headaches.   Hematological: Negative for adenopathy.   Psychiatric/Behavioral: Negative for confusion and dysphoric mood.       Past Medical History:   Diagnosis Date    Acne     Allergy      Asthma     Hashimoto's thyroiditis 2017    Preeclampsia, severe, third trimester 2018     Past Surgical History:   Procedure Laterality Date     SECTION N/A 2018    Performed by Sawyer Lake Jr., MD at [facility] L&D    CYST REMOVAL      Left ear @ age 2    TONSILLECTOMY, ADENOIDECTOMY       Family History   Problem Relation Age of Onset    No Known Problems Father     Miscarriages / Stillbirths Mother     Hypothyroidism Mother     Coronary artery disease Maternal Grandmother         s/p CABG 4v    Hypothyroidism Maternal Grandmother     Coronary artery disease Paternal Grandmother         s/p CABG    Breast cancer Neg Hx     Cancer Neg Hx     Colon cancer Neg Hx     Diabetes Neg Hx     Eclampsia Neg Hx     Hypertension Neg Hx     Stroke Neg Hx      labor Neg Hx     Ovarian cancer Neg Hx     Melanoma Neg Hx      Social History     Socioeconomic History    Marital status:      Spouse name: Not on file    Number of children: Not on file    Years of education: Not on file    Highest education level: Not on file   Occupational History    Not on file   Social Needs    Financial resource strain: Not hard at all    Food insecurity:     Worry: Never true     Inability: Never true    Transportation needs:     Medical: No     Non-medical: No   Tobacco Use    Smoking status: Never Smoker    Smokeless tobacco: Never Used   Substance and Sexual Activity    Alcohol use: Yes     Frequency: Never     Drinks per session: Patient refused     Binge frequency: Never     Comment: social not since pregnancy    Drug use: No    Sexual activity: Yes     Partners: Male     Birth control/protection: None   Lifestyle    Physical activity:     Days per week: 3 days     Minutes per session: 60 min    Stress: Only a little   Relationships    Social connections:     Talks on phone: Twice a week     Gets together: Twice a week     Attends Gnosticist service: Not on file      "Active member of club or organization: No     Attends meetings of clubs or organizations: Never     Relationship status:    Other Topics Concern    Are you pregnant or think you may be? No    Breast-feeding No   Social History Narrative    Not on file     Objective:      Vitals:    04/04/19 1502   BP: 110/60   Pulse: 64   SpO2: 100%   Weight: 102.2 kg (225 lb 5 oz)   Height: 5' 3" (1.6 m)      Physical Exam   Constitutional: She appears well-developed and well-nourished. No distress.   HENT:   Head: Normocephalic and atraumatic.   Nose: Nose normal.   Mouth/Throat: Oropharynx is clear and moist. No oropharyngeal exudate.   Eyes: Pupils are equal, round, and reactive to light. Conjunctivae and EOM are normal. Right eye exhibits no discharge. Left eye exhibits no discharge. No scleral icterus.   Neck: Neck supple. No tracheal deviation present. No thyromegaly present.   Cardiovascular: Normal rate, regular rhythm, normal heart sounds and intact distal pulses.   No murmur heard.  Pulmonary/Chest: Effort normal and breath sounds normal. No respiratory distress. She has no wheezes.   Abdominal: Soft. Bowel sounds are normal. She exhibits no distension. There is no tenderness. There is no guarding.   Musculoskeletal: She exhibits no edema or deformity.   Lymphadenopathy:     She has no cervical adenopathy.   Neurological: She is alert. No cranial nerve deficit. Gait normal.   Skin: Skin is warm and dry. Capillary refill takes less than 2 seconds. She is not diaphoretic. No erythema.   Psychiatric: She has a normal mood and affect. Her behavior is normal.         Lab Results   Component Value Date    WBC 11.04 12/18/2018    HGB 11.6 (L) 12/18/2018    HCT 38.7 12/18/2018     (H) 12/18/2018    ALT 15 12/18/2018    AST 13 12/18/2018     12/18/2018    K 4.0 12/18/2018     12/18/2018    CREATININE 0.8 12/18/2018    BUN 13 12/18/2018    CO2 23 12/18/2018    TSH 1.492 12/07/2018    HGBA1C 5.0 " 05/03/2018       The ASCVD Risk score ([address] L[address] Jr., et al., 2013) failed to calculate for the following reasons:    The 2013 ASCVD risk score is only valid for ages 40 to 79    (Imaging have been independently reviewed)  CT abdomen  with thick-walled bladder, concerning for cystitis    Assessment:       1. Diarrhea, unspecified type    2. Preeclampsia, severe, third trimester    3. Hashimoto's thyroiditis    4. Normocytic anemia    5. Mild intermittent childhood asthma without complication    6. Obesity (BMI 30-39.9)    7. Encounter for lipid screening for cardiovascular disease    8. Encounter for screening for diabetes mellitus          Plan:       Armida was seen today for establish care and diarrhea.    Diagnoses and all orders for this visit:    Diarrhea, unspecified type  Comments:  DDx C diff, infectious, IBS, thyroid (h/o hashimotos). Stool studies including C diff, TFTs. If neg, will do trial of Imodium. RTC 6 wks. Consider GI referral.  Orders:  -     Cancel: WBC, Stool; Future  -     Cancel: Stool Exam-Ova,Cysts,Parasites; Future  -     Cancel: Stool culture; Future  -     Cancel: Clostridium difficile EIA; Future  -     Cancel: Comprehensive metabolic panel; Future  -     Cancel: TSH; Future  -     Cancel: T4, free; Future  -     WBC, Stool; Future  -     Stool Exam-Ova,Cysts,Parasites; Future  -     Stool culture; Future  -     Clostridium difficile EIA; Future  -     Comprehensive metabolic panel; Future  -     TSH; Future  -     T4, free; Future  -     WBC, Stool  -     Stool Exam-Ova,Cysts,Parasites  -     Stool culture  -     Clostridium difficile EIA  -     Comprehensive metabolic panel  -     TSH  -     T4, free    Preeclampsia, severe, third trimester  Comments:  Resolved.  BP control today.  Will continue to monitor.    Hashimoto's thyroiditis  Comments:  +FHx. Elevated TPO.  Has had normal TFTs.  Recheck due to watery stools.  Will need to recheck TFTs periodically.  Orders:  -      Cancel: TSH; Future  -     Cancel: T4, free; Future  -     TSH; Future  -     T4, free; Future  -     TSH  -     T4, free    Normocytic anemia  Comments:  During pregnancy, but has also had heavier menstrual cycle since pregnancy.  Follows with OBGYN.  Is considering OCPs.  Orders:  -     Cancel: Ferritin; Future  -     Cancel: Iron and TIBC; Future  -     Cancel: Vitamin B12; Future  -     Ferritin; Future  -     Iron and TIBC; Future  -     Vitamin B12; Future  -     Ferritin  -     Iron and TIBC  -     Vitamin B12    Mild intermittent childhood asthma without complication  Comments:  Well controlled.  Continue inhaler p.r.n..    Obesity (BMI 30-39.9)  Comments:  BMI 39.  Difficulty with weight loss despite diet and exercise.  Checking TFTs. Will discuss during next visit.    Encounter for lipid screening for cardiovascular disease  Comments:  Checking due to Hashimoto's, obesity, preeclampsia  Orders:  -     Cancel: Lipid panel; Future    Encounter for screening for diabetes mellitus  Comments:  Checking due to Hashimoto's, obesity, preeclampsia  Orders:  -     Cancel: Hemoglobin A1c; Future  -     Lipid panel; Future  -     Lipid panel    Other orders  -     Cancel: Lipase; Future  -     Hemoglobin A1c; Future  -     Hemoglobin A1c         Side effects of medication(s) were discussed in detail and patient voiced understanding.  Patient will call back for any issues or complications.     RTC in 6 week(s) or sooner PRN for diarrhea.

## 2019-04-04 ENCOUNTER — OFFICE VISIT (OUTPATIENT)
Dept: INTERNAL MEDICINE | Facility: CLINIC | Age: 29
End: 2019-04-04
Payer: COMMERCIAL

## 2019-04-04 VITALS
HEIGHT: 63 IN | BODY MASS INDEX: 39.92 KG/M2 | WEIGHT: 225.31 LBS | HEART RATE: 64 BPM | DIASTOLIC BLOOD PRESSURE: 60 MMHG | SYSTOLIC BLOOD PRESSURE: 110 MMHG | OXYGEN SATURATION: 100 %

## 2019-04-04 DIAGNOSIS — J45.20 MILD INTERMITTENT CHILDHOOD ASTHMA WITHOUT COMPLICATION: ICD-10-CM

## 2019-04-04 DIAGNOSIS — O14.13 PREECLAMPSIA, SEVERE, THIRD TRIMESTER: ICD-10-CM

## 2019-04-04 DIAGNOSIS — D64.9 NORMOCYTIC ANEMIA: ICD-10-CM

## 2019-04-04 DIAGNOSIS — R19.7 DIARRHEA, UNSPECIFIED TYPE: Primary | ICD-10-CM

## 2019-04-04 DIAGNOSIS — E66.9 OBESITY (BMI 30-39.9): ICD-10-CM

## 2019-04-04 DIAGNOSIS — E06.3 HASHIMOTO'S THYROIDITIS: ICD-10-CM

## 2019-04-04 DIAGNOSIS — Z13.1 ENCOUNTER FOR SCREENING FOR DIABETES MELLITUS: ICD-10-CM

## 2019-04-04 DIAGNOSIS — Z13.220 ENCOUNTER FOR LIPID SCREENING FOR CARDIOVASCULAR DISEASE: ICD-10-CM

## 2019-04-04 DIAGNOSIS — Z13.6 ENCOUNTER FOR LIPID SCREENING FOR CARDIOVASCULAR DISEASE: ICD-10-CM

## 2019-04-04 PROCEDURE — 3008F PR BODY MASS INDEX (BMI) DOCUMENTED: ICD-10-PCS | Mod: CPTII,S$GLB,, | Performed by: INTERNAL MEDICINE

## 2019-04-04 PROCEDURE — 99205 PR OFFICE/OUTPT VISIT, NEW, LEVL V, 60-74 MIN: ICD-10-PCS | Mod: S$GLB,,, | Performed by: INTERNAL MEDICINE

## 2019-04-04 PROCEDURE — 99205 OFFICE O/P NEW HI 60 MIN: CPT | Mod: S$GLB,,, | Performed by: INTERNAL MEDICINE

## 2019-04-04 PROCEDURE — 99999 PR PBB SHADOW E&M-EST. PATIENT-LVL III: ICD-10-PCS | Mod: PBBFAC,,, | Performed by: INTERNAL MEDICINE

## 2019-04-04 PROCEDURE — 3008F BODY MASS INDEX DOCD: CPT | Mod: CPTII,S$GLB,, | Performed by: INTERNAL MEDICINE

## 2019-04-04 PROCEDURE — 99999 PR PBB SHADOW E&M-EST. PATIENT-LVL III: CPT | Mod: PBBFAC,,, | Performed by: INTERNAL MEDICINE

## 2019-04-08 ENCOUNTER — TELEPHONE (OUTPATIENT)
Dept: INTERNAL MEDICINE | Facility: CLINIC | Age: 29
End: 2019-04-08

## 2019-04-08 DIAGNOSIS — R19.7 DIARRHEA, UNSPECIFIED TYPE: Primary | ICD-10-CM

## 2019-04-08 NOTE — TELEPHONE ENCOUNTER
Notified patient that referral signed and faxed to [facility] in [place] per patient's request.

## 2019-04-08 NOTE — TELEPHONE ENCOUNTER
----- Message from [facility] sent at 4/5/2019  1:02 PM CDT -----  Contact: Shree [facility] [phone]  Name of Who is Calling:Shree        What is the request in detail: Shree is requesting a test code for some labs that were sent over to [employer].Shree stated that it is regarding test 84949 and [employer] is not recognizing that test code. Shree is requesting that clinical staff contact pt back at #[phone]. Please contact to further discuss and advise.      Can the clinic reply by [facility]: No      What Number to Call Back if not in [facility]: [phone]

## 2019-04-08 NOTE — TELEPHONE ENCOUNTER
Patient has the right code for the C-Diff test because the code you put on it was wrong according to [facility] so she needs it reprinted with the right code (56407) on it to have the test done

## 2019-04-14 LAB
ALBUMIN SERPL-MCNC: 4.2 G/DL (ref 3.6–5.1)
ALBUMIN/GLOB SERPL: 1.4 (CALC) (ref 1–2.5)
ALP SERPL-CCNC: 143 U/L (ref 33–115)
ALT SERPL-CCNC: 21 U/L (ref 6–29)
AST SERPL-CCNC: 20 U/L (ref 10–30)
BILIRUB SERPL-MCNC: 0.4 MG/DL (ref 0.2–1.2)
BUN SERPL-MCNC: 12 MG/DL (ref 7–25)
BUN/CREAT SERPL: ABNORMAL (CALC) (ref 6–22)
CALCIUM SERPL-MCNC: 9.3 MG/DL (ref 8.6–10.2)
CHLORIDE SERPL-SCNC: 105 MMOL/L (ref 98–110)
CHOLEST SERPL-MCNC: 204 MG/DL
CHOLEST/HDLC SERPL: 4.1 (CALC)
CO2 SERPL-SCNC: 25 MMOL/L (ref 20–32)
CREAT SERPL-MCNC: 0.78 MG/DL (ref 0.5–1.1)
FERRITIN SERPL-MCNC: 8 NG/ML (ref 10–154)
GFRSERPLBLD MDRD-ARVRAT: 103 ML/MIN/1.73M2
GLOBULIN SER CALC-MCNC: 3 G/DL (CALC) (ref 1.9–3.7)
GLUCOSE SERPL-MCNC: 84 MG/DL (ref 65–99)
HBA1C MFR BLD: 5.2 % OF TOTAL HGB
HDLC SERPL-MCNC: 50 MG/DL
IRON SATN MFR SERPL: 11 % (CALC) (ref 11–50)
IRON SERPL-MCNC: 43 MCG/DL (ref 40–190)
LDLC SERPL CALC-MCNC: 125 MG/DL (CALC)
NONHDLC SERPL-MCNC: 154 MG/DL (CALC)
POTASSIUM SERPL-SCNC: 4.5 MMOL/L (ref 3.5–5.3)
PROT SERPL-MCNC: 7.2 G/DL (ref 6.1–8.1)
SODIUM SERPL-SCNC: 140 MMOL/L (ref 135–146)
T4 FREE SERPL-MCNC: 1.1 NG/DL (ref 0.8–1.8)
TIBC SERPL-MCNC: 400 MCG/DL (CALC) (ref 250–450)
TRIGL SERPL-MCNC: 175 MG/DL
TSH SERPL-ACNC: 2.82 MIU/L
VIT B12 SERPL-MCNC: 339 PG/ML (ref 200–1100)

## 2019-04-17 LAB
CAMPYLOBACTER STL CULT: NORMAL
E COLI SXT STL QL IA: NORMAL
O+P STL TRI STN: NORMAL
SALM + SHIG STL CULT: NORMAL
WBC STL QL MICRO: NORMAL

## 2019-04-19 DIAGNOSIS — R19.7 DIARRHEA, UNSPECIFIED TYPE: ICD-10-CM

## 2019-04-19 DIAGNOSIS — D50.9 IRON DEFICIENCY ANEMIA, UNSPECIFIED IRON DEFICIENCY ANEMIA TYPE: ICD-10-CM

## 2019-04-19 DIAGNOSIS — E78.2 MIXED HYPERLIPIDEMIA: Primary | ICD-10-CM

## 2019-04-19 LAB — C DIFF STL QL CULT: NORMAL

## 2019-04-19 RX ORDER — PRAVASTATIN SODIUM 20 MG/1
20 TABLET ORAL DAILY
Qty: 90 TABLET | Refills: 0 | Status: SHIPPED | OUTPATIENT
Start: 2019-04-19 | End: 2019-07-16 | Stop reason: SDUPTHER

## 2019-04-19 RX ORDER — FERROUS SULFATE 325(65) MG
325 TABLET, DELAYED RELEASE (ENTERIC COATED) ORAL 2 TIMES DAILY
Qty: 180 TABLET | Refills: 1 | Status: SHIPPED | OUTPATIENT
Start: 2019-04-19 | End: 2019-07-18

## 2019-04-22 ENCOUNTER — TELEPHONE (OUTPATIENT)
Dept: INTERNAL MEDICINE | Facility: CLINIC | Age: 29
End: 2019-04-22

## 2019-04-22 NOTE — TELEPHONE ENCOUNTER
Pt received message through my chart, message stated   Your labs are normal, including checking for electrolytes, liver and kidney function, diabetes, thyroid disorder.       Your iron levels are very low!  I will prescribe iron to be taken twice a day.  It may cause your stools to become dark or green.  You may also experience constipation.  If this occurs, you may take an over the counter stool softener.  Be sure to stay hydrated as well.  We can repeat your iron panel in about 3 months to ensure that it has improved, which I have already ordered through [employer].     Your stool studies was thankfully not consistent with a bacterial/parasitic infection or C diff.  However, given your diarrhea and persistent iron deficiency anemia, we should check for malabsorption from celiac disease, which is a blood test.     Finally, your cholesterol is elevated, which places you at increased risk for heart attacks and strokes.  We should start a medication to help lower your cholesterol to decrease your risk for heart attacks and strokes.  I will send a prescription to your pharmacy for pravastatin to be taken once a day.  If you develop severe, frequent muscle aches, please stop the medication and notify our office to schedule an appointment.       I encourage you to try to exercise for about 30 minutes a day, 5 days a week; it can even be as simple as brisk walking.  In addition, try to eat a low fat diet by avoiding fried food, butter, red meat, cheese and saturated fat.  Try to limit sugar, sweets and refined grains, which are found in white bread, white rice, most forms of pasta and packaged snack foods.  You can also try to eat more fiber through fruits and vegetables, oats, beans, nuts, and fish.    Associated Results     Result Notes for Clostridium Difficile Cult w/REFL Toxin B,PCR     Notes recorded by Zaina Gutierrez MD on 4/19/2019 at 2:35 PM CDT  Needs repeat iron labs in 3 months.  Also needs tissue transglutaminase  lab (blood test) now.  All labs have been ordered through [employer].  Please help arrange this.  Thanks!    Your labs are normal, including checking for electrolytes, liver and kidney function, diabetes, thyroid disorder.      Your iron levels are very low!  I will prescribe iron to be taken twice a day.  It may cause your stools to become dark or green.  You may also experience constipation.  If this occurs, you may take an over the counter stool softener.  Be sure to stay hydrated as well.  We can repeat your iron panel in about 3 months to ensure that it has improved, which I have already ordered through [employer].    Your stool studies was thankfully not consistent with a bacterial/parasitic infection or C diff.  However, given your diarrhea and persistent iron deficiency anemia, we should check for malabsorption from celiac disease, which is a blood test.    Finally, your cholesterol is elevated, which places you at increased risk for heart attacks and strokes.  We should start a medication to help lower your cholesterol to decrease your risk for heart attacks and strokes.  I will send a prescription to your pharmacy for pravastatin to be taken once a day.  If you develop severe, frequent muscle aches, please stop the medication and notify our office to schedule an appointment.      I encourage you to try to exercise for about 30 minutes a day, 5 days a week; it can even be as simple as brisk walking.  In addition, try to eat a low fat diet by avoiding fried food, butter, red meat, cheese and saturated fat.  Try to limit sugar, sweets and refined grains, which are found in white bread, white rice, most forms of pasta and packaged snack foods.  You can also try to eat more fiber through fruits and vegetables, oats, beans, nuts, and fish.

## 2019-05-08 ENCOUNTER — PATIENT MESSAGE (OUTPATIENT)
Dept: INTERNAL MEDICINE | Facility: CLINIC | Age: 29
End: 2019-05-08

## 2019-05-08 DIAGNOSIS — Z78.9 STATIN INTOLERANCE: Primary | ICD-10-CM

## 2019-05-14 ENCOUNTER — PATIENT OUTREACH (OUTPATIENT)
Dept: ADMINISTRATIVE | Facility: HOSPITAL | Age: 29
End: 2019-05-14

## 2019-05-14 LAB
25(OH)D3 SERPL-MCNC: 16 NG/ML (ref 30–100)
ALBUMIN SERPL-MCNC: 4.1 G/DL (ref 3.6–5.1)
ALBUMIN/GLOB SERPL: 1.5 (CALC) (ref 1–2.5)
ALP SERPL-CCNC: 178 U/L (ref 33–115)
ALT SERPL-CCNC: 29 U/L (ref 6–29)
AST SERPL-CCNC: 23 U/L (ref 10–30)
BILIRUB SERPL-MCNC: 0.3 MG/DL (ref 0.2–1.2)
BUN SERPL-MCNC: 12 MG/DL (ref 7–25)
BUN/CREAT SERPL: ABNORMAL (CALC) (ref 6–22)
CALCIUM SERPL-MCNC: 9.3 MG/DL (ref 8.6–10.2)
CHLORIDE SERPL-SCNC: 105 MMOL/L (ref 98–110)
CK SERPL-CCNC: 46 U/L (ref 29–143)
CO2 SERPL-SCNC: 28 MMOL/L (ref 20–32)
CREAT SERPL-MCNC: 0.75 MG/DL (ref 0.5–1.1)
GFRSERPLBLD MDRD-ARVRAT: 108 ML/MIN/1.73M2
GLOBULIN SER CALC-MCNC: 2.7 G/DL (CALC) (ref 1.9–3.7)
GLUCOSE SERPL-MCNC: 90 MG/DL (ref 65–99)
POTASSIUM SERPL-SCNC: 4.4 MMOL/L (ref 3.5–5.3)
PROT SERPL-MCNC: 6.8 G/DL (ref 6.1–8.1)
SODIUM SERPL-SCNC: 140 MMOL/L (ref 135–146)

## 2019-07-16 ENCOUNTER — TELEPHONE (OUTPATIENT)
Dept: INTERNAL MEDICINE | Facility: CLINIC | Age: 29
End: 2019-07-16

## 2019-07-16 DIAGNOSIS — E78.2 MIXED HYPERLIPIDEMIA: ICD-10-CM

## 2019-07-16 RX ORDER — PRAVASTATIN SODIUM 20 MG/1
TABLET ORAL
Qty: 90 TABLET | Refills: 3 | Status: SHIPPED | OUTPATIENT
Start: 2019-07-16 | End: 2019-09-03

## 2019-08-28 ENCOUNTER — PATIENT MESSAGE (OUTPATIENT)
Dept: OBSTETRICS AND GYNECOLOGY | Facility: CLINIC | Age: 29
End: 2019-08-28

## 2019-09-03 ENCOUNTER — OFFICE VISIT (OUTPATIENT)
Dept: INTERNAL MEDICINE | Facility: CLINIC | Age: 29
End: 2019-09-03
Payer: COMMERCIAL

## 2019-09-03 VITALS
OXYGEN SATURATION: 100 % | SYSTOLIC BLOOD PRESSURE: 110 MMHG | HEIGHT: 63 IN | WEIGHT: 214.31 LBS | BODY MASS INDEX: 37.97 KG/M2 | HEART RATE: 69 BPM | DIASTOLIC BLOOD PRESSURE: 62 MMHG

## 2019-09-03 DIAGNOSIS — E78.2 MIXED HYPERLIPIDEMIA: ICD-10-CM

## 2019-09-03 DIAGNOSIS — J45.20 MILD INTERMITTENT CHILDHOOD ASTHMA WITHOUT COMPLICATION: ICD-10-CM

## 2019-09-03 DIAGNOSIS — J00 ACUTE NASOPHARYNGITIS: Primary | ICD-10-CM

## 2019-09-03 DIAGNOSIS — T46.6X5A STATIN MYOPATHY: ICD-10-CM

## 2019-09-03 DIAGNOSIS — G72.0 STATIN MYOPATHY: ICD-10-CM

## 2019-09-03 DIAGNOSIS — E55.9 VITAMIN D DEFICIENCY: ICD-10-CM

## 2019-09-03 PROBLEM — J02.9 SORE THROAT: Status: ACTIVE | Noted: 2019-09-03

## 2019-09-03 PROCEDURE — 3074F SYST BP LT 130 MM HG: CPT | Mod: CPTII,S$GLB,, | Performed by: INTERNAL MEDICINE

## 2019-09-03 PROCEDURE — 99215 PR OFFICE/OUTPT VISIT, EST, LEVL V, 40-54 MIN: ICD-10-PCS | Mod: S$GLB,,, | Performed by: INTERNAL MEDICINE

## 2019-09-03 PROCEDURE — 3074F PR MOST RECENT SYSTOLIC BLOOD PRESSURE < 130 MM HG: ICD-10-PCS | Mod: CPTII,S$GLB,, | Performed by: INTERNAL MEDICINE

## 2019-09-03 PROCEDURE — 99999 PR PBB SHADOW E&M-EST. PATIENT-LVL III: ICD-10-PCS | Mod: PBBFAC,,, | Performed by: INTERNAL MEDICINE

## 2019-09-03 PROCEDURE — 3078F PR MOST RECENT DIASTOLIC BLOOD PRESSURE < 80 MM HG: ICD-10-PCS | Mod: CPTII,S$GLB,, | Performed by: INTERNAL MEDICINE

## 2019-09-03 PROCEDURE — 3078F DIAST BP <80 MM HG: CPT | Mod: CPTII,S$GLB,, | Performed by: INTERNAL MEDICINE

## 2019-09-03 PROCEDURE — 99215 OFFICE O/P EST HI 40 MIN: CPT | Mod: S$GLB,,, | Performed by: INTERNAL MEDICINE

## 2019-09-03 PROCEDURE — 99999 PR PBB SHADOW E&M-EST. PATIENT-LVL III: CPT | Mod: PBBFAC,,, | Performed by: INTERNAL MEDICINE

## 2019-09-03 PROCEDURE — 3008F PR BODY MASS INDEX (BMI) DOCUMENTED: ICD-10-PCS | Mod: CPTII,S$GLB,, | Performed by: INTERNAL MEDICINE

## 2019-09-03 PROCEDURE — 3008F BODY MASS INDEX DOCD: CPT | Mod: CPTII,S$GLB,, | Performed by: INTERNAL MEDICINE

## 2019-09-03 RX ORDER — BENZONATATE 100 MG/1
100 CAPSULE ORAL 3 TIMES DAILY PRN
Qty: 20 CAPSULE | Refills: 0 | Status: SHIPPED | OUTPATIENT
Start: 2019-09-03 | End: 2019-09-18

## 2019-09-03 NOTE — PROGRESS NOTES
Subjective:       Patient ID: Armida Medina is a 29 y.o. female who  has a past medical history of Acne, Allergy, Asthma, Hashimoto's thyroiditis (06/2017), and Preeclampsia, severe, third trimester (11/21/2018).    Chief Complaint: Sore Throat     History was obtained from the patient and supplemented through chart review.  There were no ER or clinic visits since our last appointment.    Has a 2 y/o child.  Is not breast feeding.  Works as a nurse for [employer].  Was referred to me by Bambi and Dr. Anton.    Sore Throat    This is a new problem. The current episode started in the past 7 days (5 days ago.). The problem has been waxing and waning. Neither side of throat is experiencing more pain than the other. There has been no fever. The pain is at a severity of 6/10. The pain is moderate. Associated symptoms include congestion, coughing, diarrhea, ear pain and a hoarse voice. Pertinent negatives include no abdominal pain, drooling, ear discharge, headaches, plugged ear sensation, neck pain, shortness of breath, stridor, swollen glands, trouble swallowing or vomiting. She has had no exposure to strep or mono. She has tried NSAIDs (Alevve) for the symptoms. The treatment provided mild relief.     Started 6 nights ago with post nasal drip.  Then went to the [employer] concert 4 nights ago and was yelling.   AM nasal congestion.  Dry cough varies.  No chest tightness or difficulty expectorating.  No wheezing. Not needing her inhaler.  No body aches.  Son had viral URI with cough and was on 10 days of amoxicillin for otitis.      Bought a house in 2016 and always has allergy issues in the summer.      Childhood asthma:  Symptoms are well controlled.  Has not needed to use her inhaler.  No acute issues.      HLD, statin myopathy:  Had joint aches to pravastatin 20.  W/u for myopathy revealed low Vit D; is only taking prenatal vitamins; Seeing dietitian through work.  Lab Results   Component Value Date    LDLCALC 125 (H)  04/12/2019     The ASCVD Risk score (Sally DOMINGO Jr., et al., 2013) failed to calculate for the following reasons:    The 2013 ASCVD risk score is only valid for ages 40 to 79    Vitamin D deficiency:  Level 16   No results found for: [id number]         Not addressed today:  Diarrhea:  Chronic about a week the birth of her child.  Was treated with Macrobid at the time for uncomplicated UTI.  Since then, it has not had any other antibiotics.  Does work at a dialysis center.  Will have very watery diarrhea followed by constipation.  No blood or mucous in her stool.  Can be yellow and burns.  Sometimes has large volume diarrhea.  Is not particularly foul smelling.  Will have diffuse abdominal cramping at night but sometimes has this without diarrhea.  Denies abdominal pain. Denies symptoms of heartburn or sour taste in the back of her throat.  Is not associated with stress.  No association with food, except possibly with fried food.  H/o dairy intolerance and only has it with coffee.  Will have lactose free milk with cereal.  Diarrhea went away for a few weeks.  Varies and occurs about once to twice a week.  Denies fever.  Has not travel to developing countries.  Had slight nausea today.  No vomiting; is able tolerate p.o..    Denies Urinary symptoms such as dysuria, vaginal discharge.  Her periods have been heavy.  Her OBGYN, Dr. Carpenter suggested birth control, but she did not take it due to concern for side effects.  The abdominal cramping is not related to her periods.  History of Hashimoto's   Stool studies, TFTs negative.  If neg, consider trial of Imodium. Consider GI referral.      Hashimoto's thyroiditis:  Prior to pregnancy.  Was monitored during pregnancy.  Had mild elevation of TPO  to 7.7.  TFTs have been normal.  Mom and maternal grandmother with hypothyroidism.    Fatigue has improved.  Has had difficulty losing weight despite diet and exercise.  Has hair loss.  Has always had cold intolerance.  She denies  skin changes, palpitations.  +FHx. Elevated TPO.  Check TSH annually.  Lab Results   Component Value Date    TSH 2.82 04/12/2019    FREET4 1.11 12/07/2018     STACIE:  Noted during pregnancy.  Has had heavier menstrual cycles since pregnancy.  Follows with OBGYN as above.  Is considering OCPs.  No hematochezia.  Only taking PNV.    Follows with OBGYN; considering OCPs.  Pt declined testing for celiac d/t cost.  Cont iron supplement.  Lab Results   Component Value Date    IRON 43 04/12/2019    TIBC 400 04/12/2019    FERRITIN 8 (L) 04/12/2019     Lab Results   Component Value Date    [id number] 339 04/12/2019     Obesity:    BMI 37.  Has lost some weight.  Seeing dietitian through her workplace.  BMI 37.  Will discuss during next visit.    Review of Systems   Constitutional: Positive for activity change. Negative for fever and unexpected weight change.   HENT: Positive for congestion, ear pain, hoarse voice and sore throat. Negative for drooling, ear discharge, hearing loss, rhinorrhea and trouble swallowing.    Eyes: Negative for discharge and visual disturbance.   Respiratory: Positive for cough. Negative for chest tightness, shortness of breath, wheezing and stridor.    Cardiovascular: Negative for chest pain and palpitations.   Gastrointestinal: Positive for constipation and diarrhea. Negative for abdominal pain, blood in stool and vomiting.   Endocrine: Negative for polydipsia and polyuria.   Genitourinary: Negative for difficulty urinating, dysuria, hematuria and menstrual problem.   Musculoskeletal: Negative for arthralgias, joint swelling and neck pain.   Skin: Negative for rash and wound.   Neurological: Negative for weakness and headaches.   Hematological: Negative for adenopathy.   Psychiatric/Behavioral: Negative for confusion and dysphoric mood.       I personally reviewed Past Medical History, Past Surgical History, Social History, and Family History.    Objective:      Vitals:    09/03/19 0753   BP: 110/62  "  Pulse: 69   SpO2: 100%   Weight: 97.2 kg (214 lb 4.6 oz)   Height: 5' 3" (1.6 m)      Physical Exam   Constitutional: She appears well-developed and well-nourished. No distress.   HENT:   Head: Normocephalic and atraumatic.   Right Ear: Tympanic membrane, external ear and ear canal normal.   Left Ear: Tympanic membrane, external ear and ear canal normal.   Nose: Nose normal. No mucosal edema.   Mouth/Throat: Posterior oropharyngeal erythema present. No oropharyngeal exudate or posterior oropharyngeal edema.   Eyes: Pupils are equal, round, and reactive to light. Conjunctivae and EOM are normal. Right eye exhibits no discharge. Left eye exhibits no discharge. No scleral icterus.   Neck: Neck supple. No tracheal deviation present. No thyromegaly present.   Cardiovascular: Normal rate, regular rhythm, normal heart sounds and intact distal pulses.   No murmur heard.  Pulmonary/Chest: Effort normal and breath sounds normal. No respiratory distress. She has no wheezes.   Abdominal: Soft. Bowel sounds are normal. She exhibits no distension. There is no tenderness. There is no guarding.   Musculoskeletal: She exhibits no edema or deformity.   Lymphadenopathy:     She has no cervical adenopathy.   Neurological: She is alert. No cranial nerve deficit. Gait normal.   Skin: Skin is warm and dry. Capillary refill takes less than 2 seconds. She is not diaphoretic. No erythema.   Psychiatric: She has a normal mood and affect. Her behavior is normal.         Lab Results   Component Value Date    WBC 11.04 12/18/2018    HGB 11.6 (L) 12/18/2018    HCT 38.7 12/18/2018     (H) 12/18/2018    CHOL 204 (H) 04/12/2019    TRIG 175 (H) 04/12/2019    HDL 50 (L) 04/12/2019    ALT 29 05/13/2019    AST 23 05/13/2019     05/13/2019    K 4.4 05/13/2019     05/13/2019    CREATININE 0.75 05/13/2019    BUN 12 05/13/2019    CO2 28 05/13/2019    TSH 2.82 04/12/2019    HGBA1C 5.2 04/12/2019       The ASCVD Risk score (Sallyshannon LANE Jr., " et al., 2013) failed to calculate for the following reasons:    The 2013 ASCVD risk score is only valid for ages 40 to 79    (Imaging have been independently reviewed)  CT abdomen  with thick-walled bladder, concerning for cystitis    Assessment:       1. Acute nasopharyngitis    2. Mild intermittent childhood asthma without complication    3. Mixed hyperlipidemia    4. Statin myopathy    5. Vitamin D deficiency          Plan:       Armida was seen today for sore throat.    Diagnoses and all orders for this visit:    Acute nasopharyngitis  Comments:  Centor 0. Rec supportive tx, hydration, NSAIDs, antihistamines, nasal saline.  Orders:  -     benzonatate (TESSALON) 100 MG capsule; Take 1 capsule (100 mg total) by mouth 3 (three) times daily as needed for Cough.    Mild intermittent childhood asthma without complication  Comments:  Well controlled.  Continue inhaler p.r.n..    Mixed hyperlipidemia  Comments:  Rec Vit D. Repeat FLP through [facility] off statin.  Consider restarting pravastatin every other day.  Seeing OSH dietitian.  RTC to discuss results.  Orders:  -     Lipid panel; Future  -     Lipid panel    Statin myopathy  Comments:  as above.    Vitamin D deficiency  Comments:  Rec increasing to 800-1000 units.           Side effects of medication(s) were discussed in detail and patient voiced understanding.  Patient will call back for any issues or complications.     RTC in 1 month or sooner PRN for HLD with repeat FLP, diarrhea.

## 2019-09-03 NOTE — PATIENT INSTRUCTIONS
I recommend plenty of rest and hydration.  Tylenol and NSAIDs, such as ibuprofen, Motrin, naproxen can be helpful for sore throat, headache, ear pain, muscle and joint pain, sneezing, fatigue.  Chloroseptic spray for sore throat.  Over-the-counter antihistamines (Allegra, Claritin, Zyrtec) for runny nose and decongestants (Sudafed) can also be helpful.  Nasal saline once to twice a day.  Hand washing can help prevent the spread of respiratory illnesses, especially from younger children.    Statin myopathy:  G72.0  Vitamin D Deficiency:  E55.9    Your vitamin-D level is low.  You should take an over-the-counter Vitamin-D supplement 800-1000 international units daily.

## 2019-09-06 ENCOUNTER — PATIENT MESSAGE (OUTPATIENT)
Dept: INTERNAL MEDICINE | Facility: CLINIC | Age: 29
End: 2019-09-06

## 2019-09-06 DIAGNOSIS — J00 ACUTE NASOPHARYNGITIS: Primary | ICD-10-CM

## 2019-09-06 RX ORDER — PSEUDOEPHEDRINE HCL 120 MG/1
120 TABLET, FILM COATED, EXTENDED RELEASE ORAL 2 TIMES DAILY
Qty: 20 TABLET | Refills: 0 | Status: SHIPPED | OUTPATIENT
Start: 2019-09-06 | End: 2020-11-25

## 2019-09-17 ENCOUNTER — PATIENT OUTREACH (OUTPATIENT)
Dept: ADMINISTRATIVE | Facility: HOSPITAL | Age: 29
End: 2019-09-17

## 2019-09-18 ENCOUNTER — OFFICE VISIT (OUTPATIENT)
Dept: INTERNAL MEDICINE | Facility: CLINIC | Age: 29
End: 2019-09-18
Payer: COMMERCIAL

## 2019-09-18 VITALS
SYSTOLIC BLOOD PRESSURE: 118 MMHG | DIASTOLIC BLOOD PRESSURE: 64 MMHG | HEIGHT: 63 IN | OXYGEN SATURATION: 98 % | BODY MASS INDEX: 37.23 KG/M2 | WEIGHT: 210.13 LBS | HEART RATE: 73 BPM

## 2019-09-18 DIAGNOSIS — H65.92 LEFT OTITIS MEDIA WITH EFFUSION: Primary | ICD-10-CM

## 2019-09-18 DIAGNOSIS — E55.9 VITAMIN D DEFICIENCY: ICD-10-CM

## 2019-09-18 DIAGNOSIS — E78.2 MIXED HYPERLIPIDEMIA: ICD-10-CM

## 2019-09-18 DIAGNOSIS — J45.20 MILD INTERMITTENT CHILDHOOD ASTHMA WITHOUT COMPLICATION: ICD-10-CM

## 2019-09-18 PROBLEM — J00 ACUTE NASOPHARYNGITIS: Status: RESOLVED | Noted: 2019-09-03 | Resolved: 2019-09-18

## 2019-09-18 PROBLEM — H92.02 LEFT EAR PAIN: Status: ACTIVE | Noted: 2019-09-18

## 2019-09-18 PROCEDURE — 99999 PR PBB SHADOW E&M-EST. PATIENT-LVL IV: CPT | Mod: PBBFAC,,, | Performed by: INTERNAL MEDICINE

## 2019-09-18 PROCEDURE — 3008F BODY MASS INDEX DOCD: CPT | Mod: CPTII,S$GLB,, | Performed by: INTERNAL MEDICINE

## 2019-09-18 PROCEDURE — 3078F PR MOST RECENT DIASTOLIC BLOOD PRESSURE < 80 MM HG: ICD-10-PCS | Mod: CPTII,S$GLB,, | Performed by: INTERNAL MEDICINE

## 2019-09-18 PROCEDURE — 3074F SYST BP LT 130 MM HG: CPT | Mod: CPTII,S$GLB,, | Performed by: INTERNAL MEDICINE

## 2019-09-18 PROCEDURE — 99999 PR PBB SHADOW E&M-EST. PATIENT-LVL IV: ICD-10-PCS | Mod: PBBFAC,,, | Performed by: INTERNAL MEDICINE

## 2019-09-18 PROCEDURE — 3074F PR MOST RECENT SYSTOLIC BLOOD PRESSURE < 130 MM HG: ICD-10-PCS | Mod: CPTII,S$GLB,, | Performed by: INTERNAL MEDICINE

## 2019-09-18 PROCEDURE — 3078F DIAST BP <80 MM HG: CPT | Mod: CPTII,S$GLB,, | Performed by: INTERNAL MEDICINE

## 2019-09-18 PROCEDURE — 99215 PR OFFICE/OUTPT VISIT, EST, LEVL V, 40-54 MIN: ICD-10-PCS | Mod: S$GLB,,, | Performed by: INTERNAL MEDICINE

## 2019-09-18 PROCEDURE — 99215 OFFICE O/P EST HI 40 MIN: CPT | Mod: S$GLB,,, | Performed by: INTERNAL MEDICINE

## 2019-09-18 PROCEDURE — 3008F PR BODY MASS INDEX (BMI) DOCUMENTED: ICD-10-PCS | Mod: CPTII,S$GLB,, | Performed by: INTERNAL MEDICINE

## 2019-09-18 RX ORDER — DOXYCYCLINE HYCLATE 100 MG
100 TABLET ORAL EVERY 12 HOURS
Qty: 14 TABLET | Refills: 0 | Status: CANCELLED | OUTPATIENT
Start: 2019-09-18 | End: 2019-09-25

## 2019-09-18 NOTE — PROGRESS NOTES
Subjective:       Patient ID: Armida Medina is a 29 y.o. female who  has a past medical history of Acne, Allergy, Asthma, Hashimoto's thyroiditis (06/2017), and Preeclampsia, severe, third trimester (11/21/2018).    Chief Complaint: Otalgia (left)     History was obtained from the patient and supplemented through chart review.  There were no ER or clinic visits since our last appointment.    Has a 2 y/o child.  Is not breast feeding.  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 Anton.    Otalgia    There is pain in the left ear. This is a new problem. The current episode started 1 to 4 weeks ago. The problem occurs every few hours. The problem has been waxing and waning. There has been no fever. The pain is mild. Associated symptoms include hearing loss and rhinorrhea. Pertinent negatives include no coughing, drainage, ear discharge, rash or sore throat. Her past medical history is significant for a chronic ear infection. There is no history of hearing loss or a tympanostomy tube.     Some 2 weeks ago for URI with postnasal drip, nasal congestion, dry cough.  Her symptoms resolved with Zyrtec, Claritin D, Flonase.  Has some intermittent rhinorrhea.  However, feels that fluid has gone to her left ear.  Reports ear fullness, can hear a pulsating, but no drainage, ear pain, fever.  Has been taking Sudafed, Zyrtec.  No change.  Bought a house in 2016 and always has allergy issues in the summer.      Childhood asthma:  Symptoms are well controlled.  Has not needed to use her inhaler.  No acute issues.      HLD, statin myopathy:  Had joint aches to pravastatin 20.  W/u for myopathy revealed low Vit D; is only taking prenatal vitamins; Seeing dietitian through work.  Lab Results   Component Value Date    LDLCALC 125 (H) 04/12/2019     The ASCVD Risk score (Henderson DOMINGO Jr., et al., 2013) failed to calculate for the following reasons:    The 2013 ASCVD risk score is only valid for ages 40 to  79    Vitamin D deficiency:  Level 16.  Rec increasing to 800-1000 units.     No results found for: [id number]         Not addressed today:  Diarrhea:  Chronic about a week the birth of her child.  Was treated with Macrobid at the time for uncomplicated UTI.  Since then, it has not had any other antibiotics.  Does work at a dialysis center.  Will have very watery diarrhea followed by constipation.  No blood or mucous in her stool.  Can be yellow and burns.  Sometimes has large volume diarrhea.  Is not particularly foul smelling.  Will have diffuse abdominal cramping at night but sometimes has this without diarrhea.  Denies abdominal pain. Denies symptoms of heartburn or sour taste in the back of her throat.  Is not associated with stress.  No association with food, except possibly with fried food.  H/o dairy intolerance and only has it with coffee.  Will have lactose free milk with cereal.  Diarrhea went away for a few weeks.  Varies and occurs about once to twice a week.  Denies fever.  Has not travel to developing countries.  Had slight nausea today.  No vomiting; is able tolerate p.o..    Denies Urinary symptoms such as dysuria, vaginal discharge.  Her periods have been heavy.  Her OBGYN, Dr. Carpenter suggested birth control, but she did not take it due to concern for side effects.  The abdominal cramping is not related to her periods.  History of Hashimoto's   Stool studies, TFTs negative.  If neg, consider trial of Imodium. Consider GI referral.      Hashimoto's thyroiditis:  Prior to pregnancy.  Was monitored during pregnancy.  Had mild elevation of TPO  to 7.7.  TFTs have been normal.  Mom and maternal grandmother with hypothyroidism.    Fatigue has improved.  Has had difficulty losing weight despite diet and exercise.  Has hair loss.  Has always had cold intolerance.  She denies skin changes, palpitations.  +FHx. Elevated TPO.  Check TSH annually.  Lab Results   Component Value Date    TSH 2.82 04/12/2019     "FREET4 1.11 12/07/2018     STACIE:  Noted during pregnancy.  Has had heavier menstrual cycles since pregnancy.  Follows with OBGYN as above.  Is considering OCPs.  No hematochezia.  Only taking PNV.    Follows with OBGYN; considering OCPs.  Pt declined testing for celiac d/t cost.  Cont iron supplement.  Lab Results   Component Value Date    IRON 43 04/12/2019    TIBC 400 04/12/2019    FERRITIN 8 (L) 04/12/2019     Lab Results   Component Value Date    [id number] 339 04/12/2019     Obesity:    BMI 37.  Has lost some weight.  Seeing dietitian through her workplace.  BMI 37.  Will discuss during next visit.    Review of Systems   Constitutional: Negative for fever and unexpected weight change.   HENT: Positive for hearing loss and rhinorrhea. Negative for congestion, ear discharge, ear pain and sore throat.    Eyes: Negative for discharge and visual disturbance.   Respiratory: Negative for cough, chest tightness and wheezing.    Cardiovascular: Negative for chest pain and palpitations.   Gastrointestinal: Positive for constipation. Negative for blood in stool.   Endocrine: Negative for polydipsia and polyuria.   Genitourinary: Negative for difficulty urinating, dysuria, hematuria and menstrual problem.   Musculoskeletal: Negative for arthralgias and joint swelling.   Skin: Negative for rash and wound.   Neurological: Negative for dizziness and weakness.   Hematological: Negative for adenopathy.   Psychiatric/Behavioral: Negative for confusion and dysphoric mood.       I personally reviewed Past Medical History, Past Surgical History, Social History, and Family History.    Objective:      Vitals:    09/18/19 1525   BP: 118/64   Pulse: 73   SpO2: 98%   Weight: 95.3 kg (210 lb 1.6 oz)   Height: 5' 3" (1.6 m)      Physical Exam   Constitutional: She appears well-developed and well-nourished. No distress.   HENT:   Head: Normocephalic and atraumatic.   Right Ear: Tympanic membrane, external ear and ear canal normal.   Left " Ear: Tympanic membrane, external ear and ear canal normal.   Nose: Nose normal. No mucosal edema.   Mouth/Throat: Posterior oropharyngeal erythema present. No oropharyngeal exudate or posterior oropharyngeal edema.   Eyes: Pupils are equal, round, and reactive to light. Conjunctivae and EOM are normal. Right eye exhibits no discharge. Left eye exhibits no discharge. No scleral icterus.   Neck: Neck supple. No tracheal deviation present. No thyromegaly present.   Cardiovascular: Normal rate, regular rhythm, normal heart sounds and intact distal pulses.   No murmur heard.  Pulmonary/Chest: Effort normal and breath sounds normal. No respiratory distress. She has no wheezes.   Abdominal: Soft. Bowel sounds are normal. She exhibits no distension. There is no tenderness. There is no guarding.   Musculoskeletal: She exhibits no edema or deformity.   Lymphadenopathy:     She has no cervical adenopathy.   Neurological: She is alert. No cranial nerve deficit. Gait normal.   Skin: Skin is warm and dry. Capillary refill takes less than 2 seconds. She is not diaphoretic. No erythema.   Psychiatric: She has a normal mood and affect. Her behavior is normal.         Lab Results   Component Value Date    WBC 11.04 12/18/2018    HGB 11.6 (L) 12/18/2018    HCT 38.7 12/18/2018     (H) 12/18/2018    CHOL 204 (H) 04/12/2019    TRIG 175 (H) 04/12/2019    HDL 50 (L) 04/12/2019    ALT 29 05/13/2019    AST 23 05/13/2019     05/13/2019    K 4.4 05/13/2019     05/13/2019    CREATININE 0.75 05/13/2019    BUN 12 05/13/2019    CO2 28 05/13/2019    TSH 2.82 04/12/2019    HGBA1C 5.2 04/12/2019       The ASCVD Risk score (Sally DC Jr., et al., 2013) failed to calculate for the following reasons:    The 2013 ASCVD risk score is only valid for ages 40 to 79    (Imaging have been independently reviewed)  CT abdomen  with thick-walled bladder, concerning for cystitis    Assessment:       1. Left otitis media with effusion    2.  Mild intermittent childhood asthma without complication    3. Mixed hyperlipidemia    4. Vitamin D deficiency          Plan:       Armida was seen today for otalgia.    Diagnoses and all orders for this visit:    Left otitis media with effusion  Comments:  TM, ear canal wnl. Not acute otitis media, so no abx indicated. Rec antihistamines, oral decongestants. Can see ENT if persistent after 12 weeks.  Orders:  -     Ambulatory Referral to ENT    Mild intermittent childhood asthma without complication  Comments:  Well controlled.  Continue inhaler p.r.n..    Mixed hyperlipidemia  Comments:  Rec Vit D. Repeat FLP through [facility] off statin.  Consider restarting pravastatin every other day.  Seeing OSH dietitian.  RTC next mo. to discuss results.    Vitamin D deficiency  Comments:  Rec 800-1000 units.    Other orders  -     Cancel: doxycycline (VIBRA-TABS) 100 MG tablet; Take 1 tablet (100 mg total) by mouth every 12 (twelve) hours. for 7 days         Side effects of medication(s) were discussed in detail and patient voiced understanding.  Patient will call back for any issues or complications.     RTC in 1 month or sooner PRN for HLD with repeat FLP, diarrhea.

## 2019-10-15 ENCOUNTER — PATIENT OUTREACH (OUTPATIENT)
Dept: ADMINISTRATIVE | Facility: OTHER | Age: 29
End: 2019-10-15

## 2019-10-17 ENCOUNTER — OFFICE VISIT (OUTPATIENT)
Dept: OBSTETRICS AND GYNECOLOGY | Facility: CLINIC | Age: 29
End: 2019-10-17
Attending: OBSTETRICS & GYNECOLOGY
Payer: COMMERCIAL

## 2019-10-17 ENCOUNTER — HOSPITAL ENCOUNTER (OUTPATIENT)
Dept: RADIOLOGY | Facility: OTHER | Age: 29
Discharge: HOME | End: 2019-10-17
Attending: OBSTETRICS & GYNECOLOGY
Payer: COMMERCIAL

## 2019-10-17 VITALS
BODY MASS INDEX: 36.09 KG/M2 | WEIGHT: 203.69 LBS | SYSTOLIC BLOOD PRESSURE: 124 MMHG | DIASTOLIC BLOOD PRESSURE: 82 MMHG | HEIGHT: 63 IN

## 2019-10-17 DIAGNOSIS — D25.0 FIBROIDS, SUBMUCOSAL: ICD-10-CM

## 2019-10-17 DIAGNOSIS — Z01.419 WELL WOMAN EXAM WITH ROUTINE GYNECOLOGICAL EXAM: Primary | ICD-10-CM

## 2019-10-17 PROCEDURE — 76830 US PELVIS COMP WITH TRANSVAG NON-OB (XPD): ICD-10-PCS | Mod: 26,,, | Performed by: RADIOLOGY

## 2019-10-17 PROCEDURE — 87624 HPV HI-RISK TYP POOLED RSLT: CPT

## 2019-10-17 PROCEDURE — 99999 PR PBB SHADOW E&M-EST. PATIENT-LVL III: ICD-10-PCS | Mod: PBBFAC,,, | Performed by: OBSTETRICS & GYNECOLOGY

## 2019-10-17 PROCEDURE — 3074F SYST BP LT 130 MM HG: CPT | Mod: CPTII,S$GLB,, | Performed by: OBSTETRICS & GYNECOLOGY

## 2019-10-17 PROCEDURE — 99395 PREV VISIT EST AGE 18-39: CPT | Mod: 25,S$GLB,, | Performed by: OBSTETRICS & GYNECOLOGY

## 2019-10-17 PROCEDURE — 3079F PR MOST RECENT DIASTOLIC BLOOD PRESSURE 80-89 MM HG: ICD-10-PCS | Mod: CPTII,S$GLB,, | Performed by: OBSTETRICS & GYNECOLOGY

## 2019-10-17 PROCEDURE — 99395 PR PREVENTIVE VISIT,EST,18-39: ICD-10-PCS | Mod: 25,S$GLB,, | Performed by: OBSTETRICS & GYNECOLOGY

## 2019-10-17 PROCEDURE — 76856 US EXAM PELVIC COMPLETE: CPT | Mod: 26,,, | Performed by: RADIOLOGY

## 2019-10-17 PROCEDURE — 76856 US PELVIS COMP WITH TRANSVAG NON-OB (XPD): ICD-10-PCS | Mod: 26,,, | Performed by: RADIOLOGY

## 2019-10-17 PROCEDURE — 76830 TRANSVAGINAL US NON-OB: CPT | Mod: 26,,, | Performed by: RADIOLOGY

## 2019-10-17 PROCEDURE — 3074F PR MOST RECENT SYSTOLIC BLOOD PRESSURE < 130 MM HG: ICD-10-PCS | Mod: CPTII,S$GLB,, | Performed by: OBSTETRICS & GYNECOLOGY

## 2019-10-17 PROCEDURE — 99999 PR PBB SHADOW E&M-EST. PATIENT-LVL III: CPT | Mod: PBBFAC,,, | Performed by: OBSTETRICS & GYNECOLOGY

## 2019-10-17 PROCEDURE — 88175 CYTOPATH C/V AUTO FLUID REDO: CPT

## 2019-10-17 PROCEDURE — 76830 TRANSVAGINAL US NON-OB: CPT | Mod: TC

## 2019-10-17 PROCEDURE — 3079F DIAST BP 80-89 MM HG: CPT | Mod: CPTII,S$GLB,, | Performed by: OBSTETRICS & GYNECOLOGY

## 2019-10-17 NOTE — PROGRESS NOTES
CC: Well woman exam    Armida Medina is a 29 y.o. female  presents for well woman exam.  LMP: Patient's last menstrual period was 10/05/2019..  Periods are regular, monthly, using condoms.  History of uterine fibroid 8x6x7 during pregnancy, had a c/s due to breech and severe pre-e.  Interested in revisiting the issue of the fibroid.  Last pap was normal, has had gardasil vaccine.    Past Medical History:   Diagnosis Date    Abnormal Pap smear of cervix     Acne     Allergy     Asthma     Diabetes mellitus     Hashimoto's thyroiditis 2017    Preeclampsia, severe, third trimester 2018     Past Surgical History:   Procedure Laterality Date     SECTION N/A 2018    Procedure:  SECTION;  Surgeon: Sawyer Lake Jr., MD;  Location: [facility];  Service: OB/GYN;  Laterality: N/A;     SECTION      CYST REMOVAL      Left ear @ age 2    TONSILLECTOMY, ADENOIDECTOMY       Social History     Socioeconomic History    Marital status:      Spouse name: Not on file    Number of children: Not on file    Years of education: Not on file    Highest education level: Not on file   Occupational History    Not on file   Social Needs    Financial resource strain: Not hard at all    Food insecurity:     Worry: Never true     Inability: Never true    Transportation needs:     Medical: No     Non-medical: No   Tobacco Use    Smoking status: Never Smoker    Smokeless tobacco: Never Used   Substance and Sexual Activity    Alcohol use: Yes     Frequency: Never     Drinks per session: Patient refused     Binge frequency: Never     Comment: social not since pregnancy    Drug use: No    Sexual activity: Yes     Partners: Male     Birth control/protection: None   Lifestyle    Physical activity:     Days per week: 3 days     Minutes per session: 60 min    Stress: Only a little   Relationships    Social connections:     Talks on phone: Twice a week     Gets together:  "Twice a week     Attends Congregational service: Not on file     Active member of club or organization: No     Attends meetings of clubs or organizations: Never     Relationship status:    Other Topics Concern    Are you pregnant or think you may be? No    Breast-feeding No   Social History Narrative    Not on file     Family History   Problem Relation Age of Onset    No Known Problems Father     Miscarriages / Stillbirths Mother     Hypothyroidism Mother     Coronary artery disease Maternal Grandmother         s/p CABG 4v    Hypothyroidism Maternal Grandmother     Coronary artery disease Paternal Grandmother         s/p CABG    Breast cancer Neg Hx     Cancer Neg Hx     Colon cancer Neg Hx     Diabetes Neg Hx     Eclampsia Neg Hx     Hypertension Neg Hx     Stroke Neg Hx      labor Neg Hx     Ovarian cancer Neg Hx     Melanoma Neg Hx      OB History        1    Para   1    Term   1            AB        Living   1       SAB        TAB        Ectopic        Multiple   0    Live Births   1                 /82   Ht 5' 3" (1.6 m)   Wt 92.4 kg (203 lb 11.3 oz)   LMP 10/05/2019   Breastfeeding? No   BMI 36.08 kg/m²       ROS:  GENERAL: Denies weight gain or weight loss. Feeling well overall.   SKIN: Denies rash or lesions.   HEAD: Denies head injury or headache.   NODES: Denies enlarged lymph nodes.   CHEST: Denies chest pain or shortness of breath.   CARDIOVASCULAR: Denies palpitations or left sided chest pain.   ABDOMEN: No abdominal pain, constipation, diarrhea, nausea, vomiting or rectal bleeding.   URINARY: No frequency, dysuria, hematuria, or burning on urination.  REPRODUCTIVE: See HPI.   BREASTS: The patient performs breast self-examination and denies pain, lumps, or nipple discharge.   HEMATOLOGIC: No easy bruisability or excessive bleeding.   MUSCULOSKELETAL: Denies joint pain or swelling.   NEUROLOGIC: Denies syncope or weakness.   PSYCHIATRIC: Denies " depression, anxiety or mood swings.    PHYSICAL EXAM:  APPEARANCE: Well nourished, well developed, in no acute distress.  AFFECT: WNL, alert and oriented x 3  SKIN: No acne or hirsutism  NECK: Neck symmetric without masses or thyromegaly  NODES: No inguinal, cervical, axillary, or femoral lymph node enlargement  CHEST: Good respiratory effect  ABDOMEN: Soft.  No tenderness or masses.  No hepatosplenomegaly.  No hernias.  BREASTS: Symmetrical, no skin changes or visible lesions.  No palpable masses, nipple discharge bilaterally.  PELVIC: Normal external genitalia without lesions.  Normal hair distribution.  Adequate perineal body, normal urethral meatus.  Vagina moist and well rugated without lesions or discharge.  Cervix pink, without lesions, discharge or tenderness.  No significant cystocele or rectocele.  Bimanual exam shows uterus to be normal size, regular, mobile and nontender.  Adnexa without masses or tenderness.    EXTREMITIES: No edema.    Well woman exam with routine gynecological exam  -     Liquid-based pap smear, screening  -     HPV High Risk Genotypes, PCR    Fibroids, submucosal  -     US Pelvis Comp with Transvag NON-OB (xpd; Future; Expected date: 10/17/2019            Patient was counseled today on A.C.S. Pap guidelines and recommendations for yearly pelvic exams, mammograms and monthly self breast exams; to see her PCP for other health maintenance.     No follow-ups on file.

## 2019-10-22 LAB
HPV HR 12 DNA CVX QL NAA+PROBE: NEGATIVE
HPV16 AG SPEC QL: NEGATIVE
HPV18 DNA SPEC QL NAA+PROBE: NEGATIVE

## 2019-11-05 ENCOUNTER — PATIENT MESSAGE (OUTPATIENT)
Dept: OBSTETRICS AND GYNECOLOGY | Facility: CLINIC | Age: 29
End: 2019-11-05

## 2019-11-30 ENCOUNTER — PATIENT MESSAGE (OUTPATIENT)
Dept: OBSTETRICS AND GYNECOLOGY | Facility: CLINIC | Age: 29
End: 2019-11-30

## 2019-12-10 ENCOUNTER — PATIENT OUTREACH (OUTPATIENT)
Dept: ADMINISTRATIVE | Facility: OTHER | Age: 29
End: 2019-12-10

## 2020-02-06 ENCOUNTER — PATIENT MESSAGE (OUTPATIENT)
Dept: OBSTETRICS AND GYNECOLOGY | Facility: CLINIC | Age: 30
End: 2020-02-06

## 2020-09-29 ENCOUNTER — PATIENT OUTREACH (OUTPATIENT)
Dept: ADMINISTRATIVE | Facility: OTHER | Age: 30
End: 2020-09-29

## 2020-09-29 NOTE — PROGRESS NOTES
Care Everywhere:   Immunization: updated  Health Maintenance: updated  Media Review:   Legacy Review:   Order placed:   Upcoming appts:  Scheduling ticket to schedule annual pcp visit sent to patient's portal

## 2020-09-30 ENCOUNTER — TELEPHONE (OUTPATIENT)
Dept: OBSTETRICS AND GYNECOLOGY | Facility: CLINIC | Age: 30
End: 2020-09-30

## 2020-09-30 ENCOUNTER — OFFICE VISIT (OUTPATIENT)
Dept: OBSTETRICS AND GYNECOLOGY | Facility: CLINIC | Age: 30
End: 2020-09-30
Attending: OBSTETRICS & GYNECOLOGY
Payer: COMMERCIAL

## 2020-09-30 VITALS
WEIGHT: 200.38 LBS | BODY MASS INDEX: 35.5 KG/M2 | DIASTOLIC BLOOD PRESSURE: 64 MMHG | SYSTOLIC BLOOD PRESSURE: 108 MMHG | HEIGHT: 63 IN

## 2020-09-30 DIAGNOSIS — Z01.419 WELL WOMAN EXAM WITH ROUTINE GYNECOLOGICAL EXAM: Primary | ICD-10-CM

## 2020-09-30 DIAGNOSIS — E55.9 VITAMIN D DEFICIENCY: ICD-10-CM

## 2020-09-30 DIAGNOSIS — E06.3 HASHIMOTO'S THYROIDITIS: ICD-10-CM

## 2020-09-30 PROCEDURE — 3078F PR MOST RECENT DIASTOLIC BLOOD PRESSURE < 80 MM HG: ICD-10-PCS | Mod: CPTII,S$GLB,, | Performed by: OBSTETRICS & GYNECOLOGY

## 2020-09-30 PROCEDURE — 99395 PREV VISIT EST AGE 18-39: CPT | Mod: S$GLB,,, | Performed by: OBSTETRICS & GYNECOLOGY

## 2020-09-30 PROCEDURE — 1126F AMNT PAIN NOTED NONE PRSNT: CPT | Mod: S$GLB,,, | Performed by: OBSTETRICS & GYNECOLOGY

## 2020-09-30 PROCEDURE — 3078F DIAST BP <80 MM HG: CPT | Mod: CPTII,S$GLB,, | Performed by: OBSTETRICS & GYNECOLOGY

## 2020-09-30 PROCEDURE — 3008F BODY MASS INDEX DOCD: CPT | Mod: CPTII,S$GLB,, | Performed by: OBSTETRICS & GYNECOLOGY

## 2020-09-30 PROCEDURE — 3074F PR MOST RECENT SYSTOLIC BLOOD PRESSURE < 130 MM HG: ICD-10-PCS | Mod: CPTII,S$GLB,, | Performed by: OBSTETRICS & GYNECOLOGY

## 2020-09-30 PROCEDURE — 99999 PR PBB SHADOW E&M-EST. PATIENT-LVL III: CPT | Mod: PBBFAC,,, | Performed by: OBSTETRICS & GYNECOLOGY

## 2020-09-30 PROCEDURE — 1126F PR PAIN SEVERITY QUANTIFIED, NO PAIN PRESENT: ICD-10-PCS | Mod: S$GLB,,, | Performed by: OBSTETRICS & GYNECOLOGY

## 2020-09-30 PROCEDURE — 99395 PR PREVENTIVE VISIT,EST,18-39: ICD-10-PCS | Mod: S$GLB,,, | Performed by: OBSTETRICS & GYNECOLOGY

## 2020-09-30 PROCEDURE — 3008F PR BODY MASS INDEX (BMI) DOCUMENTED: ICD-10-PCS | Mod: CPTII,S$GLB,, | Performed by: OBSTETRICS & GYNECOLOGY

## 2020-09-30 PROCEDURE — 3074F SYST BP LT 130 MM HG: CPT | Mod: CPTII,S$GLB,, | Performed by: OBSTETRICS & GYNECOLOGY

## 2020-09-30 PROCEDURE — 99999 PR PBB SHADOW E&M-EST. PATIENT-LVL III: ICD-10-PCS | Mod: PBBFAC,,, | Performed by: OBSTETRICS & GYNECOLOGY

## 2020-09-30 RX ORDER — ERGOCALCIFEROL 1.25 MG/1
50000 CAPSULE ORAL
Qty: 51 CAPSULE | Refills: 0 | Status: SHIPPED | OUTPATIENT
Start: 2020-09-30 | End: 2021-07-12

## 2020-09-30 NOTE — TELEPHONE ENCOUNTER
returned patient call, scheduled fib consult     ----- Message from Fiorella Carpenter DO sent at 9/30/2020  9:08 AM CDT -----  This patient is interested in robotic myomectomy.  Currently 4cm but got to 8cm during last pregnancy, considering removal prior to next IUP.  Thank you,   Jae        FEVER

## 2020-09-30 NOTE — PROGRESS NOTES
CC: Well woman exam    Armida Medina is a 30 y.o. female  presents for well woman exam.  LMP: Patient's last menstrual period was 2020..  No issues, problems, or complaints.  Pap and HPV negative last year. History of fundal fibroid.  History of c/s x 1 for breech and severe pre-e.    Past Medical History:   Diagnosis Date    Abnormal Pap smear of cervix     Acne     Allergy     Asthma     Diabetes mellitus     Hashimoto's thyroiditis 2017    Preeclampsia, severe, third trimester 2018     Past Surgical History:   Procedure Laterality Date     SECTION N/A 2018    Procedure:  SECTION;  Surgeon: Sawyer Lake Jr., MD;  [facility]: [facility];  Service: OB/GYN;  Laterality: N/A;     SECTION      CYST REMOVAL      Left ear @ age 2    TONSILLECTOMY, ADENOIDECTOMY       Social History     Socioeconomic History    Marital status:      Spouse name: Not on file    Number of children: Not on file    Years of education: Not on file    Highest education level: Not on file   Occupational History    Not on file   Social Needs    Financial resource strain: Not hard at all    Food insecurity     Worry: Never true     Inability: Never true    Transportation needs     Medical: No     Non-medical: No   Tobacco Use    Smoking status: Never Smoker    Smokeless tobacco: Never Used   Substance and Sexual Activity    Alcohol use: Yes     Frequency: Never     Drinks per session: Patient refused     Binge frequency: Never     Comment: social not since pregnancy    Drug use: No    Sexual activity: Yes     Partners: Male     Birth control/protection: None   Lifestyle    Physical activity     Days per week: 3 days     Minutes per session: 60 min    Stress: Only a little   Relationships    Social connections     Talks on phone: Twice a week     Gets together: Twice a week     Attends Confucianism service: Not on file     Active member of club or  "organization: No     Attends meetings of clubs or organizations: Never     Relationship status:    Other Topics Concern    Are you pregnant or think you may be? No    Breast-feeding No   Social History Narrative    Not on file     Family History   Problem Relation Age of Onset    No Known Problems Father     Miscarriages / Stillbirths Mother     Hypothyroidism Mother     Coronary artery disease Maternal Grandmother         s/p CABG 4v    Hypothyroidism Maternal Grandmother     Coronary artery disease Paternal Grandmother         s/p CABG    Breast cancer Neg Hx     Cancer Neg Hx     Colon cancer Neg Hx     Diabetes Neg Hx     Eclampsia Neg Hx     Hypertension Neg Hx     Stroke Neg Hx      labor Neg Hx     Ovarian cancer Neg Hx     Melanoma Neg Hx      OB History        1    Para   1    Term   1            AB        Living   1       SAB        TAB        Ectopic        Multiple   0    Live Births   1                 /64   Ht 5' 3" (1.6 m)   Wt 90.9 kg (200 lb 6.4 oz)   LMP 2020   Breastfeeding No   BMI 35.50 kg/m²       ROS:  GENERAL: Denies weight gain or weight loss. Feeling well overall.   SKIN: Denies rash or lesions.   HEAD: Denies head injury or headache.   NODES: Denies enlarged lymph nodes.   CHEST: Denies chest pain or shortness of breath.   CARDIOVASCULAR: Denies palpitations or left sided chest pain.   ABDOMEN: No abdominal pain, constipation, diarrhea, nausea, vomiting or rectal bleeding.   URINARY: No frequency, dysuria, hematuria, or burning on urination.  REPRODUCTIVE: See HPI.   BREASTS: The patient performs breast self-examination and denies pain, lumps, or nipple discharge.   HEMATOLOGIC: No easy bruisability or excessive bleeding.   MUSCULOSKELETAL: Denies joint pain or swelling.   NEUROLOGIC: Denies syncope or weakness.   PSYCHIATRIC: Denies depression, anxiety or mood swings.    PHYSICAL EXAM:  APPEARANCE: Well nourished, well " developed, in no acute distress.  AFFECT: WNL, alert and oriented x 3  SKIN: No acne or hirsutism  NECK: Neck symmetric without masses or thyromegaly  NODES: No inguinal, cervical, axillary, or femoral lymph node enlargement  CHEST: Good respiratory effect  ABDOMEN: Soft.  No tenderness or masses.  No hepatosplenomegaly.  No hernias.  BREASTS: Symmetrical, no skin changes or visible lesions.  No palpable masses, nipple discharge bilaterally.  PELVIC: Normal external genitalia without lesions.  Normal hair distribution.  Adequate perineal body, normal urethral meatus.  Vagina moist and well rugated without lesions or discharge.  Cervix pink, without lesions, discharge or tenderness.  No significant cystocele or rectocele.  Bimanual exam shows uterus to be normal size, regular, mobile and nontender.  Adnexa without masses or tenderness.    EXTREMITIES: No edema.    Well woman exam with routine gynecological exam  -     TSH; Future; Expected date: 09/30/2020  -     T4, free; Future; Expected date: 09/30/2020  -     Thyroid Antibodies; Future; Expected date: 09/30/2020  -     Vitamin D; Future; Expected date: 09/30/2020    Vitamin D deficiency  -     Vitamin D; Future; Expected date: 09/30/2020  -     ergocalciferol (VITAMIN D2) 50,000 unit Cap; Take 1 capsule (50,000 Units total) by mouth every 7 days.  Dispense: 51 capsule; Refill: 0    Hashimoto's thyroiditis  -     Cancel: TSH; Future; Expected date: 09/30/2020  -     Cancel: T4, free; Future; Expected date: 09/30/2020  -     TSH; Future; Expected date: 09/30/2020  -     T4, free; Future; Expected date: 09/30/2020  -     Thyroid Antibodies; Future; Expected date: 09/30/2020            Patient was counseled today on A.C.S. Pap guidelines and recommendations for yearly pelvic exams, mammograms and monthly self breast exams; to see her PCP for other health maintenance.     No follow-ups on file.

## 2020-10-20 ENCOUNTER — PATIENT MESSAGE (OUTPATIENT)
Dept: INTERNAL MEDICINE | Facility: CLINIC | Age: 30
End: 2020-10-20

## 2020-11-04 ENCOUNTER — PATIENT OUTREACH (OUTPATIENT)
Dept: ADMINISTRATIVE | Facility: OTHER | Age: 30
End: 2020-11-04

## 2020-11-05 ENCOUNTER — OFFICE VISIT (OUTPATIENT)
Dept: OBSTETRICS AND GYNECOLOGY | Facility: CLINIC | Age: 30
End: 2020-11-05
Attending: OBSTETRICS & GYNECOLOGY
Payer: COMMERCIAL

## 2020-11-05 VITALS
HEIGHT: 63 IN | SYSTOLIC BLOOD PRESSURE: 122 MMHG | BODY MASS INDEX: 35.24 KG/M2 | DIASTOLIC BLOOD PRESSURE: 80 MMHG | WEIGHT: 198.88 LBS

## 2020-11-05 DIAGNOSIS — Z01.818 PRE-OP TESTING: ICD-10-CM

## 2020-11-05 DIAGNOSIS — D25.2 INTRAMURAL AND SUBSEROUS LEIOMYOMA OF UTERUS: ICD-10-CM

## 2020-11-05 DIAGNOSIS — D25.1 INTRAMURAL AND SUBSEROUS LEIOMYOMA OF UTERUS: ICD-10-CM

## 2020-11-05 DIAGNOSIS — N92.0 MENORRHAGIA WITH REGULAR CYCLE: Primary | ICD-10-CM

## 2020-11-05 PROCEDURE — 99999 PR PBB SHADOW E&M-EST. PATIENT-LVL V: ICD-10-PCS | Mod: PBBFAC,,, | Performed by: OBSTETRICS & GYNECOLOGY

## 2020-11-05 PROCEDURE — 99999 PR PBB SHADOW E&M-EST. PATIENT-LVL V: CPT | Mod: PBBFAC,,, | Performed by: OBSTETRICS & GYNECOLOGY

## 2020-11-05 PROCEDURE — 99214 OFFICE O/P EST MOD 30 MIN: CPT | Mod: S$GLB,,, | Performed by: OBSTETRICS & GYNECOLOGY

## 2020-11-05 PROCEDURE — 99214 PR OFFICE/OUTPT VISIT, EST, LEVL IV, 30-39 MIN: ICD-10-PCS | Mod: S$GLB,,, | Performed by: OBSTETRICS & GYNECOLOGY

## 2020-11-05 NOTE — PROGRESS NOTES
CC: Symptoms related to fibroids    Armida Medina is a 30 y.o. female  presents for a consultation from Dr. Carpenter for management of fibroids.      She reports cycles are heavy. She uses ultra pads and tampons that she has to change every couple of hours. She reports dyspareunia.     Ultrasound shows:  FINDINGS:  The uterus measures 8.8 x 5 x 6.4 cm and contains a 4.2 cm hypoechoic focus within the fundus consistent with fibroid.  The endometrial stripe measures 1.3 cm.  The right ovary is not visualized.  The left ovary measures 2.9 x 2.1 x 2.8 cm and has a normal appearance.  Note is made of fluid within the cervix.  There is a mild amount of fluid within the pelvis.     Impression:     Fundal fibroid.     Fluid within the cervix    She desires future fertility.   Past Medical History:   Diagnosis Date    Abnormal Pap smear of cervix     Acne     Allergy     Asthma     Hashimoto's thyroiditis 2017    Preeclampsia, severe, third trimester 2018       Past Surgical History:   Procedure Laterality Date     SECTION N/A 2018    Procedure:  SECTION;  Surgeon: Sawyer Lake Jr., MD;  Location: [facility] L&D;  Service: OB/GYN;  Laterality: N/A;     SECTION      CYST REMOVAL      Left ear @ age 2    TONSILLECTOMY, ADENOIDECTOMY         Family History   Problem Relation Age of Onset    No Known Problems Father     Miscarriages / Stillbirths Mother     Hypothyroidism Mother     Coronary artery disease Maternal Grandmother         s/p CABG 4v    Hypothyroidism Maternal Grandmother     Coronary artery disease Paternal Grandmother         s/p CABG    Breast cancer Neg Hx     Cancer Neg Hx     Colon cancer Neg Hx     Diabetes Neg Hx     Eclampsia Neg Hx     Hypertension Neg Hx     Stroke Neg Hx      labor Neg Hx     Ovarian cancer Neg Hx     Melanoma Neg Hx        Social History     Tobacco Use    Smoking status: Never Smoker    Smokeless  "tobacco: Never Used   Substance Use Topics    Alcohol use: Yes     Frequency: Never     Drinks per session: Patient refused     Binge frequency: Never     Comment: social     Drug use: No       OB History    Para Term  AB Living   1 1 1 0 0 1   SAB TAB Ectopic Multiple Live Births   0 0 0 0 1      # Outcome Date GA Lbr Jacob/2nd Weight Sex Delivery Anes PTL Lv   1 Term 18 37w3d  2.95 kg (6 lb 8.1 oz) M CS-LTranv Spinal N RAFAEL       /80 (BP Location: Right arm, Patient Position: Sitting)   Ht 5' 3" (1.6 m)   Wt 90.2 kg (198 lb 13.7 oz)   LMP 10/13/2020 (Exact Date)   Breastfeeding No   BMI 35.23 kg/m²     ROS:  GENERAL: Denies weight gain or weight loss. Feeling well overall.   SKIN: Denies rash or lesions.   HEAD: Denies head injury or headache.   NODES: Denies enlarged lymph nodes.   CHEST: Denies chest pain or shortness of breath.   CARDIOVASCULAR: Denies palpitations or left sided chest pain.   ABDOMEN: No abdominal pain, constipation, diarrhea, nausea, vomiting or rectal bleeding.   URINARY: No frequency, dysuria, hematuria, or burning on urination.  REPRODUCTIVE: See HPI.   BREASTS: The patient performs breast self-examination and denies pain, lumps, or nipple discharge.   HEMATOLOGIC: No easy bruisability or excessive bleeding with the exception of menstrual cycles.  MUSCULOSKELETAL: Denies joint pain or swelling.   NEUROLOGIC: Denies syncope or weakness.   PSYCHIATRIC: Denies depression, anxiety or mood swings.    PHYSICAL EXAM:  APPEARANCE: Well nourished, well developed, in no acute distress.  AFFECT: WNL, alert and oriented x 3  SKIN: No acne or hirsutism  NECK: Neck symmetric without masses or thyromegaly  NODES: No inguinal, cervical, axillary, or femoral lymph node enlargement  CHEST: Good respiratory effect  ABDOMEN: Soft.  No tenderness or masses.  No hepatosplenomegaly.  No hernias.  PELVIC: Normal external genitalia without lesions.  Normal hair distribution.  Adequate " perineal body, normal urethral meatus.  Vagina moist and well rugated without lesions or discharge.  Cervix pink, without lesions, discharge or tenderness.  No significant cystocele or rectocele.  Bimanual exam shows uterus to be normal size, regular, mobile and nontender.  Adnexa without masses or tenderness.    EXTREMITIES: No edema.      ICD-10-CM ICD-9-CM    1. Menorrhagia with regular cycle  N92.0 626.2 CBC Auto Differential      Case Request Operating Room: ROBOTIC MYOMECTOMY, UTERUS      Case Request Operating Room: ROBOTIC MYOMECTOMY, UTERUS      Full code      Vital signs      [place] to [place]      Insert peripheral IV      POCT glucose      Notify physician if BS > 180 for hysterectomy patients      Chlorhexidine (CHG) 2% Wipes      POCT urine pregnancy      Notify Physician/Vital Signs Parameters Urine output less than 0.5mL/kg/hr (with indwelling catheter) or 30 mL/hr (without indwelling catheter) or blood glucose greater than 200 mg/dL      Notify physician      Diet NPO Except for: Medication      Place sequential compression device      Chlorohexidine Gluconate Bath      Type & Screen      CBC auto differential      Transfer patient      Admit to Phase I PACU, transfer to phase II level of care when David score is 9 out of 10      Vital signs      Straight Cath      Intake and output Per protocol      Apply warming blanket      POCT glucose      Notify Physician (specify)      Notify Physician      Notify Physician (specify)      Notify Anesthesiologist      Oxygen Continuous      Pulse Oximetry Continuous      Vital signs      Pulse Oximetry Q12H PRN      Remove dressing      Ovalle catheter - discontinue      Diet Adult Regular (IDDSI Level 7) [facility]      DISCHARGE PATIENT 1) Tolerating PO, No emesis x 2 hours 2) Ambulates with assistance appropriate to age and health status (to baseline ability) 3) Voided or has been instructed on how to respond if difficulty voiding 4) Vital signs  stable (within ...      Place in Outpatient   2. Intramural and subserous leiomyoma of uterus  D25.1 218.1 MRI Pelvis W WO Contrast    D25.2 218.2 Case Request Operating Room: ROBOTIC MYOMECTOMY, UTERUS      Case Request Operating Room: ROBOTIC MYOMECTOMY, UTERUS      Full code      Vital signs      [place] to [place]      Insert peripheral IV      POCT glucose      Notify physician if BS > 180 for hysterectomy patients      Chlorhexidine (CHG) 2% Wipes      POCT urine pregnancy      Notify Physician/Vital Signs Parameters Urine output less than 0.5mL/kg/hr (with indwelling catheter) or 30 mL/hr (without indwelling catheter) or blood glucose greater than 200 mg/dL      Notify physician      Diet NPO Except for: Medication      Place sequential compression device      Chlorohexidine Gluconate Bath      Type & Screen      CBC auto differential      Transfer patient      Admit to Phase I PACU, transfer to phase II level of care when David score is 9 out of 10      Vital signs      Straight Cath      Intake and output Per protocol      Apply warming blanket      POCT glucose      Notify Physician (specify)      Notify Physician      Notify Physician (specify)      Notify Anesthesiologist      Oxygen Continuous      Pulse Oximetry Continuous      Vital signs      Pulse Oximetry Q12H PRN      Remove dressing      Ovalle catheter - discontinue      Diet Adult Regular (IDDSI Level 7) [facility]      DISCHARGE PATIENT 1) Tolerating PO, No emesis x 2 hours 2) Ambulates with assistance appropriate to age and health status (to baseline ability) 3) Voided or has been instructed on how to respond if difficulty voiding 4) Vital signs stable (within ...      Place in Outpatient   3. Pre-op testing  Z01.818 V72.84 COVID-19 Routine Screening      COVID-19 Routine Screening         Patient was counseled today on treatment options for fibroids including expectant management, low dose oral contraceptive pills, Orhiann UFE,  myomectomy, and hysterectomy.  Patient desires a myomectomy as she desires to conceive in the next year. Discussed that in review of her MRI, she is a good candidate for a minimally invasive approach - robotic. Discussed the risk of recurrence of fibroids, the need to delay conception, and the need for CS for delivery.     DVM scheduled December 22

## 2020-11-09 ENCOUNTER — PATIENT MESSAGE (OUTPATIENT)
Dept: OBSTETRICS AND GYNECOLOGY | Facility: CLINIC | Age: 30
End: 2020-11-09

## 2020-11-09 RX ORDER — OXYCODONE HYDROCHLORIDE 5 MG/1
5 TABLET ORAL
Status: CANCELLED | OUTPATIENT
Start: 2020-11-09

## 2020-11-09 RX ORDER — ACETAMINOPHEN 325 MG/1
650 TABLET ORAL EVERY 4 HOURS PRN
Status: CANCELLED | OUTPATIENT
Start: 2020-11-09

## 2020-11-09 RX ORDER — MEPERIDINE HYDROCHLORIDE 100 MG/ML
12.5 INJECTION INTRAMUSCULAR; INTRAVENOUS; SUBCUTANEOUS ONCE AS NEEDED
Status: CANCELLED | OUTPATIENT
Start: 2020-11-09 | End: 2020-11-10

## 2020-11-09 RX ORDER — LIDOCAINE HYDROCHLORIDE 10 MG/ML
0.5 INJECTION, SOLUTION EPIDURAL; INFILTRATION; INTRACAUDAL; PERINEURAL
Status: CANCELLED | OUTPATIENT
Start: 2020-11-09

## 2020-11-09 RX ORDER — AMOXICILLIN 250 MG
1 CAPSULE ORAL 2 TIMES DAILY
Status: CANCELLED | OUTPATIENT
Start: 2020-11-09

## 2020-11-09 RX ORDER — FAMOTIDINE 20 MG/1
20 TABLET, FILM COATED ORAL
Status: CANCELLED | OUTPATIENT
Start: 2020-11-09

## 2020-11-09 RX ORDER — HYDROMORPHONE HYDROCHLORIDE 2 MG/ML
0.2 INJECTION, SOLUTION INTRAMUSCULAR; INTRAVENOUS; SUBCUTANEOUS EVERY 5 MIN PRN
Status: CANCELLED | OUTPATIENT
Start: 2020-11-09

## 2020-11-09 RX ORDER — OXYCODONE AND ACETAMINOPHEN 10; 325 MG/1; MG/1
1 TABLET ORAL EVERY 4 HOURS PRN
Status: CANCELLED | OUTPATIENT
Start: 2020-11-09

## 2020-11-09 RX ORDER — PREGABALIN 25 MG/1
50 CAPSULE ORAL
Status: CANCELLED | OUTPATIENT
Start: 2020-11-09

## 2020-11-09 RX ORDER — SODIUM CHLORIDE, SODIUM LACTATE, POTASSIUM CHLORIDE, CALCIUM CHLORIDE 600; 310; 30; 20 MG/100ML; MG/100ML; MG/100ML; MG/100ML
INJECTION, SOLUTION INTRAVENOUS CONTINUOUS
Status: CANCELLED | OUTPATIENT
Start: 2020-11-09

## 2020-11-09 RX ORDER — OXYCODONE AND ACETAMINOPHEN 5; 325 MG/1; MG/1
1 TABLET ORAL EVERY 4 HOURS PRN
Status: CANCELLED | OUTPATIENT
Start: 2020-11-09

## 2020-11-09 RX ORDER — DIPHENHYDRAMINE HCL 25 MG
25 CAPSULE ORAL EVERY 4 HOURS PRN
Status: CANCELLED | OUTPATIENT
Start: 2020-11-09

## 2020-11-09 RX ORDER — POLYETHYLENE GLYCOL 3350 17 G/17G
17 POWDER, FOR SOLUTION ORAL DAILY
Status: CANCELLED | OUTPATIENT
Start: 2020-11-09

## 2020-11-09 RX ORDER — DIPHENHYDRAMINE HYDROCHLORIDE 50 MG/ML
25 INJECTION INTRAMUSCULAR; INTRAVENOUS EVERY 4 HOURS PRN
Status: CANCELLED | OUTPATIENT
Start: 2020-11-09

## 2020-11-09 RX ORDER — HYDROMORPHONE HYDROCHLORIDE 2 MG/ML
1 INJECTION, SOLUTION INTRAMUSCULAR; INTRAVENOUS; SUBCUTANEOUS EVERY 4 HOURS PRN
Status: CANCELLED | OUTPATIENT
Start: 2020-11-09

## 2020-11-09 RX ORDER — ONDANSETRON 4 MG/1
8 TABLET, ORALLY DISINTEGRATING ORAL EVERY 8 HOURS PRN
Status: CANCELLED | OUTPATIENT
Start: 2020-11-09

## 2020-11-09 RX ORDER — ONDANSETRON 2 MG/ML
4 INJECTION INTRAMUSCULAR; INTRAVENOUS DAILY PRN
Status: CANCELLED | OUTPATIENT
Start: 2020-11-09

## 2020-11-09 RX ORDER — SODIUM CHLORIDE 0.9 % (FLUSH) 0.9 %
3 SYRINGE (ML) INJECTION
Status: CANCELLED | OUTPATIENT
Start: 2020-11-09

## 2020-11-09 RX ORDER — MUPIROCIN 20 MG/G
OINTMENT TOPICAL
Status: CANCELLED | OUTPATIENT
Start: 2020-11-09

## 2020-11-09 RX ORDER — ACETAMINOPHEN 500 MG
1000 TABLET ORAL
Status: CANCELLED | OUTPATIENT
Start: 2020-11-09

## 2020-11-17 ENCOUNTER — TELEPHONE (OUTPATIENT)
Dept: OBSTETRICS AND GYNECOLOGY | Facility: CLINIC | Age: 30
End: 2020-11-17

## 2020-11-17 NOTE — TELEPHONE ENCOUNTER
Reached out to [facility] , obtained fax number to send of clinical notes for pa for MRI , FAXED TO [phone]    ----- Message from Elizabeth Roldan sent at 11/17/2020  3:34 PM CST -----  Steffanie with [facility] # [phone] calling about pt records for authorization           Marlene ANDERSON

## 2020-11-17 NOTE — TELEPHONE ENCOUNTER
Called Emelina at [facility] , unable to reach Emelina [facility]       ----- Message from Cristina Isaacs sent at 11/17/2020  8:48 AM CST -----  Contact: RICK MUJICA [[id number]]  Type: Patient Call Back    Who called: Emelina with [facility].     What is the request in detail: She states that she was calling in regards to an authorization that is needed for the MRI as well as an order. She states that the patient called to make appointment, but they did not received the order. She has an appointment on Thursday for 8am.      Can the clinic reply by [facility]? No    Would the patient rather a call back or a response via [facility]? Call back     Best call back number: . [phone] fax. [phone]    Additional Information:

## 2020-11-25 ENCOUNTER — OFFICE VISIT (OUTPATIENT)
Dept: INTERNAL MEDICINE | Facility: CLINIC | Age: 30
End: 2020-11-25
Payer: COMMERCIAL

## 2020-11-25 VITALS
HEIGHT: 62 IN | DIASTOLIC BLOOD PRESSURE: 78 MMHG | OXYGEN SATURATION: 98 % | HEART RATE: 65 BPM | WEIGHT: 203.06 LBS | SYSTOLIC BLOOD PRESSURE: 112 MMHG | BODY MASS INDEX: 37.37 KG/M2

## 2020-11-25 DIAGNOSIS — E66.9 OBESITY (BMI 35.0-39.9 WITHOUT COMORBIDITY): ICD-10-CM

## 2020-11-25 DIAGNOSIS — Z11.59 NEED FOR HEPATITIS C SCREENING TEST: ICD-10-CM

## 2020-11-25 DIAGNOSIS — Z23 NEED FOR PNEUMOCOCCAL VACCINE: ICD-10-CM

## 2020-11-25 DIAGNOSIS — E06.3 HASHIMOTO'S THYROIDITIS: ICD-10-CM

## 2020-11-25 DIAGNOSIS — Z00.00 ANNUAL PHYSICAL EXAM: Primary | ICD-10-CM

## 2020-11-25 DIAGNOSIS — D50.9 IRON DEFICIENCY ANEMIA, UNSPECIFIED IRON DEFICIENCY ANEMIA TYPE: ICD-10-CM

## 2020-11-25 DIAGNOSIS — E78.2 MIXED HYPERLIPIDEMIA: ICD-10-CM

## 2020-11-25 DIAGNOSIS — E55.9 VITAMIN D DEFICIENCY: ICD-10-CM

## 2020-11-25 DIAGNOSIS — Z86.59 HISTORY OF DEPRESSION: ICD-10-CM

## 2020-11-25 PROBLEM — H65.92 LEFT OTITIS MEDIA WITH EFFUSION: Status: RESOLVED | Noted: 2019-09-18 | Resolved: 2020-11-25

## 2020-11-25 PROCEDURE — 99395 PR PREVENTIVE VISIT,EST,18-39: ICD-10-PCS | Mod: S$GLB,,, | Performed by: INTERNAL MEDICINE

## 2020-11-25 PROCEDURE — 3008F BODY MASS INDEX DOCD: CPT | Mod: CPTII,S$GLB,, | Performed by: INTERNAL MEDICINE

## 2020-11-25 PROCEDURE — 99999 PR PBB SHADOW E&M-EST. PATIENT-LVL III: ICD-10-PCS | Mod: PBBFAC,,, | Performed by: INTERNAL MEDICINE

## 2020-11-25 PROCEDURE — 3074F PR MOST RECENT SYSTOLIC BLOOD PRESSURE < 130 MM HG: ICD-10-PCS | Mod: CPTII,S$GLB,, | Performed by: INTERNAL MEDICINE

## 2020-11-25 PROCEDURE — 99999 PR PBB SHADOW E&M-EST. PATIENT-LVL III: CPT | Mod: PBBFAC,,, | Performed by: INTERNAL MEDICINE

## 2020-11-25 PROCEDURE — 1126F AMNT PAIN NOTED NONE PRSNT: CPT | Mod: S$GLB,,, | Performed by: INTERNAL MEDICINE

## 2020-11-25 PROCEDURE — 1126F PR PAIN SEVERITY QUANTIFIED, NO PAIN PRESENT: ICD-10-PCS | Mod: S$GLB,,, | Performed by: INTERNAL MEDICINE

## 2020-11-25 PROCEDURE — 3008F PR BODY MASS INDEX (BMI) DOCUMENTED: ICD-10-PCS | Mod: CPTII,S$GLB,, | Performed by: INTERNAL MEDICINE

## 2020-11-25 PROCEDURE — 99395 PREV VISIT EST AGE 18-39: CPT | Mod: S$GLB,,, | Performed by: INTERNAL MEDICINE

## 2020-11-25 PROCEDURE — 3074F SYST BP LT 130 MM HG: CPT | Mod: CPTII,S$GLB,, | Performed by: INTERNAL MEDICINE

## 2020-11-25 PROCEDURE — 3078F DIAST BP <80 MM HG: CPT | Mod: CPTII,S$GLB,, | Performed by: INTERNAL MEDICINE

## 2020-11-25 PROCEDURE — 3078F PR MOST RECENT DIASTOLIC BLOOD PRESSURE < 80 MM HG: ICD-10-PCS | Mod: CPTII,S$GLB,, | Performed by: INTERNAL MEDICINE

## 2020-11-25 RX ORDER — FERROUS SULFATE 325(65) MG
325 TABLET, DELAYED RELEASE (ENTERIC COATED) ORAL 2 TIMES DAILY
Qty: 180 TABLET | Refills: 3 | Status: SHIPPED | OUTPATIENT
Start: 2020-11-25 | End: 2021-06-29

## 2020-11-25 RX ORDER — BUPROPION HYDROCHLORIDE 150 MG/1
150 TABLET ORAL DAILY
Qty: 30 TABLET | Refills: 11 | Status: SHIPPED | OUTPATIENT
Start: 2020-11-25 | End: 2021-06-29

## 2020-11-25 NOTE — PROGRESS NOTES
Subjective:       Patient ID: Armida Medina is a 30 y.o. female who  has a past medical history of Abnormal Pap smear of cervix (2012), Acne, Allergy, Asthma, Hashimoto's thyroiditis (06/2017), and Preeclampsia, severe, third trimester (11/21/2018).    Chief Complaint: Annual Exam and Obesity     History was obtained from the patient and supplemented through chart review.  Following with OBGYN for menorrhagia.    Has a 2 y/o child.  Is not breast feeding.  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  Was referred to me by Mendy and Dr. Anton.  Will eventually plan on pregnancy in maybe a year.      HPI     Obesity:    Lost about 20 lb this year which was baby weight.  BMI 35.  Has seen a dietitian through her workplace.  Is frustrated with lack of weight loss.    H/o HA from TMJ/bruxism. H/o depression, anxiety, but doing well.  Has taken Wellbutrin in the past which helped with energy, anxiety.  Did not like Lexpro in the past.    Diet: Has done healthy course meals with 3 meals/day, smaller portions, but was expensive for her family. No fast foods.  Admits to eating carbs. Eats ground meat >1/2 week.  Doesn't eat late.  Breakfast: oatmeal  Lunch: salad, soup.  Dinner: waffle with peanut butter, protein shake  Snacks: none  Drinks: iced coffee from [facility] with 2 pumps. At least 2 bottles of water a day. Forgets to drink water during the day.  Sometimes diet coke.  ETOH:  none    Exercise: Barre3 3/week.     HLD, statin myopathy:    H/o joint aches with pravastatin 20.  W/u for myopathy revealed low Vit D. Diet as above.  Considering pregnancy in maybe a year.   Lab Results   Component Value Date    LDLCALC 125 (H) 04/12/2019     The ASCVD Risk score ([place]ally [place] Jr., et al., 2013) failed to calculate for the following reasons:    The 2013 ASCVD risk score is only valid for ages 40 to 79    Hashimoto's thyroiditis:  Prior to pregnancy.  Was monitored during pregnancy.  Had mild elevation of TPO to 7.7.  TFTs have been  normal.  Mom and maternal grandmother with hypothyroidism.    Fatigue has improved.  Has had difficulty losing weight despite diet and exercise.  Has hair loss.  Has always had cold intolerance.  She denies skin changes, palpitations.  Lab Results   Component Value Date    TSH 2.82 04/12/2019    FREET4 1.11 12/07/2018     STACIE:  Noted during pregnancy.  Has had heavier menstrual cycles since pregnancy.  Regular menstrual cycles. Ultrasound with uterine fibroid.  Planning on myomectomy with OBGYN.  No hematochezia. Hasn't been taking iron.  Pt previously declined testing for celiac d/t cost.    Lab Results   Component Value Date    IRON 43 04/12/2019    TIBC 400 04/12/2019    FERRITIN 8 (L) 04/12/2019     Lab Results   Component Value Date    UVFSQWOQ08 339 04/12/2019     No results found for: FOLATE    Vitamin D deficiency:  Level 16.  Taking 50K 1/week.  No results found for: [id number]              Not addressed today.  Childhood asthma:    Symptoms are well controlled.  Has not needed to use her inhaler.  No acute issues.    Well controlled.  Continue inhaler p.r.n..    Fibroid:  Following with OBGYN and planning on myomectomy.    Review of Systems   Constitutional: Negative for fever and unexpected weight change.   HENT: Negative for rhinorrhea and sneezing.    Eyes: Negative for redness and itching.   Respiratory: Negative for shortness of breath and wheezing.    Cardiovascular: Negative for chest pain and leg swelling.   Gastrointestinal: Negative for abdominal pain and diarrhea.   Genitourinary: Positive for menstrual problem. Negative for dysuria.   Musculoskeletal: Negative for gait problem and joint swelling.   Skin: Negative for color change and rash.   Neurological: Negative for dizziness and light-headedness.   Hematological: Negative for adenopathy.   Psychiatric/Behavioral: Negative for confusion. The patient is not nervous/anxious.        I personally reviewed Past Medical History, Past Surgical  "History, Social History, and Family History.    Objective:      Vitals:    11/25/20 1339   BP: 112/78   Pulse: 65   SpO2: 98%   Weight: 92.1 kg (203 lb 0.7 oz)   Height: 5' 2" (1.575 m)      Physical Exam  Constitutional:       General: She is not in acute distress.     Appearance: She is well-developed. She is not diaphoretic.   HENT:      Head: Normocephalic and atraumatic.      Nose: Nose normal.      Mouth/Throat:      Pharynx: No oropharyngeal exudate.   Eyes:      General: No scleral icterus.        Right eye: No discharge.         Left eye: No discharge.   Neck:      Musculoskeletal: Neck supple.      Thyroid: No thyromegaly.      Trachea: No tracheal deviation.   Cardiovascular:      Rate and Rhythm: Normal rate and regular rhythm.      Heart sounds: Normal heart sounds. No murmur.   Pulmonary:      Effort: Pulmonary effort is normal. No respiratory distress.      Breath sounds: Normal breath sounds. No wheezing.   Abdominal:      General: Bowel sounds are normal. There is no distension.      Palpations: Abdomen is soft.      Tenderness: There is no abdominal tenderness.   Musculoskeletal:         General: No deformity.      Right lower leg: No edema.      Left lower leg: No edema.   Lymphadenopathy:      Cervical: No cervical adenopathy.   Skin:     General: Skin is warm and dry.      Findings: No erythema.   Neurological:      Mental Status: She is alert.      Cranial Nerves: No cranial nerve deficit.      Gait: Gait normal.   Psychiatric:         Behavior: Behavior normal.           Lab Results   Component Value Date    WBC 11.04 12/18/2018    HGB 11.6 (L) 12/18/2018    HCT 38.7 12/18/2018     (H) 12/18/2018    CHOL 204 (H) 04/12/2019    TRIG 175 (H) 04/12/2019    HDL 50 (L) 04/12/2019    ALT 29 05/13/2019    AST 23 05/13/2019     05/13/2019    K 4.4 05/13/2019     05/13/2019    CREATININE 0.75 05/13/2019    BUN 12 05/13/2019    CO2 28 05/13/2019    TSH 2.82 04/12/2019    HGBA1C 5.2 " 04/12/2019       The ASCVD Risk score ([place] [address] Jr[address] et al., 2013) failed to calculate for the following reasons:    The 2013 ASCVD risk score is only valid for ages 40 to 79    (Imaging have been independently reviewed)  pelvic ultrasound with fibroid    Assessment:       1. Annual physical exam    2. Obesity (BMI 35.0-39.9 without comorbidity)    3. History of depression    4. Mixed hyperlipidemia    5. Hashimoto's thyroiditis    6. Iron deficiency anemia, unspecified iron deficiency anemia type    7. Vitamin D deficiency    8. Need for pneumococcal vaccine    9. Need for hepatitis C screening test          Plan:       Armida was seen today for annual exam and obesity.    Diagnoses and all orders for this visit:    Annual physical exam  Comments:  She will  form for her employer from clinic.  Orders:  -     Hemoglobin A1C; Future  -     Hemoglobin A1C    Obesity (BMI 35.0-39.9 without comorbidity)  Comments:  Persistent. Increase veggies, reduce red meat/carbs, hydration. Start Wellbutrin; discussed SE, lack of studies/R/B with pregnancy; will take temporarily.  Orders:  -     buPROPion (WELLBUTRIN XL) 150 MG TB24 tablet; Take 1 tablet (150 mg total) by mouth once daily.  -     Cancel: Hemoglobin A1C; Future  -     Hemoglobin A1C; Future  -     Hemoglobin A1C    History of depression  Comments:  Controlled, but start Wellbutrin for weight as above.  Orders:  -     buPROPion (WELLBUTRIN XL) 150 MG TB24 tablet; Take 1 tablet (150 mg total) by mouth once daily.    Mixed hyperlipidemia  Comments:  Stable. H/o myalgias; on Vit D. Discussed diet. Repeat FLP. Consider restarting pravastatin qOD and to stop with pregnancy.  Orders:  -     Lipid Panel; Future  -     Lipid Panel    Hashimoto's thyroiditis  Comments:  +FHx. Elevated TPO.  Reschedule TSH; monitor annually. Check A1C.  Orders:  -     Hemoglobin A1C; Future  -     Hemoglobin A1C    Iron deficiency anemia, unspecified iron deficiency anemia  type  Comments:  Follows with OBGYN. Planning on myomectomy. Not on iron supplement; restart BID. Recheck CBC, iron panel.  Orders:  -     Ferritin; Future  -     Iron and TIBC; Future  -     ferrous sulfate 325 (65 FE) MG EC tablet; Take 1 tablet (325 mg total) by mouth 2 (two) times daily.  -     Ferritin  -     Iron and TIBC    Vitamin D deficiency  Comments:  Taking 50K 1/week. Statin myalgias as above. Reschedule Vit D level.    Need for pneumococcal vaccine  Comments:  PPSV23 indicated d/t asthma. Advised to obtain vaccine at Pharmacy.    Need for hepatitis C screening test  -     Hepatitis C Antibody; Future  -     Hepatitis C Antibody    Other orders  -     Cancel: TSH; Future  -     Cancel: CBC Auto Differential; Future  -     Cancel: Comprehensive Metabolic Panel; Future  -     Cancel: Vitamin D; Future         Side effects of medication(s) were discussed in detail and patient voiced understanding.  Patient will call back for any issues or complications.     RTC in 1 year or sooner PRN for annual

## 2020-11-27 PROBLEM — R19.7 DIARRHEA: Status: RESOLVED | Noted: 2019-03-13 | Resolved: 2020-11-27

## 2020-12-01 ENCOUNTER — ANESTHESIA EVENT (OUTPATIENT)
Dept: SURGERY | Facility: HOSPITAL | Age: 30
End: 2020-12-01
Payer: COMMERCIAL

## 2020-12-11 LAB
CHOLEST SERPL-MCNC: 181 MG/DL
CHOLEST/HDLC SERPL: 4.2 (CALC)
FERRITIN SERPL-MCNC: 18 NG/ML (ref 16–154)
HBA1C MFR BLD: 5.1 % OF TOTAL HGB
HCV AB S/CO SERPL IA: 0.02
HCV AB SERPL QL IA: NORMAL
HDLC SERPL-MCNC: 43 MG/DL
IRON SATN MFR SERPL: 41 % (CALC) (ref 16–45)
IRON SERPL-MCNC: 146 MCG/DL (ref 40–190)
LDLC SERPL CALC-MCNC: 116 MG/DL (CALC)
NONHDLC SERPL-MCNC: 138 MG/DL (CALC)
TIBC SERPL-MCNC: 360 MCG/DL (CALC) (ref 250–450)
TRIGL SERPL-MCNC: 112 MG/DL

## 2020-12-14 ENCOUNTER — PATIENT MESSAGE (OUTPATIENT)
Dept: OBSTETRICS AND GYNECOLOGY | Facility: CLINIC | Age: 30
End: 2020-12-14

## 2020-12-14 ENCOUNTER — PATIENT MESSAGE (OUTPATIENT)
Dept: INTERNAL MEDICINE | Facility: CLINIC | Age: 30
End: 2020-12-14

## 2020-12-17 ENCOUNTER — PATIENT OUTREACH (OUTPATIENT)
Dept: ADMINISTRATIVE | Facility: OTHER | Age: 30
End: 2020-12-17

## 2020-12-17 NOTE — PROGRESS NOTES
Care Everywhere:   Immunization: updated, links delay  Health Maintenance: updated  Media Review:   Legacy Review:   Order placed:   Upcoming appts:

## 2020-12-18 ENCOUNTER — HOSPITAL ENCOUNTER (OUTPATIENT)
Dept: PREADMISSION TESTING | Facility: HOSPITAL | Age: 30
Discharge: HOME | End: 2020-12-18
Attending: OBSTETRICS & GYNECOLOGY
Payer: COMMERCIAL

## 2020-12-18 VITALS
BODY MASS INDEX: 37.36 KG/M2 | HEIGHT: 62 IN | OXYGEN SATURATION: 99 % | HEART RATE: 69 BPM | DIASTOLIC BLOOD PRESSURE: 81 MMHG | RESPIRATION RATE: 14 BRPM | SYSTOLIC BLOOD PRESSURE: 131 MMHG | WEIGHT: 203 LBS

## 2020-12-18 DIAGNOSIS — Z01.818 PREOP TESTING: Primary | ICD-10-CM

## 2020-12-18 RX ORDER — LIDOCAINE HYDROCHLORIDE 10 MG/ML
1 INJECTION, SOLUTION EPIDURAL; INFILTRATION; INTRACAUDAL; PERINEURAL ONCE
Status: CANCELLED | OUTPATIENT
Start: 2020-12-18 | End: 2020-12-18

## 2020-12-18 RX ORDER — SODIUM CHLORIDE, SODIUM LACTATE, POTASSIUM CHLORIDE, CALCIUM CHLORIDE 600; 310; 30; 20 MG/100ML; MG/100ML; MG/100ML; MG/100ML
INJECTION, SOLUTION INTRAVENOUS CONTINUOUS
Status: CANCELLED | OUTPATIENT
Start: 2020-12-18

## 2020-12-18 RX ORDER — SCOLOPAMINE TRANSDERMAL SYSTEM 1 MG/1
1 PATCH, EXTENDED RELEASE TRANSDERMAL
Status: CANCELLED | OUTPATIENT
Start: 2020-12-18

## 2020-12-18 NOTE — DISCHARGE INSTRUCTIONS
Your surgery is scheduled for 12/22/20.    Please report to Front Lobby on the 1st Floor at 8:15a.m.    THIS TIME IS SUBJECT TO CHANGE.  YOU WILL RECEIVE A PHONE CALL THE DAY BEFORE SURGERY BY 3:30 PM TO CONFIRM YOUR TIME OF ARRIVAL.  IF YOU HAVE NOT RECEIVED A PHONE CALL BY 3:30 PM THE DAY BEFORE YOUR SURGERY PLEASE CALL [phone]     INSTRUCTIONS IMPORTANT!!!  ¨ Do not eat or drink after 12 midnight-including water. OK to brush teeth, no   gum, candy or mints!      ____  Proceed to [facility] on 12/18/20 for additional testing.        ____  Do not wear makeup, including mascara.  ____  No powder, lotions or creams to surgical area.  ____  Please remove all jewelry, including piercings and leave at home.  ____  No money or valuables needed. Please leave at home.  ____  Please bring any documents given by your doctor.  ____  If going home the same day, arrange for a ride home. You will not be able to             drive if Anesthesia was used.  ____  Children under 18 years require a parent / guardian present the entire time             they are in surgery / recovery.  ____  Wear loose fitting clothing. Allow for dressings, bandages.  ____  Stop Aspirin, Ibuprofen, Motrin and Aleve at least 3-5 days before surgery, unless otherwise instructed by your doctor, or the nurse.   You MAY use Tylenol/acetaminophen until day of surgery.  ____  Wash the surgical area with Hibiclens the night before surgery, and again the             morning of surgery.  Be sure to rinse hibiclens off completely (if instructed by   nurse).  ____  If you take diabetic medication, do not take am of surgery unless instructed by Doctor.  ____  Call MD for temperature above 101 degrees or any other signs of infection such as Urinary (bladder) infection, Upper respiratory infection, skin boils, etc.  ____ Stop taking any Fish Oil supplement or any Vitamins that contain Vitamin E at least 5 days prior to surgery.  ____ Do Not wear  your contact lenses the day of your procedure.  You may wear your glasses.      ____Do not shave surgical site for 3 days prior to surgery.  ____ Practice Good hand washing before, during, and after procedure.      I have read or had read and explained to me, and understand the above information.  Additional comments or instructions:  For additional questions call [phone]      ANESTHESIA SIDE EFFECTS  -For the first 24 hours after surgery:  Do not drive, use heavy equipment, make important decisions, or drink alcohol  -It is normal to feel sleepy for several hours.  Rest until you are more awake.  -Have someone stay with you, if needed.  They can watch for problems and help keep you safe.  -Some possible post anesthesia side effects include: nausea and vomiting, sore throat and hoarseness, sleepiness, and dizziness.        Pre-Op Bathing Instructions    Before surgery, you can play an important role in your own health.    Because skin is not sterile, we need to be sure that your skin is as free of germs as possible. By following the instructions below, you can reduce the number of germs on your skin before surgery.    IMPORTANT: You will need to shower with a special soap called Hibiclens*, available at any pharmacy.  If you are allergic to Chlorhexidine (the antiseptic in Hibiclens), use an antibacterial soap such as Dial Soap for your preoperative shower.  You will shower with Hibiclens both the night before your surgery and the morning of your surgery.  Do not use Hibiclens on the head, face or genitals to avoid injury to those areas.    STEP #1: THE NIGHT BEFORE YOUR SURGERY     1. Do not shave the area of your body where your surgery will be performed.  2. Shower and wash your hair and body as usual with your normal soap and shampoo.  3. Rinse your hair and body thoroughly after you shower to remove all soap residue.  4. With your hand, apply one packet of Hibiclens soap to the surgical site.   5. Wash the  site gently for five (5) minutes. Do not scrub your skin too hard.   6. Do not wash with your regular soap after Hibiclens is used.  7. Rinse your body thoroughly.  8. Pat yourself dry with a clean, soft towel.  9. Do not use lotion, cream, or powder.  10. Wear clean clothes.    STEP #2: THE MORNING OF YOUR SURGERY     1. Repeat Step #1.    * Not to be used by people allergic to Chlorhexidine.

## 2020-12-18 NOTE — PRE-PROCEDURE INSTRUCTIONS
Mother Sherrell Joel  [phone]    Allergies, medical, surgical, family and psychosocial histories reviewed with patient. Periop plan of care reviewed. Preop instructions given, including medications to take and to hold. Hibiclens soap and instructions on use given. Time allotted for questions to be addressed.  Patient verbalized understanding.

## 2020-12-18 NOTE — ANESTHESIA PREPROCEDURE EVALUATION
2020  Armida Medina is a 30 y.o., female scheduled for robotic myomectomy on 2020.    Past Medical History:   Diagnosis Date    Abnormal Pap smear of cervix     Acne     Allergy     Asthma     Hashimoto's thyroiditis 2017    Preeclampsia, severe, third trimester 2018     Past Surgical History:   Procedure Laterality Date     SECTION N/A 2018    Procedure:  SECTION;  Surgeon: Sawyer Lake Jr., MD;  [facility];  Service: OB/GYN;  Laterality: N/A;    CYST REMOVAL      Left ear @ age 2    TONSILLECTOMY, ADENOIDECTOMY         Anesthesia Evaluation    I have reviewed the Patient Summary Reports.    I have reviewed the Nursing Notes.    I have reviewed the Medications.     Review of Systems  Anesthesia Hx:  No problems with previous Anesthesia  Denies Family Hx of Anesthesia complications.    Social:  Non-Smoker, Social Alcohol Use    Hematology/Oncology:  Hematology Normal        Cardiovascular:  Cardiovascular Normal Exercise tolerance: good   Denies Angina.        Pulmonary:   Asthma mild    Hepatic/GI:  Hepatic/GI Normal    Neurological:  Neurology Normal    Endocrine:  Thyroid Disease Hashimoto's Thyroiditis        Physical Exam  General:  Obesity    Airway/Jaw/Neck:  Airway Findings: Mouth Opening: Normal Tongue: Normal  General Airway Assessment: Adult  Mallampati: II  TM Distance: Normal, at least 6 cm       Chest/Lungs:  Chest/Lungs Findings: Clear to auscultation, Normal Respiratory Rate     Heart/Vascular:  Heart Findings: Rate: Normal  Rhythm: Regular Rhythm  Sounds: Normal        Mental Status:  Mental Status Findings:  Cooperative, Alert and Oriented         Anesthesia Plan  Type of Anesthesia, risks & benefits discussed:  Anesthesia Type:  general  Patient's Preference:   Intra-op Monitoring Plan: standard ASA monitors  Intra-op  Monitoring Plan Comments:   Post Op Pain Control Plan: multimodal analgesia  Post Op Pain Control Plan Comments:   Induction:   IV  Beta Blocker:  Patient is not currently on a Beta-Blocker (No further documentation required).       Informed Consent: Patient understands risks and agrees with Anesthesia plan.  Questions answered. Anesthesia consent signed with patient.  ASA Score: 2     Day of Surgery Review of History & Physical:        Anesthesia Plan Notes: Anesthesia consent to be signed prior to procedure on 12/22/2020          Ready For Surgery From Anesthesia Perspective.

## 2020-12-19 ENCOUNTER — LAB VISIT (OUTPATIENT)
Dept: INTERNAL MEDICINE | Facility: CLINIC | Age: 30
End: 2020-12-19
Payer: COMMERCIAL

## 2020-12-19 DIAGNOSIS — Z01.818 PRE-OP TESTING: ICD-10-CM

## 2020-12-19 PROCEDURE — U0003 INFECTIOUS AGENT DETECTION BY NUCLEIC ACID (DNA OR RNA); SEVERE ACUTE RESPIRATORY SYNDROME CORONAVIRUS 2 (SARS-COV-2) (CORONAVIRUS DISEASE [COVID-19]), AMPLIFIED PROBE TECHNIQUE, MAKING USE OF HIGH THROUGHPUT TECHNOLOGIES AS DESCRIBED BY CMS-2020-01-R: HCPCS

## 2020-12-20 LAB — SARS-COV-2 RNA RESP QL NAA+PROBE: NOT DETECTED

## 2020-12-21 ENCOUNTER — OFFICE VISIT (OUTPATIENT)
Dept: OBSTETRICS AND GYNECOLOGY | Facility: CLINIC | Age: 30
End: 2020-12-21
Attending: OBSTETRICS & GYNECOLOGY
Payer: COMMERCIAL

## 2020-12-21 VITALS
DIASTOLIC BLOOD PRESSURE: 68 MMHG | HEIGHT: 62 IN | WEIGHT: 203.06 LBS | BODY MASS INDEX: 37.37 KG/M2 | SYSTOLIC BLOOD PRESSURE: 106 MMHG

## 2020-12-21 DIAGNOSIS — Z01.818 PREOPERATIVE EXAM FOR GYNECOLOGIC SURGERY: Primary | ICD-10-CM

## 2020-12-21 PROCEDURE — 99999 PR PBB SHADOW E&M-EST. PATIENT-LVL III: ICD-10-PCS | Mod: PBBFAC,,, | Performed by: OBSTETRICS & GYNECOLOGY

## 2020-12-21 PROCEDURE — 1126F AMNT PAIN NOTED NONE PRSNT: CPT | Mod: S$GLB,,, | Performed by: OBSTETRICS & GYNECOLOGY

## 2020-12-21 PROCEDURE — 99999 PR PBB SHADOW E&M-EST. PATIENT-LVL III: CPT | Mod: PBBFAC,,, | Performed by: OBSTETRICS & GYNECOLOGY

## 2020-12-21 PROCEDURE — 3008F BODY MASS INDEX DOCD: CPT | Mod: CPTII,S$GLB,, | Performed by: OBSTETRICS & GYNECOLOGY

## 2020-12-21 PROCEDURE — 1126F PR PAIN SEVERITY QUANTIFIED, NO PAIN PRESENT: ICD-10-PCS | Mod: S$GLB,,, | Performed by: OBSTETRICS & GYNECOLOGY

## 2020-12-21 PROCEDURE — 3008F PR BODY MASS INDEX (BMI) DOCUMENTED: ICD-10-PCS | Mod: CPTII,S$GLB,, | Performed by: OBSTETRICS & GYNECOLOGY

## 2020-12-21 PROCEDURE — 99499 UNLISTED E&M SERVICE: CPT | Mod: S$GLB,,, | Performed by: OBSTETRICS & GYNECOLOGY

## 2020-12-21 PROCEDURE — 99499 NO LOS: ICD-10-PCS | Mod: S$GLB,,, | Performed by: OBSTETRICS & GYNECOLOGY

## 2020-12-21 NOTE — H&P (VIEW-ONLY)
CC: Preop exam    Armida Medina is a 30 y.o. female  presents for a pre-op exam for a DVM scheduled on 2020.      She reports cycles are heavy. She uses ultra pads and tampons that she has to change every couple of hours. She reports dyspareunia.      Ultrasound shows:  FINDINGS:  The uterus measures 8.8 x 5 x 6.4 cm and contains a 4.2 cm hypoechoic focus within the fundus consistent with fibroid.  The endometrial stripe measures 1.3 cm.  The right ovary is not visualized.  The left ovary measures 2.9 x 2.1 x 2.8 cm and has a normal appearance.  Note is made of fluid within the cervix.  There is a mild amount of fluid within the pelvis.     Impression:     Fundal fibroid.     Fluid within the cervix    MRI images reviewed and are consistent with ultrasound    Past Medical History:   Diagnosis Date    Abnormal Pap smear of cervix     Acne     Allergy     Asthma     Hashimoto's thyroiditis 2017    Preeclampsia, severe, third trimester 2018       Past Surgical History:   Procedure Laterality Date     SECTION N/A 2018    Procedure:  SECTION;  Surgeon: Sawyer Lake Jr., MD;  Location: [facility];  Service: OB/GYN;  Laterality: N/A;    CYST REMOVAL      Left ear @ age 2    TONSILLECTOMY, ADENOIDECTOMY         OB History    Para Term  AB Living   1 1 1 0 0 1   SAB TAB Ectopic Multiple Live Births   0 0 0 0 1      # Outcome Date GA Lbr Jacob/2nd Weight Sex Delivery Anes PTL Lv   1 Term 18 37w3d  2.95 kg (6 lb 8.1 oz) M CS-LTranv Spinal N RAFAEL       Family History   Problem Relation Age of Onset    No Known Problems Father     Miscarriages / Stillbirths Mother     Hypothyroidism Mother     Coronary artery disease Maternal Grandmother         s/p CABG 4v    Hypothyroidism Maternal Grandmother     Coronary artery disease Paternal Grandmother         s/p CABG    Breast cancer Neg Hx     Cancer Neg Hx     Colon cancer Neg Hx   "   Diabetes Neg Hx     Eclampsia Neg Hx     Hypertension Neg Hx     Stroke Neg Hx      labor Neg Hx     Ovarian cancer Neg Hx     Melanoma Neg Hx        Social History     Tobacco Use    Smoking status: Never Smoker    Smokeless tobacco: Never Used   Substance Use Topics    Alcohol use: Yes     Frequency: Never     Drinks per session: Patient refused     Binge frequency: Never     Comment: social     Drug use: No       /68 (BP Location: Right arm, Patient Position: Sitting)   Ht 5' 2" (1.575 m)   Wt 92.1 kg (203 lb 0.7 oz)   LMP 2020   BMI 37.14 kg/m²     Current Outpatient Medications on File Prior to Visit   Medication Sig Dispense Refill    buPROPion (WELLBUTRIN XL) 150 MG TB24 tablet Take 1 tablet (150 mg total) by mouth once daily. 30 tablet 11    cetirizine (ZYRTEC) 10 MG tablet Take 10 mg by mouth once daily.      ergocalciferol (VITAMIN D2) 50,000 unit Cap Take 1 capsule (50,000 Units total) by mouth every 7 days. 51 capsule 0    ferrous sulfate 325 (65 FE) MG EC tablet Take 1 tablet (325 mg total) by mouth 2 (two) times daily. 180 tablet 3     No current facility-administered medications on file prior to visit.         Review of patient's allergies indicates:   Allergen Reactions    Ibuprofen Hives and Shortness Of Breath     bronchospasm    Ancef [cefazolin] Rash        ROS:  GENERAL: Denies weight gain or weight loss. Feeling well overall.   SKIN: Denies rash or lesions.   HEAD: Denies head injury or headache.   NODES: Denies enlarged lymph nodes.   CHEST: Denies chest pain or shortness of breath.   CARDIOVASCULAR: Denies palpitations or left sided chest pain.   ABDOMEN: No abdominal pain, constipation, diarrhea, nausea, vomiting or rectal bleeding.   URINARY: No frequency, dysuria, hematuria, or burning on urination.  REPRODUCTIVE: See HPI.   HEMATOLOGIC: No easy bruisability or excessive bleeding with the exception of menstrual cycles.  MUSCULOSKELETAL: Denies " joint pain or swelling.   NEUROLOGIC: Denies syncope or weakness.   PSYCHIATRIC: Denies depression, anxiety or mood swings.    PHYSICAL EXAM:  APPEARANCE: Well nourished, well developed, in no acute distress.  AFFECT: WNL, alert and oriented x 3  SKIN: No acne or hirsutism  NECK: Neck symmetric without masses or thyromegaly  NODES: No inguinal, cervical, axillary, or femoral lymph node enlargement  CHEST: Good respiratory effect  ABDOMEN: Soft.  No tenderness or masses.  No hepatosplenomegaly.  No hernias.  PELVIC: Deferred  EXTREMITIES: No edema.      ICD-10-CM ICD-9-CM    1. Preoperative exam for gynecologic surgery  Z01.818 V72.83        Patient was counseled today on treatment options for fibroids including expectant management, low dose oral contraceptive pills, Orhiann UFE, myomectomy, and hysterectomy.  Patient desires a myomectomy as she desires to conceive in the next year. Discussed that in review of her MRI, she is a good candidate for a minimally invasive approach - robotic. Discussed the risk of recurrence of fibroids, the need to delay conception, and the need for CS for delivery.     Surgical risks discussed including but not limited to risk of bleeding, infection, and laparotomy.    I have discussed the risks, benefits, indications, and alternatives of the procedure in detail.  The patient verbalizes her understanding.  All questions answered.  Consents signed.  The patient agrees to proceed to proceed as planned.

## 2020-12-21 NOTE — PROGRESS NOTES
CC: Preop exam    Armida Medina is a 30 y.o. female  presents for a pre-op exam for a DVM scheduled on 2020.      She reports cycles are heavy. She uses ultra pads and tampons that she has to change every couple of hours. She reports dyspareunia.      Ultrasound shows:  FINDINGS:  The uterus measures 8.8 x 5 x 6.4 cm and contains a 4.2 cm hypoechoic focus within the fundus consistent with fibroid.  The endometrial stripe measures 1.3 cm.  The right ovary is not visualized.  The left ovary measures 2.9 x 2.1 x 2.8 cm and has a normal appearance.  Note is made of fluid within the cervix.  There is a mild amount of fluid within the pelvis.     Impression:     Fundal fibroid.     Fluid within the cervix    MRI images reviewed and are consistent with ultrasound    Past Medical History:   Diagnosis Date    Abnormal Pap smear of cervix     Acne     Allergy     Asthma     Hashimoto's thyroiditis 2017    Preeclampsia, severe, third trimester 2018       Past Surgical History:   Procedure Laterality Date     SECTION N/A 2018    Procedure:  SECTION;  Surgeon: Sawyer Lake Jr., MD;  Location: [facility];  Service: OB/GYN;  Laterality: N/A;    CYST REMOVAL      Left ear @ age 2    TONSILLECTOMY, ADENOIDECTOMY         OB History    Para Term  AB Living   1 1 1 0 0 1   SAB TAB Ectopic Multiple Live Births   0 0 0 0 1      # Outcome Date GA Lbr Jacob/2nd Weight Sex Delivery Anes PTL Lv   1 Term 18 37w3d  2.95 kg (6 lb 8.1 oz) M CS-LTranv Spinal N RAFAEL       Family History   Problem Relation Age of Onset    No Known Problems Father     Miscarriages / Stillbirths Mother     Hypothyroidism Mother     Coronary artery disease Maternal Grandmother         s/p CABG 4v    Hypothyroidism Maternal Grandmother     Coronary artery disease Paternal Grandmother         s/p CABG    Breast cancer Neg Hx     Cancer Neg Hx     Colon cancer Neg Hx   "   Diabetes Neg Hx     Eclampsia Neg Hx     Hypertension Neg Hx     Stroke Neg Hx      labor Neg Hx     Ovarian cancer Neg Hx     Melanoma Neg Hx        Social History     Tobacco Use    Smoking status: Never Smoker    Smokeless tobacco: Never Used   Substance Use Topics    Alcohol use: Yes     Frequency: Never     Drinks per session: Patient refused     Binge frequency: Never     Comment: social     Drug use: No       /68 (BP Location: Right arm, Patient Position: Sitting)   Ht 5' 2" (1.575 m)   Wt 92.1 kg (203 lb 0.7 oz)   LMP 2020   BMI 37.14 kg/m²     Current Outpatient Medications on File Prior to Visit   Medication Sig Dispense Refill    buPROPion (WELLBUTRIN XL) 150 MG TB24 tablet Take 1 tablet (150 mg total) by mouth once daily. 30 tablet 11    cetirizine (ZYRTEC) 10 MG tablet Take 10 mg by mouth once daily.      ergocalciferol (VITAMIN D2) 50,000 unit Cap Take 1 capsule (50,000 Units total) by mouth every 7 days. 51 capsule 0    ferrous sulfate 325 (65 FE) MG EC tablet Take 1 tablet (325 mg total) by mouth 2 (two) times daily. 180 tablet 3     No current facility-administered medications on file prior to visit.         Review of patient's allergies indicates:   Allergen Reactions    Ibuprofen Hives and Shortness Of Breath     bronchospasm    Ancef [cefazolin] Rash        ROS:  GENERAL: Denies weight gain or weight loss. Feeling well overall.   SKIN: Denies rash or lesions.   HEAD: Denies head injury or headache.   NODES: Denies enlarged lymph nodes.   CHEST: Denies chest pain or shortness of breath.   CARDIOVASCULAR: Denies palpitations or left sided chest pain.   ABDOMEN: No abdominal pain, constipation, diarrhea, nausea, vomiting or rectal bleeding.   URINARY: No frequency, dysuria, hematuria, or burning on urination.  REPRODUCTIVE: See HPI.   HEMATOLOGIC: No easy bruisability or excessive bleeding with the exception of menstrual cycles.  MUSCULOSKELETAL: Denies " joint pain or swelling.   NEUROLOGIC: Denies syncope or weakness.   PSYCHIATRIC: Denies depression, anxiety or mood swings.    PHYSICAL EXAM:  APPEARANCE: Well nourished, well developed, in no acute distress.  AFFECT: WNL, alert and oriented x 3  SKIN: No acne or hirsutism  NECK: Neck symmetric without masses or thyromegaly  NODES: No inguinal, cervical, axillary, or femoral lymph node enlargement  CHEST: Good respiratory effect  ABDOMEN: Soft.  No tenderness or masses.  No hepatosplenomegaly.  No hernias.  PELVIC: Deferred  EXTREMITIES: No edema.      ICD-10-CM ICD-9-CM    1. Preoperative exam for gynecologic surgery  Z01.818 V72.83        Patient was counseled today on treatment options for fibroids including expectant management, low dose oral contraceptive pills, Orhiann UFE, myomectomy, and hysterectomy.  Patient desires a myomectomy as she desires to conceive in the next year. Discussed that in review of her MRI, she is a good candidate for a minimally invasive approach - robotic. Discussed the risk of recurrence of fibroids, the need to delay conception, and the need for CS for delivery.     Surgical risks discussed including but not limited to risk of bleeding, infection, and laparotomy.    I have discussed the risks, benefits, indications, and alternatives of the procedure in detail.  The patient verbalizes her understanding.  All questions answered.  Consents signed.  The patient agrees to proceed to proceed as planned.

## 2020-12-22 ENCOUNTER — ANESTHESIA (OUTPATIENT)
Dept: SURGERY | Facility: HOSPITAL | Age: 30
End: 2020-12-22
Payer: COMMERCIAL

## 2020-12-22 ENCOUNTER — HOSPITAL ENCOUNTER (OUTPATIENT)
Facility: HOSPITAL | Age: 30
Discharge: HOME | End: 2020-12-22
Attending: OBSTETRICS & GYNECOLOGY | Admitting: OBSTETRICS & GYNECOLOGY
Payer: COMMERCIAL

## 2020-12-22 VITALS
TEMPERATURE: 98 F | HEIGHT: 62 IN | OXYGEN SATURATION: 98 % | DIASTOLIC BLOOD PRESSURE: 70 MMHG | BODY MASS INDEX: 37.36 KG/M2 | WEIGHT: 203 LBS | RESPIRATION RATE: 20 BRPM | SYSTOLIC BLOOD PRESSURE: 131 MMHG | HEART RATE: 75 BPM

## 2020-12-22 DIAGNOSIS — D25.1 INTRAMURAL AND SUBSEROUS LEIOMYOMA OF UTERUS: ICD-10-CM

## 2020-12-22 DIAGNOSIS — R11.0 NAUSEA: Primary | ICD-10-CM

## 2020-12-22 DIAGNOSIS — Z98.890 S/P MYOMECTOMY: Primary | ICD-10-CM

## 2020-12-22 DIAGNOSIS — N92.0 MENORRHAGIA WITH REGULAR CYCLE: ICD-10-CM

## 2020-12-22 DIAGNOSIS — D25.2 INTRAMURAL AND SUBSEROUS LEIOMYOMA OF UTERUS: ICD-10-CM

## 2020-12-22 LAB
ABO + RH BLD: NORMAL
BASOPHILS # BLD AUTO: 0.05 K/UL (ref 0–0.2)
BASOPHILS NFR BLD: 0.9 % (ref 0–1.9)
BLD GP AB SCN CELLS X3 SERPL QL: NORMAL
DIFFERENTIAL METHOD: ABNORMAL
EOSINOPHIL # BLD AUTO: 0 K/UL (ref 0–0.5)
EOSINOPHIL NFR BLD: 0.7 % (ref 0–8)
ERYTHROCYTE [DISTWIDTH] IN BLOOD BY AUTOMATED COUNT: 14.5 % (ref 11.5–14.5)
HCT VFR BLD AUTO: 40.8 % (ref 37–48.5)
HGB BLD-MCNC: 12.8 G/DL (ref 12–16)
IMM GRANULOCYTES # BLD AUTO: 0.01 K/UL (ref 0–0.04)
IMM GRANULOCYTES NFR BLD AUTO: 0.2 % (ref 0–0.5)
LYMPHOCYTES # BLD AUTO: 1.3 K/UL (ref 1–4.8)
LYMPHOCYTES NFR BLD: 22.3 % (ref 18–48)
MCH RBC QN AUTO: 27.3 PG (ref 27–31)
MCHC RBC AUTO-ENTMCNC: 31.4 G/DL (ref 32–36)
MCV RBC AUTO: 87 FL (ref 82–98)
MONOCYTES # BLD AUTO: 0.4 K/UL (ref 0.3–1)
MONOCYTES NFR BLD: 7.2 % (ref 4–15)
NEUTROPHILS # BLD AUTO: 3.9 K/UL (ref 1.8–7.7)
NEUTROPHILS NFR BLD: 68.7 % (ref 38–73)
NRBC BLD-RTO: 0 /100 WBC
PLATELET # BLD AUTO: 221 K/UL (ref 150–350)
PMV BLD AUTO: 11.3 FL (ref 9.2–12.9)
POCT GLUCOSE: 89 MG/DL (ref 70–110)
RBC # BLD AUTO: 4.69 M/UL (ref 4–5.4)
WBC # BLD AUTO: 5.66 K/UL (ref 3.9–12.7)

## 2020-12-22 PROCEDURE — 85025 COMPLETE CBC W/AUTO DIFF WBC: CPT

## 2020-12-22 PROCEDURE — 63600175 PHARM REV CODE 636 W HCPCS: Performed by: NURSE ANESTHETIST, CERTIFIED REGISTERED

## 2020-12-22 PROCEDURE — 71000015 HC POSTOP RECOV 1ST HR: Performed by: OBSTETRICS & GYNECOLOGY

## 2020-12-22 PROCEDURE — 71000033 HC RECOVERY, INTIAL HOUR: Performed by: OBSTETRICS & GYNECOLOGY

## 2020-12-22 PROCEDURE — 36000711: Performed by: OBSTETRICS & GYNECOLOGY

## 2020-12-22 PROCEDURE — 25000003 PHARM REV CODE 250: Performed by: NURSE ANESTHETIST, CERTIFIED REGISTERED

## 2020-12-22 PROCEDURE — 88305 TISSUE EXAM BY PATHOLOGIST: CPT | Mod: 26,,, | Performed by: STUDENT IN AN ORGANIZED HEALTH CARE EDUCATION/TRAINING PROGRAM

## 2020-12-22 PROCEDURE — 37000009 HC ANESTHESIA EA ADD 15 MINS: Performed by: OBSTETRICS & GYNECOLOGY

## 2020-12-22 PROCEDURE — 88305 TISSUE EXAM BY PATHOLOGIST: ICD-10-PCS | Mod: 26,,, | Performed by: STUDENT IN AN ORGANIZED HEALTH CARE EDUCATION/TRAINING PROGRAM

## 2020-12-22 PROCEDURE — 71000039 HC RECOVERY, EACH ADD'L HOUR: Performed by: OBSTETRICS & GYNECOLOGY

## 2020-12-22 PROCEDURE — 37000008 HC ANESTHESIA 1ST 15 MINUTES: Performed by: OBSTETRICS & GYNECOLOGY

## 2020-12-22 PROCEDURE — 36000710: Performed by: OBSTETRICS & GYNECOLOGY

## 2020-12-22 PROCEDURE — 25000003 PHARM REV CODE 250: Performed by: OBSTETRICS & GYNECOLOGY

## 2020-12-22 PROCEDURE — 27201423 OPTIME MED/SURG SUP & DEVICES STERILE SUPPLY: Performed by: OBSTETRICS & GYNECOLOGY

## 2020-12-22 PROCEDURE — 86850 RBC ANTIBODY SCREEN: CPT

## 2020-12-22 PROCEDURE — 63600175 PHARM REV CODE 636 W HCPCS: Performed by: NURSE PRACTITIONER

## 2020-12-22 PROCEDURE — 25000003 PHARM REV CODE 250: Performed by: NURSE PRACTITIONER

## 2020-12-22 PROCEDURE — 88305 TISSUE EXAM BY PATHOLOGIST: CPT | Performed by: STUDENT IN AN ORGANIZED HEALTH CARE EDUCATION/TRAINING PROGRAM

## 2020-12-22 PROCEDURE — 63600175 PHARM REV CODE 636 W HCPCS: Performed by: OBSTETRICS & GYNECOLOGY

## 2020-12-22 PROCEDURE — 58545 LAPAROSCOPIC MYOMECTOMY: CPT | Mod: ,,, | Performed by: OBSTETRICS & GYNECOLOGY

## 2020-12-22 PROCEDURE — 36415 COLL VENOUS BLD VENIPUNCTURE: CPT

## 2020-12-22 PROCEDURE — C1782 MORCELLATOR: HCPCS | Performed by: OBSTETRICS & GYNECOLOGY

## 2020-12-22 PROCEDURE — 58545 PR LAP,MYOMECTOMY 1-4,TOT WT 250 GMS: ICD-10-PCS | Mod: ,,, | Performed by: OBSTETRICS & GYNECOLOGY

## 2020-12-22 PROCEDURE — 71000016 HC POSTOP RECOV ADDL HR: Performed by: OBSTETRICS & GYNECOLOGY

## 2020-12-22 RX ORDER — SODIUM CHLORIDE 0.9 % (FLUSH) 0.9 %
3 SYRINGE (ML) INJECTION
Status: DISCONTINUED | OUTPATIENT
Start: 2020-12-22 | End: 2020-12-22 | Stop reason: HOSPADM

## 2020-12-22 RX ORDER — EPHEDRINE SULFATE 50 MG/ML
INJECTION, SOLUTION INTRAVENOUS
Status: DISCONTINUED | OUTPATIENT
Start: 2020-12-22 | End: 2020-12-22

## 2020-12-22 RX ORDER — MIDAZOLAM HYDROCHLORIDE 1 MG/ML
INJECTION INTRAMUSCULAR; INTRAVENOUS
Status: DISCONTINUED | OUTPATIENT
Start: 2020-12-22 | End: 2020-12-22

## 2020-12-22 RX ORDER — SODIUM CHLORIDE, SODIUM LACTATE, POTASSIUM CHLORIDE, CALCIUM CHLORIDE 600; 310; 30; 20 MG/100ML; MG/100ML; MG/100ML; MG/100ML
INJECTION, SOLUTION INTRAVENOUS CONTINUOUS
Status: DISCONTINUED | OUTPATIENT
Start: 2020-12-22 | End: 2020-12-22 | Stop reason: HOSPADM

## 2020-12-22 RX ORDER — MUPIROCIN 20 MG/G
OINTMENT TOPICAL
Status: COMPLETED | OUTPATIENT
Start: 2020-12-22 | End: 2020-12-22

## 2020-12-22 RX ORDER — LIDOCAINE HYDROCHLORIDE 10 MG/ML
0.5 INJECTION, SOLUTION EPIDURAL; INFILTRATION; INTRACAUDAL; PERINEURAL
Status: DISCONTINUED | OUTPATIENT
Start: 2020-12-22 | End: 2020-12-22 | Stop reason: HOSPADM

## 2020-12-22 RX ORDER — SCOLOPAMINE TRANSDERMAL SYSTEM 1 MG/1
1 PATCH, EXTENDED RELEASE TRANSDERMAL
Status: DISCONTINUED | OUTPATIENT
Start: 2020-12-22 | End: 2020-12-22 | Stop reason: HOSPADM

## 2020-12-22 RX ORDER — OXYCODONE AND ACETAMINOPHEN 5; 325 MG/1; MG/1
1 TABLET ORAL EVERY 4 HOURS PRN
Qty: 20 TABLET | Refills: 0 | Status: SHIPPED | OUTPATIENT
Start: 2020-12-22 | End: 2020-12-26

## 2020-12-22 RX ORDER — ACETAMINOPHEN 325 MG/1
650 TABLET ORAL EVERY 4 HOURS PRN
Status: DISCONTINUED | OUTPATIENT
Start: 2020-12-22 | End: 2020-12-22 | Stop reason: HOSPADM

## 2020-12-22 RX ORDER — ONDANSETRON 8 MG/1
8 TABLET, ORALLY DISINTEGRATING ORAL EVERY 8 HOURS PRN
Status: DISCONTINUED | OUTPATIENT
Start: 2020-12-22 | End: 2020-12-22 | Stop reason: HOSPADM

## 2020-12-22 RX ORDER — OXYCODONE AND ACETAMINOPHEN 5; 325 MG/1; MG/1
1 TABLET ORAL EVERY 4 HOURS PRN
Status: DISCONTINUED | OUTPATIENT
Start: 2020-12-22 | End: 2020-12-22 | Stop reason: HOSPADM

## 2020-12-22 RX ORDER — FAMOTIDINE 20 MG/1
20 TABLET, FILM COATED ORAL
Status: COMPLETED | OUTPATIENT
Start: 2020-12-22 | End: 2020-12-22

## 2020-12-22 RX ORDER — ROCURONIUM BROMIDE 10 MG/ML
INJECTION, SOLUTION INTRAVENOUS
Status: DISCONTINUED | OUTPATIENT
Start: 2020-12-22 | End: 2020-12-22

## 2020-12-22 RX ORDER — ONDANSETRON HYDROCHLORIDE 2 MG/ML
INJECTION, SOLUTION INTRAMUSCULAR; INTRAVENOUS
Status: DISCONTINUED | OUTPATIENT
Start: 2020-12-22 | End: 2020-12-22

## 2020-12-22 RX ORDER — OXYCODONE AND ACETAMINOPHEN 5; 325 MG/1; MG/1
1 TABLET ORAL
Status: CANCELLED | OUTPATIENT
Start: 2020-12-22

## 2020-12-22 RX ORDER — HYDROMORPHONE HYDROCHLORIDE 2 MG/ML
0.2 INJECTION, SOLUTION INTRAMUSCULAR; INTRAVENOUS; SUBCUTANEOUS EVERY 5 MIN PRN
Status: DISCONTINUED | OUTPATIENT
Start: 2020-12-22 | End: 2020-12-22 | Stop reason: HOSPADM

## 2020-12-22 RX ORDER — PREGABALIN 50 MG/1
50 CAPSULE ORAL
Status: COMPLETED | OUTPATIENT
Start: 2020-12-22 | End: 2020-12-22

## 2020-12-22 RX ORDER — HYDROMORPHONE HYDROCHLORIDE 2 MG/ML
0.2 INJECTION, SOLUTION INTRAMUSCULAR; INTRAVENOUS; SUBCUTANEOUS EVERY 5 MIN PRN
Status: CANCELLED | OUTPATIENT
Start: 2020-12-22

## 2020-12-22 RX ORDER — ONDANSETRON 4 MG/1
4 TABLET, ORALLY DISINTEGRATING ORAL EVERY 8 HOURS PRN
Qty: 20 TABLET | Refills: 0 | Status: SHIPPED | OUTPATIENT
Start: 2020-12-22 | End: 2021-06-29

## 2020-12-22 RX ORDER — BUPIVACAINE HYDROCHLORIDE 2.5 MG/ML
INJECTION, SOLUTION INFILTRATION; PERINEURAL
Status: DISCONTINUED | OUTPATIENT
Start: 2020-12-22 | End: 2020-12-22 | Stop reason: HOSPADM

## 2020-12-22 RX ORDER — DIPHENHYDRAMINE HYDROCHLORIDE 50 MG/ML
25 INJECTION INTRAMUSCULAR; INTRAVENOUS EVERY 4 HOURS PRN
Status: DISCONTINUED | OUTPATIENT
Start: 2020-12-22 | End: 2020-12-22 | Stop reason: HOSPADM

## 2020-12-22 RX ORDER — VASOPRESSIN 20 [USP'U]/ML
INJECTION, SOLUTION INTRAMUSCULAR; SUBCUTANEOUS
Status: DISCONTINUED | OUTPATIENT
Start: 2020-12-22 | End: 2020-12-22 | Stop reason: HOSPADM

## 2020-12-22 RX ORDER — AMOXICILLIN 250 MG
1 CAPSULE ORAL 2 TIMES DAILY
Status: DISCONTINUED | OUTPATIENT
Start: 2020-12-22 | End: 2020-12-22 | Stop reason: HOSPADM

## 2020-12-22 RX ORDER — LIDOCAINE HCL/PF 100 MG/5ML
SYRINGE (ML) INTRAVENOUS
Status: DISCONTINUED | OUTPATIENT
Start: 2020-12-22 | End: 2020-12-22

## 2020-12-22 RX ORDER — DEXAMETHASONE SODIUM PHOSPHATE 4 MG/ML
INJECTION, SOLUTION INTRA-ARTICULAR; INTRALESIONAL; INTRAMUSCULAR; INTRAVENOUS; SOFT TISSUE
Status: DISCONTINUED | OUTPATIENT
Start: 2020-12-22 | End: 2020-12-22

## 2020-12-22 RX ORDER — ONDANSETRON 2 MG/ML
4 INJECTION INTRAMUSCULAR; INTRAVENOUS DAILY PRN
Status: DISCONTINUED | OUTPATIENT
Start: 2020-12-22 | End: 2020-12-22 | Stop reason: HOSPADM

## 2020-12-22 RX ORDER — DIPHENHYDRAMINE HYDROCHLORIDE 50 MG/ML
25 INJECTION INTRAMUSCULAR; INTRAVENOUS EVERY 6 HOURS PRN
Status: CANCELLED | OUTPATIENT
Start: 2020-12-22

## 2020-12-22 RX ORDER — GENTAMICIN SULFATE 100 MG/100ML
INJECTION, SOLUTION INTRAVENOUS
Status: DISCONTINUED | OUTPATIENT
Start: 2020-12-22 | End: 2020-12-22

## 2020-12-22 RX ORDER — OXYCODONE HYDROCHLORIDE 5 MG/1
5 TABLET ORAL
Status: DISCONTINUED | OUTPATIENT
Start: 2020-12-22 | End: 2020-12-22 | Stop reason: HOSPADM

## 2020-12-22 RX ORDER — ACETAMINOPHEN 500 MG
1000 TABLET ORAL
Status: COMPLETED | OUTPATIENT
Start: 2020-12-22 | End: 2020-12-22

## 2020-12-22 RX ORDER — MEPERIDINE HYDROCHLORIDE 50 MG/ML
12.5 INJECTION INTRAMUSCULAR; INTRAVENOUS; SUBCUTANEOUS ONCE AS NEEDED
Status: DISCONTINUED | OUTPATIENT
Start: 2020-12-22 | End: 2020-12-22 | Stop reason: HOSPADM

## 2020-12-22 RX ORDER — PROPOFOL 10 MG/ML
VIAL (ML) INTRAVENOUS
Status: DISCONTINUED | OUTPATIENT
Start: 2020-12-22 | End: 2020-12-22

## 2020-12-22 RX ORDER — CEFAZOLIN SODIUM 2 G/50ML
2 SOLUTION INTRAVENOUS
Status: DISCONTINUED | OUTPATIENT
Start: 2020-12-22 | End: 2020-12-22 | Stop reason: HOSPADM

## 2020-12-22 RX ORDER — OXYCODONE AND ACETAMINOPHEN 10; 325 MG/1; MG/1
1 TABLET ORAL EVERY 4 HOURS PRN
Status: DISCONTINUED | OUTPATIENT
Start: 2020-12-22 | End: 2020-12-22 | Stop reason: HOSPADM

## 2020-12-22 RX ORDER — CLINDAMYCIN PHOSPHATE 900 MG/50ML
INJECTION, SOLUTION INTRAVENOUS
Status: DISCONTINUED | OUTPATIENT
Start: 2020-12-22 | End: 2020-12-22

## 2020-12-22 RX ORDER — FENTANYL CITRATE 50 UG/ML
INJECTION, SOLUTION INTRAMUSCULAR; INTRAVENOUS
Status: DISCONTINUED | OUTPATIENT
Start: 2020-12-22 | End: 2020-12-22

## 2020-12-22 RX ORDER — HYDROMORPHONE HYDROCHLORIDE 2 MG/ML
0.5 INJECTION, SOLUTION INTRAMUSCULAR; INTRAVENOUS; SUBCUTANEOUS EVERY 5 MIN PRN
Status: CANCELLED | OUTPATIENT
Start: 2020-12-22

## 2020-12-22 RX ORDER — HYDROMORPHONE HYDROCHLORIDE 2 MG/ML
1 INJECTION, SOLUTION INTRAMUSCULAR; INTRAVENOUS; SUBCUTANEOUS EVERY 4 HOURS PRN
Status: DISCONTINUED | OUTPATIENT
Start: 2020-12-22 | End: 2020-12-22 | Stop reason: HOSPADM

## 2020-12-22 RX ORDER — POLYETHYLENE GLYCOL 3350 17 G/17G
17 POWDER, FOR SOLUTION ORAL DAILY
Status: DISCONTINUED | OUTPATIENT
Start: 2020-12-22 | End: 2020-12-22 | Stop reason: HOSPADM

## 2020-12-22 RX ORDER — LIDOCAINE HYDROCHLORIDE 10 MG/ML
1 INJECTION, SOLUTION EPIDURAL; INFILTRATION; INTRACAUDAL; PERINEURAL ONCE
Status: DISCONTINUED | OUTPATIENT
Start: 2020-12-22 | End: 2020-12-22 | Stop reason: HOSPADM

## 2020-12-22 RX ORDER — NEOSTIGMINE METHYLSULFATE 1 MG/ML
INJECTION, SOLUTION INTRAVENOUS
Status: DISCONTINUED | OUTPATIENT
Start: 2020-12-22 | End: 2020-12-22

## 2020-12-22 RX ORDER — DIPHENHYDRAMINE HCL 25 MG
25 CAPSULE ORAL EVERY 4 HOURS PRN
Status: DISCONTINUED | OUTPATIENT
Start: 2020-12-22 | End: 2020-12-22 | Stop reason: HOSPADM

## 2020-12-22 RX ADMIN — PROMETHAZINE HYDROCHLORIDE 6.25 MG: 25 INJECTION INTRAMUSCULAR; INTRAVENOUS at 03:12

## 2020-12-22 RX ADMIN — FENTANYL CITRATE 50 MCG: 50 INJECTION, SOLUTION INTRAMUSCULAR; INTRAVENOUS at 01:12

## 2020-12-22 RX ADMIN — MUPIROCIN: 20 OINTMENT TOPICAL at 08:12

## 2020-12-22 RX ADMIN — PROPOFOL 150 MG: 10 INJECTION, EMULSION INTRAVENOUS at 11:12

## 2020-12-22 RX ADMIN — HYDROMORPHONE HYDROCHLORIDE 0.2 MG: 2 INJECTION, SOLUTION INTRAMUSCULAR; INTRAVENOUS; SUBCUTANEOUS at 02:12

## 2020-12-22 RX ADMIN — SODIUM CHLORIDE, SODIUM LACTATE, POTASSIUM CHLORIDE, AND CALCIUM CHLORIDE: .6; .31; .03; .02 INJECTION, SOLUTION INTRAVENOUS at 08:12

## 2020-12-22 RX ADMIN — SCOPALAMINE 1 PATCH: 1 PATCH, EXTENDED RELEASE TRANSDERMAL at 08:12

## 2020-12-22 RX ADMIN — ONDANSETRON 8 MG: 2 INJECTION, SOLUTION INTRAMUSCULAR; INTRAVENOUS at 01:12

## 2020-12-22 RX ADMIN — ONDANSETRON 8 MG: 8 TABLET, ORALLY DISINTEGRATING ORAL at 05:12

## 2020-12-22 RX ADMIN — GLYCOPYRROLATE 0.8 MG: 0.2 INJECTION, SOLUTION INTRAMUSCULAR; INTRAVITREAL at 01:12

## 2020-12-22 RX ADMIN — DEXAMETHASONE SODIUM PHOSPHATE 8 MG: 4 INJECTION, SOLUTION INTRA-ARTICULAR; INTRALESIONAL; INTRAMUSCULAR; INTRAVENOUS; SOFT TISSUE at 11:12

## 2020-12-22 RX ADMIN — SODIUM CHLORIDE, SODIUM LACTATE, POTASSIUM CHLORIDE, AND CALCIUM CHLORIDE: .6; .31; .03; .02 INJECTION, SOLUTION INTRAVENOUS at 11:12

## 2020-12-22 RX ADMIN — PREGABALIN 50 MG: 50 CAPSULE ORAL at 08:12

## 2020-12-22 RX ADMIN — ACETAMINOPHEN 1000 MG: 500 TABLET ORAL at 08:12

## 2020-12-22 RX ADMIN — CLINDAMYCIN IN 5 PERCENT DEXTROSE 900 MG: 18 INJECTION, SOLUTION INTRAVENOUS at 11:12

## 2020-12-22 RX ADMIN — MIDAZOLAM HYDROCHLORIDE 2 MG: 1 INJECTION, SOLUTION INTRAMUSCULAR; INTRAVENOUS at 11:12

## 2020-12-22 RX ADMIN — FENTANYL CITRATE 100 MCG: 50 INJECTION, SOLUTION INTRAMUSCULAR; INTRAVENOUS at 11:12

## 2020-12-22 RX ADMIN — OXYCODONE HYDROCHLORIDE AND ACETAMINOPHEN 1 TABLET: 10; 325 TABLET ORAL at 01:12

## 2020-12-22 RX ADMIN — HYDROMORPHONE HYDROCHLORIDE 0.2 MG: 2 INJECTION, SOLUTION INTRAMUSCULAR; INTRAVENOUS; SUBCUTANEOUS at 01:12

## 2020-12-22 RX ADMIN — ROCURONIUM BROMIDE 50 MG: 10 INJECTION, SOLUTION INTRAVENOUS at 11:12

## 2020-12-22 RX ADMIN — LIDOCAINE HYDROCHLORIDE 100 MG: 20 INJECTION, SOLUTION INTRAVENOUS at 11:12

## 2020-12-22 RX ADMIN — GENTAMICIN SULFATE 100 MG: 100 INJECTION, SOLUTION INTRAVENOUS at 11:12

## 2020-12-22 RX ADMIN — EPHEDRINE SULFATE 10 MG: 50 INJECTION, SOLUTION INTRAMUSCULAR; INTRAVENOUS; SUBCUTANEOUS at 12:12

## 2020-12-22 RX ADMIN — FAMOTIDINE 20 MG: 20 TABLET, FILM COATED ORAL at 08:12

## 2020-12-22 RX ADMIN — EPHEDRINE SULFATE 5 MG: 50 INJECTION, SOLUTION INTRAMUSCULAR; INTRAVENOUS; SUBCUTANEOUS at 12:12

## 2020-12-22 RX ADMIN — FENTANYL CITRATE 100 MCG: 50 INJECTION, SOLUTION INTRAMUSCULAR; INTRAVENOUS at 12:12

## 2020-12-22 RX ADMIN — NEOSTIGMINE METHYLSULFATE 5 MG: 1 INJECTION INTRAVENOUS at 01:12

## 2020-12-22 NOTE — OP NOTE
Surgery Date: 12/22/2020     SURGEON: Sylvie Zavaleta    ASSISTANT: Cheryl Edouard    PREOP DIAGNOSIS:   1. Fibroid  2. Menorrhagia    POSTOP DIAGNOSIS:    1. Fibroids  2. Menorrhagia    PROCEDURES: Robotic assisted laparoscopic myomectomy (1 fibroid)    ANESTHESIA: general    FINDINGS/KEY COMPONENTS: Uterus sounded to 8 cm. Fundal fibroid approximately 3 cm pressing on endometrial cavity. Normal tubes and ovaries bilaterally. Patient is NOT a candidate for a vaginal delivery.     ESTIMATED BLOOD LOSS: 10 cc    COMPLICATIONS: None    IV FLUIDS: 1000 cc    URINE OUTPUT: 300 cc    PROCEDURE: Patient was taking to the operating room where general anesthesia was administered and found to be adequate.  She was prepped and draped in the dorsal lithotomy position.  A weighted sterile speculum was placed in the vagina.  The anterior lip of the cervix was grasped with a single tooth tenaculum.  The uterus was sounded to approximately 8 cm.  A ADAL manipulator was placed inside the uterine cavity.  A bar catheter was inserted into the bladder.    Gloves were changed.  A Veress needle was inserted into the umbilicus under tenting of the anterior abdominal wall.  Placement into the peritoneal cavity was confirmed via saline drop test.  The abdomen was insufflated to 15mm Hg using Carbon dioxide.  A 8 mm supraumbilical skin incision was made with the scalpel.  A 8  mm trocar was advanced through this incision.  The camera was placed through the trocar.  Excellent hemostasis was noted.  The patient was placed in deep Trendelenburg.  An 8 mm left lateral skin incision was made with a scalpel and an 8 mm trocar was advanced through this incision under visualization of the camera.  A 12 mm left upper quadrant incision was made with the scalpel.  A 12 mm AirSeal trocar was advanced through the incision under visualization of the camera.  An 8 mm right lateral skin incision was made with the scalpel.  An 8 mm trocar was  advanced through this incision under visualization of the camera.  Attention was turned to the right upper quadrant.  An 8 mm skin incision was made with the scalpel and an 8 mm trocar was advanced through this incision under visualization of the camera.  Excellent hemostasis was noted.  The robot was docked into place.  Instruments were placed.  The surgeon moved to the console for the procedure.      The above findings were noted. There was a small adhesion of omentutm to the anterior abdominal wall. This was freed and found to be hemostatic. All fibroids were removed in the following fashion: 20 Units of Pitressin diluted in 100cc of Normal saline was injected into the serosa overlying the fibroid.  An incision was made with the monopolar scissors over the serosa of the fibroid.  This incision was carried down to the fibroid with the scissor.  The fibroid was grasped with a laparoscopic single tooth tenaculum.  Using the bipolar [place], the fibroid was enucleated from the underlying myometrium.  Hemostasis was maintained utilizing the bipolar cautery.  Once the fibroid was completely enucleated, it was placed in the anterior cul-de-sac for removal later.  The deepest layer of myometrium was reapproximated with 180 V-Lock suture in a running fashion.  The serosa was reapproximated with 180 V-Lock in a baseball stitch fashion.  Excellent hemostasis was noted.      The uterus was copiously irrigated.  Excellent hemostasis was noted.  The  8 mm umbilical trocar was removed and the incision was extended to 12 mm. A morcellator was advanced through this incision under visualization of the camera.  An endocatch bag was placed through the lateral port. The fibroid was placed in the bag. Using morcellation contained in the bag, the fibroid was removed from the abdominal cavity.  The abdomen was inspected and found to be free of injury. Excellent hemostasis was noted. Hemoblast was placed over the uterus. The fascia of  the 12 mm incisions was re-approximated with 0-Vicryl in a Trav-Joyce fashion. The abdomen was deflated and all trocars were removed.  The skin was closed with 4-0 Monocryl in a subcuticular fashion.  The incisions were injected with .25% Marcaine. The ADAL and bar were removed.  Sponge, lap, and needle counts were correct x 2.  The patient was taken to the recovery room in stable condition.

## 2020-12-22 NOTE — DISCHARGE SUMMARY
[facility]  Discharge Note  Short Stay    Procedure(s) (LRB):  ROBOTIC MYOMECTOMY, UTERUS (N/A)    OUTCOME: Patient tolerated treatment/procedure well without complication and is now ready for discharge.    DISPOSITION: Home or Self Care    FINAL DIAGNOSIS:  <principal problem not specified>    FOLLOWUP: In clinic    DISCHARGE INSTRUCTIONS:    Discharge Procedure Orders   Diet Adult Regular     Notify your health care provider if you experience any of the following:  temperature >100.4     Notify your health care provider if you experience any of the following:  persistent nausea and vomiting or diarrhea     Notify your health care provider if you experience any of the following:  severe uncontrolled pain     Notify your health care provider if you experience any of the following:  redness, tenderness, or signs of infection (pain, swelling, redness, odor or green/yellow discharge around incision site)     Notify your health care provider if you experience any of the following:  difficulty breathing or increased cough     Notify your health care provider if you experience any of the following:  severe persistent headache     Notify your health care provider if you experience any of the following:  worsening rash     Notify your health care provider if you experience any of the following:  persistent dizziness, light-headedness, or visual disturbances     Notify your health care provider if you experience any of the following:  increased confusion or weakness     Notify your health care provider if you experience any of the following:

## 2020-12-22 NOTE — BRIEF OP NOTE
BRIEF OPERATIVE NOTE       SUMMARY      Surgery Date: 12/22/2020     SURGEON: Sylvie Zavaleta    ASSISTANT: Cheryl Edouard    PREOP DIAGNOSIS:   1. Fibroid  2. Menorrhagia    POSTOP DIAGNOSIS:    1. Fibroids  2. Menorrhagia    PROCEDURES: Robotic assisted laparoscopic myomectomy (1 fibroid)    ANESTHESIA: general    FINDINGS/KEY COMPONENTS: Uterus sounded to 8 cm. Fundal fibroid approximately 3 cm pressing on endometrial cavity. Normal tubes and ovaries bilaterally. Patient is NOT a candidate for a vaginal delivery.     ESTIMATED BLOOD LOSS: 10 cc    COMPLICATIONS: None    PATHOLOGY:   Specimens (From admission, onward)    None

## 2020-12-22 NOTE — TRANSFER OF CARE
"Anesthesia Transfer of Care Note    Patient: Armida Medina    Procedure(s) Performed: Procedure(s) (LRB):  ROBOTIC MYOMECTOMY, UTERUS (N/A)    Patient location: PACU    Anesthesia Type: general    Transport from OR: Transported from OR on 2-3 L/min O2 by NC with adequate spontaneous ventilation    Post pain: adequate analgesia    Post assessment: no apparent anesthetic complications and tolerated procedure well    Post vital signs: stable    Level of consciousness: lethargic    Nausea/Vomiting: no nausea/vomiting    Complications: none    Transfer of care protocol was followed      Last vitals:   Visit Vitals  /78 (Patient Position: Lying)   Pulse 69   Temp 37.4 °C (99.3 °F) (Skin)   Resp 18   Ht 5' 2" (1.575 m)   Wt 92.1 kg (203 lb)   LMP 12/12/2020   SpO2 98%   Breastfeeding No   BMI 37.13 kg/m²     "

## 2020-12-23 ENCOUNTER — TELEPHONE (OUTPATIENT)
Dept: OBSTETRICS AND GYNECOLOGY | Facility: CLINIC | Age: 30
End: 2020-12-23

## 2020-12-23 NOTE — TELEPHONE ENCOUNTER
I called patient to see how she is doing postop. She is ambulating, voiding, tolerating a regular diet and pain is well controlled.     Precautions given.    RTC as scheduled.

## 2020-12-23 NOTE — PLAN OF CARE
Discharge criteria met,voicing desire to go home. Discharge instructions given to patient & mother; verbalized understanding. Discharge home via wheelchair in care of patient's mother.

## 2020-12-23 NOTE — DISCHARGE INSTRUCTIONS
Return to [facility] if unable to urinate in 6 hours.          Having Robotic-Assisted Myomectomy  Robotic-assisted myomectomy is a type of surgery. Its done to remove growths in a womens womb (uterus) called fibroid tumors. The tumors are not cancer. The surgery is done with special tools and a robotic control  After your surgery  After the surgery, a healthcare provider will watch your vital signs. He or she will also check your incisions. You may be able to go home the same day. Or you may need to stay overnight.  Recovering at home  Make sure you move around as much as possible after your surgery. This helps to prevent problems like blood clots. You may have some pain after the surgery. This includes pain in your shoulder from the gas used during surgery. Your healthcare provider will tell you what to take for pain.  Follow-up care  Make sure you follow all of your healthcare providers instructions. Dont miss any of your follow-up appointments. Your fibroid symptoms may go away after surgery. Fibroids can grow back or new ones can form. Talk with your healthcare provider if your symptoms return. Tell your provider if you get pregnant after having this surgery.  When to call your healthcare provider  Call your healthcare provider right away if you have any of these:  · Fever of 100.4°F (38.0°C) or higher  · Pain that is getting worse  · Redness or warmth near the incisions  · Vaginal bleeding  · Lots of fluid leaking from your incisions     ANESTHESIA  -For the first 24 hours after surgery:  Do not drive, use heavy equipment, make important decisions, or drink alcohol  -It is normal to feel sleepy for several hours.  Rest until you are more awake.  -Have someone stay with you, if needed.  They can watch for problems and help keep you safe.  -Some possible post anesthesia side effects include: nausea and vomiting, sore throat and hoarseness, sleepiness, and dizziness.    PAIN  -If you have pain after  surgery, pain medicine will help you feel better.  Take it as directed, before pain becomes severe.  Most pain relievers taken by mouth need at least 20-30 minutes to start working.  -Do not drive or drink alcohol while taking pain medicine.  -Pain medication can upset your stomach.  Taking them with a little food may help.  -Other ways to help control pain: elevation, ice, and relaxation  -Call your surgeon if still having unmanageable pain an hour after taking pain medicine.  -Pain medicine can cause constipation.  Taking an over-the counter stool softener while on prescription pain medicine and drinking plenty of fluids can prevent this side effect.  -Call your surgeon if you have severe side effects like: breathing problems, trouble waking up, dizziness, confusion, or severe constipation.    NAUSEA  -Some people have nausea after surgery.  This is often because of anesthesia, pain, pain medicine, or the stress of surgery.  -Do not push yourself to eat.  Start off with clear liquids and soup.  Slowly move to solid foods.  Don't eat fatty, rich, spicy foods at first.  Eat smaller amounts.  -If you develop persistent nausea and vomiting please notify your surgeon immediately.    BLEEDING  -Different types of surgery require different types of care and dressing changes.  It is important to follow all instructions and advice from your surgeon.  Change dressing as directed.  Call your surgeon for any concerns regarding postop bleeding.    SIGNS OF INFECTION  -Signs of infection include: fever, swelling, drainage, and redness  -Notify your surgeon if you have a fever of 100.4 F (38.0 C) or higher.  -Notify your surgeon if you notice redness, swelling, increased pain, pus, or a foul smell at the incision site.      Oxycodone tablets or capsules  What is this medicine?  OXYCODONE (ox i KOE done) is a pain reliever. It is used to treat moderate to severe pain.  How should I use this medicine?  Take this medicine by mouth  with a glass of water. Follow the directions on the prescription label. You can take it with or without food. If it upsets your stomach, take it with food. Take your medicine at regular intervals. Do not take it more often than directed. Do not stop taking except on your doctor's advice.  Some brands of this medicine, like Oxecta, have special instructions. Ask your doctor or pharmacist if these directions are for you: Do not cut, crush or chew this medicine. Swallow only one tablet at a time. Do not wet, soak, or lick the tablet before you take it.  A special MedGuide will be given to you by the pharmacist with each prescription and refill. Be sure to read this information carefully each time.  Talk to your pediatrician regarding the use of this medicine in children. Special care may be needed.  What side effects may I notice from receiving this medicine?  Side effects that you should report to your doctor or health care professional as soon as possible:  · allergic reactions like skin rash, itching or hives, swelling of the face, lips, or tongue  · breathing problems  · confusion  · signs and symptoms of low blood pressure like dizziness; feeling faint or lightheaded, falls; unusually weak or tired  · trouble passing urine or change in the amount of urine  · trouble swallowing  Side effects that usually do not require medical attention (report to your doctor or health care professional if they continue or are bothersome):  · constipation  · dry mouth  · nausea, vomiting  · tiredness  What may interact with this medicine?  This medicine may interact with the following medications:  · alcohol  · antihistamines for allergy, cough and cold  · antiviral medicines for HIV or AIDS  · atropine  · certain antibiotics like clarithromycin, erythromycin, linezolid, rifampin  · certain medicines for anxiety or sleep  · certain medicines for bladder problems like oxybutynin, tolterodine  · certain medicines for depression like  amitriptyline, fluoxetine, sertraline  · certain medicines for fungal infections like ketoconazole, itraconazole, voriconazole  · certain medicines for migraine headache like almotriptan, eletriptan, frovatriptan, naratriptan, rizatriptan, sumatriptan, zolmitriptan  · certain medicines for nausea or vomiting like dolasetron, ondansetron, palonosetron  · certain medicines for Parkinson's disease like benztropine, trihexyphenidyl  · certain medicines for seizures like phenobarbital, phenytoin, primidone  · certain medicines for stomach problems like dicyclomine, hyoscyamine  · certain medicines for travel sickness like scopolamine  · diuretics  · general anesthetics like halothane, isoflurane, methoxyflurane, propofol  · ipratropium  · local anesthetics like lidocaine, pramoxine, tetracaine  · MAOIs like Carbex, Eldepryl, Marplan, Nardil, and Parnate  · medicines that relax muscles for surgery  · methylene blue  · nilotinib  · other narcotic medicines for pain or cough  · phenothiazines like chlorpromazine, mesoridazine, prochlorperazine, thioridazine  What if I miss a dose?  If you miss a dose, take it as soon as you can. If it is almost time for your next dose, take only that dose. Do not take double or extra doses.  Where should I keep my medicine?  Keep out of the reach of children. This medicine can be abused. Keep your medicine in a safe place to protect it from theft. Do not share this medicine with anyone. Selling or giving away this medicine is dangerous and against the law.  Store at room temperature between 15 and 30 degrees C (59 and 86 degrees F). Protect from light. Keep container tightly closed.  This medicine may cause accidental overdose and death if it is taken by other adults, children, or pets. Flush any unused medicine down the toilet to reduce the chance of harm. Do not use the medicine after the expiration date.  What should I tell my health care provider before I take this medicine?  They need  to know if you have any of these conditions:  · Union's disease  · brain tumor  · head injury  · heart disease  · history of drug or alcohol abuse problem  · if you often drink alcohol  · kidney disease  · liver disease  · lung or breathing disease, like asthma  · mental illness  · pancreatic disease  · seizures  · thyroid disease  · an unusual or allergic reaction to oxycodone, codeine, hydrocodone, morphine, other medicines, foods, dyes, or preservatives  · pregnant or trying to get pregnant  · breast-feeding  What should I watch for while using this medicine?  Tell your doctor or health care professional if your pain does not go away, if it gets worse, or if you have new or a different type of pain. You may develop tolerance to the medicine. Tolerance means that you will need a higher dose of the medicine for pain relief. Tolerance is normal and is expected if you take this medicine for a long time.  Do not suddenly stop taking your medicine because you may develop a severe reaction. Your body becomes used to the medicine. This does NOT mean you are addicted. Addiction is a behavior related to getting and using a drug for a non-medical reason. If you have pain, you have a medical reason to take pain medicine. Your doctor will tell you how much medicine to take. If your doctor wants you to stop the medicine, the dose will be slowly lowered over time to avoid any side effects.  There are different types of narcotic medicines (opiates). If you take more than one type at the same time or if you are taking another medicine that also causes drowsiness, you may have more side effects. Give your health care provider a list of all medicines you use. Your doctor will tell you how much medicine to take. Do not take more medicine than directed. Call emergency for help if you have problems breathing or unusual sleepiness.  You may get drowsy or dizzy. Do not drive, use machinery, or do anything that needs mental alertness  until you know how the medicine affects you. Do not stand or sit up quickly, especially if you are an older patient. This reduces the risk of dizzy or fainting spells. Alcohol may interfere with the effect of this medicine. Avoid alcoholic drinks.  This medicine will cause constipation. Try to have a bowel movement at least every 2 to 3 days. If you do not have a bowel movement for 3 days, call your doctor or health care professional.  Your mouth may get dry. Chewing sugarless gum or sucking hard candy, and drinking plenty of water may help. Contact your doctor if the problem does not go away or is severe.  NOTE:This sheet is a summary. It may not cover all possible information. If you have questions about this medicine, talk to your doctor, pharmacist, or health care provider. Copyright© 2017 [employer]

## 2020-12-23 NOTE — PLAN OF CARE
Bladder scanned showing greater then 515 ml; Dr Brewer notified & orders obtained to do in/out catheter and discharge home and to instruct patient to return to nearest emergency room if unable to void within 6 hours from now; patient & mother verbalized understanding. zofran Rx will be sent to [address] per Dr Brewer.

## 2020-12-23 NOTE — PROGRESS NOTES
Contacted by periop nurse. Patient is ambulating. Had motion sickness but nausea/vomiting has resolved. Desires to be discharged home. Requests Rx for Zofran in case nausea returns. Has been unable to urinate. Bladder scan about 450cc. Patient desires an in/out cath and d/c home. Instructed to return to the ED if unable to urinate in 6 hrs.

## 2020-12-23 NOTE — ANESTHESIA POSTPROCEDURE EVALUATION
Anesthesia Post Evaluation    Patient: Armida Medina    Procedure(s) Performed: Procedure(s) (LRB):  ROBOTIC MYOMECTOMY, UTERUS (N/A)    Final Anesthesia Type: general      Patient location during evaluation: PACU  Patient participation: Yes- Able to Participate  Level of consciousness: awake and alert  Post-procedure vital signs: reviewed and stable  Pain management: adequate  Airway patency: patent    PONV status at discharge: No PONV  Anesthetic complications: no      Cardiovascular status: blood pressure returned to baseline  Respiratory status: unassisted  Hydration status: euvolemic  Follow-up not needed.          Vitals Value Taken Time   /70 12/22/20 1930   Temp 36.8 °C (98.2 °F) 12/22/20 1930   Pulse 75 12/22/20 1930   Resp 20 12/22/20 1930   SpO2 98 % 12/22/20 1930         Event Time   Out of Recovery 14:50:00         Pain/David Score: Pain Rating Prior to Med Admin: 7 (12/22/2020  2:04 PM)  Pain Rating Post Med Admin: 6 (12/22/2020  2:50 PM)  David Score: 10 (12/22/2020  7:40 PM)

## 2020-12-23 NOTE — PLAN OF CARE
Attempted to urinate for the 3rd time,but able yet. Bladder scanned showing 444 ml; Dr Brewer notified. No new orders at present.

## 2020-12-31 LAB
FINAL PATHOLOGIC DIAGNOSIS: NORMAL
GROSS: NORMAL
Lab: NORMAL

## 2021-01-07 ENCOUNTER — OFFICE VISIT (OUTPATIENT)
Dept: OBSTETRICS AND GYNECOLOGY | Facility: CLINIC | Age: 31
End: 2021-01-07
Payer: COMMERCIAL

## 2021-01-07 VITALS
SYSTOLIC BLOOD PRESSURE: 108 MMHG | DIASTOLIC BLOOD PRESSURE: 82 MMHG | BODY MASS INDEX: 35.93 KG/M2 | WEIGHT: 196.44 LBS

## 2021-01-07 DIAGNOSIS — Z48.89 ENCOUNTER FOR POSTOPERATIVE WOUND CHECK: Primary | ICD-10-CM

## 2021-01-07 PROCEDURE — 3008F PR BODY MASS INDEX (BMI) DOCUMENTED: ICD-10-PCS | Mod: CPTII,S$GLB,, | Performed by: NURSE PRACTITIONER

## 2021-01-07 PROCEDURE — 99024 POSTOP FOLLOW-UP VISIT: CPT | Mod: S$GLB,,, | Performed by: NURSE PRACTITIONER

## 2021-01-07 PROCEDURE — 99024 PR POST-OP FOLLOW-UP VISIT: ICD-10-PCS | Mod: S$GLB,,, | Performed by: NURSE PRACTITIONER

## 2021-01-07 PROCEDURE — 99999 PR PBB SHADOW E&M-EST. PATIENT-LVL III: ICD-10-PCS | Mod: PBBFAC,,, | Performed by: NURSE PRACTITIONER

## 2021-01-07 PROCEDURE — 99999 PR PBB SHADOW E&M-EST. PATIENT-LVL III: CPT | Mod: PBBFAC,,, | Performed by: NURSE PRACTITIONER

## 2021-01-07 PROCEDURE — 3008F BODY MASS INDEX DOCD: CPT | Mod: CPTII,S$GLB,, | Performed by: NURSE PRACTITIONER

## 2021-01-07 PROCEDURE — 1126F AMNT PAIN NOTED NONE PRSNT: CPT | Mod: S$GLB,,, | Performed by: NURSE PRACTITIONER

## 2021-01-07 PROCEDURE — 1126F PR PAIN SEVERITY QUANTIFIED, NO PAIN PRESENT: ICD-10-PCS | Mod: S$GLB,,, | Performed by: NURSE PRACTITIONER

## 2021-01-21 ENCOUNTER — OFFICE VISIT (OUTPATIENT)
Dept: OBSTETRICS AND GYNECOLOGY | Facility: CLINIC | Age: 31
End: 2021-01-21
Payer: COMMERCIAL

## 2021-01-21 VITALS
HEIGHT: 62 IN | DIASTOLIC BLOOD PRESSURE: 80 MMHG | BODY MASS INDEX: 36.55 KG/M2 | WEIGHT: 198.63 LBS | SYSTOLIC BLOOD PRESSURE: 112 MMHG

## 2021-01-21 DIAGNOSIS — Z09 POSTOP CHECK: Primary | ICD-10-CM

## 2021-01-21 PROCEDURE — 1126F PR PAIN SEVERITY QUANTIFIED, NO PAIN PRESENT: ICD-10-PCS | Mod: S$GLB,,, | Performed by: OBSTETRICS & GYNECOLOGY

## 2021-01-21 PROCEDURE — 3008F BODY MASS INDEX DOCD: CPT | Mod: CPTII,S$GLB,, | Performed by: OBSTETRICS & GYNECOLOGY

## 2021-01-21 PROCEDURE — 99024 POSTOP FOLLOW-UP VISIT: CPT | Mod: S$GLB,,, | Performed by: OBSTETRICS & GYNECOLOGY

## 2021-01-21 PROCEDURE — 3008F PR BODY MASS INDEX (BMI) DOCUMENTED: ICD-10-PCS | Mod: CPTII,S$GLB,, | Performed by: OBSTETRICS & GYNECOLOGY

## 2021-01-21 PROCEDURE — 1126F AMNT PAIN NOTED NONE PRSNT: CPT | Mod: S$GLB,,, | Performed by: OBSTETRICS & GYNECOLOGY

## 2021-01-21 PROCEDURE — 99999 PR PBB SHADOW E&M-EST. PATIENT-LVL III: ICD-10-PCS | Mod: PBBFAC,,, | Performed by: OBSTETRICS & GYNECOLOGY

## 2021-01-21 PROCEDURE — 99999 PR PBB SHADOW E&M-EST. PATIENT-LVL III: CPT | Mod: PBBFAC,,, | Performed by: OBSTETRICS & GYNECOLOGY

## 2021-01-21 PROCEDURE — 99024 PR POST-OP FOLLOW-UP VISIT: ICD-10-PCS | Mod: S$GLB,,, | Performed by: OBSTETRICS & GYNECOLOGY

## 2021-03-02 ENCOUNTER — PATIENT MESSAGE (OUTPATIENT)
Dept: INTERNAL MEDICINE | Facility: CLINIC | Age: 31
End: 2021-03-02

## 2021-04-27 ENCOUNTER — PATIENT MESSAGE (OUTPATIENT)
Dept: OBSTETRICS AND GYNECOLOGY | Facility: CLINIC | Age: 31
End: 2021-04-27

## 2021-04-27 ENCOUNTER — TELEPHONE (OUTPATIENT)
Dept: OBSTETRICS AND GYNECOLOGY | Facility: CLINIC | Age: 31
End: 2021-04-27

## 2021-04-27 DIAGNOSIS — N92.0 MENORRHAGIA WITH REGULAR CYCLE: ICD-10-CM

## 2021-04-27 DIAGNOSIS — N92.6 IRREGULAR MENSES: Primary | ICD-10-CM

## 2021-04-28 ENCOUNTER — TELEPHONE (OUTPATIENT)
Dept: OBSTETRICS AND GYNECOLOGY | Facility: CLINIC | Age: 31
End: 2021-04-28

## 2021-04-28 DIAGNOSIS — N92.0 MENORRHAGIA WITH REGULAR CYCLE: ICD-10-CM

## 2021-04-28 DIAGNOSIS — N92.6 IRREGULAR MENSES: Primary | ICD-10-CM

## 2021-04-30 ENCOUNTER — TELEPHONE (OUTPATIENT)
Dept: OBSTETRICS AND GYNECOLOGY | Facility: CLINIC | Age: 31
End: 2021-04-30

## 2021-06-02 ENCOUNTER — PATIENT MESSAGE (OUTPATIENT)
Dept: OBSTETRICS AND GYNECOLOGY | Facility: CLINIC | Age: 31
End: 2021-06-02

## 2021-06-02 DIAGNOSIS — N91.2 AMENORRHEA: Primary | ICD-10-CM

## 2021-06-04 ENCOUNTER — TELEPHONE (OUTPATIENT)
Dept: OBSTETRICS AND GYNECOLOGY | Facility: CLINIC | Age: 31
End: 2021-06-04

## 2021-06-24 ENCOUNTER — PATIENT MESSAGE (OUTPATIENT)
Dept: OBSTETRICS AND GYNECOLOGY | Facility: CLINIC | Age: 31
End: 2021-06-24

## 2021-06-28 ENCOUNTER — PATIENT OUTREACH (OUTPATIENT)
Dept: ADMINISTRATIVE | Facility: OTHER | Age: 31
End: 2021-06-28

## 2021-06-29 ENCOUNTER — OFFICE VISIT (OUTPATIENT)
Dept: OBSTETRICS AND GYNECOLOGY | Facility: CLINIC | Age: 31
End: 2021-06-29
Payer: COMMERCIAL

## 2021-06-29 ENCOUNTER — PROCEDURE VISIT (OUTPATIENT)
Dept: OBSTETRICS AND GYNECOLOGY | Facility: CLINIC | Age: 31
End: 2021-06-29
Attending: OBSTETRICS & GYNECOLOGY
Payer: COMMERCIAL

## 2021-06-29 ENCOUNTER — TELEPHONE (OUTPATIENT)
Dept: OBSTETRICS AND GYNECOLOGY | Facility: CLINIC | Age: 31
End: 2021-06-29

## 2021-06-29 VITALS
DIASTOLIC BLOOD PRESSURE: 79 MMHG | HEIGHT: 62 IN | SYSTOLIC BLOOD PRESSURE: 113 MMHG | BODY MASS INDEX: 38.62 KG/M2 | WEIGHT: 209.88 LBS

## 2021-06-29 DIAGNOSIS — O09.299 HISTORY OF PRE-ECLAMPSIA IN PRIOR PREGNANCY, CURRENTLY PREGNANT: ICD-10-CM

## 2021-06-29 DIAGNOSIS — N91.2 AMENORRHEA: ICD-10-CM

## 2021-06-29 DIAGNOSIS — Z32.01 POSITIVE PREGNANCY TEST: ICD-10-CM

## 2021-06-29 DIAGNOSIS — N91.4 SECONDARY OLIGOMENORRHEA: Primary | ICD-10-CM

## 2021-06-29 DIAGNOSIS — O36.80X0 PREGNANCY WITH UNCERTAIN FETAL VIABILITY, SINGLE OR UNSPECIFIED FETUS: Primary | ICD-10-CM

## 2021-06-29 DIAGNOSIS — O36.80X0 PREGNANCY WITH UNCERTAIN FETAL VIABILITY, SINGLE OR UNSPECIFIED FETUS: ICD-10-CM

## 2021-06-29 PROBLEM — O34.219 PREVIOUS CESAREAN DELIVERY AFFECTING PREGNANCY: Status: ACTIVE | Noted: 2021-06-29

## 2021-06-29 PROBLEM — N92.0 MENORRHAGIA WITH REGULAR CYCLE: Status: RESOLVED | Noted: 2020-12-22 | Resolved: 2021-06-29

## 2021-06-29 LAB
B-HCG UR QL: POSITIVE
CREAT UR-MCNC: 190 MG/DL (ref 15–325)
CTP QC/QA: YES
PROT UR-MCNC: 8 MG/DL (ref 0–15)
PROT/CREAT UR: 0.04 MG/G{CREAT} (ref 0–0.2)

## 2021-06-29 PROCEDURE — 76801 OB US < 14 WKS SINGLE FETUS: CPT | Mod: S$GLB,,, | Performed by: OBSTETRICS & GYNECOLOGY

## 2021-06-29 PROCEDURE — 99213 OFFICE O/P EST LOW 20 MIN: CPT | Mod: S$GLB,,, | Performed by: NURSE PRACTITIONER

## 2021-06-29 PROCEDURE — 87491 CHLMYD TRACH DNA AMP PROBE: CPT | Performed by: NURSE PRACTITIONER

## 2021-06-29 PROCEDURE — 1126F AMNT PAIN NOTED NONE PRSNT: CPT | Mod: S$GLB,,, | Performed by: NURSE PRACTITIONER

## 2021-06-29 PROCEDURE — 81025 URINE PREGNANCY TEST: CPT | Mod: S$GLB,,, | Performed by: NURSE PRACTITIONER

## 2021-06-29 PROCEDURE — 87591 N.GONORRHOEAE DNA AMP PROB: CPT | Performed by: NURSE PRACTITIONER

## 2021-06-29 PROCEDURE — 82570 ASSAY OF URINE CREATININE: CPT | Performed by: NURSE PRACTITIONER

## 2021-06-29 PROCEDURE — 99999 PR PBB SHADOW E&M-EST. PATIENT-LVL III: ICD-10-PCS | Mod: PBBFAC,,, | Performed by: NURSE PRACTITIONER

## 2021-06-29 PROCEDURE — 76801 PR US, OB <14WKS, TRANSABD, SINGLE GESTATION: ICD-10-PCS | Mod: S$GLB,,, | Performed by: OBSTETRICS & GYNECOLOGY

## 2021-06-29 PROCEDURE — 81025 POCT URINE PREGNANCY: ICD-10-PCS | Mod: S$GLB,,, | Performed by: NURSE PRACTITIONER

## 2021-06-29 PROCEDURE — 3008F BODY MASS INDEX DOCD: CPT | Mod: CPTII,S$GLB,, | Performed by: NURSE PRACTITIONER

## 2021-06-29 PROCEDURE — 87086 URINE CULTURE/COLONY COUNT: CPT | Performed by: NURSE PRACTITIONER

## 2021-06-29 PROCEDURE — 99213 PR OFFICE/OUTPT VISIT, EST, LEVL III, 20-29 MIN: ICD-10-PCS | Mod: S$GLB,,, | Performed by: NURSE PRACTITIONER

## 2021-06-29 PROCEDURE — 99999 PR PBB SHADOW E&M-EST. PATIENT-LVL III: CPT | Mod: PBBFAC,,, | Performed by: NURSE PRACTITIONER

## 2021-06-29 PROCEDURE — 1126F PR PAIN SEVERITY QUANTIFIED, NO PAIN PRESENT: ICD-10-PCS | Mod: S$GLB,,, | Performed by: NURSE PRACTITIONER

## 2021-06-29 PROCEDURE — 3008F PR BODY MASS INDEX (BMI) DOCUMENTED: ICD-10-PCS | Mod: CPTII,S$GLB,, | Performed by: NURSE PRACTITIONER

## 2021-06-29 RX ORDER — NAPROXEN SODIUM 220 MG/1
81 TABLET, FILM COATED ORAL DAILY
Status: ON HOLD | COMMUNITY
End: 2021-07-16

## 2021-06-30 ENCOUNTER — PATIENT MESSAGE (OUTPATIENT)
Dept: OBSTETRICS AND GYNECOLOGY | Facility: CLINIC | Age: 31
End: 2021-06-30

## 2021-06-30 ENCOUNTER — TELEPHONE (OUTPATIENT)
Dept: OBSTETRICS AND GYNECOLOGY | Facility: CLINIC | Age: 31
End: 2021-06-30

## 2021-06-30 ENCOUNTER — LAB VISIT (OUTPATIENT)
Dept: LAB | Facility: OTHER | Age: 31
End: 2021-06-30
Attending: NURSE PRACTITIONER
Payer: COMMERCIAL

## 2021-06-30 DIAGNOSIS — O36.80X0 PREGNANCY WITH UNCERTAIN FETAL VIABILITY, SINGLE OR UNSPECIFIED FETUS: ICD-10-CM

## 2021-06-30 DIAGNOSIS — O36.80X0 PREGNANCY WITH UNCERTAIN FETAL VIABILITY, SINGLE OR UNSPECIFIED FETUS: Primary | ICD-10-CM

## 2021-06-30 LAB — HCG INTACT+B SERPL-ACNC: NORMAL MIU/ML

## 2021-06-30 PROCEDURE — 36415 COLL VENOUS BLD VENIPUNCTURE: CPT | Performed by: NURSE PRACTITIONER

## 2021-06-30 PROCEDURE — 84702 CHORIONIC GONADOTROPIN TEST: CPT | Performed by: NURSE PRACTITIONER

## 2021-07-01 ENCOUNTER — TELEPHONE (OUTPATIENT)
Dept: OBSTETRICS AND GYNECOLOGY | Facility: CLINIC | Age: 31
End: 2021-07-01

## 2021-07-01 LAB
BACTERIA UR CULT: NORMAL
C TRACH DNA SPEC QL NAA+PROBE: NOT DETECTED
N GONORRHOEA DNA SPEC QL NAA+PROBE: NOT DETECTED

## 2021-07-02 ENCOUNTER — TELEPHONE (OUTPATIENT)
Dept: OBSTETRICS AND GYNECOLOGY | Facility: CLINIC | Age: 31
End: 2021-07-02

## 2021-07-02 ENCOUNTER — LAB VISIT (OUTPATIENT)
Dept: LAB | Facility: OTHER | Age: 31
End: 2021-07-02
Attending: NURSE PRACTITIONER
Payer: COMMERCIAL

## 2021-07-02 DIAGNOSIS — O36.80X0 PREGNANCY WITH UNCERTAIN FETAL VIABILITY, SINGLE OR UNSPECIFIED FETUS: ICD-10-CM

## 2021-07-02 LAB — HCG INTACT+B SERPL-ACNC: NORMAL MIU/ML

## 2021-07-02 PROCEDURE — 36415 COLL VENOUS BLD VENIPUNCTURE: CPT | Performed by: NURSE PRACTITIONER

## 2021-07-02 PROCEDURE — 84702 CHORIONIC GONADOTROPIN TEST: CPT | Performed by: NURSE PRACTITIONER

## 2021-07-07 ENCOUNTER — LAB VISIT (OUTPATIENT)
Dept: LAB | Facility: OTHER | Age: 31
End: 2021-07-07
Attending: NURSE PRACTITIONER
Payer: COMMERCIAL

## 2021-07-07 DIAGNOSIS — O36.80X0 PREGNANCY WITH UNCERTAIN FETAL VIABILITY, SINGLE OR UNSPECIFIED FETUS: ICD-10-CM

## 2021-07-07 LAB — HCG INTACT+B SERPL-ACNC: NORMAL MIU/ML

## 2021-07-07 PROCEDURE — 84702 CHORIONIC GONADOTROPIN TEST: CPT | Performed by: NURSE PRACTITIONER

## 2021-07-07 PROCEDURE — 36415 COLL VENOUS BLD VENIPUNCTURE: CPT | Performed by: NURSE PRACTITIONER

## 2021-07-12 ENCOUNTER — PATIENT MESSAGE (OUTPATIENT)
Dept: OBSTETRICS AND GYNECOLOGY | Facility: CLINIC | Age: 31
End: 2021-07-12

## 2021-07-13 ENCOUNTER — TELEPHONE (OUTPATIENT)
Dept: OBSTETRICS AND GYNECOLOGY | Facility: CLINIC | Age: 31
End: 2021-07-13

## 2021-07-13 ENCOUNTER — PROCEDURE VISIT (OUTPATIENT)
Dept: OBSTETRICS AND GYNECOLOGY | Facility: CLINIC | Age: 31
End: 2021-07-13
Payer: COMMERCIAL

## 2021-07-13 DIAGNOSIS — O36.80X0 PREGNANCY WITH UNCERTAIN FETAL VIABILITY, SINGLE OR UNSPECIFIED FETUS: ICD-10-CM

## 2021-07-13 DIAGNOSIS — O02.0 ANEMBRYONIC PREGNANCY: ICD-10-CM

## 2021-07-13 PROCEDURE — 76801 PR US, OB <14WKS, TRANSABD, SINGLE GESTATION: ICD-10-PCS | Mod: S$GLB,,, | Performed by: OBSTETRICS & GYNECOLOGY

## 2021-07-13 PROCEDURE — 76801 OB US < 14 WKS SINGLE FETUS: CPT | Mod: S$GLB,,, | Performed by: OBSTETRICS & GYNECOLOGY

## 2021-07-13 RX ORDER — MISOPROSTOL 200 UG/1
800 TABLET ORAL ONCE
Qty: 4 TABLET | Refills: 0 | Status: SHIPPED | OUTPATIENT
Start: 2021-07-13 | End: 2021-11-18

## 2021-07-13 RX ORDER — OXYCODONE AND ACETAMINOPHEN 5; 325 MG/1; MG/1
1 TABLET ORAL EVERY 4 HOURS PRN
Qty: 10 TABLET | Refills: 0 | Status: SHIPPED | OUTPATIENT
Start: 2021-07-13 | End: 2021-11-18

## 2021-07-14 DIAGNOSIS — O02.1 MISSED ABORTION: Primary | ICD-10-CM

## 2021-07-14 RX ORDER — SODIUM CHLORIDE 9 MG/ML
INJECTION, SOLUTION INTRAVENOUS CONTINUOUS
Status: CANCELLED | OUTPATIENT
Start: 2021-07-14

## 2021-07-14 RX ORDER — MUPIROCIN 20 MG/G
OINTMENT TOPICAL
Status: CANCELLED | OUTPATIENT
Start: 2021-07-14

## 2021-07-14 RX ORDER — ACETAMINOPHEN 500 MG
1000 TABLET ORAL
Status: CANCELLED | OUTPATIENT
Start: 2021-07-14

## 2021-07-14 RX ORDER — DOXYCYCLINE HYCLATE 100 MG
200 TABLET ORAL
Status: CANCELLED | OUTPATIENT
Start: 2021-07-14

## 2021-07-15 ENCOUNTER — TELEPHONE (OUTPATIENT)
Dept: OBSTETRICS AND GYNECOLOGY | Facility: CLINIC | Age: 31
End: 2021-07-15

## 2021-07-16 ENCOUNTER — HOSPITAL ENCOUNTER (OUTPATIENT)
Facility: OTHER | Age: 31
Discharge: HOME | End: 2021-07-16
Attending: OBSTETRICS & GYNECOLOGY | Admitting: OBSTETRICS & GYNECOLOGY
Payer: COMMERCIAL

## 2021-07-16 ENCOUNTER — ANESTHESIA EVENT (OUTPATIENT)
Dept: SURGERY | Facility: OTHER | Age: 31
End: 2021-07-16
Payer: COMMERCIAL

## 2021-07-16 ENCOUNTER — ANESTHESIA (OUTPATIENT)
Dept: SURGERY | Facility: OTHER | Age: 31
End: 2021-07-16
Payer: COMMERCIAL

## 2021-07-16 VITALS
HEIGHT: 62 IN | SYSTOLIC BLOOD PRESSURE: 107 MMHG | TEMPERATURE: 98 F | OXYGEN SATURATION: 97 % | BODY MASS INDEX: 38.46 KG/M2 | HEART RATE: 79 BPM | WEIGHT: 209 LBS | RESPIRATION RATE: 18 BRPM | DIASTOLIC BLOOD PRESSURE: 59 MMHG

## 2021-07-16 DIAGNOSIS — O02.1 MISSED ABORTION: Primary | ICD-10-CM

## 2021-07-16 LAB
BASOPHILS # BLD AUTO: 0.05 K/UL (ref 0–0.2)
BASOPHILS NFR BLD: 0.5 % (ref 0–1.9)
DIFFERENTIAL METHOD: ABNORMAL
EOSINOPHIL # BLD AUTO: 0.1 K/UL (ref 0–0.5)
EOSINOPHIL NFR BLD: 0.6 % (ref 0–8)
ERYTHROCYTE [DISTWIDTH] IN BLOOD BY AUTOMATED COUNT: 14.6 % (ref 11.5–14.5)
HCT VFR BLD AUTO: 44.5 % (ref 37–48.5)
HGB BLD-MCNC: 14.5 G/DL (ref 12–16)
IMM GRANULOCYTES # BLD AUTO: 0.03 K/UL (ref 0–0.04)
IMM GRANULOCYTES NFR BLD AUTO: 0.3 % (ref 0–0.5)
LYMPHOCYTES # BLD AUTO: 1.7 K/UL (ref 1–4.8)
LYMPHOCYTES NFR BLD: 16.8 % (ref 18–48)
MCH RBC QN AUTO: 28.5 PG (ref 27–31)
MCHC RBC AUTO-ENTMCNC: 32.6 G/DL (ref 32–36)
MCV RBC AUTO: 87 FL (ref 82–98)
MONOCYTES # BLD AUTO: 0.5 K/UL (ref 0.3–1)
MONOCYTES NFR BLD: 5.3 % (ref 4–15)
NEUTROPHILS # BLD AUTO: 7.7 K/UL (ref 1.8–7.7)
NEUTROPHILS NFR BLD: 76.5 % (ref 38–73)
NRBC BLD-RTO: 0 /100 WBC
PLATELET # BLD AUTO: 234 K/UL (ref 150–450)
PMV BLD AUTO: 11.4 FL (ref 9.2–12.9)
RBC # BLD AUTO: 5.09 M/UL (ref 4–5.4)
WBC # BLD AUTO: 10.12 K/UL (ref 3.9–12.7)

## 2021-07-16 PROCEDURE — 71000016 HC POSTOP RECOV ADDL HR: Performed by: OBSTETRICS & GYNECOLOGY

## 2021-07-16 PROCEDURE — 88305 TISSUE EXAM BY PATHOLOGIST: CPT | Mod: 26,,, | Performed by: PATHOLOGY

## 2021-07-16 PROCEDURE — 25000003 PHARM REV CODE 250: Performed by: ANESTHESIOLOGY

## 2021-07-16 PROCEDURE — 88305 TISSUE EXAM BY PATHOLOGIST: CPT | Performed by: PATHOLOGY

## 2021-07-16 PROCEDURE — 59812 PR SURG RX INCOMPLETE ABORTN: ICD-10-PCS | Mod: ,,, | Performed by: OBSTETRICS & GYNECOLOGY

## 2021-07-16 PROCEDURE — 25000003 PHARM REV CODE 250: Performed by: OBSTETRICS & GYNECOLOGY

## 2021-07-16 PROCEDURE — 59812 TREATMENT OF MISCARRIAGE: CPT | Mod: ,,, | Performed by: OBSTETRICS & GYNECOLOGY

## 2021-07-16 PROCEDURE — 63600175 PHARM REV CODE 636 W HCPCS: Performed by: SPECIALIST

## 2021-07-16 PROCEDURE — 71000015 HC POSTOP RECOV 1ST HR: Performed by: OBSTETRICS & GYNECOLOGY

## 2021-07-16 PROCEDURE — 63600175 PHARM REV CODE 636 W HCPCS: Performed by: NURSE ANESTHETIST, CERTIFIED REGISTERED

## 2021-07-16 PROCEDURE — 71000033 HC RECOVERY, INTIAL HOUR: Performed by: OBSTETRICS & GYNECOLOGY

## 2021-07-16 PROCEDURE — 25000003 PHARM REV CODE 250: Performed by: NURSE ANESTHETIST, CERTIFIED REGISTERED

## 2021-07-16 PROCEDURE — 36415 COLL VENOUS BLD VENIPUNCTURE: CPT | Performed by: OBSTETRICS & GYNECOLOGY

## 2021-07-16 PROCEDURE — 37000009 HC ANESTHESIA EA ADD 15 MINS: Performed by: OBSTETRICS & GYNECOLOGY

## 2021-07-16 PROCEDURE — 36000705 HC OR TIME LEV I EA ADD 15 MIN: Performed by: OBSTETRICS & GYNECOLOGY

## 2021-07-16 PROCEDURE — 85025 COMPLETE CBC W/AUTO DIFF WBC: CPT | Performed by: OBSTETRICS & GYNECOLOGY

## 2021-07-16 PROCEDURE — 36000704 HC OR TIME LEV I 1ST 15 MIN: Performed by: OBSTETRICS & GYNECOLOGY

## 2021-07-16 PROCEDURE — 88305 TISSUE EXAM BY PATHOLOGIST: ICD-10-PCS | Mod: 26,,, | Performed by: PATHOLOGY

## 2021-07-16 PROCEDURE — 37000008 HC ANESTHESIA 1ST 15 MINUTES: Performed by: OBSTETRICS & GYNECOLOGY

## 2021-07-16 PROCEDURE — 25000003 PHARM REV CODE 250: Performed by: SPECIALIST

## 2021-07-16 RX ORDER — HYDROMORPHONE HYDROCHLORIDE 2 MG/ML
0.5 INJECTION, SOLUTION INTRAMUSCULAR; INTRAVENOUS; SUBCUTANEOUS EVERY 4 HOURS PRN
Status: CANCELLED | OUTPATIENT
Start: 2021-07-16

## 2021-07-16 RX ORDER — ONDANSETRON 8 MG/1
8 TABLET, ORALLY DISINTEGRATING ORAL EVERY 8 HOURS PRN
Status: DISCONTINUED | OUTPATIENT
Start: 2021-07-16 | End: 2021-07-16 | Stop reason: HOSPADM

## 2021-07-16 RX ORDER — FENTANYL CITRATE 50 UG/ML
INJECTION, SOLUTION INTRAMUSCULAR; INTRAVENOUS
Status: DISCONTINUED | OUTPATIENT
Start: 2021-07-16 | End: 2021-07-16

## 2021-07-16 RX ORDER — HYDROMORPHONE HYDROCHLORIDE 2 MG/ML
0.4 INJECTION, SOLUTION INTRAMUSCULAR; INTRAVENOUS; SUBCUTANEOUS EVERY 5 MIN PRN
Status: DISCONTINUED | OUTPATIENT
Start: 2021-07-16 | End: 2021-07-16 | Stop reason: HOSPADM

## 2021-07-16 RX ORDER — ACETAMINOPHEN 325 MG/1
650 TABLET ORAL EVERY 4 HOURS PRN
Status: CANCELLED | OUTPATIENT
Start: 2021-07-16

## 2021-07-16 RX ORDER — PHENYLEPHRINE HYDROCHLORIDE 10 MG/ML
INJECTION INTRAVENOUS
Status: DISCONTINUED | OUTPATIENT
Start: 2021-07-16 | End: 2021-07-16

## 2021-07-16 RX ORDER — DOXYCYCLINE HYCLATE 100 MG
200 TABLET ORAL ONCE
Status: CANCELLED | OUTPATIENT
Start: 2021-07-16 | End: 2021-07-16

## 2021-07-16 RX ORDER — PROCHLORPERAZINE EDISYLATE 5 MG/ML
5 INJECTION INTRAMUSCULAR; INTRAVENOUS EVERY 30 MIN PRN
Status: DISCONTINUED | OUTPATIENT
Start: 2021-07-16 | End: 2021-07-16 | Stop reason: HOSPADM

## 2021-07-16 RX ORDER — ACETAMINOPHEN 500 MG
1000 TABLET ORAL
Status: COMPLETED | OUTPATIENT
Start: 2021-07-16 | End: 2021-07-16

## 2021-07-16 RX ORDER — ONDANSETRON 2 MG/ML
INJECTION INTRAMUSCULAR; INTRAVENOUS
Status: DISCONTINUED | OUTPATIENT
Start: 2021-07-16 | End: 2021-07-16

## 2021-07-16 RX ORDER — MIDAZOLAM HYDROCHLORIDE 1 MG/ML
INJECTION INTRAMUSCULAR; INTRAVENOUS
Status: DISCONTINUED | OUTPATIENT
Start: 2021-07-16 | End: 2021-07-16

## 2021-07-16 RX ORDER — PROCHLORPERAZINE EDISYLATE 5 MG/ML
5 INJECTION INTRAMUSCULAR; INTRAVENOUS EVERY 6 HOURS PRN
Status: DISCONTINUED | OUTPATIENT
Start: 2021-07-16 | End: 2021-07-16 | Stop reason: HOSPADM

## 2021-07-16 RX ORDER — DIPHENHYDRAMINE HYDROCHLORIDE 50 MG/ML
25 INJECTION INTRAMUSCULAR; INTRAVENOUS EVERY 6 HOURS PRN
Status: DISCONTINUED | OUTPATIENT
Start: 2021-07-16 | End: 2021-07-16 | Stop reason: HOSPADM

## 2021-07-16 RX ORDER — SODIUM CHLORIDE 9 MG/ML
INJECTION, SOLUTION INTRAVENOUS CONTINUOUS
Status: DISCONTINUED | OUTPATIENT
Start: 2021-07-16 | End: 2021-07-16 | Stop reason: HOSPADM

## 2021-07-16 RX ORDER — DIPHENHYDRAMINE HYDROCHLORIDE 50 MG/ML
25 INJECTION INTRAMUSCULAR; INTRAVENOUS EVERY 4 HOURS PRN
Status: CANCELLED | OUTPATIENT
Start: 2021-07-16

## 2021-07-16 RX ORDER — ACETAMINOPHEN 500 MG
1000 TABLET ORAL
Status: DISCONTINUED | OUTPATIENT
Start: 2021-07-16 | End: 2021-07-16

## 2021-07-16 RX ORDER — PROPOFOL 10 MG/ML
VIAL (ML) INTRAVENOUS
Status: DISCONTINUED | OUTPATIENT
Start: 2021-07-16 | End: 2021-07-16

## 2021-07-16 RX ORDER — MUPIROCIN 20 MG/G
OINTMENT TOPICAL
Status: DISCONTINUED | OUTPATIENT
Start: 2021-07-16 | End: 2021-07-16 | Stop reason: HOSPADM

## 2021-07-16 RX ORDER — DEXAMETHASONE SODIUM PHOSPHATE 4 MG/ML
INJECTION, SOLUTION INTRA-ARTICULAR; INTRALESIONAL; INTRAMUSCULAR; INTRAVENOUS; SOFT TISSUE
Status: DISCONTINUED | OUTPATIENT
Start: 2021-07-16 | End: 2021-07-16

## 2021-07-16 RX ORDER — OXYCODONE HYDROCHLORIDE 5 MG/1
5 TABLET ORAL
Status: DISCONTINUED | OUTPATIENT
Start: 2021-07-16 | End: 2021-07-16 | Stop reason: HOSPADM

## 2021-07-16 RX ORDER — HYDROCODONE BITARTRATE AND ACETAMINOPHEN 5; 325 MG/1; MG/1
1 TABLET ORAL EVERY 4 HOURS PRN
Status: CANCELLED | OUTPATIENT
Start: 2021-07-16

## 2021-07-16 RX ORDER — FAMOTIDINE 20 MG/1
20 TABLET, FILM COATED ORAL ONCE
Status: COMPLETED | OUTPATIENT
Start: 2021-07-16 | End: 2021-07-16

## 2021-07-16 RX ORDER — SODIUM CHLORIDE 0.9 % (FLUSH) 0.9 %
3 SYRINGE (ML) INJECTION
Status: DISCONTINUED | OUTPATIENT
Start: 2021-07-16 | End: 2021-07-16 | Stop reason: HOSPADM

## 2021-07-16 RX ORDER — SCOLOPAMINE TRANSDERMAL SYSTEM 1 MG/1
1 PATCH, EXTENDED RELEASE TRANSDERMAL ONCE
Status: DISCONTINUED | OUTPATIENT
Start: 2021-07-16 | End: 2021-07-16 | Stop reason: HOSPADM

## 2021-07-16 RX ORDER — DOXYCYCLINE HYCLATE 100 MG
200 TABLET ORAL
Status: DISCONTINUED | OUTPATIENT
Start: 2021-07-16 | End: 2021-07-16 | Stop reason: HOSPADM

## 2021-07-16 RX ORDER — LIDOCAINE HYDROCHLORIDE 20 MG/ML
INJECTION INTRAVENOUS
Status: DISCONTINUED | OUTPATIENT
Start: 2021-07-16 | End: 2021-07-16

## 2021-07-16 RX ORDER — MEPERIDINE HYDROCHLORIDE 25 MG/ML
12.5 INJECTION INTRAMUSCULAR; INTRAVENOUS; SUBCUTANEOUS ONCE AS NEEDED
Status: DISCONTINUED | OUTPATIENT
Start: 2021-07-16 | End: 2021-07-16 | Stop reason: HOSPADM

## 2021-07-16 RX ORDER — DIPHENHYDRAMINE HCL 25 MG
25 CAPSULE ORAL EVERY 4 HOURS PRN
Status: CANCELLED | OUTPATIENT
Start: 2021-07-16

## 2021-07-16 RX ADMIN — MIDAZOLAM HYDROCHLORIDE 2 MG: 1 INJECTION, SOLUTION INTRAMUSCULAR; INTRAVENOUS at 12:07

## 2021-07-16 RX ADMIN — ONDANSETRON HYDROCHLORIDE 4 MG: 2 INJECTION INTRAMUSCULAR; INTRAVENOUS at 12:07

## 2021-07-16 RX ADMIN — DEXAMETHASONE SODIUM PHOSPHATE 8 MG: 4 INJECTION, SOLUTION INTRAMUSCULAR; INTRAVENOUS at 12:07

## 2021-07-16 RX ADMIN — PROPOFOL 200 MG: 10 INJECTION, EMULSION INTRAVENOUS at 12:07

## 2021-07-16 RX ADMIN — DIPHENHYDRAMINE HYDROCHLORIDE 25 MG: 50 INJECTION INTRAMUSCULAR; INTRAVENOUS at 01:07

## 2021-07-16 RX ADMIN — LIDOCAINE HYDROCHLORIDE 75 MG: 20 INJECTION, SOLUTION INTRAVENOUS at 12:07

## 2021-07-16 RX ADMIN — DOXYCYCLINE HYCLATE 200 MG: 100 TABLET, COATED ORAL at 11:07

## 2021-07-16 RX ADMIN — HYDROMORPHONE HYDROCHLORIDE 0.4 MG: 2 INJECTION INTRAMUSCULAR; INTRAVENOUS; SUBCUTANEOUS at 01:07

## 2021-07-16 RX ADMIN — ACETAMINOPHEN 1000 MG: 500 TABLET, FILM COATED ORAL at 11:07

## 2021-07-16 RX ADMIN — OXYCODONE 5 MG: 5 TABLET ORAL at 01:07

## 2021-07-16 RX ADMIN — FENTANYL CITRATE 25 MCG: 50 INJECTION, SOLUTION INTRAMUSCULAR; INTRAVENOUS at 12:07

## 2021-07-16 RX ADMIN — SCOLOPAMINE TRANSDERMAL SYSTEM 1 PATCH: 1 PATCH, EXTENDED RELEASE TRANSDERMAL at 11:07

## 2021-07-16 RX ADMIN — FAMOTIDINE 20 MG: 20 TABLET ORAL at 11:07

## 2021-07-16 RX ADMIN — FENTANYL CITRATE 100 MCG: 50 INJECTION, SOLUTION INTRAMUSCULAR; INTRAVENOUS at 12:07

## 2021-07-16 RX ADMIN — MUPIROCIN: 20 OINTMENT TOPICAL at 11:07

## 2021-07-16 RX ADMIN — PHENYLEPHRINE HYDROCHLORIDE 200 MCG: 10 INJECTION INTRAVENOUS at 12:07

## 2021-07-16 RX ADMIN — GLYCOPYRROLATE 0.2 MG: 0.2 INJECTION, SOLUTION INTRAMUSCULAR; INTRAVITREAL at 12:07

## 2021-07-22 LAB
FINAL PATHOLOGIC DIAGNOSIS: NORMAL
GROSS: NORMAL
Lab: NORMAL

## 2021-07-29 ENCOUNTER — OFFICE VISIT (OUTPATIENT)
Dept: OBSTETRICS AND GYNECOLOGY | Facility: CLINIC | Age: 31
End: 2021-07-29
Attending: OBSTETRICS & GYNECOLOGY
Payer: COMMERCIAL

## 2021-07-29 ENCOUNTER — PATIENT OUTREACH (OUTPATIENT)
Dept: ADMINISTRATIVE | Facility: OTHER | Age: 31
End: 2021-07-29

## 2021-07-29 VITALS
BODY MASS INDEX: 38.25 KG/M2 | SYSTOLIC BLOOD PRESSURE: 120 MMHG | WEIGHT: 207.88 LBS | HEIGHT: 62 IN | DIASTOLIC BLOOD PRESSURE: 76 MMHG

## 2021-07-29 DIAGNOSIS — Z98.890 S/P D&C (STATUS POST DILATION AND CURETTAGE): Primary | ICD-10-CM

## 2021-07-29 PROBLEM — O34.219 PREVIOUS CESAREAN DELIVERY AFFECTING PREGNANCY: Status: RESOLVED | Noted: 2021-06-29 | Resolved: 2021-07-29

## 2021-07-29 PROBLEM — O02.1 MISSED ABORTION: Status: RESOLVED | Noted: 2021-07-16 | Resolved: 2021-07-29

## 2021-07-29 PROCEDURE — 3008F BODY MASS INDEX DOCD: CPT | Mod: CPTII,S$GLB,, | Performed by: OBSTETRICS & GYNECOLOGY

## 2021-07-29 PROCEDURE — 99999 PR PBB SHADOW E&M-EST. PATIENT-LVL II: CPT | Mod: PBBFAC,,, | Performed by: OBSTETRICS & GYNECOLOGY

## 2021-07-29 PROCEDURE — 3008F PR BODY MASS INDEX (BMI) DOCUMENTED: ICD-10-PCS | Mod: CPTII,S$GLB,, | Performed by: OBSTETRICS & GYNECOLOGY

## 2021-07-29 PROCEDURE — 1159F MED LIST DOCD IN RCRD: CPT | Mod: CPTII,S$GLB,, | Performed by: OBSTETRICS & GYNECOLOGY

## 2021-07-29 PROCEDURE — 99024 POSTOP FOLLOW-UP VISIT: CPT | Mod: S$GLB,,, | Performed by: OBSTETRICS & GYNECOLOGY

## 2021-07-29 PROCEDURE — 99024 PR POST-OP FOLLOW-UP VISIT: ICD-10-PCS | Mod: S$GLB,,, | Performed by: OBSTETRICS & GYNECOLOGY

## 2021-07-29 PROCEDURE — 99999 PR PBB SHADOW E&M-EST. PATIENT-LVL II: ICD-10-PCS | Mod: PBBFAC,,, | Performed by: OBSTETRICS & GYNECOLOGY

## 2021-07-29 PROCEDURE — 3074F SYST BP LT 130 MM HG: CPT | Mod: CPTII,S$GLB,, | Performed by: OBSTETRICS & GYNECOLOGY

## 2021-07-29 PROCEDURE — 1159F PR MEDICATION LIST DOCUMENTED IN MEDICAL RECORD: ICD-10-PCS | Mod: CPTII,S$GLB,, | Performed by: OBSTETRICS & GYNECOLOGY

## 2021-07-29 PROCEDURE — 3078F DIAST BP <80 MM HG: CPT | Mod: CPTII,S$GLB,, | Performed by: OBSTETRICS & GYNECOLOGY

## 2021-07-29 PROCEDURE — 3074F PR MOST RECENT SYSTOLIC BLOOD PRESSURE < 130 MM HG: ICD-10-PCS | Mod: CPTII,S$GLB,, | Performed by: OBSTETRICS & GYNECOLOGY

## 2021-07-29 PROCEDURE — 3078F PR MOST RECENT DIASTOLIC BLOOD PRESSURE < 80 MM HG: ICD-10-PCS | Mod: CPTII,S$GLB,, | Performed by: OBSTETRICS & GYNECOLOGY

## 2021-08-06 ENCOUNTER — TELEPHONE (OUTPATIENT)
Dept: OBSTETRICS AND GYNECOLOGY | Facility: CLINIC | Age: 31
End: 2021-08-06

## 2021-09-14 ENCOUNTER — PATIENT MESSAGE (OUTPATIENT)
Dept: OBSTETRICS AND GYNECOLOGY | Facility: CLINIC | Age: 31
End: 2021-09-14

## 2021-09-16 ENCOUNTER — TELEPHONE (OUTPATIENT)
Dept: OBSTETRICS AND GYNECOLOGY | Facility: CLINIC | Age: 31
End: 2021-09-16

## 2021-09-16 ENCOUNTER — PATIENT MESSAGE (OUTPATIENT)
Dept: OBSTETRICS AND GYNECOLOGY | Facility: CLINIC | Age: 31
End: 2021-09-16

## 2021-09-30 ENCOUNTER — PATIENT MESSAGE (OUTPATIENT)
Dept: OBSTETRICS AND GYNECOLOGY | Facility: CLINIC | Age: 31
End: 2021-09-30

## 2021-09-30 DIAGNOSIS — O36.80X0 PREGNANCY WITH UNCERTAIN FETAL VIABILITY, SINGLE OR UNSPECIFIED FETUS: Primary | ICD-10-CM

## 2021-10-01 ENCOUNTER — LAB VISIT (OUTPATIENT)
Dept: LAB | Facility: HOSPITAL | Age: 31
End: 2021-10-01
Attending: OBSTETRICS & GYNECOLOGY
Payer: COMMERCIAL

## 2021-10-01 ENCOUNTER — TELEPHONE (OUTPATIENT)
Dept: OBSTETRICS AND GYNECOLOGY | Facility: CLINIC | Age: 31
End: 2021-10-01

## 2021-10-01 DIAGNOSIS — O36.80X0 PREGNANCY WITH UNCERTAIN FETAL VIABILITY, SINGLE OR UNSPECIFIED FETUS: ICD-10-CM

## 2021-10-01 LAB — HCG INTACT+B SERPL-ACNC: 57 MIU/ML

## 2021-10-01 PROCEDURE — 84702 CHORIONIC GONADOTROPIN TEST: CPT | Performed by: OBSTETRICS & GYNECOLOGY

## 2021-10-01 PROCEDURE — 36415 COLL VENOUS BLD VENIPUNCTURE: CPT | Mod: PO | Performed by: OBSTETRICS & GYNECOLOGY

## 2021-10-01 RX ORDER — PROGESTERONE 200 MG/1
200 CAPSULE ORAL NIGHTLY
Qty: 30 CAPSULE | Refills: 11 | Status: SHIPPED | OUTPATIENT
Start: 2021-10-01 | End: 2022-01-12

## 2021-10-04 ENCOUNTER — LAB VISIT (OUTPATIENT)
Dept: LAB | Facility: OTHER | Age: 31
End: 2021-10-04
Attending: NURSE PRACTITIONER
Payer: COMMERCIAL

## 2021-10-04 ENCOUNTER — PATIENT MESSAGE (OUTPATIENT)
Dept: OBSTETRICS AND GYNECOLOGY | Facility: CLINIC | Age: 31
End: 2021-10-04

## 2021-10-04 DIAGNOSIS — O36.80X0 PREGNANCY WITH UNCERTAIN FETAL VIABILITY, SINGLE OR UNSPECIFIED FETUS: ICD-10-CM

## 2021-10-04 LAB — HCG INTACT+B SERPL-ACNC: 226 MIU/ML

## 2021-10-04 PROCEDURE — 36415 COLL VENOUS BLD VENIPUNCTURE: CPT | Performed by: NURSE PRACTITIONER

## 2021-10-04 PROCEDURE — 84702 CHORIONIC GONADOTROPIN TEST: CPT | Performed by: NURSE PRACTITIONER

## 2021-10-05 ENCOUNTER — PATIENT MESSAGE (OUTPATIENT)
Dept: OBSTETRICS AND GYNECOLOGY | Facility: CLINIC | Age: 31
End: 2021-10-05

## 2021-10-08 ENCOUNTER — TELEPHONE (OUTPATIENT)
Dept: OBSTETRICS AND GYNECOLOGY | Facility: CLINIC | Age: 31
End: 2021-10-08

## 2021-10-08 DIAGNOSIS — Z34.90 PREGNANCY: Primary | ICD-10-CM

## 2021-10-26 ENCOUNTER — OFFICE VISIT (OUTPATIENT)
Dept: OBSTETRICS AND GYNECOLOGY | Facility: CLINIC | Age: 31
End: 2021-10-26
Payer: COMMERCIAL

## 2021-10-26 ENCOUNTER — PROCEDURE VISIT (OUTPATIENT)
Dept: OBSTETRICS AND GYNECOLOGY | Facility: CLINIC | Age: 31
End: 2021-10-26
Attending: OBSTETRICS & GYNECOLOGY
Payer: COMMERCIAL

## 2021-10-26 VITALS — HEIGHT: 62 IN | BODY MASS INDEX: 38.99 KG/M2 | WEIGHT: 211.88 LBS

## 2021-10-26 DIAGNOSIS — Z34.90 PREGNANCY: ICD-10-CM

## 2021-10-26 DIAGNOSIS — Z34.90 PREGNANCY, UNSPECIFIED GESTATIONAL AGE: Primary | ICD-10-CM

## 2021-10-26 PROCEDURE — 3008F PR BODY MASS INDEX (BMI) DOCUMENTED: ICD-10-PCS | Mod: CPTII,S$GLB,, | Performed by: ADVANCED PRACTICE MIDWIFE

## 2021-10-26 PROCEDURE — 1159F MED LIST DOCD IN RCRD: CPT | Mod: CPTII,S$GLB,, | Performed by: ADVANCED PRACTICE MIDWIFE

## 2021-10-26 PROCEDURE — 99212 PR OFFICE/OUTPT VISIT, EST, LEVL II, 10-19 MIN: ICD-10-PCS | Mod: S$GLB,,, | Performed by: ADVANCED PRACTICE MIDWIFE

## 2021-10-26 PROCEDURE — 1159F PR MEDICATION LIST DOCUMENTED IN MEDICAL RECORD: ICD-10-PCS | Mod: CPTII,S$GLB,, | Performed by: ADVANCED PRACTICE MIDWIFE

## 2021-10-26 PROCEDURE — 99212 OFFICE O/P EST SF 10 MIN: CPT | Mod: S$GLB,,, | Performed by: ADVANCED PRACTICE MIDWIFE

## 2021-10-26 PROCEDURE — 99999 PR PBB SHADOW E&M-EST. PATIENT-LVL III: CPT | Mod: PBBFAC,,, | Performed by: ADVANCED PRACTICE MIDWIFE

## 2021-10-26 PROCEDURE — 99999 PR PBB SHADOW E&M-EST. PATIENT-LVL III: ICD-10-PCS | Mod: PBBFAC,,, | Performed by: ADVANCED PRACTICE MIDWIFE

## 2021-10-26 PROCEDURE — 3008F BODY MASS INDEX DOCD: CPT | Mod: CPTII,S$GLB,, | Performed by: ADVANCED PRACTICE MIDWIFE

## 2021-10-26 PROCEDURE — 76801 OB US < 14 WKS SINGLE FETUS: CPT | Mod: PBBFAC | Performed by: OBSTETRICS & GYNECOLOGY

## 2021-10-27 ENCOUNTER — PATIENT MESSAGE (OUTPATIENT)
Dept: OBSTETRICS AND GYNECOLOGY | Facility: CLINIC | Age: 31
End: 2021-10-27
Payer: COMMERCIAL

## 2021-11-01 ENCOUNTER — TELEPHONE (OUTPATIENT)
Dept: OBSTETRICS AND GYNECOLOGY | Facility: CLINIC | Age: 31
End: 2021-11-01
Payer: COMMERCIAL

## 2021-11-01 DIAGNOSIS — Z36.82 NUCHAL TRANSLUCENCY OF FETUS ON PRENATAL ULTRASOUND: Primary | ICD-10-CM

## 2021-11-11 ENCOUNTER — TELEPHONE (OUTPATIENT)
Dept: OBSTETRICS AND GYNECOLOGY | Facility: CLINIC | Age: 31
End: 2021-11-11
Payer: COMMERCIAL

## 2021-11-13 LAB
ABO GROUP BLD: NORMAL
B-HCG SERPL-ACNC: ABNORMAL MIU/ML
BASOPHILS # BLD AUTO: 31 CELLS/UL (ref 0–200)
BASOPHILS NFR BLD AUTO: 0.3 %
BLASTS # BLD: ABNORMAL CELLS/UL
BLASTS NFR BLD MANUAL: ABNORMAL %
BLD GP AB SCN SERPL QL: NORMAL
C TRACH RRNA SPEC QL NAA+PROBE: NOT DETECTED
COMMENT: NORMAL
EOSINOPHIL # BLD AUTO: 82 CELLS/UL (ref 15–500)
EOSINOPHIL NFR BLD AUTO: 0.8 %
ERYTHROCYTE [DISTWIDTH] IN BLOOD BY AUTOMATED COUNT: 14.2 % (ref 11–15)
HBV SURFACE AB SER QL IA: REACTIVE
HBV SURFACE AG SERPL QL IA: NORMAL
HBV SURFACE AG SERPL QL NT: NORMAL
HCT VFR BLD AUTO: 40.9 % (ref 35–45)
HGB BLD-MCNC: 13 G/DL (ref 11.7–15.5)
HIV 1+2 AB+HIV1 P24 AG SERPL QL IA: NORMAL
LYMPHOCYTES # BLD AUTO: 1421 CELLS/UL (ref 850–3900)
LYMPHOCYTES NFR BLD AUTO: 13.8 %
MCH RBC QN AUTO: 28.1 PG (ref 27–33)
MCHC RBC AUTO-ENTMCNC: 31.8 G/DL (ref 32–36)
MCV RBC AUTO: 88.3 FL (ref 80–100)
METAMYELOCYTES # BLD: ABNORMAL CELLS/UL
METAMYELOCYTES NFR BLD MANUAL: ABNORMAL %
MONOCYTES # BLD AUTO: 505 CELLS/UL (ref 200–950)
MONOCYTES NFR BLD AUTO: 4.9 %
MYELOCYTES # BLD: ABNORMAL CELLS/UL
MYELOCYTES NFR BLD MANUAL: ABNORMAL %
N GONORRHOEA RRNA SPEC QL NAA+PROBE: NOT DETECTED
NEUTROPHILS # BLD AUTO: 8261 CELLS/UL (ref 1500–7800)
NEUTROPHILS NFR BLD AUTO: 80.2 %
NEUTS BAND # BLD: ABNORMAL CELLS/UL (ref 0–750)
NEUTS BAND NFR BLD MANUAL: ABNORMAL %
NRBC # BLD: ABNORMAL CELLS/UL
NRBC BLD-RTO: ABNORMAL /100 WBC
PLATELET # BLD AUTO: 226 THOUSAND/UL (ref 140–400)
PMV BLD REES-ECKER: 11.8 FL (ref 7.5–12.5)
PROMYELOCYTES # BLD: ABNORMAL CELLS/UL
PROMYELOCYTES NFR BLD MANUAL: ABNORMAL %
RBC # BLD AUTO: 4.63 MILLION/UL (ref 3.8–5.1)
RH BLD: NORMAL
RPR SER QL: NORMAL
RUBV IGG SERPL IA-ACNC: 1.72 INDEX
SERVICE CMNT-IMP: ABNORMAL
VARIANT LYMPHS NFR BLD: ABNORMAL % (ref 0–10)
VZV IGG SER IA-ACNC: 1137 INDEX
WBC # BLD AUTO: 10.3 THOUSAND/UL (ref 3.8–10.8)

## 2021-11-18 ENCOUNTER — LAB VISIT (OUTPATIENT)
Dept: LAB | Facility: OTHER | Age: 31
End: 2021-11-18
Attending: OBSTETRICS & GYNECOLOGY
Payer: COMMERCIAL

## 2021-11-18 ENCOUNTER — INITIAL PRENATAL (OUTPATIENT)
Dept: OBSTETRICS AND GYNECOLOGY | Facility: CLINIC | Age: 31
End: 2021-11-18
Attending: OBSTETRICS & GYNECOLOGY
Payer: COMMERCIAL

## 2021-11-18 VITALS
DIASTOLIC BLOOD PRESSURE: 72 MMHG | SYSTOLIC BLOOD PRESSURE: 120 MMHG | WEIGHT: 212.94 LBS | BODY MASS INDEX: 38.95 KG/M2

## 2021-11-18 DIAGNOSIS — E06.3 HASHIMOTO'S THYROIDITIS: Primary | ICD-10-CM

## 2021-11-18 DIAGNOSIS — E06.3 HASHIMOTO'S THYROIDITIS: ICD-10-CM

## 2021-11-18 LAB
T4 FREE SERPL-MCNC: 1.05 NG/DL (ref 0.71–1.51)
THYROPEROXIDASE IGG SERPL-ACNC: 7.2 IU/ML
TSH SERPL DL<=0.005 MIU/L-ACNC: 1.63 UIU/ML (ref 0.4–4)

## 2021-11-18 PROCEDURE — 99999 PR PBB SHADOW E&M-EST. PATIENT-LVL II: CPT | Mod: PBBFAC,,, | Performed by: OBSTETRICS & GYNECOLOGY

## 2021-11-18 PROCEDURE — 0502F PR SUBSEQUENT PRENATAL CARE: ICD-10-PCS | Mod: CPTII,S$GLB,, | Performed by: OBSTETRICS & GYNECOLOGY

## 2021-11-18 PROCEDURE — 86376 MICROSOMAL ANTIBODY EACH: CPT | Performed by: OBSTETRICS & GYNECOLOGY

## 2021-11-18 PROCEDURE — 99999 PR PBB SHADOW E&M-EST. PATIENT-LVL II: ICD-10-PCS | Mod: PBBFAC,,, | Performed by: OBSTETRICS & GYNECOLOGY

## 2021-11-18 PROCEDURE — 0502F SUBSEQUENT PRENATAL CARE: CPT | Mod: CPTII,S$GLB,, | Performed by: OBSTETRICS & GYNECOLOGY

## 2021-11-18 PROCEDURE — 36415 COLL VENOUS BLD VENIPUNCTURE: CPT | Performed by: OBSTETRICS & GYNECOLOGY

## 2021-11-18 PROCEDURE — 84439 ASSAY OF FREE THYROXINE: CPT | Performed by: OBSTETRICS & GYNECOLOGY

## 2021-11-18 PROCEDURE — 84443 ASSAY THYROID STIM HORMONE: CPT | Performed by: OBSTETRICS & GYNECOLOGY

## 2021-11-29 ENCOUNTER — PATIENT MESSAGE (OUTPATIENT)
Dept: OBSTETRICS AND GYNECOLOGY | Facility: CLINIC | Age: 31
End: 2021-11-29
Payer: COMMERCIAL

## 2021-11-29 ENCOUNTER — PROCEDURE VISIT (OUTPATIENT)
Dept: MATERNAL FETAL MEDICINE | Facility: CLINIC | Age: 31
End: 2021-11-29
Attending: OBSTETRICS & GYNECOLOGY
Payer: COMMERCIAL

## 2021-11-29 VITALS — BODY MASS INDEX: 39.64 KG/M2 | WEIGHT: 216.69 LBS

## 2021-11-29 DIAGNOSIS — Z36.89 ENCOUNTER FOR FETAL ANATOMIC SURVEY: Primary | ICD-10-CM

## 2021-11-29 DIAGNOSIS — Z36.82 NUCHAL TRANSLUCENCY OF FETUS ON PRENATAL ULTRASOUND: ICD-10-CM

## 2021-11-29 PROCEDURE — 76813 OB US NUCHAL MEAS 1 GEST: CPT | Mod: S$GLB,,, | Performed by: OBSTETRICS & GYNECOLOGY

## 2021-11-29 PROCEDURE — 76813 US MFM PROCEDURE (VIEWPOINT): ICD-10-PCS | Mod: S$GLB,,, | Performed by: OBSTETRICS & GYNECOLOGY

## 2021-12-14 ENCOUNTER — ROUTINE PRENATAL (OUTPATIENT)
Dept: OBSTETRICS AND GYNECOLOGY | Facility: CLINIC | Age: 31
End: 2021-12-14
Attending: OBSTETRICS & GYNECOLOGY
Payer: COMMERCIAL

## 2021-12-14 VITALS
SYSTOLIC BLOOD PRESSURE: 122 MMHG | WEIGHT: 218.94 LBS | DIASTOLIC BLOOD PRESSURE: 80 MMHG | BODY MASS INDEX: 40.04 KG/M2

## 2021-12-14 DIAGNOSIS — Z34.90 PREGNANCY WITH ONE FETUS, ANTEPARTUM: ICD-10-CM

## 2021-12-14 PROCEDURE — 99999 PR PBB SHADOW E&M-EST. PATIENT-LVL II: ICD-10-PCS | Mod: PBBFAC,,, | Performed by: OBSTETRICS & GYNECOLOGY

## 2021-12-14 PROCEDURE — 0502F SUBSEQUENT PRENATAL CARE: CPT | Mod: CPTII,S$GLB,, | Performed by: OBSTETRICS & GYNECOLOGY

## 2021-12-14 PROCEDURE — 99999 PR PBB SHADOW E&M-EST. PATIENT-LVL II: CPT | Mod: PBBFAC,,, | Performed by: OBSTETRICS & GYNECOLOGY

## 2021-12-14 PROCEDURE — 0502F PR SUBSEQUENT PRENATAL CARE: ICD-10-PCS | Mod: CPTII,S$GLB,, | Performed by: OBSTETRICS & GYNECOLOGY

## 2021-12-21 ENCOUNTER — ROUTINE PRENATAL (OUTPATIENT)
Dept: OBSTETRICS AND GYNECOLOGY | Facility: CLINIC | Age: 31
End: 2021-12-21
Attending: OBSTETRICS & GYNECOLOGY
Payer: COMMERCIAL

## 2021-12-21 VITALS
WEIGHT: 222.25 LBS | BODY MASS INDEX: 40.65 KG/M2 | DIASTOLIC BLOOD PRESSURE: 82 MMHG | SYSTOLIC BLOOD PRESSURE: 130 MMHG

## 2021-12-21 DIAGNOSIS — Z98.891 HISTORY OF CESAREAN SECTION: ICD-10-CM

## 2021-12-21 DIAGNOSIS — Z34.02 ENCOUNTER FOR SUPERVISION OF NORMAL FIRST PREGNANCY IN SECOND TRIMESTER: Primary | ICD-10-CM

## 2021-12-21 DIAGNOSIS — Z98.890 HISTORY OF MYOMECTOMY: ICD-10-CM

## 2021-12-21 DIAGNOSIS — Z34.90 PREGNANCY WITH ONE FETUS, ANTEPARTUM: ICD-10-CM

## 2021-12-21 PROCEDURE — 99999 PR PBB SHADOW E&M-EST. PATIENT-LVL III: ICD-10-PCS | Mod: PBBFAC,,, | Performed by: OBSTETRICS & GYNECOLOGY

## 2021-12-21 PROCEDURE — 0502F SUBSEQUENT PRENATAL CARE: CPT | Mod: CPTII,S$GLB,, | Performed by: OBSTETRICS & GYNECOLOGY

## 2021-12-21 PROCEDURE — 0502F PR SUBSEQUENT PRENATAL CARE: ICD-10-PCS | Mod: CPTII,S$GLB,, | Performed by: OBSTETRICS & GYNECOLOGY

## 2021-12-21 PROCEDURE — 99999 PR PBB SHADOW E&M-EST. PATIENT-LVL III: CPT | Mod: PBBFAC,,, | Performed by: OBSTETRICS & GYNECOLOGY

## 2021-12-31 ENCOUNTER — PATIENT MESSAGE (OUTPATIENT)
Dept: ADMINISTRATIVE | Facility: OTHER | Age: 31
End: 2021-12-31
Payer: COMMERCIAL

## 2021-12-31 DIAGNOSIS — U07.1 COVID-19 AFFECTING PREGNANCY IN SECOND TRIMESTER: Primary | ICD-10-CM

## 2021-12-31 DIAGNOSIS — O98.512 COVID-19 AFFECTING PREGNANCY IN SECOND TRIMESTER: Primary | ICD-10-CM

## 2022-01-12 ENCOUNTER — ROUTINE PRENATAL (OUTPATIENT)
Dept: OBSTETRICS AND GYNECOLOGY | Facility: CLINIC | Age: 32
End: 2022-01-12
Attending: OBSTETRICS & GYNECOLOGY
Payer: COMMERCIAL

## 2022-01-12 VITALS
DIASTOLIC BLOOD PRESSURE: 70 MMHG | BODY MASS INDEX: 41.13 KG/M2 | WEIGHT: 224.88 LBS | SYSTOLIC BLOOD PRESSURE: 112 MMHG

## 2022-01-12 DIAGNOSIS — Z34.90 PREGNANCY WITH ONE FETUS, ANTEPARTUM: ICD-10-CM

## 2022-01-12 DIAGNOSIS — U07.1 COVID-19 AFFECTING PREGNANCY IN SECOND TRIMESTER: Primary | ICD-10-CM

## 2022-01-12 DIAGNOSIS — O98.512 COVID-19 AFFECTING PREGNANCY IN SECOND TRIMESTER: Primary | ICD-10-CM

## 2022-01-12 PROCEDURE — 0502F PR SUBSEQUENT PRENATAL CARE: ICD-10-PCS | Mod: CPTII,S$GLB,, | Performed by: OBSTETRICS & GYNECOLOGY

## 2022-01-12 PROCEDURE — 99999 PR PBB SHADOW E&M-EST. PATIENT-LVL II: CPT | Mod: PBBFAC,,, | Performed by: OBSTETRICS & GYNECOLOGY

## 2022-01-12 PROCEDURE — 0502F SUBSEQUENT PRENATAL CARE: CPT | Mod: CPTII,S$GLB,, | Performed by: OBSTETRICS & GYNECOLOGY

## 2022-01-12 PROCEDURE — 99999 PR PBB SHADOW E&M-EST. PATIENT-LVL II: ICD-10-PCS | Mod: PBBFAC,,, | Performed by: OBSTETRICS & GYNECOLOGY

## 2022-01-12 NOTE — PROGRESS NOTES
Doing well, MT21 reviewed.  Recent covid with only mild symptoms.  Upcoming ultrasound reviewed.  Patient seen and examined.  No complaints, denies VB/LOF/Ctxns, reports good fetal movement. Precautions for Bleeding/ ROM/ Decreased fetal movement/ Pre-E reviewed.  F/U scheduled 4 weeks

## 2022-01-13 ENCOUNTER — PATIENT MESSAGE (OUTPATIENT)
Dept: MATERNAL FETAL MEDICINE | Facility: CLINIC | Age: 32
End: 2022-01-13
Payer: COMMERCIAL

## 2022-01-18 ENCOUNTER — RESEARCH ENCOUNTER (OUTPATIENT)
Dept: RESEARCH | Facility: OTHER | Age: 32
End: 2022-01-18

## 2022-01-18 ENCOUNTER — PROCEDURE VISIT (OUTPATIENT)
Dept: MATERNAL FETAL MEDICINE | Facility: CLINIC | Age: 32
End: 2022-01-18
Payer: COMMERCIAL

## 2022-01-18 DIAGNOSIS — Z36.89 ENCOUNTER FOR FETAL ANATOMIC SURVEY: ICD-10-CM

## 2022-01-18 DIAGNOSIS — Z36.89 ENCOUNTER FOR ULTRASOUND TO CHECK FETAL GROWTH: Primary | ICD-10-CM

## 2022-01-18 PROCEDURE — 76811 OB US DETAILED SNGL FETUS: CPT | Mod: S$GLB,,, | Performed by: OBSTETRICS & GYNECOLOGY

## 2022-01-18 PROCEDURE — 76811 US MFM PROCEDURE (VIEWPOINT): ICD-10-PCS | Mod: S$GLB,,, | Performed by: OBSTETRICS & GYNECOLOGY

## 2022-01-18 NOTE — RESEARCH
"  Screening Visit  Sponsor: [employer] "Miracle of Life Study"  Study: Observational Study of Pregnant Women to Validate Biomarkers of Pregnancy Complication Risk  IRB/Protocol #: 2021.103/ [id number]  Principle Investigator/ Sub-Investigator: Madhav Gold MD  Site: [facility]  Location: [facility]  Subject Number: [id number]     Subject presented for enrollment in [employer]/ Tigerlilyacle of Life Study.  Subject is  19 weeks 3 days pregnant.  Subject meets all eligibility criteria for enrollment in study.     Prior to the Informed Consent (IC) being signed, or any study protocol required data collection, testing, procedure, or intervention being performed, the following was done and/or discussed:?   · Patient was given a copy of the IC for review??   · Purpose of the study and qualifications to participate??   · Study design, follow up schedule  · Confidentiality and HIPAA Authorization for Release of Medical Records for the research trial/ subject's rights/research related injury?   · Risk, Benefits, Compensation and Costs?   · Participation in the research trial is voluntary and patient may withdraw at anytime?   · Contact information for study related questions?      Patient verbalizes understanding of the above: Yes?   Contact information for CRC and PI given to patient: Yes?   Patient able to adequately summarize: the purpose of the study, the risks associated with the study, and all procedures, and follow-ups associated with the study: Yes?      Informed Consent:   Consenting was completed in private area using the most current IRB approved version of the ICF (Revised 10 Sep 2021) and patient was given ample time to review consent prior to execution of ICF.  Before signing consent, patient was asked if she had any questions and she stated "no".   Armida Medina signed the IRB approved version of informed consent form for the [employer]/ Tigerlilyacle of Life research study.? Each page of the consent was reviewed with " the patient and all questions answered satisfactorily. The patient signed the paper consent form and received a copy of same (copy of informed consent made and provided to patient). The original consent was scanned into electronic medical records (EPIC).    Time of Consent Execution: 8:48 AM      Blood Sample Collection:      Was the sample collected?: Yes  Sample collected by: Eden Lugo  Date of collection: 01/18/2022  Time of collection: 8:59 AM  Specimen Type: whole blood  Specimen shipped Fed EX Ref # [id number]  Shipped on 01/18/2022     Sample ID #: [id number]     Patient advised we would continue to follow her throughout her pregnancy and would follow up with her after her delivery, Patient verbalized understanding.     Patient received [employer] Token # [id number].  Research participant received $25 stipend.

## 2022-02-03 ENCOUNTER — PATIENT MESSAGE (OUTPATIENT)
Dept: OBSTETRICS AND GYNECOLOGY | Facility: CLINIC | Age: 32
End: 2022-02-03
Payer: COMMERCIAL

## 2022-02-15 ENCOUNTER — PROCEDURE VISIT (OUTPATIENT)
Dept: MATERNAL FETAL MEDICINE | Facility: CLINIC | Age: 32
End: 2022-02-15
Payer: COMMERCIAL

## 2022-02-15 DIAGNOSIS — Z36.89 ENCOUNTER FOR ULTRASOUND TO CHECK FETAL GROWTH: ICD-10-CM

## 2022-02-15 DIAGNOSIS — Z36.89 ENCOUNTER FOR ULTRASOUND TO CHECK FETAL GROWTH: Primary | ICD-10-CM

## 2022-02-16 ENCOUNTER — ROUTINE PRENATAL (OUTPATIENT)
Dept: OBSTETRICS AND GYNECOLOGY | Facility: CLINIC | Age: 32
End: 2022-02-16
Attending: OBSTETRICS & GYNECOLOGY
Payer: COMMERCIAL

## 2022-02-16 VITALS
DIASTOLIC BLOOD PRESSURE: 78 MMHG | WEIGHT: 236.75 LBS | BODY MASS INDEX: 43.31 KG/M2 | SYSTOLIC BLOOD PRESSURE: 118 MMHG

## 2022-02-16 DIAGNOSIS — Z34.90 PREGNANCY WITH ONE FETUS, ANTEPARTUM: Primary | ICD-10-CM

## 2022-02-16 DIAGNOSIS — Z98.891 HISTORY OF CESAREAN SECTION: ICD-10-CM

## 2022-02-16 DIAGNOSIS — Z98.890 HISTORY OF MYOMECTOMY: ICD-10-CM

## 2022-02-16 DIAGNOSIS — E06.3 HASHIMOTO'S DISEASE: ICD-10-CM

## 2022-02-16 DIAGNOSIS — E06.3 HASHIMOTO'S THYROIDITIS: ICD-10-CM

## 2022-02-16 PROCEDURE — 0502F SUBSEQUENT PRENATAL CARE: CPT | Mod: CPTII,S$GLB,, | Performed by: OBSTETRICS & GYNECOLOGY

## 2022-02-16 PROCEDURE — 0502F PR SUBSEQUENT PRENATAL CARE: ICD-10-PCS | Mod: CPTII,S$GLB,, | Performed by: OBSTETRICS & GYNECOLOGY

## 2022-02-16 PROCEDURE — 99999 PR PBB SHADOW E&M-EST. PATIENT-LVL II: CPT | Mod: PBBFAC,,, | Performed by: OBSTETRICS & GYNECOLOGY

## 2022-02-16 PROCEDURE — 99999 PR PBB SHADOW E&M-EST. PATIENT-LVL II: ICD-10-PCS | Mod: PBBFAC,,, | Performed by: OBSTETRICS & GYNECOLOGY

## 2022-02-16 NOTE — PROGRESS NOTES
Glucose screen and TSH with next visit.  No residual covid symptoms.  Mood Ok.  Discussed support and compression.  Discussed  labor precautions.

## 2022-02-23 DIAGNOSIS — D84.9 IMMUNOSUPPRESSED STATUS: ICD-10-CM

## 2022-03-14 ENCOUNTER — ROUTINE PRENATAL (OUTPATIENT)
Dept: OBSTETRICS AND GYNECOLOGY | Facility: CLINIC | Age: 32
End: 2022-03-14
Attending: OBSTETRICS & GYNECOLOGY
Payer: COMMERCIAL

## 2022-03-14 ENCOUNTER — LAB VISIT (OUTPATIENT)
Dept: LAB | Facility: OTHER | Age: 32
End: 2022-03-14
Attending: OBSTETRICS & GYNECOLOGY
Payer: COMMERCIAL

## 2022-03-14 VITALS — DIASTOLIC BLOOD PRESSURE: 80 MMHG | BODY MASS INDEX: 44.6 KG/M2 | SYSTOLIC BLOOD PRESSURE: 102 MMHG | WEIGHT: 243.81 LBS

## 2022-03-14 DIAGNOSIS — Z98.891 HISTORY OF CESAREAN SECTION: ICD-10-CM

## 2022-03-14 DIAGNOSIS — Z34.90 PREGNANCY WITH ONE FETUS, ANTEPARTUM: ICD-10-CM

## 2022-03-14 DIAGNOSIS — E06.3 HASHIMOTO'S DISEASE: ICD-10-CM

## 2022-03-14 DIAGNOSIS — Z98.890 HISTORY OF MYOMECTOMY: Primary | ICD-10-CM

## 2022-03-14 LAB
BASOPHILS # BLD AUTO: 0.03 K/UL (ref 0–0.2)
BASOPHILS NFR BLD: 0.3 % (ref 0–1.9)
DIFFERENTIAL METHOD: ABNORMAL
EOSINOPHIL # BLD AUTO: 0.1 K/UL (ref 0–0.5)
EOSINOPHIL NFR BLD: 0.8 % (ref 0–8)
ERYTHROCYTE [DISTWIDTH] IN BLOOD BY AUTOMATED COUNT: 13.5 % (ref 11.5–14.5)
GLUCOSE SERPL-MCNC: 122 MG/DL (ref 70–140)
HCT VFR BLD AUTO: 37 % (ref 37–48.5)
HGB BLD-MCNC: 12.1 G/DL (ref 12–16)
IMM GRANULOCYTES # BLD AUTO: 0.05 K/UL (ref 0–0.04)
IMM GRANULOCYTES NFR BLD AUTO: 0.4 % (ref 0–0.5)
LYMPHOCYTES # BLD AUTO: 1.4 K/UL (ref 1–4.8)
LYMPHOCYTES NFR BLD: 12.2 % (ref 18–48)
MCH RBC QN AUTO: 29.2 PG (ref 27–31)
MCHC RBC AUTO-ENTMCNC: 32.7 G/DL (ref 32–36)
MCV RBC AUTO: 89 FL (ref 82–98)
MONOCYTES # BLD AUTO: 0.6 K/UL (ref 0.3–1)
MONOCYTES NFR BLD: 5.5 % (ref 4–15)
NEUTROPHILS # BLD AUTO: 9.3 K/UL (ref 1.8–7.7)
NEUTROPHILS NFR BLD: 80.8 % (ref 38–73)
NRBC BLD-RTO: 0 /100 WBC
PLATELET # BLD AUTO: 223 K/UL (ref 150–450)
PMV BLD AUTO: 12.2 FL (ref 9.2–12.9)
RBC # BLD AUTO: 4.14 M/UL (ref 4–5.4)
TSH SERPL DL<=0.005 MIU/L-ACNC: 0.72 UIU/ML (ref 0.4–4)
WBC # BLD AUTO: 11.44 K/UL (ref 3.9–12.7)

## 2022-03-14 PROCEDURE — 36415 COLL VENOUS BLD VENIPUNCTURE: CPT | Performed by: OBSTETRICS & GYNECOLOGY

## 2022-03-14 PROCEDURE — 84443 ASSAY THYROID STIM HORMONE: CPT | Performed by: OBSTETRICS & GYNECOLOGY

## 2022-03-14 PROCEDURE — 99999 PR PBB SHADOW E&M-EST. PATIENT-LVL III: ICD-10-PCS | Mod: PBBFAC,,, | Performed by: OBSTETRICS & GYNECOLOGY

## 2022-03-14 PROCEDURE — 0502F PR SUBSEQUENT PRENATAL CARE: ICD-10-PCS | Mod: CPTII,S$GLB,, | Performed by: OBSTETRICS & GYNECOLOGY

## 2022-03-14 PROCEDURE — 85025 COMPLETE CBC W/AUTO DIFF WBC: CPT | Performed by: OBSTETRICS & GYNECOLOGY

## 2022-03-14 PROCEDURE — 99999 PR PBB SHADOW E&M-EST. PATIENT-LVL III: CPT | Mod: PBBFAC,,, | Performed by: OBSTETRICS & GYNECOLOGY

## 2022-03-14 PROCEDURE — 82950 GLUCOSE TEST: CPT | Performed by: OBSTETRICS & GYNECOLOGY

## 2022-03-14 PROCEDURE — 0502F SUBSEQUENT PRENATAL CARE: CPT | Mod: CPTII,S$GLB,, | Performed by: OBSTETRICS & GYNECOLOGY

## 2022-03-14 NOTE — PROGRESS NOTES
Reports some discomfort over upper uterus with sneezing, bending over.  Not constant pain, discussed warning signs related to history of myomectomy.   scheduled 22 at 7 am.  Glucose screen today, resulted and normal  Patient seen and examined.  No complaints, denies VB/LOF/Ctxns, reports good fetal movement. Precautions for Bleeding/ ROM/ Decreased fetal movement/ Pre-E reviewed.  F/U scheduled 2 weeks. tdap with next visit.

## 2022-03-17 ENCOUNTER — PATIENT MESSAGE (OUTPATIENT)
Dept: OBSTETRICS AND GYNECOLOGY | Facility: CLINIC | Age: 32
End: 2022-03-17
Payer: COMMERCIAL

## 2022-03-30 ENCOUNTER — ROUTINE PRENATAL (OUTPATIENT)
Dept: OBSTETRICS AND GYNECOLOGY | Facility: CLINIC | Age: 32
End: 2022-03-30
Attending: OBSTETRICS & GYNECOLOGY
Payer: COMMERCIAL

## 2022-03-30 VITALS
WEIGHT: 248.88 LBS | BODY MASS INDEX: 45.52 KG/M2 | SYSTOLIC BLOOD PRESSURE: 110 MMHG | DIASTOLIC BLOOD PRESSURE: 60 MMHG

## 2022-03-30 DIAGNOSIS — Z98.890 HISTORY OF MYOMECTOMY: ICD-10-CM

## 2022-03-30 DIAGNOSIS — Z23 NEED FOR TDAP VACCINATION: Primary | ICD-10-CM

## 2022-03-30 DIAGNOSIS — J45.20 MILD INTERMITTENT CHILDHOOD ASTHMA WITHOUT COMPLICATION: ICD-10-CM

## 2022-03-30 DIAGNOSIS — Z34.90 PREGNANCY WITH ONE FETUS, ANTEPARTUM: Primary | ICD-10-CM

## 2022-03-30 DIAGNOSIS — E06.3 HASHIMOTO'S THYROIDITIS: ICD-10-CM

## 2022-03-30 PROCEDURE — 0502F SUBSEQUENT PRENATAL CARE: CPT | Mod: CPTII,S$GLB,, | Performed by: OBSTETRICS & GYNECOLOGY

## 2022-03-30 PROCEDURE — 90471 TDAP VACCINE GREATER THAN OR EQUAL TO 7YO IM: ICD-10-PCS | Mod: S$GLB,,, | Performed by: OBSTETRICS & GYNECOLOGY

## 2022-03-30 PROCEDURE — 99999 PR PBB SHADOW E&M-EST. PATIENT-LVL III: CPT | Mod: PBBFAC,,, | Performed by: OBSTETRICS & GYNECOLOGY

## 2022-03-30 PROCEDURE — 99999 PR PBB SHADOW E&M-EST. PATIENT-LVL II: ICD-10-PCS | Mod: PBBFAC,,,

## 2022-03-30 PROCEDURE — 90715 TDAP VACCINE GREATER THAN OR EQUAL TO 7YO IM: ICD-10-PCS | Mod: S$GLB,,, | Performed by: OBSTETRICS & GYNECOLOGY

## 2022-03-30 PROCEDURE — 0502F PR SUBSEQUENT PRENATAL CARE: ICD-10-PCS | Mod: CPTII,S$GLB,, | Performed by: OBSTETRICS & GYNECOLOGY

## 2022-03-30 PROCEDURE — 90715 TDAP VACCINE 7 YRS/> IM: CPT | Mod: S$GLB,,, | Performed by: OBSTETRICS & GYNECOLOGY

## 2022-03-30 PROCEDURE — 99999 PR PBB SHADOW E&M-EST. PATIENT-LVL III: ICD-10-PCS | Mod: PBBFAC,,, | Performed by: OBSTETRICS & GYNECOLOGY

## 2022-03-30 PROCEDURE — 90471 IMMUNIZATION ADMIN: CPT | Mod: S$GLB,,, | Performed by: OBSTETRICS & GYNECOLOGY

## 2022-03-30 PROCEDURE — 99999 PR PBB SHADOW E&M-EST. PATIENT-LVL II: CPT | Mod: PBBFAC,,,

## 2022-03-30 NOTE — PROGRESS NOTES
Patient seen and examined.  No complaints, denies VB/LOF/Ctxns, reports good fetal movement. Precautions for Bleeding/ ROM/ Decreased fetal movement/ Pre-E reviewed.  tdap today.  F/U scheduled 2 weeks

## 2022-03-30 NOTE — PROGRESS NOTES
Here for TDAP injection. Patient without complaint of pain at this time, Injection given. Tolerated well. No pain noted after injection.  Advised to wait in lobby 15 minutes and report any adverse reactions.     Site: RD

## 2022-04-11 ENCOUNTER — ROUTINE PRENATAL (OUTPATIENT)
Dept: OBSTETRICS AND GYNECOLOGY | Facility: CLINIC | Age: 32
End: 2022-04-11
Attending: OBSTETRICS & GYNECOLOGY
Payer: COMMERCIAL

## 2022-04-11 VITALS — SYSTOLIC BLOOD PRESSURE: 120 MMHG | BODY MASS INDEX: 46.73 KG/M2 | WEIGHT: 255.5 LBS | DIASTOLIC BLOOD PRESSURE: 80 MMHG

## 2022-04-11 DIAGNOSIS — Z3A.31 31 WEEKS GESTATION OF PREGNANCY: Primary | ICD-10-CM

## 2022-04-11 PROCEDURE — 99999 PR PBB SHADOW E&M-EST. PATIENT-LVL III: ICD-10-PCS | Mod: PBBFAC,,, | Performed by: NURSE PRACTITIONER

## 2022-04-11 PROCEDURE — 0502F PR SUBSEQUENT PRENATAL CARE: ICD-10-PCS | Mod: CPTII,S$GLB,, | Performed by: NURSE PRACTITIONER

## 2022-04-11 PROCEDURE — 99999 PR PBB SHADOW E&M-EST. PATIENT-LVL III: CPT | Mod: PBBFAC,,, | Performed by: NURSE PRACTITIONER

## 2022-04-11 PROCEDURE — 0502F SUBSEQUENT PRENATAL CARE: CPT | Mod: CPTII,S$GLB,, | Performed by: NURSE PRACTITIONER

## 2022-04-11 NOTE — PROGRESS NOTES
Here for routine OB appt at 31w2d, with no complaints.  Reports good FM.  Denies LOF, denies VB, denies contractions. No problems. Already scheduled for repeat CS in May.   Reviewed warning signs of Labor and Preeclampsia.  Daily FM counts reinforced.  F/U scheduled 2 weeks  S/p Tdap

## 2022-04-11 NOTE — PATIENT INSTRUCTIONS
LABOR AND DELIVERY PHONE NUMBER, [phone] (OPEN 24/7, LOCATED ON 6TH FLOOR OF HOSPITAL)  [address] PHONE NUMBER, [phone] (OPEN MON-FRI, 8a-5p)

## 2022-04-14 ENCOUNTER — PATIENT MESSAGE (OUTPATIENT)
Dept: MATERNAL FETAL MEDICINE | Facility: CLINIC | Age: 32
End: 2022-04-14
Payer: COMMERCIAL

## 2022-04-18 ENCOUNTER — PROCEDURE VISIT (OUTPATIENT)
Dept: MATERNAL FETAL MEDICINE | Facility: CLINIC | Age: 32
End: 2022-04-18
Payer: COMMERCIAL

## 2022-04-18 DIAGNOSIS — Z36.89 ENCOUNTER FOR ULTRASOUND TO CHECK FETAL GROWTH: ICD-10-CM

## 2022-04-18 PROCEDURE — 76816 US MFM PROCEDURE (VIEWPOINT): ICD-10-PCS | Mod: S$GLB,,, | Performed by: OBSTETRICS & GYNECOLOGY

## 2022-04-18 PROCEDURE — 76816 OB US FOLLOW-UP PER FETUS: CPT | Mod: S$GLB,,, | Performed by: OBSTETRICS & GYNECOLOGY

## 2022-04-19 ENCOUNTER — PATIENT MESSAGE (OUTPATIENT)
Dept: OBSTETRICS AND GYNECOLOGY | Facility: CLINIC | Age: 32
End: 2022-04-19
Payer: COMMERCIAL

## 2022-04-20 ENCOUNTER — ROUTINE PRENATAL (OUTPATIENT)
Dept: OBSTETRICS AND GYNECOLOGY | Facility: CLINIC | Age: 32
DRG: 833 | End: 2022-04-20
Attending: OBSTETRICS & GYNECOLOGY
Payer: COMMERCIAL

## 2022-04-20 ENCOUNTER — HOSPITAL ENCOUNTER (INPATIENT)
Facility: OTHER | Age: 32
LOS: 2 days | Discharge: HOME | DRG: 833 | End: 2022-04-22
Attending: OBSTETRICS & GYNECOLOGY | Admitting: OBSTETRICS & GYNECOLOGY
Payer: COMMERCIAL

## 2022-04-20 VITALS
DIASTOLIC BLOOD PRESSURE: 90 MMHG | BODY MASS INDEX: 47.62 KG/M2 | WEIGHT: 260.38 LBS | SYSTOLIC BLOOD PRESSURE: 160 MMHG

## 2022-04-20 DIAGNOSIS — Z98.890 HISTORY OF MYOMECTOMY: Primary | ICD-10-CM

## 2022-04-20 DIAGNOSIS — Z98.891 PREVIOUS CESAREAN SECTION: ICD-10-CM

## 2022-04-20 DIAGNOSIS — O14.93 PRE-ECLAMPSIA IN THIRD TRIMESTER: ICD-10-CM

## 2022-04-20 DIAGNOSIS — O13.3 GESTATIONAL HYPERTENSION, THIRD TRIMESTER: Primary | ICD-10-CM

## 2022-04-20 DIAGNOSIS — O14.90 PRE-ECLAMPSIA: ICD-10-CM

## 2022-04-20 DIAGNOSIS — Z3A.32 32 WEEKS GESTATION OF PREGNANCY: ICD-10-CM

## 2022-04-20 LAB
ABO + RH BLD: NORMAL
ALBUMIN SERPL BCP-MCNC: 2.9 G/DL (ref 3.5–5.2)
ALP SERPL-CCNC: 119 U/L (ref 55–135)
ALT SERPL W/O P-5'-P-CCNC: 10 U/L (ref 10–44)
ANION GAP SERPL CALC-SCNC: 13 MMOL/L (ref 8–16)
AST SERPL-CCNC: 11 U/L (ref 10–40)
BASOPHILS # BLD AUTO: 0.02 K/UL (ref 0–0.2)
BASOPHILS NFR BLD: 0.2 % (ref 0–1.9)
BILIRUB SERPL-MCNC: 0.1 MG/DL (ref 0.1–1)
BLD GP AB SCN CELLS X3 SERPL QL: NORMAL
BUN SERPL-MCNC: 9 MG/DL (ref 6–20)
CALCIUM SERPL-MCNC: 9.3 MG/DL (ref 8.7–10.5)
CHLORIDE SERPL-SCNC: 105 MMOL/L (ref 95–110)
CO2 SERPL-SCNC: 20 MMOL/L (ref 23–29)
CREAT SERPL-MCNC: 0.7 MG/DL (ref 0.5–1.4)
CREAT UR-MCNC: 35.8 MG/DL (ref 15–325)
DIFFERENTIAL METHOD: ABNORMAL
EOSINOPHIL # BLD AUTO: 0.1 K/UL (ref 0–0.5)
EOSINOPHIL NFR BLD: 0.4 % (ref 0–8)
ERYTHROCYTE [DISTWIDTH] IN BLOOD BY AUTOMATED COUNT: 13.6 % (ref 11.5–14.5)
EST. GFR  (AFRICAN AMERICAN): >60 ML/MIN/1.73 M^2
EST. GFR  (NON AFRICAN AMERICAN): >60 ML/MIN/1.73 M^2
GLUCOSE SERPL-MCNC: 92 MG/DL (ref 70–110)
HCT VFR BLD AUTO: 37.3 % (ref 37–48.5)
HGB BLD-MCNC: 12.2 G/DL (ref 12–16)
HIV 1+2 AB+HIV1 P24 AG SERPL QL IA: NEGATIVE
IMM GRANULOCYTES # BLD AUTO: 0.09 K/UL (ref 0–0.04)
IMM GRANULOCYTES NFR BLD AUTO: 0.7 % (ref 0–0.5)
LYMPHOCYTES # BLD AUTO: 1.5 K/UL (ref 1–4.8)
LYMPHOCYTES NFR BLD: 12 % (ref 18–48)
MCH RBC QN AUTO: 28.4 PG (ref 27–31)
MCHC RBC AUTO-ENTMCNC: 32.7 G/DL (ref 32–36)
MCV RBC AUTO: 87 FL (ref 82–98)
MONOCYTES # BLD AUTO: 1 K/UL (ref 0.3–1)
MONOCYTES NFR BLD: 7.9 % (ref 4–15)
NEUTROPHILS # BLD AUTO: 10 K/UL (ref 1.8–7.7)
NEUTROPHILS NFR BLD: 78.8 % (ref 38–73)
NRBC BLD-RTO: 0 /100 WBC
PLATELET # BLD AUTO: 204 K/UL (ref 150–450)
PMV BLD AUTO: 12.1 FL (ref 9.2–12.9)
POTASSIUM SERPL-SCNC: 4.3 MMOL/L (ref 3.5–5.1)
PROT SERPL-MCNC: 6.8 G/DL (ref 6–8.4)
PROT UR-MCNC: <7 MG/DL (ref 0–15)
PROT/CREAT UR: NORMAL MG/G{CREAT} (ref 0–0.2)
RBC # BLD AUTO: 4.3 M/UL (ref 4–5.4)
SODIUM SERPL-SCNC: 138 MMOL/L (ref 136–145)
WBC # BLD AUTO: 12.69 K/UL (ref 3.9–12.7)

## 2022-04-20 PROCEDURE — 85025 COMPLETE CBC W/AUTO DIFF WBC: CPT

## 2022-04-20 PROCEDURE — 59025 FETAL NON-STRESS TEST: CPT

## 2022-04-20 PROCEDURE — 87389 HIV-1 AG W/HIV-1&-2 AB AG IA: CPT

## 2022-04-20 PROCEDURE — 25000003 PHARM REV CODE 250

## 2022-04-20 PROCEDURE — 87081 CULTURE SCREEN ONLY: CPT

## 2022-04-20 PROCEDURE — 99999 PR PBB SHADOW E&M-EST. PATIENT-LVL II: CPT | Mod: PBBFAC,,, | Performed by: OBSTETRICS & GYNECOLOGY

## 2022-04-20 PROCEDURE — 59025 FETAL NON-STRESS TEST: CPT | Mod: 26,,, | Performed by: OBSTETRICS & GYNECOLOGY

## 2022-04-20 PROCEDURE — 84156 ASSAY OF PROTEIN URINE: CPT

## 2022-04-20 PROCEDURE — 59025 PR FETAL 2N-STRESS TEST: ICD-10-PCS | Mod: 26,,, | Performed by: OBSTETRICS & GYNECOLOGY

## 2022-04-20 PROCEDURE — 86850 RBC ANTIBODY SCREEN: CPT

## 2022-04-20 PROCEDURE — 99285 PR EMERGENCY DEPT VISIT,LEVEL V: ICD-10-PCS | Mod: 25,,, | Performed by: OBSTETRICS & GYNECOLOGY

## 2022-04-20 PROCEDURE — 80053 COMPREHEN METABOLIC PANEL: CPT

## 2022-04-20 PROCEDURE — 99223 1ST HOSP IP/OBS HIGH 75: CPT | Mod: 25,,, | Performed by: OBSTETRICS & GYNECOLOGY

## 2022-04-20 PROCEDURE — 0502F SUBSEQUENT PRENATAL CARE: CPT | Mod: CPTII,S$GLB,, | Performed by: OBSTETRICS & GYNECOLOGY

## 2022-04-20 PROCEDURE — 63600175 PHARM REV CODE 636 W HCPCS

## 2022-04-20 PROCEDURE — 99999 PR PBB SHADOW E&M-EST. PATIENT-LVL II: ICD-10-PCS | Mod: PBBFAC,,, | Performed by: OBSTETRICS & GYNECOLOGY

## 2022-04-20 PROCEDURE — 0502F PR SUBSEQUENT PRENATAL CARE: ICD-10-PCS | Mod: CPTII,S$GLB,, | Performed by: OBSTETRICS & GYNECOLOGY

## 2022-04-20 PROCEDURE — 99223 PR INITIAL HOSPITAL CARE,LEVL III: ICD-10-PCS | Mod: 25,,, | Performed by: OBSTETRICS & GYNECOLOGY

## 2022-04-20 PROCEDURE — 25000003 PHARM REV CODE 250: Performed by: STUDENT IN AN ORGANIZED HEALTH CARE EDUCATION/TRAINING PROGRAM

## 2022-04-20 PROCEDURE — 99285 EMERGENCY DEPT VISIT HI MDM: CPT | Mod: 25,,, | Performed by: OBSTETRICS & GYNECOLOGY

## 2022-04-20 PROCEDURE — 11000001 HC ACUTE MED/SURG PRIVATE ROOM

## 2022-04-20 PROCEDURE — 99285 EMERGENCY DEPT VISIT HI MDM: CPT | Mod: 25

## 2022-04-20 PROCEDURE — 86592 SYPHILIS TEST NON-TREP QUAL: CPT

## 2022-04-20 RX ORDER — CETIRIZINE HYDROCHLORIDE 5 MG/1
10 TABLET ORAL ONCE
Status: COMPLETED | OUTPATIENT
Start: 2022-04-20 | End: 2022-04-20

## 2022-04-20 RX ORDER — FAMOTIDINE 20 MG/1
20 TABLET, FILM COATED ORAL 2 TIMES DAILY PRN
COMMUNITY
End: 2023-05-17

## 2022-04-20 RX ORDER — BETAMETHASONE SODIUM PHOSPHATE AND BETAMETHASONE ACETATE 3; 3 MG/ML; MG/ML
12 INJECTION, SUSPENSION INTRA-ARTICULAR; INTRALESIONAL; INTRAMUSCULAR; SOFT TISSUE
Status: COMPLETED | OUTPATIENT
Start: 2022-04-20 | End: 2022-04-21

## 2022-04-20 RX ORDER — ACETAMINOPHEN 500 MG
1000 TABLET ORAL ONCE
Status: COMPLETED | OUTPATIENT
Start: 2022-04-20 | End: 2022-04-20

## 2022-04-20 RX ORDER — HYDRALAZINE HYDROCHLORIDE 20 MG/ML
10 INJECTION INTRAMUSCULAR; INTRAVENOUS ONCE AS NEEDED
Status: DISCONTINUED | OUTPATIENT
Start: 2022-04-20 | End: 2022-04-21

## 2022-04-20 RX ORDER — LABETALOL HYDROCHLORIDE 5 MG/ML
20 INJECTION, SOLUTION INTRAVENOUS ONCE AS NEEDED
Status: COMPLETED | OUTPATIENT
Start: 2022-04-20 | End: 2022-04-20

## 2022-04-20 RX ORDER — MAGNESIUM SULFATE HEPTAHYDRATE 40 MG/ML
2 INJECTION, SOLUTION INTRAVENOUS CONTINUOUS
Status: DISCONTINUED | OUTPATIENT
Start: 2022-04-20 | End: 2022-04-21

## 2022-04-20 RX ORDER — MAGNESIUM SULFATE HEPTAHYDRATE 40 MG/ML
4 INJECTION, SOLUTION INTRAVENOUS ONCE
Status: COMPLETED | OUTPATIENT
Start: 2022-04-20 | End: 2022-04-20

## 2022-04-20 RX ORDER — LABETALOL HYDROCHLORIDE 5 MG/ML
40 INJECTION, SOLUTION INTRAVENOUS ONCE AS NEEDED
Status: DISCONTINUED | OUTPATIENT
Start: 2022-04-20 | End: 2022-04-21

## 2022-04-20 RX ORDER — CALCIUM GLUCONATE 98 MG/ML
1 INJECTION, SOLUTION INTRAVENOUS
Status: DISCONTINUED | OUTPATIENT
Start: 2022-04-20 | End: 2022-04-21

## 2022-04-20 RX ORDER — LABETALOL HYDROCHLORIDE 5 MG/ML
80 INJECTION, SOLUTION INTRAVENOUS ONCE AS NEEDED
Status: DISCONTINUED | OUTPATIENT
Start: 2022-04-20 | End: 2022-04-21

## 2022-04-20 RX ORDER — SODIUM CHLORIDE, SODIUM LACTATE, POTASSIUM CHLORIDE, CALCIUM CHLORIDE 600; 310; 30; 20 MG/100ML; MG/100ML; MG/100ML; MG/100ML
INJECTION, SOLUTION INTRAVENOUS CONTINUOUS
Status: DISCONTINUED | OUTPATIENT
Start: 2022-04-20 | End: 2022-04-21

## 2022-04-20 RX ADMIN — SODIUM CHLORIDE, SODIUM LACTATE, POTASSIUM CHLORIDE, AND CALCIUM CHLORIDE 1000 ML: .6; .31; .03; .02 INJECTION, SOLUTION INTRAVENOUS at 03:04

## 2022-04-20 RX ADMIN — ACETAMINOPHEN 1000 MG: 500 TABLET, FILM COATED ORAL at 02:04

## 2022-04-20 RX ADMIN — MAGNESIUM SULFATE HEPTAHYDRATE 4 G: 40 INJECTION, SOLUTION INTRAVENOUS at 03:04

## 2022-04-20 RX ADMIN — LABETALOL HYDROCHLORIDE 20 MG: 5 INJECTION INTRAVENOUS at 03:04

## 2022-04-20 RX ADMIN — BETAMETHASONE SODIUM PHOSPHATE AND BETAMETHASONE ACETATE 12 MG: 3; 3 INJECTION, SUSPENSION INTRA-ARTICULAR; INTRALESIONAL; INTRAMUSCULAR at 03:04

## 2022-04-20 RX ADMIN — MAGNESIUM SULFATE IN WATER 2 G/HR: 40 INJECTION, SOLUTION INTRAVENOUS at 03:04

## 2022-04-20 RX ADMIN — CETIRIZINE HYDROCHLORIDE 10 MG: 5 TABLET, FILM COATED ORAL at 11:04

## 2022-04-20 NOTE — ED PROVIDER NOTES
Encounter Date: 2022    Armida Medina is a 31 y.o. [id number] at 32w4d presents complaining of headache and elevated BP at [facility] appointment earlier today (160/90).   Denies taking any medications for her headache. Rates severity of headache as 3-4/10. Denies SOB, vision changes, RUQ pain, dysuria, hematuria, cough, fever, chills.     This IUP is complicated by prior CSD (breech), desires repeat (h/o myomectomy), h/o Pre-E (on ASA).  Patient denies contractions, denies vaginal bleeding, denies LOF.   Fetal Movement: normal.        History     Chief Complaint   Patient presents with    Headache    Hypertension     HPI  Review of patient's allergies indicates:   Allergen Reactions    Ibuprofen Hives and Shortness Of Breath     bronchospasm    Ancef [cefazolin] Rash     Past Medical History:   Diagnosis Date    Abnormal Pap smear of cervix     Acne     Allergy     Asthma     Hashimoto's thyroiditis 2017    Preeclampsia, severe, third trimester 2018     Past Surgical History:   Procedure Laterality Date     SECTION N/A 2018    Procedure:  SECTION;  Surgeon: Sawyer Lake Jr., MD;  Location: [facility] L&D;  Service: OB/GYN;  Laterality: N/A;    CYST REMOVAL      Left ear @ age 2    DILATION AND CURETTAGE OF UTERUS USING SUCTION N/A 2021    Procedure: DILATION AND CURETTAGE, UTERUS, USING SUCTION;  Surgeon: Fiorella Carpenter DO;  Location: [facility] OR;  Service: OB/GYN;  Laterality: N/A;    ROBOT-ASSISTED LAPAROSCOPIC UTERINE MYOMECTOMY N/A 2020    Procedure: ROBOTIC MYOMECTOMY, UTERUS;  Surgeon: Sylvie Zavaleta MD;  Location: [facility] OR;  Service: OB/GYN;  Laterality: N/A;    TONSILLECTOMY, ADENOIDECTOMY       Family History   Problem Relation Age of Onset    No Known Problems Father     Miscarriages / Stillbirths Mother     Hypothyroidism Mother     Coronary artery disease Maternal Grandmother         s/p CABG 4v    Hypothyroidism Maternal  Grandmother     Coronary artery disease Paternal Grandmother         s/p CABG    Breast cancer Neg Hx     Cancer Neg Hx     Colon cancer Neg Hx     Diabetes Neg Hx     Eclampsia Neg Hx     Hypertension Neg Hx     Stroke Neg Hx      labor Neg Hx     Ovarian cancer Neg Hx     Melanoma Neg Hx      Social History     Tobacco Use    Smoking status: Never Smoker    Smokeless tobacco: Never Used   Substance Use Topics    Alcohol use: Not Currently     Comment: social     Drug use: No     Review of Systems   Constitutional: Negative for chills, fever and unexpected weight change.   HENT: Negative for sinus pressure and sinus pain.    Eyes: Negative for visual disturbance.   Respiratory: Negative for cough and shortness of breath.    Cardiovascular: Negative for chest pain and palpitations.   Gastrointestinal: Negative for abdominal distention, abdominal pain, constipation, diarrhea, nausea and vomiting.   Endocrine: Negative for cold intolerance and heat intolerance.   Genitourinary: Negative for difficulty urinating, dyspareunia, dysuria, genital sores, pelvic pain, urgency and vaginal pain.   Musculoskeletal: Negative for back pain.   Neurological: Positive for headaches. Negative for dizziness, syncope and weakness.   Hematological: Does not bruise/bleed easily.       Physical Exam     Initial Vitals   BP Pulse Resp Temp SpO2   22 1420 22 1409 22 1420 22 1420 22 1410   (!) 174/83 86 (!) 21 98.1 °F (36.7 °C) 98 %      MAP       --                Physical Exam    Vitals reviewed.  Constitutional: She appears well-developed and well-nourished. She is not diaphoretic. No distress.   HENT:   Head: Normocephalic and atraumatic.   Eyes: Conjunctivae are normal. Pupils are equal, round, and reactive to light.   Cardiovascular: Normal rate.   Pulmonary/Chest: No respiratory distress.   Abdominal: There is no abdominal tenderness. There is no rebound and no guarding.    Musculoskeletal:         General: No tenderness or edema. Normal range of motion.     Neurological: She is alert and oriented to person, place, and time.   Skin: Skin is warm and dry.   Psychiatric: She has a normal mood and affect. Thought content normal.         ED Course   Obtain Fetal nonstress test (NST)    Date/Time: 4/20/2022 2:28 PM  Performed by: Tammy Booth MD  Authorized by: Tammy Booth MD     Nonstress Test:     Variability:  6-25 BPM    Decelerations:  None    Accelerations:  15 bpm    Acoustic Stimulator: No      Baseline:  140    Uterine Irritability: No      Contractions:  Not present  Biophysical Profile:     Nonstress Test Interpretation: reactive      Overall Impression:  Reassuring  Post-procedure:     Patient tolerance:  Patient tolerated the procedure well with no immediate complications      Labs Reviewed   CBC W/ AUTO DIFFERENTIAL - Abnormal; Notable for the following components:       Result Value    Immature Granulocytes 0.7 (*)     Gran # (ANC) 10.0 (*)     Immature Grans (Abs) 0.09 (*)     Gran % 78.8 (*)     Lymph % 12.0 (*)     All other components within normal limits   COMPREHENSIVE METABOLIC PANEL - Abnormal; Notable for the following components:    CO2 20 (*)     Albumin 2.9 (*)     All other components within normal limits   PROTEIN / CREATININE RATIO, URINE    Narrative:     Specimen Source->Urine   HIV 1 / 2 ANTIBODY    Narrative:     Release to patient->Immediate   TYPE & SCREEN          Imaging Results    None          Medications   melatonin tablet 6 mg (6 mg Oral Given 4/21/22 2113)   cetirizine tablet 10 mg (10 mg Oral Given 4/21/22 2113)   acetaminophen tablet 1,000 mg (1,000 mg Oral Given 4/20/22 1440)   betamethasone acetate-betamethasone sodium phosphate injection 12 mg (12 mg Intramuscular Given 4/21/22 1531)   labetaloL injection 20 mg (20 mg Intravenous Given 4/20/22 1521)   magnesium sulfate in water 40 gram/1,000 mL (4 %) bolus from bag 4 g (4 g Intravenous  Bolus from Bag 22 1516)   cetirizine tablet 10 mg (10 mg Oral Given 22 2307)   acetaminophen tablet 1,000 mg (1,000 mg Oral Given 22 1600)     Medical Decision Making:   ED Management:  Severe range BP in RENATA  Pre E labs collected  --CBC WNL  --CMP WNL  --P/C ratio UTC  BMZ ordered  GBS collected  NST RR  TOCO with no ctx  Tylenol 1g  Pt admitted for Pre-E with SF  Magnesium initiated  Labetalol 20 pushed in RENATA for 2 sustained severe range BP 15 minutes apart  Consents signed and to chart  U/S with fetus in TRANSVERSE presentation   Other:   I discussed test(s) with the performing physician.  I have discussed this case with another health care provider.            Attending Attestation:   Physician Attestation Statement for Resident:  As the supervising MD   Physician Attestation Statement: I have personally seen and examined this patient.   I agree with the above history. -:   As the supervising MD I agree with the above PE.    As the supervising MD I agree with the above treatment, course, plan, and disposition.   -: Patient evaluated and found to be stable, agree with resident's assessment and plan.  NST reactive and reassuring, no contractions on toco.  Severe range blood pressure treated with labetalol 20mg IV x1, Bps normal after treatment.  Pre-E labs normal.  MFM consult.  Will admit to antepartum for severe preeclampsia due to Bps.  I was personally present during the critical portions of the procedure(s) performed by the resident and was immediately available in the ED to provide services and assistance as needed during the entire procedure.  I have reviewed and agree with the residents interpretation of the following: lab data.  I have reviewed the following: old records at this facility.                       Tammy Booth MD  OBGYN PGY-1    Clinical Impression:   Final diagnoses:  [O14.90] Pre-eclampsia (Primary)  [Z3A.32] 32 weeks gestation of pregnancy  [Z98.891] Previous   section  [O14.93] Pre-eclampsia in third trimester          ED Disposition Condition    Admit               Zandra Caldera MD  04/21/22 1369

## 2022-04-20 NOTE — PROGRESS NOTES
Increased LE swelling today. HA last night and aain today, has not tried tylenol or other intervention, no continuous visual changes.    Noted to have 5 lb weight gain over past week. 3+ pitting edema.  Trace urine protein.  To OB ED for evaluation.

## 2022-04-20 NOTE — SUBJECTIVE & OBJECTIVE
Obstetric HPI:  Patient reports None contractions, active fetal movement, No vaginal bleeding , No loss of fluid     This pregnancy has been complicated by H/o Csx1 (Pre-E with SF, Breech), h/o myomectomy, h/o Pre-E w/ SF, Hashimoto's thyroiditis, anemia, MO (46), childhood asthma, depression.     OB History    Para Term  AB Living   3 1 1 0 1 1   SAB IAB Ectopic Multiple Live Births   1 0 0 0 1      # Outcome Date GA Lbr Jacob/2nd Weight Sex Delivery Anes PTL Lv   3 Current            2 SAB 21 8w0d          1 Term 18 37w3d  2.95 kg (6 lb 8.1 oz) M CS-LTranv Spinal N RAFAEL      Name: EDUARDO MUJICA      Apgar1: 8  Apgar5: 9      Obstetric Comments   Second pregnancy -Blighted ovum     Past Medical History:   Diagnosis Date    Abnormal Pap smear of cervix     Acne     Allergy     Asthma     Hashimoto's thyroiditis 2017    Preeclampsia, severe, third trimester 2018     Past Surgical History:   Procedure Laterality Date     SECTION N/A 2018    Procedure:  SECTION;  Surgeon: Sawyer Lake Jr., MD;  Location: [facility] L&D;  Service: OB/GYN;  Laterality: N/A;    CYST REMOVAL      Left ear @ age 2    DILATION AND CURETTAGE OF UTERUS USING SUCTION N/A 2021    Procedure: DILATION AND CURETTAGE, UTERUS, USING SUCTION;  Surgeon: Fiorella Carpenter DO;  Location: [facility] OR;  Service: OB/GYN;  Laterality: N/A;    ROBOT-ASSISTED LAPAROSCOPIC UTERINE MYOMECTOMY N/A 2020    Procedure: ROBOTIC MYOMECTOMY, UTERUS;  Surgeon: Sylvie Zavaleta MD;  Location: [facility] OR;  Service: OB/GYN;  Laterality: N/A;    TONSILLECTOMY, ADENOIDECTOMY         (Not in a hospital admission)      Review of patient's allergies indicates:   Allergen Reactions    Ibuprofen Hives and Shortness Of Breath     bronchospasm    Ancef [cefazolin] Rash        Family History       Problem Relation (Age of Onset)    Coronary artery disease Maternal Grandmother, Paternal Grandmother     Hypothyroidism Mother, Maternal Grandmother    Miscarriages / Stillbirths Mother    No Known Problems Father          Tobacco Use    Smoking status: Never Smoker    Smokeless tobacco: Never Used   Substance and Sexual Activity    Alcohol use: Not Currently     Comment: social     Drug use: No    Sexual activity: Yes     Partners: Male     Review of Systems   Constitutional: Negative.  Negative for fatigue.   HENT:  Negative for nasal congestion.    Respiratory: Negative.  Negative for shortness of breath.    Cardiovascular: Negative.    Gastrointestinal: Negative.  Negative for abdominal pain, bloating, constipation, diarrhea, nausea and vomiting.   Genitourinary: Negative.  Negative for dysmenorrhea, dyspareunia, dysuria, menorrhagia, menstrual problem, pelvic pain, vaginal bleeding, vaginal discharge, urinary incontinence and vaginal dryness.   Musculoskeletal: Negative.  Negative for leg pain.   Integumentary:  Negative.   Neurological:  Positive for headaches. Negative for seizures and syncope.   All other systems reviewed and are negative.   Objective:     Vital Signs (Most Recent):  Temp: 97.8 °F (36.6 °C) (04/20/22 1429)  Pulse: 81 (04/20/22 1530)  Resp: (!) 21 (04/20/22 1420)  BP: (!) 151/65 (04/20/22 1530)  SpO2: 96 % (04/20/22 1530)   Vital Signs (24h Range):  Temp:  [97.8 °F (36.6 °C)-98.1 °F (36.7 °C)] 97.8 °F (36.6 °C)  Pulse:  [79-96] 81  Resp:  [21] 21  SpO2:  [96 %-99 %] 96 %  BP: (144-178)/(65-90) 151/65        There is no height or weight on file to calculate BMI.    FHT: 145, mod paula, + accels, - decels Cat 1 (reassuring)  TOCO:  none    Physical Exam:   Constitutional: She is oriented to person, place, and time. She appears well-developed and well-nourished. No distress.    HENT:   Head: Normocephalic and atraumatic.    Eyes: Pupils are equal, round, and reactive to light.     Cardiovascular:  Normal rate.             Pulmonary/Chest: Effort normal. No respiratory distress.        Abdominal:  There is no abdominal tenderness. There is no rebound and no guarding.   Gravid, obese             Musculoskeletal: Normal range of motion.       Neurological: She is alert and oriented to person, place, and time.    Skin: Skin is warm and dry.    Psychiatric: She has a normal mood and affect. Her behavior is normal.     Cervix:  Dilation:  0  Effacement:  50%  Station: -3  Presentation: transverse     Significant Labs:  Lab Results   Component Value Date    GROUPTRH A POS 04/20/2022    HEPBSAG NON-REACTIVE 11/11/2021    STREPBCULT No Group B Streptococcus isolated 11/08/2018       CBC:   Recent Labs   Lab 04/20/22  1431   WBC 12.69   RBC 4.30   HGB 12.2   HCT 37.3      MCV 87   MCH 28.4   MCHC 32.7     CMP:   Recent Labs   Lab 04/20/22  1431   GLU 92   CALCIUM 9.3   ALBUMIN 2.9*   PROT 6.8      K 4.3   CO2 20*      BUN 9   CREATININE 0.7   ALKPHOS 119   ALT 10   AST 11   BILITOT 0.1       P/C ratio pending     RPR in process    HIV negative    I have personallly reviewed all pertinent lab results from the last 24 hours.

## 2022-04-20 NOTE — HOSPITAL COURSE
04/20/2022 - HD#1 Severe range BP in RENATA, pushed labetalol 20. 1g Tylenol given with improvement in HA. Admitted to antepartum service for management Pre-E with SF (BP). Magnesium started, BMZ 1/2 given. GBS and Pre-E labs collected.    04/21/2022 - HD#2 VSS overnight. Asymptomatic. Anticipate de-escalating magnesium today, regular diet. Will complete steroid course today.  04/22/2022 - HD#3 VSS overnight, one mild range. Asymptomatic.       Admitted 4/20 for concern for preeclampsia with SF (BP).   Patient had two severe range blood pressures in the RENATA requiring labetalol 20 mg IV x 1  Her pre E labs on admit and repeat during her two day admission were all within normal limits  Admit labs 4/20: PC TLC Plt 204, Cr 0.7, AST/ALT 11/10  Repeat labs 4/21: Plt 212, CR 0.7, AST/ALT 10/11  During the 48 hours, patient blood pressures were all within normal limits except for one mild range blood pressure  Therefore on review, discussion that patient unlikely to have preeclampsia with SF (BP). Discussion with patient on 4/22 and decision to discharge with twice weekly follow up until end of pregnancy for   -Weekly lab evaluation  -Weekly OBGYN appointment (Dr. Carpenter clinic messaged to schedule appointments)  -PNT for antepartum fetal surveillance. (Order for PNT placed)

## 2022-04-20 NOTE — ASSESSMENT & PLAN NOTE
-Pre preg BMI 39  -does not meet requirement for PP lovenox  -SCD on while in bed  -Encourage ambulation                           Measurements:  Wt Readings from Last 1 Encounters:   04/20/22 118.1 kg (260 lb 5.8 oz)   There is no height or weight on file to calculate BMI.    Recommendations:      Patient has been screened and assessed by RD. RD will follow patient.

## 2022-04-20 NOTE — ASSESSMENT & PLAN NOTE
-H/o CSDx1 @37wga due to breech presentation/Pre-E with SF  -Desires repeat CSD  -CSD consents signed and to chart, blood transfusion consents signed and to chart   -U/S with transverse presentation  -SVE: closed/thick/hi  -Pt denies feeling contractions

## 2022-04-20 NOTE — H&P
[facility], [place], operated by [facility] - Emergency Dept (OB)  Obstetrics  History & Physical    Patient Name: Armida Medina  MRN: [id number]  Admission Date: 2022  Primary Care Provider: Zaina Gutierrez MD    Subjective:     Principal Problem:Pre-eclampsia, antepartum, third trimester    History of Present Illness:  Armida Medina is a 31 y.o. [id number] at 32w4d presented to the OB ED due to elevated blood pressures in clinic (160/90), and a headache (3-4 out of 10 in severity). Denies vision changes, RUQ pain, chest pain, SOB, edema, fevers, chills, N/V/D. Denies taking any medications for her HA.     This IUP is complicated by h/o CSx1 (Pre-E w/ SF, Breech Presentation), h/o RA-myomectomy, depression, obesity (BMI 47; pre preg BMI 39), anemia, asthma, and Hashimoto's.       Obstetric HPI:  Patient reports None contractions, active fetal movement, No vaginal bleeding , No loss of fluid     This pregnancy has been complicated by H/o Csx1 (Pre-E with SF, Breech), h/o myomectomy, h/o Pre-E w/ SF, Hashimoto's thyroiditis, anemia, MO (47), childhood asthma, depression.     OB History    Para Term  AB Living   3 1 1 0 1 1   SAB IAB Ectopic Multiple Live Births   1 0 0 0 1      # Outcome Date GA Lbr Jacob/2nd Weight Sex Delivery Anes PTL Lv   3 Current            2 SAB 21 8w0d          1 Term 18 37w3d  2.95 kg (6 lb 8.1 oz) M CS-LTranv Spinal N RAFAEL      Name: EDUARDO MEDINA      Apgar1: 8  Apgar5: 9      Obstetric Comments   Second pregnancy -Blighted ovum     Past Medical History:   Diagnosis Date    Abnormal Pap smear of cervix     Acne     Allergy     Asthma     Hashimoto's thyroiditis 2017    Preeclampsia, severe, third trimester 2018     Past Surgical History:   Procedure Laterality Date     SECTION N/A 2018    Procedure:  SECTION;  Surgeon: Swayer Lake Jr., MD;  Location: [facility]D;  Service: OB/GYN;  Laterality: N/A;    CYST REMOVAL      Left ear @ age 2     DILATION AND CURETTAGE OF UTERUS USING SUCTION N/A 7/16/2021    Procedure: DILATION AND CURETTAGE, UTERUS, USING SUCTION;  Surgeon: Fiorella Carpenter DO;  Location: [facility] OR;  Service: OB/GYN;  Laterality: N/A;    ROBOT-ASSISTED LAPAROSCOPIC UTERINE MYOMECTOMY N/A 12/22/2020    Procedure: ROBOTIC MYOMECTOMY, UTERUS;  Surgeon: Sylvie Zavaleta MD;  Location: [facility] OR;  Service: OB/GYN;  Laterality: N/A;    TONSILLECTOMY, ADENOIDECTOMY         (Not in a hospital admission)      Review of patient's allergies indicates:   Allergen Reactions    Ibuprofen Hives and Shortness Of Breath     bronchospasm    Ancef [cefazolin] Rash        Family History       Problem Relation (Age of Onset)    Coronary artery disease Maternal Grandmother, Paternal Grandmother    Hypothyroidism Mother, Maternal Grandmother    Miscarriages / Stillbirths Mother    No Known Problems Father          Tobacco Use    Smoking status: Never Smoker    Smokeless tobacco: Never Used   Substance and Sexual Activity    Alcohol use: Not Currently     Comment: social     Drug use: No    Sexual activity: Yes     Partners: Male     Review of Systems   Constitutional: Negative.  Negative for fatigue.   HENT:  Negative for nasal congestion.    Respiratory: Negative.  Negative for shortness of breath.    Cardiovascular: Negative.    Gastrointestinal: Negative.  Negative for abdominal pain, bloating, constipation, diarrhea, nausea and vomiting.   Genitourinary: Negative.  Negative for dysmenorrhea, dyspareunia, dysuria, menorrhagia, menstrual problem, pelvic pain, vaginal bleeding, vaginal discharge, urinary incontinence and vaginal dryness.   Musculoskeletal: Negative.  Negative for leg pain.   Integumentary:  Negative.   Neurological:  Positive for headaches. Negative for seizures and syncope.   All other systems reviewed and are negative.   Objective:     Vital Signs (Most Recent):  Temp: 97.8 °F (36.6 °C) (04/20/22 1429)  Pulse: 81 (04/20/22  1530)  Resp: (!) 21 (22 1420)  BP: (!) 151/65 (22 1530)  SpO2: 96 % (22 1530)   Vital Signs (24h Range):  Temp:  [97.8 °F (36.6 °C)-98.1 °F (36.7 °C)] 97.8 °F (36.6 °C)  Pulse:  [79-96] 81  Resp:  [21] 21  SpO2:  [96 %-99 %] 96 %  BP: (144-178)/(65-90) 151/65        There is no height or weight on file to calculate BMI.    FHT: 145, mod paula, + accels, - decels Cat 1 (reassuring)  TOCO:  none    Physical Exam:   Constitutional: She is oriented to person, place, and time. She appears well-developed and well-nourished. No distress.    HENT:   Head: Normocephalic and atraumatic.    Eyes: Pupils are equal, round, and reactive to light.     Cardiovascular:  Normal rate.             Pulmonary/Chest: Effort normal. No respiratory distress.        Abdominal: There is no abdominal tenderness. There is no rebound and no guarding.   Gravid, obese             Musculoskeletal: Normal range of motion.       Neurological: She is alert and oriented to person, place, and time.    Skin: Skin is warm and dry.    Psychiatric: She has a normal mood and affect. Her behavior is normal.     Cervix:  Dilation:  0  Effacement:  50%  Station: -3  Presentation: transverse     Significant Labs:  Lab Results   Component Value Date    GROUPTRH A POS 2022    HEPBSAG NON-REACTIVE 2021    STREPBCULT No Group B Streptococcus isolated 2018       CBC:   Recent Labs   Lab 22  1431   WBC 12.69   RBC 4.30   HGB 12.2   HCT 37.3      MCV 87   MCH 28.4   MCHC 32.7     CMP:   Recent Labs   Lab 22  1431   GLU 92   CALCIUM 9.3   ALBUMIN 2.9*   PROT 6.8      K 4.3   CO2 20*      BUN 9   CREATININE 0.7   ALKPHOS 119   ALT 10   AST 11   BILITOT 0.1       P/C ratio pending     RPR in process    HIV negative    I have personallly reviewed all pertinent lab results from the last 24 hours.    Assessment/Plan:     31 y.o. female  at 32w4d for:    * Pre-eclampsia with Severe Features, antepartum,  third trimester  -BP: (144-178)/(65-90) 151/65  --Pushed labetalol 20 in RENATA with return of BP to WNL  -CBC  12.7//204  -CMP Cr 0.7 AST/ALT 11/10  -P/C ratio pending  -Headache resolved with Tylenol (1g)  -Magnesium started with 4 gram loading dose  -BMZ 1/2 given    History of  section  -H/o CSDx1 @37wga due to breech presentation/Pre-E with SF  -Desires repeat CSD  -CSD consents signed and to chart, blood transfusion consents signed and to chart   -U/S with transverse presentation  -SVE: closed/thick/hi  -Pt denies feeling contractions      History of myomectomy  -Repeat CSD for delivery     Obesity (BMI 35.0-39.9 without comorbidity)  -Pre preg BMI 39  -does not meet requirement for PP lovenox  -SCD on while in bed  -Encourage ambulation       Iron deficiency anemia  -pt asymptomatic  -H/H   -continue to monitor         Tammy Booth MD  Obstetrics  [facility] - Emergency Dept (OB)

## 2022-04-20 NOTE — ASSESSMENT & PLAN NOTE
-BP: (144-178)/(65-90) 151/65  --Pushed labetalol 20 in RENATA with return of BP to WNL  -CBC  12.7/12/37/204  -CMP Cr 0.7 AST/ALT 11/10  -P/C ratio pending  -Headache resolved with Tylenol (1g)  -Magnesium started with 4 gram loading dose  -BMZ 1/2 given

## 2022-04-20 NOTE — HPI
Armida Medina is a 31 y.o. [id number] at 32w4d presented to the OB ED due to elevated blood pressures in clinic (160/90), and a headache (3-4 out of 10 in severity). Denies vision changes, RUQ pain, chest pain, SOB, edema, fevers, chills, N/V/D. Denies taking any medications for her HA.     This IUP is complicated by h/o CSx1 (Pre-E w/ SF, Breech Presentation), h/o RA-myomectomy, depression, obesity (BMI 47; pre preg BMI 39), anemia, asthma, and Hashimoto's.

## 2022-04-21 ENCOUNTER — ANESTHESIA EVENT (OUTPATIENT)
Dept: OBSTETRICS AND GYNECOLOGY | Facility: OTHER | Age: 32
End: 2022-04-21
Payer: COMMERCIAL

## 2022-04-21 ENCOUNTER — ANESTHESIA (OUTPATIENT)
Dept: OBSTETRICS AND GYNECOLOGY | Facility: OTHER | Age: 32
End: 2022-04-21
Payer: COMMERCIAL

## 2022-04-21 LAB
ALBUMIN SERPL BCP-MCNC: 2.8 G/DL (ref 3.5–5.2)
ALP SERPL-CCNC: 114 U/L (ref 55–135)
ALT SERPL W/O P-5'-P-CCNC: 11 U/L (ref 10–44)
ANION GAP SERPL CALC-SCNC: 16 MMOL/L (ref 8–16)
AST SERPL-CCNC: 10 U/L (ref 10–40)
BASOPHILS # BLD AUTO: 0.02 K/UL (ref 0–0.2)
BASOPHILS NFR BLD: 0.1 % (ref 0–1.9)
BILIRUB SERPL-MCNC: 0.2 MG/DL (ref 0.1–1)
BUN SERPL-MCNC: 7 MG/DL (ref 6–20)
CALCIUM SERPL-MCNC: 8 MG/DL (ref 8.7–10.5)
CHLORIDE SERPL-SCNC: 102 MMOL/L (ref 95–110)
CO2 SERPL-SCNC: 18 MMOL/L (ref 23–29)
CREAT SERPL-MCNC: 0.7 MG/DL (ref 0.5–1.4)
DIFFERENTIAL METHOD: ABNORMAL
EOSINOPHIL # BLD AUTO: 0 K/UL (ref 0–0.5)
EOSINOPHIL NFR BLD: 0.1 % (ref 0–8)
ERYTHROCYTE [DISTWIDTH] IN BLOOD BY AUTOMATED COUNT: 13.8 % (ref 11.5–14.5)
EST. GFR  (AFRICAN AMERICAN): >60 ML/MIN/1.73 M^2
EST. GFR  (NON AFRICAN AMERICAN): >60 ML/MIN/1.73 M^2
GLUCOSE SERPL-MCNC: 141 MG/DL (ref 70–110)
HCT VFR BLD AUTO: 34.2 % (ref 37–48.5)
HGB BLD-MCNC: 11 G/DL (ref 12–16)
IMM GRANULOCYTES # BLD AUTO: 0.49 K/UL (ref 0–0.04)
IMM GRANULOCYTES NFR BLD AUTO: 2.9 % (ref 0–0.5)
LYMPHOCYTES # BLD AUTO: 1.1 K/UL (ref 1–4.8)
LYMPHOCYTES NFR BLD: 6.6 % (ref 18–48)
MCH RBC QN AUTO: 27.8 PG (ref 27–31)
MCHC RBC AUTO-ENTMCNC: 32.2 G/DL (ref 32–36)
MCV RBC AUTO: 87 FL (ref 82–98)
MONOCYTES # BLD AUTO: 0.7 K/UL (ref 0.3–1)
MONOCYTES NFR BLD: 4.2 % (ref 4–15)
NEUTROPHILS # BLD AUTO: 14.6 K/UL (ref 1.8–7.7)
NEUTROPHILS NFR BLD: 86.1 % (ref 38–73)
NRBC BLD-RTO: 0 /100 WBC
PLATELET # BLD AUTO: 212 K/UL (ref 150–450)
PMV BLD AUTO: 12 FL (ref 9.2–12.9)
POTASSIUM SERPL-SCNC: 3.8 MMOL/L (ref 3.5–5.1)
PROT SERPL-MCNC: 6.4 G/DL (ref 6–8.4)
RBC # BLD AUTO: 3.95 M/UL (ref 4–5.4)
RPR SER QL: NORMAL
SODIUM SERPL-SCNC: 136 MMOL/L (ref 136–145)
WBC # BLD AUTO: 16.92 K/UL (ref 3.9–12.7)

## 2022-04-21 PROCEDURE — 25000003 PHARM REV CODE 250

## 2022-04-21 PROCEDURE — 99232 SBSQ HOSP IP/OBS MODERATE 35: CPT | Mod: 25,,, | Performed by: OBSTETRICS & GYNECOLOGY

## 2022-04-21 PROCEDURE — 63600175 PHARM REV CODE 636 W HCPCS

## 2022-04-21 PROCEDURE — 99232 PR SUBSEQUENT HOSPITAL CARE,LEVL II: ICD-10-PCS | Mod: 25,,, | Performed by: OBSTETRICS & GYNECOLOGY

## 2022-04-21 PROCEDURE — 80053 COMPREHEN METABOLIC PANEL: CPT | Performed by: STUDENT IN AN ORGANIZED HEALTH CARE EDUCATION/TRAINING PROGRAM

## 2022-04-21 PROCEDURE — 85025 COMPLETE CBC W/AUTO DIFF WBC: CPT | Performed by: STUDENT IN AN ORGANIZED HEALTH CARE EDUCATION/TRAINING PROGRAM

## 2022-04-21 PROCEDURE — 59025 PR FETAL 2N-STRESS TEST: ICD-10-PCS | Mod: 26,,, | Performed by: OBSTETRICS & GYNECOLOGY

## 2022-04-21 PROCEDURE — 25000003 PHARM REV CODE 250: Performed by: STUDENT IN AN ORGANIZED HEALTH CARE EDUCATION/TRAINING PROGRAM

## 2022-04-21 PROCEDURE — 11000001 HC ACUTE MED/SURG PRIVATE ROOM

## 2022-04-21 PROCEDURE — 59025 FETAL NON-STRESS TEST: CPT | Mod: 26,,, | Performed by: OBSTETRICS & GYNECOLOGY

## 2022-04-21 PROCEDURE — 36415 COLL VENOUS BLD VENIPUNCTURE: CPT | Performed by: STUDENT IN AN ORGANIZED HEALTH CARE EDUCATION/TRAINING PROGRAM

## 2022-04-21 RX ORDER — ACETAMINOPHEN 500 MG
1000 TABLET ORAL ONCE
Status: COMPLETED | OUTPATIENT
Start: 2022-04-21 | End: 2022-04-21

## 2022-04-21 RX ORDER — CETIRIZINE HYDROCHLORIDE 5 MG/1
10 TABLET ORAL NIGHTLY
Status: DISCONTINUED | OUTPATIENT
Start: 2022-04-21 | End: 2022-04-22 | Stop reason: HOSPADM

## 2022-04-21 RX ORDER — TALC
6 POWDER (GRAM) TOPICAL NIGHTLY PRN
Status: DISCONTINUED | OUTPATIENT
Start: 2022-04-21 | End: 2022-04-22 | Stop reason: HOSPADM

## 2022-04-21 RX ORDER — CETIRIZINE HYDROCHLORIDE 5 MG/1
5 TABLET ORAL DAILY
Status: DISCONTINUED | OUTPATIENT
Start: 2022-04-22 | End: 2022-04-21

## 2022-04-21 RX ADMIN — SODIUM CHLORIDE, SODIUM LACTATE, POTASSIUM CHLORIDE, AND CALCIUM CHLORIDE 1000 ML: .6; .31; .03; .02 INJECTION, SOLUTION INTRAVENOUS at 04:04

## 2022-04-21 RX ADMIN — BETAMETHASONE SODIUM PHOSPHATE AND BETAMETHASONE ACETATE 12 MG: 3; 3 INJECTION, SUSPENSION INTRA-ARTICULAR; INTRALESIONAL; INTRAMUSCULAR at 03:04

## 2022-04-21 RX ADMIN — CETIRIZINE HYDROCHLORIDE 10 MG: 5 TABLET, FILM COATED ORAL at 09:04

## 2022-04-21 RX ADMIN — MELATONIN TAB 3 MG 6 MG: 3 TAB at 09:04

## 2022-04-21 RX ADMIN — ACETAMINOPHEN 1000 MG: 500 TABLET, FILM COATED ORAL at 04:04

## 2022-04-21 NOTE — ASSESSMENT & PLAN NOTE
-H/o CSDx1 @37wga due to breech presentation/Pre-E with SF  -Desires repeat CSD  -CSD consents signed and to chart, blood transfusion consents signed and to chart   -U/S with transverse presentation  -SVE: closed/thick/high

## 2022-04-21 NOTE — ASSESSMENT & PLAN NOTE
- BP: (110-178)/(55-90) 117/57  - Required one push of labetalol 20 in RENATA with return of BP to WNL  - CBC  12.7/12/37/204  - CMP Cr 0.7 AST/ALT 11/10  - P/C ratio TLTC  - Headache resolved with Tylenol (1g) in RENATA  - Magnesium started with 4 gram loading dose  - BMZ 1/2 given  - Currently NPO  - Anticipate de-escalation off continuous EFM and magnesium, will discuss with staff today

## 2022-04-21 NOTE — ANESTHESIA PREPROCEDURE EVALUATION
[facility]  Anesthesia Pre-Operative Evaluation         Patient Name/: Armida Medina, 1990  MRN: [id number]    SUBJECTIVE:          2022    Armida Medina is a 31 y.o. female  @ 32w4d who presents complaining of headache and elevated BP.  Admitted for obs.     This IUP is complicated by prior CSD (breech), desires repeat (h/o myomectomy), hx of Pre E (on ASA).     OB History    Para Term  AB Living   3 1 1 0 1 1   SAB IAB Ectopic Multiple Live Births   1 0 0 0 1      # Outcome Date GA Lbr Jacob/2nd Weight Sex Delivery Anes PTL Lv   3 Current            2 SAB 21 8w0d          1 Term 18 37w3d  2.95 kg (6 lb 8.1 oz) M CS-LTranv Spinal N RAFAEL      Obstetric Comments   Second pregnancy -Blighted ovum       Review of patient's allergies indicates:   Allergen Reactions    Ibuprofen Hives and Shortness Of Breath     bronchospasm    Ancef [cefazolin] Rash       Prev airway:   Method of Intubation: Direct laryngoscopy; Inserted by: Other (mikel santiago); Airway Device: Endotracheal Tube; Mask Ventilation: Easy; Intubated: Postinduction; Blade: Alegria #2; Airway Device Size: 7.5; Style: Cuffed; Cuff Inflation: Minimal occlusive pressure; Placement Verified By: Capnometry, Auscultation; Grade: Grade I; Complicating Factors: None; Intubation Findings: Positive EtCO2, Bilateral breath sounds, Atraumatic/Condition of teeth unchanged;  Depth of Insertion: 21; Securment: Lips; Complications: None; Breath Sounds: Equal Bilateral; Insertion Attempts: 1    LDA:        Peripheral IV - Single Lumen 22 1440 18 G Left;Posterior Hand (Active)   Number of days: 0       Drips:      Patient Active Problem List   Diagnosis    Hashimoto's thyroiditis    Childhood asthma without complication    Iron deficiency anemia    Obesity (BMI 35.0-39.9 without comorbidity)    Mixed hyperlipidemia    Statin myopathy    Vitamin D deficiency    History of depression     History of myomectomy    History of  section    Pregnancy with one fetus, antepartum    Pre-eclampsia with Severe Features, antepartum, third trimester       ECHO (if available)________________________  No results found for this or any previous visit.    ________________________________________    Current Inpatient Medications:       Current Facility-Administered Medications on File Prior to Visit   Medication Dose Route Frequency Provider Last Rate Last Admin    betamethasone acetate-betamethasone sodium phosphate injection 12 mg  12 mg Intramuscular Q24H Telma Vazquez MD   12 mg at 22 1517    calcium gluconate 100 mg/mL (10%) injection 1 g  1 g Intravenous PRN Tammy Booth MD        hydrALAZINE injection 10 mg  10 mg Intravenous Once PRN Tammy Booth MD        labetaloL injection 40 mg  40 mg Intravenous Once PRN Tammy Booth MD        labetaloL injection 80 mg  80 mg Intravenous Once PRN Tammy Booth MD        lactated ringers infusion   Intravenous Continuous Tammy Booth MD 75 mL/hr at 22 0441 1,000 mL at 22 0441    magnesium sulfate in water 40 gram/1,000 mL (4 %) infusion  2 g/hr Intravenous Continuous Tammy Booth MD 50 mL/hr at 22 1545 2 g/hr at 22 1545     Current Outpatient Medications on File Prior to Visit   Medication Sig Dispense Refill    aspirin 81 mg Cap       cetirizine (ZYRTEC) 10 MG tablet Take 10 mg by mouth once daily.      ergocalciferol (ERGOCALCIFEROL) 50,000 unit Cap TAKE 1 CAPSULE BY MOUTH EVERY 7 DAYS 12 capsule 3    famotidine (PEPCID) 20 MG tablet Take 20 mg by mouth 2 (two) times daily as needed for Heartburn.         Past Surgical History:   Procedure Laterality Date     SECTION N/A 2018    Procedure:  SECTION;  Surgeon: Sawyer Lake Jr., MD;  [facility]: [facility];  Service: OB/GYN;  Laterality: N/A;    CYST REMOVAL      Left ear @ age 2    DILATION AND CURETTAGE OF UTERUS USING SUCTION N/A  7/16/2021    Procedure: DILATION AND CURETTAGE, UTERUS, USING SUCTION;  Surgeon: Fiorella Carpenter DO;  Location: [facility] OR;  Service: OB/GYN;  Laterality: N/A;    ROBOT-ASSISTED LAPAROSCOPIC UTERINE MYOMECTOMY N/A 12/22/2020    Procedure: ROBOTIC MYOMECTOMY, UTERUS;  Surgeon: Sylvie Zavaleta MD;  Location: [facility] OR;  Service: OB/GYN;  Laterality: N/A;    TONSILLECTOMY, ADENOIDECTOMY         Social History:  Tobacco Use: Low Risk     Smoking Tobacco Use: Never Smoker    Smokeless Tobacco Use: Never Used       Alcohol Use: Not on file       OBJECTIVE:     Vital Signs Range:  BMI Readings from Last 1 Encounters:   04/20/22 47.62 kg/m²       Temp:  [36.7 °C (98 °F)-36.8 °C (98.3 °F)]   Pulse:  []   Resp:  [18-20]   BP: (110-143)/(55-82)   SpO2:  [93 %-97 %]        Significant Labs:        Component Value Date/Time    WBC 12.69 04/20/2022 1431    HGB 12.2 04/20/2022 1431    HCT 37.3 04/20/2022 1431     04/20/2022 1431     04/20/2022 1431    K 4.3 04/20/2022 1431     04/20/2022 1431    CO2 20 (L) 04/20/2022 1431    GLU 92 04/20/2022 1431    BUN 9 04/20/2022 1431    CREATININE 0.7 04/20/2022 1431    CALCIUM 9.3 04/20/2022 1431    ALBUMIN 2.9 (L) 04/20/2022 1431    PROT 6.8 04/20/2022 1431    ALKPHOS 119 04/20/2022 1431    BILITOT 0.1 04/20/2022 1431    AST 11 04/20/2022 1431    ALT 10 04/20/2022 1431    HGBA1C 5.1 12/10/2020 0734        Please see Results Review for additional labs.     Diagnostic Studies: No relevant studies.    EKG:   No results found for this or any previous visit.    ECHO:  See subjective, if available.      ASSESSMENT/PLAN:           Pre-op Assessment    I have reviewed the Patient Summary Reports.     I have reviewed the Nursing Notes.    I have reviewed the Medications.     Review of Systems  Anesthesia Hx:  No problems with previous Anesthesia Denies Hx of Anesthetic complications  History of prior surgery of interest to airway management or planning: Denies  Family Hx of Anesthesia complications.   Denies Personal Hx of Anesthesia complications.   Hematology/Oncology:  Hematology Normal   Oncology Normal    -- Denies Anemia:  Denies Current/Recent Cancer  --  Denies Cancer in past history:    EENT/Dental:EENT/Dental Normal   Cardiovascular:   Hypertension    Pulmonary:   Asthma    Renal/:  Renal/ Normal  Denies Chronic Renal Disease.     Hepatic/GI:  Hepatic/GI Normal  Denies GERD.    Musculoskeletal:  Musculoskeletal Normal    Neurological:   Denies CVA. Neuromuscular Disease,  Denies Seizures.    Endocrine:  Endocrine Normal Denies Diabetes.    Dermatological:  Skin Normal    Psych:  Psychiatric Normal           Physical Exam  General: Well nourished, Cooperative, Alert and Oriented    Chest/Lungs:  Normal Respiratory Rate    Heart:  Rhythm: Regular Rhythm        Anesthesia Plan  Type of Anesthesia, risks & benefits discussed:    Anesthesia Type: Gen ETT, Epidural, Spinal, CSE  Intra-op Monitoring Plan: Standard ASA Monitors  Post Op Pain Control Plan: multimodal analgesia, IV/PO Opioids PRN, epidural analgesia and intrathecal opioid  Informed Consent: Informed consent signed with the Patient and all parties understand the risks and agree with anesthesia plan.  All questions answered.   ASA Score: 3  Day of Surgery Review of History & Physical: H&P Update referred to the surgeon/provider.    Ready For Surgery From Anesthesia Perspective.     .

## 2022-04-21 NOTE — ASSESSMENT & PLAN NOTE
-Pre preg BMI 39  -SCD on while in bed  -Encourage ambulation                           Measurements:  Wt Readings from Last 1 Encounters:   04/21/22 116.7 kg (257 lb 4.4 oz)   Body mass index is 47.06 kg/m².    Recommendations:      Patient has been screened and assessed by RD. RD will follow patient.

## 2022-04-21 NOTE — PROGRESS NOTES
[facility] - Antepartum ([place])  Obstetrics  Antepartum Progress Note    Patient Name: Armida Medina  MRN: [id number]  Admission Date: 2022  Hospital Length of Stay: 1 days  Attending Physician: No att. providers found  Primary Care Provider: Zaina Gutierrez MD    Subjective:     Principal Problem:Pre-eclampsia, antepartum, third trimester    HPI:  Armida Medina is a 31 y.o. [id number] at 32w4d presented to the OB ED due to elevated blood pressures in clinic (160/90), and a headache (3-4 out of 10 in severity). Denies vision changes, RUQ pain, chest pain, SOB, edema, fevers, chills, N/V/D. Denies taking any medications for her HA.     This IUP is complicated by h/o CSx1 (Pre-E w/ SF, Breech Presentation), h/o RA-myomectomy, depression, obesity (BMI 47; pre preg BMI 39), anemia, asthma, and Hashimoto's.       Hospital Course:  2022 - HD#1 Severe range BP in RENATA, pushed labetalol 20. 1g Tylenol given with improvement in HA. Admitted to antepartum service for management Pre-E with SF (BP). Magnesium started, BMZ 1/2 given. GBS and Pre-E labs collected.    2022 - HD#2 VSS overnight. Asymptomatic. Anticipate de-escalating magnesium today, regular diet. Will complete steroid course today.      Obstetric HPI:  Patient reports None contractions, active fetal movement, absent vaginal bleeding , absent loss of fluid      Objective:     Vital Signs (Most Recent):  Temp: 98 °F (36.7 °C) (22)  Pulse: 86 (22 0614)  Resp: 20 (22)  BP: (!) 117/57 (22 05)  SpO2: 95 % (22)   Vital Signs (24h Range):  Temp:  [97.8 °F (36.6 °C)-98.3 °F (36.8 °C)] 98 °F (36.7 °C)  Pulse:  [] 86  Resp:  [18-21] 20  SpO2:  [93 %-99 %] 95 %  BP: (110-178)/(55-90) 117/57     Weight: 116.7 kg (257 lb 4.4 oz)  Body mass index is 47.06 kg/m².    FHT: 130 bpm, moderate variability, +accels, + one variable decel Cat 2 (reassuring)  TOCO:  none      Intake/Output Summary (Last 24 hours) at  4/21/2022 0650  Last data filed at 4/21/2022 0618  Gross per 24 hour   Intake 1857.66 ml   Output 1900 ml   Net -42.34 ml       Cervical Exam: deferred     Significant Labs:  Recent Lab Results         04/20/22  1431   04/20/22  1421        Albumin 2.9         Alkaline Phosphatase 119         ALT 10         Anion Gap 13         AST 11         Baso # 0.02         Basophil % 0.2         BILIRUBIN TOTAL 0.1  Comment: For infants and newborns, interpretation of results should be based  on gestational age, weight and in agreement with clinical  observations.    Premature Infant recommended reference ranges:  Up to 24 hours.............<8.0 mg/dL  Up to 48 hours............<12.0 mg/dL  3-5 days..................<15.0 mg/dL  6-29 days.................<15.0 mg/dL           BUN 9         Calcium 9.3         Chloride 105         CO2 20         Creatinine 0.7         Creatinine, Urine   35.8       Differential Method Automated         eGFR if  >60         eGFR if non  >60  Comment: Calculation used to obtain the estimated glomerular filtration  rate (eGFR) is the CKD-EPI equation.            Eos # 0.1         Eosinophil % 0.4         Glucose 92         Gran # (ANC) 10.0         Gran % 78.8         Group & Rh A POS         Hematocrit 37.3         Hemoglobin 12.2         HIV 1/2 Ag/Ab Negative         Immature Grans (Abs) 0.09  Comment: Mild elevation in immature granulocytes is non specific and   can be seen in a variety of conditions including stress response,   acute inflammation, trauma and pregnancy. Correlation with other   laboratory and clinical findings is essential.           Immature Granulocytes 0.7         INDIRECT RYLAN NEG         Lymph # 1.5         Lymph % 12.0         MCH 28.4         MCHC 32.7         MCV 87         Mono # 1.0         Mono % 7.9         MPV 12.1         nRBC 0         Platelets 204         Potassium 4.3         Prot/Creat Ratio, Urine   Unable to calculate        PROTEIN TOTAL 6.8         Protein, Urine Random   <7       RBC 4.30         RDW 13.6         Sodium 138         WBC 12.69                 Physical Exam:   Constitutional: She is oriented to person, place, and time. She appears well-developed and well-nourished. No distress.    HENT:   Head: Normocephalic and atraumatic.    Eyes: Pupils are equal, round, and reactive to light.     Cardiovascular:  Normal rate.             Pulmonary/Chest: Effort normal. No respiratory distress.        Abdominal: There is no abdominal tenderness. There is no rebound and no guarding.   Gravid, obese             Musculoskeletal: Normal range of motion.       Neurological: She is alert and oriented to person, place, and time.    Skin: Skin is warm and dry.    Psychiatric: She has a normal mood and affect. Her behavior is normal.     Assessment/Plan:     31 y.o. female  at 32w5d for:    * Pre-eclampsia with Severe Features, antepartum, third trimester  - BP: (110-178)/(55-90) 117/57  - Required one push of labetalol 20 in RENATA with return of BP to WNL  - CBC  12.7/12/37/204  - CMP Cr 0.7 AST/ALT 11/10  - P/C ratio TLTC  - Headache resolved with Tylenol (1g) in RENATA  - Magnesium started with 4 gram loading dose  - BMZ 1/2 given  - Currently NPO  - Anticipate de-escalation off continuous EFM and magnesium, will discuss with staff today      History of  section  -H/o CSDx1 @37wga due to breech presentation/Pre-E with SF  -Desires repeat CSD  -CSD consents signed and to chart, blood transfusion consents signed and to chart   -U/S with transverse presentation  -SVE: closed/thick/high    History of myomectomy  -Repeat CSD for delivery     Obesity (BMI 35.0-39.9 without comorbidity)  -Pre preg BMI 39  -SCD on while in bed  -Encourage ambulation                           Measurements:  Wt Readings from Last 1 Encounters:   22 116.7 kg (257 lb 4.4 oz)   Body mass index is 47.06 kg/m².    Recommendations:      Patient has  been screened and assessed by RD. RD will follow patient.    Iron deficiency anemia  -pt asymptomatic  -H/H   -continue to monitor             Liane Arceo MD  Obstetrics  [facility] - Antepartum ([place])        Patient seen and examined. 31 year old  at 32w4d admitted for suspected preeclampsia with severe features due to multiple severe range Bps.    Patient with no complaints today however does report increased weight gain and edema over the past few days.    Temp:  [97.8 °F (36.6 °C)-98.7 °F (37.1 °C)] 98.7 °F (37.1 °C)  Pulse:  [] 80  Resp:  [17-21] 17  SpO2:  [93 %-99 %] 97 %  BP: (110-178)/(55-90) 135/75    NST: 130 bpm/moderate variability/ 2+ accels/ no decels        Will continue to monitor however, current plan is for delivery at 34 weeks. Although no proteinuria, given elevations in blood pressures, history of preeclampsia, and markedly increased edema, likely preeclampsia with severe features.   Complete betamethasone  NST BID  Regular diet  Recent growth on  with AGA fetus  Will be for C section with Dr. Carpenter.    Gianna Oneal MD  PGY 6  Maternal Fetal Medicine  [facility]

## 2022-04-21 NOTE — SUBJECTIVE & OBJECTIVE
Obstetric HPI:  Patient reports None contractions, active fetal movement, absent vaginal bleeding , absent loss of fluid      Objective:     Vital Signs (Most Recent):  Temp: 98 °F (36.7 °C) (04/21/22 0422)  Pulse: 86 (04/21/22 0614)  Resp: 20 (04/21/22 0422)  BP: (!) 117/57 (04/21/22 0522)  SpO2: 95 % (04/21/22 0614)   Vital Signs (24h Range):  Temp:  [97.8 °F (36.6 °C)-98.3 °F (36.8 °C)] 98 °F (36.7 °C)  Pulse:  [] 86  Resp:  [18-21] 20  SpO2:  [93 %-99 %] 95 %  BP: (110-178)/(55-90) 117/57     Weight: 116.7 kg (257 lb 4.4 oz)  Body mass index is 47.06 kg/m².    FHT: 130 bpm, moderate variability, +accels, + one variable decel Cat 2 (reassuring)  TOCO:  none      Intake/Output Summary (Last 24 hours) at 4/21/2022 0650  Last data filed at 4/21/2022 0618  Gross per 24 hour   Intake 1857.66 ml   Output 1900 ml   Net -42.34 ml       Cervical Exam: deferred     Significant Labs:  Recent Lab Results         04/20/22  1431   04/20/22  1421        Albumin 2.9         Alkaline Phosphatase 119         ALT 10         Anion Gap 13         AST 11         Baso # 0.02         Basophil % 0.2         BILIRUBIN TOTAL 0.1  Comment: For infants and newborns, interpretation of results should be based  on gestational age, weight and in agreement with clinical  observations.    Premature Infant recommended reference ranges:  Up to 24 hours.............<8.0 mg/dL  Up to 48 hours............<12.0 mg/dL  3-5 days..................<15.0 mg/dL  6-29 days.................<15.0 mg/dL           BUN 9         Calcium 9.3         Chloride 105         CO2 20         Creatinine 0.7         Creatinine, Urine   35.8       Differential Method Automated         eGFR if  >60         eGFR if non  >60  Comment: Calculation used to obtain the estimated glomerular filtration  rate (eGFR) is the CKD-EPI equation.            Eos # 0.1         Eosinophil % 0.4         Glucose 92         Gran # (ANC) 10.0         Gran %  78.8         Group & Rh A POS         Hematocrit 37.3         Hemoglobin 12.2         HIV 1/2 Ag/Ab Negative         Immature Grans (Abs) 0.09  Comment: Mild elevation in immature granulocytes is non specific and   can be seen in a variety of conditions including stress response,   acute inflammation, trauma and pregnancy. Correlation with other   laboratory and clinical findings is essential.           Immature Granulocytes 0.7         INDIRECT RYLAN NEG         Lymph # 1.5         Lymph % 12.0         MCH 28.4         MCHC 32.7         MCV 87         Mono # 1.0         Mono % 7.9         MPV 12.1         nRBC 0         Platelets 204         Potassium 4.3         Prot/Creat Ratio, Urine   Unable to calculate       PROTEIN TOTAL 6.8         Protein, Urine Random   <7       RBC 4.30         RDW 13.6         Sodium 138         WBC 12.69                 Physical Exam:   Constitutional: She is oriented to person, place, and time. She appears well-developed and well-nourished. No distress.    HENT:   Head: Normocephalic and atraumatic.    Eyes: Pupils are equal, round, and reactive to light.     Cardiovascular:  Normal rate.             Pulmonary/Chest: Effort normal. No respiratory distress.        Abdominal: There is no abdominal tenderness. There is no rebound and no guarding.   Gravid, obese             Musculoskeletal: Normal range of motion.       Neurological: She is alert and oriented to person, place, and time.    Skin: Skin is warm and dry.    Psychiatric: She has a normal mood and affect. Her behavior is normal.

## 2022-04-21 NOTE — CARE UPDATE
PM NST    140 bpm, moderate variability, +accels, -decels  No ctx    Reactive and reassuring.    Liane Arceo MD/MPH  OB/GYN PGY2

## 2022-04-22 VITALS
HEIGHT: 62 IN | SYSTOLIC BLOOD PRESSURE: 116 MMHG | RESPIRATION RATE: 18 BRPM | DIASTOLIC BLOOD PRESSURE: 55 MMHG | WEIGHT: 253.88 LBS | OXYGEN SATURATION: 100 % | HEART RATE: 62 BPM | TEMPERATURE: 98 F | BODY MASS INDEX: 46.72 KG/M2

## 2022-04-22 PROBLEM — O14.93 PRE-ECLAMPSIA, ANTEPARTUM, THIRD TRIMESTER: Status: RESOLVED | Noted: 2022-04-20 | Resolved: 2022-04-22

## 2022-04-22 PROBLEM — O13.3 GESTATIONAL HYPERTENSION, THIRD TRIMESTER: Status: ACTIVE | Noted: 2022-04-22

## 2022-04-22 PROCEDURE — 99238 HOSP IP/OBS DSCHRG MGMT 30/<: CPT | Mod: 25,,, | Performed by: OBSTETRICS & GYNECOLOGY

## 2022-04-22 PROCEDURE — 99238 PR HOSPITAL DISCHARGE DAY,<30 MIN: ICD-10-PCS | Mod: 25,,, | Performed by: OBSTETRICS & GYNECOLOGY

## 2022-04-22 NOTE — DISCHARGE INSTRUCTIONS
Labor and Delivery: [phone]    Call for:    1)Any vaginal bleeding other than spotting  2)Contractions every 5 minutes for an hour  3)Leaking of fluid like your water broke (If you're unsure, put on a regular pad and if you fill that pad in an hour or less its a good sign that it's your water and not normal discharge of pregnancy or urine)  4)Headache unrelieved by tylenol  5)Any vision changes (seeing spots or floaters)  6)Decreased fetal movement (less than 10 kicks in 2 hours)    Make sure to drink plenty of fluids

## 2022-04-22 NOTE — ASSESSMENT & PLAN NOTE
- BP: (116-142)/(58-76) 122/58  - Required one push of labetalol 20 in RENATA with return of BP to WNL  - CBC  12.7/12/37/204 > 17/11/34/212  - CMP Cr 0.7 > 0.7 AST/ALT 11/10 > 10/11  - P/C ratio TLTC  - Headache resolved with Tylenol (1g) in RENATA  - BMZ 4/20-4/21  - Regular diet  - NST BID

## 2022-04-22 NOTE — DISCHARGE SUMMARY
[facility] - Antepartum ([place])  Obstetrics  Discharge Summary      Patient Name: Armida Medina  MRN: [id number]  Admission Date: 2022  Hospital Length of Stay: 2 days  Discharge Date and Time:  2022 8:41 AM  Attending Physician: Madhav Gold MD   Discharging Provider: Rosana Hwang MD   Primary Care Provider: Zaina Gutierrez MD    HPI: Armida Medina is a 31 y.o. [id number] at 32w4d presented to the OB ED due to elevated blood pressures in clinic (160/90), and a headache (3-4 out of 10 in severity). Denies vision changes, RUQ pain, chest pain, SOB, edema, fevers, chills, N/V/D. Denies taking any medications for her HA.     This IUP is complicated by h/o CSx1 (Pre-E w/ SF, Breech Presentation), h/o RA-myomectomy, depression, obesity (BMI 47; pre preg BMI 39), anemia, asthma, and Hashimoto's.       FHT: reactive and reassuring   TOCO: quiet    Hospital Course:   2022 - HD#1 Severe range BP in RENATA, pushed labetalol 20. 1g Tylenol given with improvement in HA. Admitted to antepartum service for management Pre-E with SF (BP). Magnesium started, BMZ 1/2 given. GBS and Pre-E labs collected.    2022 - HD#2 VSS overnight. Asymptomatic. Anticipate de-escalating magnesium today, regular diet. Will complete steroid course today.  2022 - HD#3 VSS overnight, one mild range. Asymptomatic.       Admitted  for concern for preeclampsia with SF (BP).   Patient had two severe range blood pressures in the RENATA requiring labetalol 20 mg IV x 1  Her pre E labs on admit and repeat during her two day admission were all within normal limits  Admit labs : PC TLC Plt 204, Cr 0.7, AST/ALT 11/10  Repeat labs : Plt 212, CR 0.7, AST/ALT 10/11  During the 48 hours, patient blood pressures were all within normal limits except for one mild range blood pressure  Therefore on review, discussion that patient unlikely to have preeclampsia with SF (BP). Discussion with patient on  and decision to  discharge with twice weekly follow up until end of pregnancy for   -Weekly lab evaluation  -Weekly OBGYN appointment (Dr. Carpenter clinic messaged to schedule appointments)  -PNT for antepartum fetal surveillance. (Order for PNT placed)            Final Active Diagnoses:    Diagnosis Date Noted POA    PRINCIPAL PROBLEM:  Gestational hypertension, third trimester [O13.3] 2022 Unknown    History of  section [Z98.891] 2021 Not Applicable    History of myomectomy [Z98.890] 2020 Not Applicable    Obesity (BMI 35.0-39.9 without comorbidity) [E66.9] 2019 Yes    Iron deficiency anemia [D50.9] 2019 Yes      Problems Resolved During this Admission:    Diagnosis Date Noted Date Resolved POA    Pre-eclampsia with Severe Features, antepartum, third trimester [O14.93] 2022 Unknown        Immunizations     None          This patient has no babies on file.  Pending Diagnostic Studies:     None          Discharged Condition: good    Disposition: Home or Self Care    Follow Up:   Follow-up Information     Fiorella Carpenter DO. Schedule an appointment as soon as possible for a visit in 1 week(s).    Specialty: Obstetrics and Gynecology  Why: BP check and routine prenatal appointment  Contact information:  [address] [place] [id number]  [phone]                       Patient Instructions:      Diet Adult Regular     Notify your health care provider if you experience any of the following:  temperature >100.4     Notify your health care provider if you experience any of the following:  persistent nausea and vomiting or diarrhea     Notify your health care provider if you experience any of the following:  severe uncontrolled pain     Notify your health care provider if you experience any of the following:  redness, tenderness, or signs of infection (pain, swelling, redness, odor or green/yellow discharge around incision site)     Notify your health care  provider if you experience any of the following:  difficulty breathing or increased cough     Notify your health care provider if you experience any of the following:  severe persistent headache     Notify your health care provider if you experience any of the following:  worsening rash     Notify your health care provider if you experience any of the following:  persistent dizziness, light-headedness, or visual disturbances     Notify your health care provider if you experience any of the following:  increased confusion or weakness     Prenatal Testing - NST/LUIS   Standing Status: Standing Number of Occurrences: 24 Standing Exp. Date: 04/22/23     Order Specific Question Answer Comments   Procedures to be performed:  NST - Once or Twice Weekly : If NST is non-reactive, Then a BPP shoud be preformed. Non Stress Test (NST)      Activity as tolerated     Medications:  Current Discharge Medication List      CONTINUE these medications which have NOT CHANGED    Details   aspirin 81 mg Cap       cetirizine (ZYRTEC) 10 MG tablet Take 10 mg by mouth once daily.      ergocalciferol (ERGOCALCIFEROL) 50,000 unit Cap TAKE 1 CAPSULE BY MOUTH EVERY 7 DAYS  Qty: 12 capsule, Refills: 3    Associated Diagnoses: Vitamin D deficiency      famotidine (PEPCID) 20 MG tablet Take 20 mg by mouth 2 (two) times daily as needed for Heartburn.           Austin Hwang MD  OBGYN PGY-3

## 2022-04-22 NOTE — SUBJECTIVE & OBJECTIVE
Obstetric HPI:  Patient reports None contractions, active fetal movement, absent vaginal bleeding , absent loss of fluid.  Denies headache, SOB, edema, RUQ pain, and vision changes.     Objective:     Vital Signs (Most Recent):  Temp: 98.4 °F (36.9 °C) (04/22/22 0430)  Pulse: 74 (04/22/22 0430)  Resp: 16 (04/22/22 0430)  BP: (!) 122/58 (04/22/22 0430)  SpO2: 100 % (04/22/22 0430)   Vital Signs (24h Range):  Temp:  [97.8 °F (36.6 °C)-98.7 °F (37.1 °C)] 98.4 °F (36.9 °C)  Pulse:  [72-90] 74  Resp:  [16-18] 16  SpO2:  [95 %-100 %] 100 %  BP: (116-142)/(58-76) 122/58     Weight: 116.7 kg (257 lb 4.4 oz)  Body mass index is 47.06 kg/m².    FHT: 145 bpm, moderate variability, +accels, - decel Cat 1 (reassuring)  TOCO:  none      Intake/Output Summary (Last 24 hours) at 4/22/2022 0646  Last data filed at 4/21/2022 0939  Gross per 24 hour   Intake 120.24 ml   Output --   Net 120.24 ml         Cervical Exam: deferred     Significant Labs:  Recent Lab Results         04/21/22  1118        Albumin 2.8       Alkaline Phosphatase 114       ALT 11       Anion Gap 16       AST 10       Baso # 0.02       Basophil % 0.1       BILIRUBIN TOTAL 0.2  Comment: For infants and newborns, interpretation of results should be based  on gestational age, weight and in agreement with clinical  observations.    Premature Infant recommended reference ranges:  Up to 24 hours.............<8.0 mg/dL  Up to 48 hours............<12.0 mg/dL  3-5 days..................<15.0 mg/dL  6-29 days.................<15.0 mg/dL         BUN 7       Calcium 8.0       Chloride 102       CO2 18       Creatinine 0.7       Differential Method Automated       eGFR if  >60       eGFR if non  >60  Comment: Calculation used to obtain the estimated glomerular filtration  rate (eGFR) is the CKD-EPI equation.          Eos # 0.0       Eosinophil % 0.1       Glucose 141       Gran # (ANC) 14.6       Gran % 86.1       Hematocrit 34.2        Hemoglobin 11.0       Immature Grans (Abs) 0.49  Comment: Mild elevation in immature granulocytes is non specific and   can be seen in a variety of conditions including stress response,   acute inflammation, trauma and pregnancy. Correlation with other   laboratory and clinical findings is essential.         Immature Granulocytes 2.9       Lymph # 1.1       Lymph % 6.6       MCH 27.8       MCHC 32.2       MCV 87       Mono # 0.7       Mono % 4.2       MPV 12.0       nRBC 0       Platelets 212       Potassium 3.8       PROTEIN TOTAL 6.4       RBC 3.95       RDW 13.8       Sodium 136       WBC 16.92               Physical Exam:   Constitutional: She is oriented to person, place, and time. She appears well-developed and well-nourished. No distress.    HENT:   Head: Normocephalic and atraumatic.    Eyes: Pupils are equal, round, and reactive to light.     Cardiovascular:  Normal rate.             Pulmonary/Chest: Effort normal. No respiratory distress.        Abdominal: There is no abdominal tenderness. There is no rebound and no guarding.   Gravid, obese             Musculoskeletal: Normal range of motion.       Neurological: She is alert and oriented to person, place, and time.    Skin: Skin is warm and dry.    Psychiatric: She has a normal mood and affect. Her behavior is normal.

## 2022-04-22 NOTE — PROGRESS NOTES
[facility] - Antepartum ([place])  Obstetrics  Antepartum Progress Note    Patient Name: Armida Medina  MRN: [id number]  Admission Date: 2022  Hospital Length of Stay: 2 days  Attending Physician: Madhav Gold MD  Primary Care Provider: Zaina Gutierrez MD    Subjective:     Principal Problem:Pre-eclampsia, antepartum, third trimester    HPI:  Armida Medina is a 31 y.o. [id number] at 32w4d presented to the OB ED due to elevated blood pressures in clinic (160/90), and a headache (3-4 out of 10 in severity). Denies vision changes, RUQ pain, chest pain, SOB, edema, fevers, chills, N/V/D. Denies taking any medications for her HA.     This IUP is complicated by h/o CSx1 (Pre-E w/ SF, Breech Presentation), h/o RA-myomectomy, depression, obesity (BMI 47; pre preg BMI 39), anemia, asthma, and Hashimoto's.       Hospital Course:  2022 - HD#1 Severe range BP in RENATA, pushed labetalol 20. 1g Tylenol given with improvement in HA. Admitted to antepartum service for management Pre-E with SF (BP). Magnesium started, BMZ 1/2 given. GBS and Pre-E labs collected.    2022 - HD#2 VSS overnight. Asymptomatic. Anticipate de-escalating magnesium today, regular diet. Will complete steroid course today.  2022 - HD#3 VSS overnight, one mild range. Asymptomatic.       Obstetric HPI:  Patient reports None contractions, active fetal movement, absent vaginal bleeding , absent loss of fluid.  Denies headache, SOB, edema, RUQ pain, and vision changes.     Objective:     Vital Signs (Most Recent):  Temp: 98.4 °F (36.9 °C) (22)  Pulse: 74 (22)  Resp: 16 (22)  BP: (!) 122/58 (22)  SpO2: 100 % (22)   Vital Signs (24h Range):  Temp:  [97.8 °F (36.6 °C)-98.7 °F (37.1 °C)] 98.4 °F (36.9 °C)  Pulse:  [72-90] 74  Resp:  [16-18] 16  SpO2:  [95 %-100 %] 100 %  BP: (116-142)/(58-76) 122/58     Weight: 116.7 kg (257 lb 4.4 oz)  Body mass index is 47.06 kg/m².    FHT: 145 bpm,  moderate variability, +accels, - decel Cat 1 (reassuring)  TOCO:  none      Intake/Output Summary (Last 24 hours) at 4/22/2022 0646  Last data filed at 4/21/2022 0939  Gross per 24 hour   Intake 120.24 ml   Output --   Net 120.24 ml         Cervical Exam: deferred     Significant Labs:  Recent Lab Results         04/21/22  1118        Albumin 2.8       Alkaline Phosphatase 114       ALT 11       Anion Gap 16       AST 10       Baso # 0.02       Basophil % 0.1       BILIRUBIN TOTAL 0.2  Comment: For infants and newborns, interpretation of results should be based  on gestational age, weight and in agreement with clinical  observations.    Premature Infant recommended reference ranges:  Up to 24 hours.............<8.0 mg/dL  Up to 48 hours............<12.0 mg/dL  3-5 days..................<15.0 mg/dL  6-29 days.................<15.0 mg/dL         BUN 7       Calcium 8.0       Chloride 102       CO2 18       Creatinine 0.7       Differential Method Automated       eGFR if  >60       eGFR if non  >60  Comment: Calculation used to obtain the estimated glomerular filtration  rate (eGFR) is the CKD-EPI equation.          Eos # 0.0       Eosinophil % 0.1       Glucose 141       Gran # (ANC) 14.6       Gran % 86.1       Hematocrit 34.2       Hemoglobin 11.0       Immature Grans (Abs) 0.49  Comment: Mild elevation in immature granulocytes is non specific and   can be seen in a variety of conditions including stress response,   acute inflammation, trauma and pregnancy. Correlation with other   laboratory and clinical findings is essential.         Immature Granulocytes 2.9       Lymph # 1.1       Lymph % 6.6       MCH 27.8       MCHC 32.2       MCV 87       Mono # 0.7       Mono % 4.2       MPV 12.0       nRBC 0       Platelets 212       Potassium 3.8       PROTEIN TOTAL 6.4       RBC 3.95       RDW 13.8       Sodium 136       WBC 16.92               Physical Exam:   Constitutional: She is  oriented to person, place, and time. She appears well-developed and well-nourished. No distress.    HENT:   Head: Normocephalic and atraumatic.    Eyes: Pupils are equal, round, and reactive to light.     Cardiovascular:  Normal rate.             Pulmonary/Chest: Effort normal. No respiratory distress.        Abdominal: There is no abdominal tenderness. There is no rebound and no guarding.   Gravid, obese             Musculoskeletal: Normal range of motion.       Neurological: She is alert and oriented to person, place, and time.    Skin: Skin is warm and dry.    Psychiatric: She has a normal mood and affect. Her behavior is normal.     Assessment/Plan:     31 y.o. female  at 32w6d for:    * Pre-eclampsia with Severe Features, antepartum, third trimester  - BP: (116-142)/(58-76) 122/58  - Required one push of labetalol 20 in RENATA with return of BP to WNL  - CBC  12.7/12/37/204 > 17///212  - CMP Cr 0.7 > 0.7 AST/ALT 11/10 > 10/11  - P/C ratio TLTC  - Headache resolved with Tylenol (1g) in RENATA  - BMZ -  - Regular diet  - NST BID    History of  section  -H/o CSDx1 @37wga due to breech presentation/Pre-E with SF  -Desires repeat CSD  -CSD consents signed and to chart, blood transfusion consents signed and to chart   -U/S with transverse presentation  -SVE: closed/thick/high    History of myomectomy  -Repeat CSD for delivery     Obesity (BMI 35.0-39.9 without comorbidity)  -Pre preg BMI 39  -SCD on while in bed  -Encourage ambulation     Iron deficiency anemia  -pt asymptomatic  -H/H   -continue to monitor             Liane Arceo MD  Obstetrics  [facility] - Antepartum ([place])      Patient seen and examined. 31 year old  at 32w4d admitted for suspected preeclampsia with severe features due to multiple severe range Bps.     Patient with no complaints today and normal to mild range Bps over 24 hours.     Temp:  [97.8 °F (36.6 °C)-98.4 °F (36.9 °C)] 98.2 °F (36.8 °C)  Pulse:  [62-90]  62  Resp:  [16-18] 18  SpO2:  [97 %-100 %] 100 %  BP: (116-142)/(55-76) 116/55       NST: 140 bpm/moderate variability/ 2+ accels/ no decels        Patient observed through betamethasone window and no additional severe ranges. Given overall stable clinical appearance, will monitor outpatient.  PNT on Monday and Follow up with Dr. Carpenter late next week with weekly Preeclampsia labs    Okay to discharge home.     Gianna Oneal MD  PGY 6  Maternal Fetal Medicine  [facility]

## 2022-04-23 LAB — BACTERIA SPEC AEROBE CULT: NORMAL

## 2022-04-25 ENCOUNTER — HOSPITAL ENCOUNTER (OUTPATIENT)
Dept: PERINATAL CARE | Facility: OTHER | Age: 32
Discharge: HOME | End: 2022-04-25
Attending: OBSTETRICS & GYNECOLOGY
Payer: COMMERCIAL

## 2022-04-25 ENCOUNTER — ROUTINE PRENATAL (OUTPATIENT)
Dept: OBSTETRICS AND GYNECOLOGY | Facility: CLINIC | Age: 32
End: 2022-04-25
Attending: OBSTETRICS & GYNECOLOGY
Payer: COMMERCIAL

## 2022-04-25 DIAGNOSIS — Z98.890 HISTORY OF MYOMECTOMY: Primary | ICD-10-CM

## 2022-04-25 DIAGNOSIS — O13.3 GESTATIONAL HYPERTENSION, THIRD TRIMESTER: ICD-10-CM

## 2022-04-25 PROCEDURE — 0502F SUBSEQUENT PRENATAL CARE: CPT | Mod: CPTII,S$GLB,, | Performed by: OBSTETRICS & GYNECOLOGY

## 2022-04-25 PROCEDURE — 0502F PR SUBSEQUENT PRENATAL CARE: ICD-10-PCS | Mod: CPTII,S$GLB,, | Performed by: OBSTETRICS & GYNECOLOGY

## 2022-04-25 PROCEDURE — 99999 PR PBB SHADOW E&M-EST. PATIENT-LVL II: ICD-10-PCS | Mod: PBBFAC,,, | Performed by: OBSTETRICS & GYNECOLOGY

## 2022-04-25 PROCEDURE — 76818 FETAL BIOPHYS PROFILE W/NST: CPT

## 2022-04-25 PROCEDURE — 99999 PR PBB SHADOW E&M-EST. PATIENT-LVL II: CPT | Mod: PBBFAC,,, | Performed by: OBSTETRICS & GYNECOLOGY

## 2022-04-25 PROCEDURE — 76818 FETAL BIOPHYS PROFILE W/NST: CPT | Mod: 26,,, | Performed by: OBSTETRICS & GYNECOLOGY

## 2022-04-25 PROCEDURE — 76818 PRENATAL TESTING - NST/AFI: ICD-10-PCS | Mod: 26,,, | Performed by: OBSTETRICS & GYNECOLOGY

## 2022-04-25 NOTE — PROGRESS NOTES
Patient seen and examined.  No complaints, denies VB/LOF/Ctxns, reports good fetal movement. Precautions for Bleeding/ ROM/ Decreased fetal movement/ Pre-E reviewed.  F/U scheduled and strict precautions for pre-e and need for presentation to L&D.

## 2022-04-28 ENCOUNTER — HOSPITAL ENCOUNTER (INPATIENT)
Facility: OTHER | Age: 32
LOS: 2 days | Discharge: HOME | End: 2022-05-02
Attending: OBSTETRICS & GYNECOLOGY | Admitting: OBSTETRICS & GYNECOLOGY
Payer: COMMERCIAL

## 2022-04-28 ENCOUNTER — TELEPHONE (OUTPATIENT)
Dept: OBSTETRICS AND GYNECOLOGY | Facility: CLINIC | Age: 32
End: 2022-04-28
Payer: COMMERCIAL

## 2022-04-28 ENCOUNTER — HOSPITAL ENCOUNTER (OUTPATIENT)
Dept: PERINATAL CARE | Facility: OTHER | Age: 32
Discharge: HOME | End: 2022-04-28
Attending: STUDENT IN AN ORGANIZED HEALTH CARE EDUCATION/TRAINING PROGRAM
Payer: COMMERCIAL

## 2022-04-28 DIAGNOSIS — Z3A.33 33 WEEKS GESTATION OF PREGNANCY: ICD-10-CM

## 2022-04-28 DIAGNOSIS — O16.3 HYPERTENSION AFFECTING PREGNANCY IN THIRD TRIMESTER: ICD-10-CM

## 2022-04-28 DIAGNOSIS — Z98.891 STATUS POST CESAREAN DELIVERY: ICD-10-CM

## 2022-04-28 DIAGNOSIS — O13.3 GESTATIONAL HYPERTENSION, THIRD TRIMESTER: ICD-10-CM

## 2022-04-28 DIAGNOSIS — R03.0 ELEVATED BLOOD PRESSURE READING: Primary | ICD-10-CM

## 2022-04-28 LAB
ABO + RH BLD: NORMAL
ALBUMIN SERPL BCP-MCNC: 2.8 G/DL (ref 3.5–5.2)
ALP SERPL-CCNC: 124 U/L (ref 55–135)
ALT SERPL W/O P-5'-P-CCNC: 11 U/L (ref 10–44)
ANION GAP SERPL CALC-SCNC: 13 MMOL/L (ref 8–16)
AST SERPL-CCNC: 11 U/L (ref 10–40)
BASOPHILS # BLD AUTO: 0.01 K/UL (ref 0–0.2)
BASOPHILS NFR BLD: 0.1 % (ref 0–1.9)
BILIRUB SERPL-MCNC: 0.1 MG/DL (ref 0.1–1)
BLD GP AB SCN CELLS X3 SERPL QL: NORMAL
BUN SERPL-MCNC: 11 MG/DL (ref 6–20)
CALCIUM SERPL-MCNC: 9.4 MG/DL (ref 8.7–10.5)
CHLORIDE SERPL-SCNC: 104 MMOL/L (ref 95–110)
CO2 SERPL-SCNC: 20 MMOL/L (ref 23–29)
CREAT SERPL-MCNC: 0.7 MG/DL (ref 0.5–1.4)
CREAT UR-MCNC: 218.8 MG/DL (ref 15–325)
DIFFERENTIAL METHOD: ABNORMAL
EOSINOPHIL # BLD AUTO: 0.1 K/UL (ref 0–0.5)
EOSINOPHIL NFR BLD: 0.5 % (ref 0–8)
ERYTHROCYTE [DISTWIDTH] IN BLOOD BY AUTOMATED COUNT: 13.6 % (ref 11.5–14.5)
EST. GFR  (AFRICAN AMERICAN): >60 ML/MIN/1.73 M^2
EST. GFR  (NON AFRICAN AMERICAN): >60 ML/MIN/1.73 M^2
GLUCOSE SERPL-MCNC: 126 MG/DL (ref 70–110)
HCT VFR BLD AUTO: 37.5 % (ref 37–48.5)
HGB BLD-MCNC: 12.1 G/DL (ref 12–16)
IMM GRANULOCYTES # BLD AUTO: 0.08 K/UL (ref 0–0.04)
IMM GRANULOCYTES NFR BLD AUTO: 0.6 % (ref 0–0.5)
LYMPHOCYTES # BLD AUTO: 1.5 K/UL (ref 1–4.8)
LYMPHOCYTES NFR BLD: 11.5 % (ref 18–48)
MCH RBC QN AUTO: 28.1 PG (ref 27–31)
MCHC RBC AUTO-ENTMCNC: 32.3 G/DL (ref 32–36)
MCV RBC AUTO: 87 FL (ref 82–98)
MONOCYTES # BLD AUTO: 0.9 K/UL (ref 0.3–1)
MONOCYTES NFR BLD: 7 % (ref 4–15)
NEUTROPHILS # BLD AUTO: 10.5 K/UL (ref 1.8–7.7)
NEUTROPHILS NFR BLD: 80.3 % (ref 38–73)
NRBC BLD-RTO: 0 /100 WBC
PLATELET # BLD AUTO: 223 K/UL (ref 150–450)
PMV BLD AUTO: 12.9 FL (ref 9.2–12.9)
POTASSIUM SERPL-SCNC: 3.8 MMOL/L (ref 3.5–5.1)
PROT SERPL-MCNC: 7 G/DL (ref 6–8.4)
PROT UR-MCNC: 59 MG/DL (ref 0–15)
PROT/CREAT UR: 0.27 MG/G{CREAT} (ref 0–0.2)
RBC # BLD AUTO: 4.31 M/UL (ref 4–5.4)
SODIUM SERPL-SCNC: 137 MMOL/L (ref 136–145)
WBC # BLD AUTO: 13.1 K/UL (ref 3.9–12.7)

## 2022-04-28 PROCEDURE — 59025 FETAL NON-STRESS TEST: CPT

## 2022-04-28 PROCEDURE — 99219 PR INITIAL OBSERVATION CARE,LEVL II: CPT | Mod: 25,,, | Performed by: OBSTETRICS & GYNECOLOGY

## 2022-04-28 PROCEDURE — 76818 PRENATAL TESTING - NST/AFI: ICD-10-PCS | Mod: 26,,, | Performed by: OBSTETRICS & GYNECOLOGY

## 2022-04-28 PROCEDURE — 25000003 PHARM REV CODE 250: Performed by: STUDENT IN AN ORGANIZED HEALTH CARE EDUCATION/TRAINING PROGRAM

## 2022-04-28 PROCEDURE — 96366 THER/PROPH/DIAG IV INF ADDON: CPT

## 2022-04-28 PROCEDURE — 96375 TX/PRO/DX INJ NEW DRUG ADDON: CPT

## 2022-04-28 PROCEDURE — 76818 FETAL BIOPHYS PROFILE W/NST: CPT

## 2022-04-28 PROCEDURE — 96365 THER/PROPH/DIAG IV INF INIT: CPT

## 2022-04-28 PROCEDURE — 80053 COMPREHEN METABOLIC PANEL: CPT | Performed by: STUDENT IN AN ORGANIZED HEALTH CARE EDUCATION/TRAINING PROGRAM

## 2022-04-28 PROCEDURE — 86762 RUBELLA ANTIBODY: CPT | Performed by: STUDENT IN AN ORGANIZED HEALTH CARE EDUCATION/TRAINING PROGRAM

## 2022-04-28 PROCEDURE — 84156 ASSAY OF PROTEIN URINE: CPT | Performed by: STUDENT IN AN ORGANIZED HEALTH CARE EDUCATION/TRAINING PROGRAM

## 2022-04-28 PROCEDURE — 99284 PR EMERGENCY DEPT VISIT,LEVEL IV: ICD-10-PCS | Mod: 25,,, | Performed by: OBSTETRICS & GYNECOLOGY

## 2022-04-28 PROCEDURE — 99219 PR INITIAL OBSERVATION CARE,LEVL II: ICD-10-PCS | Mod: 25,,, | Performed by: OBSTETRICS & GYNECOLOGY

## 2022-04-28 PROCEDURE — 63600175 PHARM REV CODE 636 W HCPCS: Performed by: STUDENT IN AN ORGANIZED HEALTH CARE EDUCATION/TRAINING PROGRAM

## 2022-04-28 PROCEDURE — G0378 HOSPITAL OBSERVATION PER HR: HCPCS

## 2022-04-28 PROCEDURE — 85025 COMPLETE CBC W/AUTO DIFF WBC: CPT | Performed by: STUDENT IN AN ORGANIZED HEALTH CARE EDUCATION/TRAINING PROGRAM

## 2022-04-28 PROCEDURE — 59025 FETAL NON-STRESS TEST: CPT | Mod: 26,76,, | Performed by: OBSTETRICS & GYNECOLOGY

## 2022-04-28 PROCEDURE — 59025 FETAL NON-STRESS TEST: CPT | Mod: 26,,, | Performed by: OBSTETRICS & GYNECOLOGY

## 2022-04-28 PROCEDURE — 76818 FETAL BIOPHYS PROFILE W/NST: CPT | Mod: 26,,, | Performed by: OBSTETRICS & GYNECOLOGY

## 2022-04-28 PROCEDURE — 86850 RBC ANTIBODY SCREEN: CPT | Performed by: STUDENT IN AN ORGANIZED HEALTH CARE EDUCATION/TRAINING PROGRAM

## 2022-04-28 PROCEDURE — 96372 THER/PROPH/DIAG INJ SC/IM: CPT | Performed by: STUDENT IN AN ORGANIZED HEALTH CARE EDUCATION/TRAINING PROGRAM

## 2022-04-28 PROCEDURE — 25000003 PHARM REV CODE 250

## 2022-04-28 PROCEDURE — 99285 EMERGENCY DEPT VISIT HI MDM: CPT | Mod: 25

## 2022-04-28 PROCEDURE — 59025 PR FETAL 2N-STRESS TEST: ICD-10-PCS | Mod: 26,,, | Performed by: OBSTETRICS & GYNECOLOGY

## 2022-04-28 PROCEDURE — 59025 PR FETAL 2N-STRESS TEST: ICD-10-PCS | Mod: 26,76,, | Performed by: OBSTETRICS & GYNECOLOGY

## 2022-04-28 PROCEDURE — 99284 EMERGENCY DEPT VISIT MOD MDM: CPT | Mod: 25,,, | Performed by: OBSTETRICS & GYNECOLOGY

## 2022-04-28 RX ORDER — DIPHENHYDRAMINE HYDROCHLORIDE 50 MG/ML
25 INJECTION INTRAMUSCULAR; INTRAVENOUS EVERY 4 HOURS PRN
Status: DISCONTINUED | OUTPATIENT
Start: 2022-04-28 | End: 2022-04-30

## 2022-04-28 RX ORDER — SODIUM CHLORIDE 0.9 % (FLUSH) 0.9 %
10 SYRINGE (ML) INJECTION
Status: DISCONTINUED | OUTPATIENT
Start: 2022-04-28 | End: 2022-04-30

## 2022-04-28 RX ORDER — SIMETHICONE 80 MG
1 TABLET,CHEWABLE ORAL EVERY 6 HOURS PRN
Status: DISCONTINUED | OUTPATIENT
Start: 2022-04-28 | End: 2022-04-30

## 2022-04-28 RX ORDER — AMOXICILLIN 250 MG
1 CAPSULE ORAL NIGHTLY PRN
Status: DISCONTINUED | OUTPATIENT
Start: 2022-04-28 | End: 2022-04-30

## 2022-04-28 RX ORDER — PRENATAL WITH FERROUS FUM AND FOLIC ACID 3080; 920; 120; 400; 22; 1.84; 3; 20; 10; 1; 12; 200; 27; 25; 2 [IU]/1; [IU]/1; MG/1; [IU]/1; MG/1; MG/1; MG/1; MG/1; MG/1; MG/1; UG/1; MG/1; MG/1; MG/1; MG/1
1 TABLET ORAL DAILY
Status: DISCONTINUED | OUTPATIENT
Start: 2022-04-29 | End: 2022-04-30

## 2022-04-28 RX ORDER — ACETAMINOPHEN 325 MG/1
650 TABLET ORAL EVERY 6 HOURS PRN
Status: DISCONTINUED | OUTPATIENT
Start: 2022-04-28 | End: 2022-04-30

## 2022-04-28 RX ORDER — PROCHLORPERAZINE EDISYLATE 5 MG/ML
5 INJECTION INTRAMUSCULAR; INTRAVENOUS EVERY 6 HOURS PRN
Status: DISCONTINUED | OUTPATIENT
Start: 2022-04-28 | End: 2022-04-30

## 2022-04-28 RX ORDER — DIPHENHYDRAMINE HCL 25 MG
25 CAPSULE ORAL EVERY 4 HOURS PRN
Status: DISCONTINUED | OUTPATIENT
Start: 2022-04-28 | End: 2022-04-30

## 2022-04-28 RX ORDER — MAGNESIUM SULFATE HEPTAHYDRATE 40 MG/ML
2 INJECTION, SOLUTION INTRAVENOUS CONTINUOUS
Status: DISCONTINUED | OUTPATIENT
Start: 2022-04-28 | End: 2022-04-29

## 2022-04-28 RX ORDER — CALCIUM GLUCONATE 98 MG/ML
1 INJECTION, SOLUTION INTRAVENOUS
Status: DISCONTINUED | OUTPATIENT
Start: 2022-04-28 | End: 2022-05-01

## 2022-04-28 RX ORDER — SODIUM CHLORIDE, SODIUM LACTATE, POTASSIUM CHLORIDE, CALCIUM CHLORIDE 600; 310; 30; 20 MG/100ML; MG/100ML; MG/100ML; MG/100ML
INJECTION, SOLUTION INTRAVENOUS CONTINUOUS
Status: DISCONTINUED | OUTPATIENT
Start: 2022-04-28 | End: 2022-04-29

## 2022-04-28 RX ORDER — ACETAMINOPHEN 500 MG
1000 TABLET ORAL ONCE
Status: COMPLETED | OUTPATIENT
Start: 2022-04-28 | End: 2022-04-28

## 2022-04-28 RX ORDER — FAMOTIDINE 20 MG/1
20 TABLET, FILM COATED ORAL 2 TIMES DAILY PRN
Status: DISCONTINUED | OUTPATIENT
Start: 2022-04-28 | End: 2022-05-02 | Stop reason: HOSPADM

## 2022-04-28 RX ORDER — TALC
6 POWDER (GRAM) TOPICAL NIGHTLY PRN
Status: DISCONTINUED | OUTPATIENT
Start: 2022-04-28 | End: 2022-05-02 | Stop reason: HOSPADM

## 2022-04-28 RX ORDER — CETIRIZINE HYDROCHLORIDE 5 MG/1
10 TABLET ORAL DAILY
Status: DISCONTINUED | OUTPATIENT
Start: 2022-04-29 | End: 2022-04-28

## 2022-04-28 RX ORDER — MAGNESIUM SULFATE HEPTAHYDRATE 40 MG/ML
4 INJECTION, SOLUTION INTRAVENOUS ONCE
Status: COMPLETED | OUTPATIENT
Start: 2022-04-28 | End: 2022-04-28

## 2022-04-28 RX ORDER — NAPROXEN SODIUM 220 MG/1
81 TABLET, FILM COATED ORAL ONCE
Status: DISCONTINUED | OUTPATIENT
Start: 2022-04-28 | End: 2022-04-30

## 2022-04-28 RX ORDER — ONDANSETRON 8 MG/1
8 TABLET, ORALLY DISINTEGRATING ORAL EVERY 8 HOURS PRN
Status: DISCONTINUED | OUTPATIENT
Start: 2022-04-28 | End: 2022-04-30

## 2022-04-28 RX ORDER — LABETALOL HYDROCHLORIDE 5 MG/ML
20 INJECTION, SOLUTION INTRAVENOUS ONCE
Status: COMPLETED | OUTPATIENT
Start: 2022-04-28 | End: 2022-04-28

## 2022-04-28 RX ORDER — MAGNESIUM SULFATE HEPTAHYDRATE 40 MG/ML
INJECTION, SOLUTION INTRAVENOUS
Status: COMPLETED
Start: 2022-04-28 | End: 2022-04-28

## 2022-04-28 RX ORDER — BETAMETHASONE SODIUM PHOSPHATE AND BETAMETHASONE ACETATE 3; 3 MG/ML; MG/ML
12 INJECTION, SUSPENSION INTRA-ARTICULAR; INTRALESIONAL; INTRAMUSCULAR; SOFT TISSUE
Status: COMPLETED | OUTPATIENT
Start: 2022-04-28 | End: 2022-04-29

## 2022-04-28 RX ORDER — CETIRIZINE HYDROCHLORIDE 5 MG/1
10 TABLET ORAL NIGHTLY
Status: DISCONTINUED | OUTPATIENT
Start: 2022-04-28 | End: 2022-05-02 | Stop reason: HOSPADM

## 2022-04-28 RX ADMIN — MAGNESIUM SULFATE IN WATER 2 G/HR: 40 INJECTION, SOLUTION INTRAVENOUS at 05:04

## 2022-04-28 RX ADMIN — LABETALOL HYDROCHLORIDE 20 MG: 5 INJECTION INTRAVENOUS at 05:04

## 2022-04-28 RX ADMIN — SODIUM CHLORIDE, SODIUM LACTATE, POTASSIUM CHLORIDE, AND CALCIUM CHLORIDE 1000 ML: .6; .31; .03; .02 INJECTION, SOLUTION INTRAVENOUS at 05:04

## 2022-04-28 RX ADMIN — CETIRIZINE HYDROCHLORIDE 10 MG: 5 TABLET, FILM COATED ORAL at 08:04

## 2022-04-28 RX ADMIN — MAGNESIUM SULFATE HEPTAHYDRATE 4 G: 40 INJECTION, SOLUTION INTRAVENOUS at 05:04

## 2022-04-28 RX ADMIN — ACETAMINOPHEN 1000 MG: 500 TABLET, FILM COATED ORAL at 03:04

## 2022-04-28 RX ADMIN — Medication 6 MG: at 08:04

## 2022-04-28 RX ADMIN — BETAMETHASONE SODIUM PHOSPHATE AND BETAMETHASONE ACETATE 12 MG: 3; 3 INJECTION, SUSPENSION INTRA-ARTICULAR; INTRALESIONAL; INTRAMUSCULAR at 05:04

## 2022-04-28 NOTE — MEDICAL/APP STUDENT
OBSTETRICS EMERGENCY DEPARTMENT NOTE  Reason for Visit   Hypertension    HPI   31 y.o., at 33w5d with an Estimated Date of Delivery: 22 (Patient's last menstrual period was 2021.)  Patient was advised to attend the OB Emergency Department while she was at her scheduled visit for prenatal testing.   Patient was previously hospitalized for magnesium for elevated blood pressure during this pregnancy.     OB History    Para Term  AB Living   3 1 1 0 1 1   SAB IAB Ectopic Multiple Live Births   1 0 0 0 1      # Outcome Date GA Lbr Jacob/2nd Weight Sex Delivery Anes PTL Lv   3 Current            2 SAB 21 8w0d          1 Term 18 37w3d  2.95 kg (6 lb 8.1 oz) M CS-LTranv Spinal N RAFAEL      Obstetric Comments   Second pregnancy -Blighted ovum       Contractions: No   Vaginal Bleeding: No   Loss of fluid: No   Fetal movement: Yes   Cramping: Yes, 2-3 times per day, about 3-4/10 in pain   Syncope: No   Headache: Yes     Dizziness: No   Lightheadedness: No   Vision or hearing changes: No   Back or joint pain: Mild, managed with Tylenol   Abdominal pain: No   Pelvic pain: No   Trauma, including falls, since last OBGYN visit: No     History of bleeding disorders: No   History of clotting disorders: No   History of  birth defects in previous children: No   History of preeclampsia or eclampsia: Yes, pre-eclampsia delivered at 37w4d per patient   History of pregnancy complications: No   History of previous delivery complications: No   History of anemia requiring IV iron or blood transfusions: No     Exam   BP (!) 169/98 Comment: Dr. Arceo notified. Will obtain IV access.  Pulse 89   Temp 98.7 °F (37.1 °C) (Oral)   Resp 18   LMP 2021     GENERAL: No acute distress  ABD: Gravid     Fetal heart tones: Yes  Fetal heart beat: 130-160 bpm    End of Bed Assessment  Alert and Oriented x 3: Yes   Pallor or cyanosis: No   Icterus: No   Clubbing: No   Edema: No   Lymph  adenopathy: No   Capillary  refill less than 2 seconds:  Yes     Review of Systems   Constitutional: Negative.    HENT: Negative.    Eyes: Negative.    Respiratory: Negative.    Cardiovascular: Positive for leg swelling. Negative for chest pain and claudication.   Gastrointestinal: Negative.    Skin: Negative.    Neurological: Positive for headaches (Mild to moderate headaches controlled by Tylenol).       Assessment and Plan   There are no diagnoses linked to this encounter.    Pregnancy dating, labs, ultrasound reports, prenatal testing, and problem list; prior records and results; and available outside records were reviewed and updated in EMR.       --  Armida Benton MSCOURTNEY  Year 3 Medical Student

## 2022-04-28 NOTE — ASSESSMENT & PLAN NOTE
- Admitted 4/20-4/22 with severe range blood pressures requiring labetalol 20 mg IV x 1, over course of two day observation no further severe range blood pressure. Labs on admit and repeat were all wnl.   - 4/28 re-presentation for severe range blood pressure. Three severe range blood pressure (one in PNT and two in RENATA). Due to previous history, waited before treatment, and blood pressure resolved and became mild range on their own.   - Decision to admission to antepartum  - 4/28: PC ratio 0.27 (increased from 4/20 TLTC). Plt 223 Cr 0.7, LFT 11/13. Will obtain 24 hour urine  - Low threshold to start magnesium if severe range BP  - NPO and continuous monitoring

## 2022-04-28 NOTE — ED PROVIDER NOTES
Encounter Date: 2022       History     Chief Complaint   Patient presents with    Hypertension     HPI   Armida Medina is a 31 y.o. [id number] at 33w5d presents from PNT for elevated BP. Patient had severe range BP in PNT. Reports headache earlier today from lack of sleep. Denies SOB, vision changes, RUQ pain, and edema.     This IUP is complicated by gHTN, depression, obesity, anemia, h/o c/s x1, h/o myomectomy.  Patient denies contractions, denies vaginal bleeding, denies LOF.   Fetal Movement: normal.        Review of patient's allergies indicates:   Allergen Reactions    Ibuprofen Hives and Shortness Of Breath     bronchospasm    Ancef [cefazolin] Rash     Past Medical History:   Diagnosis Date    Abnormal Pap smear of cervix     Acne     Allergy     Asthma     Hashimoto's thyroiditis 2017    Preeclampsia, severe, third trimester 2018     Past Surgical History:   Procedure Laterality Date     SECTION N/A 2018    Procedure:  SECTION;  Surgeon: Sawyer Lake Jr., MD;  Location: [facility] L&D;  Service: OB/GYN;  Laterality: N/A;     SECTION N/A 2022    Procedure:  SECTION;  Surgeon: Fiorella Carpenter DO;  Location: [facility] L&D;  Service: OB/GYN;  Laterality: N/A;     SECTION N/A 2022    Procedure:  SECTION;  Surgeon: Fiorella Carpenter DO;  Location: [facility] L&D;  Service: OB/GYN;  Laterality: N/A;    CYST REMOVAL      Left ear @ age 2    DILATION AND CURETTAGE OF UTERUS USING SUCTION N/A 2021    Procedure: DILATION AND CURETTAGE, UTERUS, USING SUCTION;  Surgeon: Fiorella Carpenter DO;  Location: [facility] OR;  Service: OB/GYN;  Laterality: N/A;    ROBOT-ASSISTED LAPAROSCOPIC UTERINE MYOMECTOMY N/A 2020    Procedure: ROBOTIC MYOMECTOMY, UTERUS;  Surgeon: Sylvie Zavaleta MD;  Location: [facility] OR;  Service: OB/GYN;  Laterality: N/A;    TONSILLECTOMY, ADENOIDECTOMY       Family History   Problem Relation Age of  Onset    No Known Problems Father     Miscarriages / Stillbirths Mother     Hypothyroidism Mother     Coronary artery disease Maternal Grandmother         s/p CABG 4v    Hypothyroidism Maternal Grandmother     Coronary artery disease Paternal Grandmother         s/p CABG    Breast cancer Neg Hx     Cancer Neg Hx     Colon cancer Neg Hx     Diabetes Neg Hx     Eclampsia Neg Hx     Hypertension Neg Hx     Stroke Neg Hx      labor Neg Hx     Ovarian cancer Neg Hx     Melanoma Neg Hx      Social History     Tobacco Use    Smoking status: Never Smoker    Smokeless tobacco: Never Used   Substance Use Topics    Alcohol use: Not Currently     Comment: social     Drug use: No     Review of Systems   Constitutional: Negative for fever.   HENT: Negative for sore throat.    Eyes: Negative for visual disturbance.   Respiratory: Negative for shortness of breath.    Cardiovascular: Negative for chest pain.   Gastrointestinal: Negative for abdominal pain and nausea.   Genitourinary: Negative for dysuria, vaginal bleeding, vaginal discharge and vaginal pain.   Musculoskeletal: Negative for back pain.   Skin: Negative for rash.   Neurological: Negative for weakness and headaches.   Hematological: Does not bruise/bleed easily.       Physical Exam     Initial Vitals   BP Pulse Resp Temp SpO2   22 1434 22 1434 22 1434 22 1434 22 1440   (!) 169/98 89 18 98.7 °F (37.1 °C) 97 %      MAP       --                Physical Exam    Vitals reviewed.  Constitutional: She appears well-developed and well-nourished. She is not diaphoretic. No distress.   HENT:   Head: Normocephalic and atraumatic.   Eyes: Conjunctivae and EOM are normal.   Neck: Neck supple.   Normal range of motion.  Cardiovascular: Normal rate.   Pulmonary/Chest:   Normal work of breathing   Abdominal: Abdomen is soft. There is no abdominal tenderness.   Gravid uterus There is no rebound and no guarding.   Musculoskeletal:          General: Normal range of motion.      Cervical back: Normal range of motion and neck supple.     Neurological: She is alert and oriented to person, place, and time.   Skin: Skin is warm and dry.   Psychiatric: She has a normal mood and affect. Thought content normal.         ED Course   Fetal non-stress test    Date/Time: 4/28/2022 5:09 PM  Performed by: Liane Arceo MD  Authorized by: Fiorella Carpenter DO     Nonstress Test:     Variability:  6-25 BPM    Decelerations:  None    Accelerations:  15 bpm    Acoustic Stimulator: No      Baseline:  145    Uterine Irritability: No      Contractions:  Not present  Biophysical Profile:     Nonstress Test Interpretation: reactive      Overall Impression:  Reassuring      Labs Reviewed   COMPREHENSIVE METABOLIC PANEL - Abnormal; Notable for the following components:       Result Value    CO2 20 (*)     Glucose 126 (*)     Albumin 2.8 (*)     All other components within normal limits   CBC W/ AUTO DIFFERENTIAL - Abnormal; Notable for the following components:    WBC 13.10 (*)     Immature Granulocytes 0.6 (*)     Gran # (ANC) 10.5 (*)     Immature Grans (Abs) 0.08 (*)     Gran % 80.3 (*)     Lymph % 11.5 (*)     All other components within normal limits   PROTEIN / CREATININE RATIO, URINE - Abnormal; Notable for the following components:    Protein, Urine Random 59 (*)     Prot/Creat Ratio, Urine 0.27 (*)     All other components within normal limits    Narrative:     Specimen Source->Urine   TYPE & SCREEN          Imaging Results    None          Medications   labetaloL injection 20 mg (20 mg Intravenous Given 4/28/22 1725)   acetaminophen tablet 1,000 mg (1,000 mg Oral Given 4/28/22 1507)   betamethasone acetate-betamethasone sodium phosphate injection 12 mg (12 mg Intramuscular Given 4/29/22 1706)   magnesium sulfate in water 40 gram/1,000 mL (4 %) bolus from bag 4 g (4 g Intravenous Bolus from Bag 4/28/22 1715)   labetaloL injection 20 mg (20 mg Intravenous Given  4/29/22 0914)   acetaminophen tablet 1,000 mg (1,000 mg Oral Given 4/30/22 0834)   sodium citrate-citric acid 500-334 mg/5 ml solution 30 mL (30 mLs Oral Given 4/30/22 0834)   famotidine (PF) injection 20 mg (20 mg Intravenous Given 4/30/22 0834)   nalbuphine injection 5 mg (5 mg Intravenous Given 4/30/22 1159)   magnesium sulfate in water 40 gram/1,000 mL (4 %) bolus from bag 4 g (4 g Intravenous Bolus from Bag 4/30/22 0336)   oxyCODONE immediate release tablet 5 mg (5 mg Oral Given 5/1/22 0058)     Medical Decision Making:   ED Management:  - severe range BP, however then mild range  - asymptomatic  - NST RR  - [place] no ctx  - SVE deferred  - CBC, CMP wnl  - P:C 0.27  - Will admit for BP monitoring  - low threshold to start magnesium if sustained severe range BP  - Bedside US vertex  - consents signed/scanned  Other:   I have discussed this case with another health care provider.            Attending Attestation:   Physician Attestation Statement for Resident:  As the supervising MD   Physician Attestation Statement: I have personally seen and examined this patient.   I agree with the above history. -:   As the supervising MD I agree with the above PE.    As the supervising MD I agree with the above treatment, course, plan, and disposition.   -: Patient evaluated and found to be stable, agree with resident's assessment and plan.  NST reactive and reassuring.  2 severe range blood pressures, 20mg IV labetalol canceled by MFM.  Will admit to antepartum for severe GHTN  Pre-E labs show worsening proteinuria with P/C 0.27.  I was personally present during the critical portions of the procedure(s) performed by the resident and was immediately available in the ED to provide services and assistance as needed during the entire procedure.  I have reviewed and agree with the residents interpretation of the following: lab data.  I have reviewed the following: old records at this facility.                         Clinical  Impression:   Final diagnoses:  [R03.0] Elevated blood pressure reading (Primary)  [Z3A.33] 33 weeks gestation of pregnancy  [O16.3] Hypertension affecting pregnancy in third trimester          ED Disposition Condition    Send to L&D             Liane Arceo MD/MPH  OB/GYN PGY2       Liane Arceo MD  Resident  04/28/22 1712       Zandra Caldera MD  05/02/22 [id number]

## 2022-04-28 NOTE — TELEPHONE ENCOUNTER
[facility] in prenatal testing called to inform Dr. Carpenter that patient has elevated /86 and c/o headache. [facility] was informed to send patient to L&D ED, due to patient's hx of pre-eclampsia and treatment last week. Dr. Carpenter notified via telephone and concurs.

## 2022-04-28 NOTE — HPI
Armida Medina is a 32 yo  at 33w5d who presents to [facility] for severe range blood pressure in PNT. In the [facility] she had two severe range blood pressures. Due to a slightly abnormal presentation lat week, decision was to hold treatment with acute anti hypertensive medication at that time to evaluate if the severe range blood pressures continued to be sustained. Her repeat BP was mild range. Decision was made to admit patient to antepartum for serial blood pressure monitoring, repeat pre-eclampsia labs. In the [facility], denies HA, scotoma, SOB, CP, RUQ pain.     She denies contractions, leakage of fluids, and vaginal bleeding. Reports active fetal movement.  This IUP is complicated by gHTN, depression, obesity, anemia, h/o c/s x1, and h/o myomectomy.    Previous admission history:  Admitted  for concern for preeclampsia with SF (BP).   Patient had two severe range blood pressures in the [facility] requiring labetalol 20 mg IV x 1  Her pre E labs on admit and repeat during her two day admission were all within normal limits  Admit labs : PC TLC Plt 204, Cr 0.7, AST/ALT /10  Repeat labs : Plt 212, CR 0.7, AST/ALT 10/  During the 48 hours, patient blood pressures were all within normal limits except for one mild range blood pressure.Therefore on review, discussion that patient unlikely to have preeclampsia with SF (BP). Discussion with patient on  and decision to discharge with twice weekly follow up for gHTN.

## 2022-04-28 NOTE — H&P
[facility] - Antepartum ([place])  Obstetrics  History & Physical    Patient Name: Armida Medina  MRN: [id number]  Admission Date: 2022  Primary Care Provider: Zaina Gutierrez MD    Subjective:     Principal Problem:<principal problem not specified>    History of Present Illness:  Armida Medina is a 32 yo  at 33w5d who presents to [facility] for severe range blood pressure in PNT. In the [facility] she had two severe range blood pressures. Due to a slightly abnormal presentation lat week, decision was to hold treatment with acute anti hypertensive medication at that time to evaluate if the severe range blood pressures continued to be sustained. Her repeat BP was mild range. Decision was made to admit patient to antepartum for serial blood pressure monitoring, repeat pre-eclampsia labs. In the [facility], denies HA, scotoma, SOB, CP, RUQ pain.     She denies contractions, leakage of fluids, and vaginal bleeding. Reports active fetal movement.  This IUP is complicated by gHTN, depression, obesity, anemia, h/o c/s x1, and h/o myomectomy.    Previous admission history:  Admitted  for concern for preeclampsia with SF (BP).   Patient had two severe range blood pressures in the [facility] requiring labetalol 20 mg IV x 1  Her pre E labs on admit and repeat during her two day admission were all within normal limits  Admit labs : PC TLC Plt 204, Cr 0.7, AST/ALT 11/10  Repeat labs : Plt 212, CR 0.7, AST/ALT 10/11  During the 48 hours, patient blood pressures were all within normal limits except for one mild range blood pressure.Therefore on review, discussion that patient unlikely to have preeclampsia with SF (BP). Discussion with patient on  and decision to discharge with twice weekly follow up for gHTN.          OB History    Para Term  AB Living   3 1 1 0 1 1   SAB IAB Ectopic Multiple Live Births   1 0 0 0 1      # Outcome Date GA Lbr Jacob/2nd Weight Sex Delivery Anes PTL Lv   3 Current            2 SAB  21 8w0d          1 Term 18 37w3d  2.95 kg (6 lb 8.1 oz) M CS-LTranv Spinal N RAFAEL      Name: EDUARDO MUJICA      Apgar1: 8  Apgar5: 9      Obstetric Comments   Second pregnancy -Blighted ovum     Past Medical History:   Diagnosis Date    Abnormal Pap smear of cervix     Acne     Allergy     Asthma     Hashimoto's thyroiditis 2017    Preeclampsia, severe, third trimester 2018     Past Surgical History:   Procedure Laterality Date     SECTION N/A 2018    Procedure:  SECTION;  Surgeon: Sawyer Lake Jr., MD;  Location: [facility] L&D;  Service: OB/GYN;  Laterality: N/A;    CYST REMOVAL      Left ear @ age 2    DILATION AND CURETTAGE OF UTERUS USING SUCTION N/A 2021    Procedure: DILATION AND CURETTAGE, UTERUS, USING SUCTION;  Surgeon: Fiorella Carpenter DO;  Location: [facility] OR;  Service: OB/GYN;  Laterality: N/A;    ROBOT-ASSISTED LAPAROSCOPIC UTERINE MYOMECTOMY N/A 2020    Procedure: ROBOTIC MYOMECTOMY, UTERUS;  Surgeon: Sylvie Zavaleta MD;  Location: [facility] OR;  Service: OB/GYN;  Laterality: N/A;    TONSILLECTOMY, ADENOIDECTOMY         PTA Medications   Medication Sig    aspirin 81 mg Cap     cetirizine (ZYRTEC) 10 MG tablet Take 10 mg by mouth once daily.    ergocalciferol (ERGOCALCIFEROL) 50,000 unit Cap TAKE 1 CAPSULE BY MOUTH EVERY 7 DAYS    famotidine (PEPCID) 20 MG tablet Take 20 mg by mouth 2 (two) times daily as needed for Heartburn.       Review of patient's allergies indicates:   Allergen Reactions    Ibuprofen Hives and Shortness Of Breath     bronchospasm    Ancef [cefazolin] Rash        Family History       Problem Relation (Age of Onset)    Coronary artery disease Maternal Grandmother, Paternal Grandmother    Hypothyroidism Mother, Maternal Grandmother    Miscarriages / Stillbirths Mother    No Known Problems Father          Tobacco Use    Smoking status: Never Smoker    Smokeless tobacco: Never Used   Substance and Sexual  Activity    Alcohol use: Not Currently     Comment: social     Drug use: No    Sexual activity: Yes     Partners: Male     Review of Systems   Constitutional:  Negative for chills and fever.   Eyes:  Negative for visual disturbance.   Respiratory:  Negative for cough.    Cardiovascular:  Negative for chest pain, palpitations and leg swelling.   Gastrointestinal:  Negative for constipation, diarrhea and nausea.   Genitourinary:  Negative for pelvic pain, vaginal bleeding, vaginal discharge and vaginal pain.   Musculoskeletal:  Negative for arthralgias.   Integumentary:  Negative for breast mass.   Neurological:  Negative for headaches.   Psychiatric/Behavioral:  Negative for depression.    Breast: Negative for mass   Objective:     Vital Signs (Most Recent):  Temp: 98.7 °F (37.1 °C) (04/28/22 1434)  Pulse: 86 (04/28/22 1600)  Resp: 18 (04/28/22 1434)  BP: (!) 145/71 (04/28/22 1549)  SpO2: 97 % (04/28/22 1600)   Vital Signs (24h Range):  Temp:  [98.7 °F (37.1 °C)] 98.7 °F (37.1 °C)  Pulse:  [74-95] 86  Resp:  [18] 18  SpO2:  [96 %-98 %] 97 %  BP: (145-169)/(71-98) 145/71     Weight: 114.8 kg (253 lb)  Body mass index is 46.27 kg/m².    FHT: 145 bpm, moderate variability, +accels, -decels, Cat 1  TOCO:  quiet    Physical Exam:   Constitutional: She is oriented to person, place, and time. She appears well-developed and well-nourished. No distress.    HENT:   Head: Normocephalic and atraumatic.    Eyes: Pupils are equal, round, and reactive to light. EOM are normal.     Cardiovascular:  Normal rate.             Pulmonary/Chest: Effort normal. No respiratory distress.        Abdominal: Soft. She exhibits no distension. There is no abdominal tenderness. There is no rebound and no guarding.             Musculoskeletal: Normal range of motion and moves all extremeties. No tenderness or edema.       Neurological: She is alert and oriented to person, place, and time.    Skin: Skin is warm and dry. She is not diaphoretic.       Cervix:  Deferred     Significant Labs:  Lab Results   Component Value Date    GROUPTRH A POS 2022    HEPBSAG NON-REACTIVE 2021    STREPBCULT No Group B Streptococcus isolated 2022       CBC:   Recent Labs   Lab 22  1458   WBC 13.10*   RBC 4.31   HGB 12.1   HCT 37.5      MCV 87   MCH 28.1   MCHC 32.3     CMP:   Recent Labs   Lab 22  1458   *   CALCIUM 9.4   ALBUMIN 2.8*   PROT 7.0      K 3.8   CO2 20*      BUN 11   CREATININE 0.7   ALKPHOS 124   ALT 11   AST 11   BILITOT 0.1     Protein Creatinine Ratios:    Creatinine, Urine   Date Value Ref Range Status   2022 218.8 15.0 - 325.0 mg/dL Final   2022 35.8 15.0 - 325.0 mg/dL Final   2021 190.0 15.0 - 325.0 mg/dL Final     Protein, Urine Random   Date Value Ref Range Status   2022 59 (H) 0 - 15 mg/dL Final   2022 <7 0 - 15 mg/dL Final   2021 8 0 - 15 mg/dL Final     Prot/Creat Ratio, Urine   Date Value Ref Range Status   2022 0.27 (H) 0.00 - 0.20 Final   2022 Unable to calculate 0.00 - 0.20 Final   2021 0.04 0.00 - 0.20 Final         I have personallly reviewed all pertinent lab results from the last 24 hours.    Assessment/Plan:     31 y.o. female  at 33w5d for:    33 weeks gestation of pregnancy  - s/p BMZ  - Vertex on bedside US      Gestational hypertension, third trimester  - Admitted - with severe range blood pressures requiring labetalol 20 mg IV x 1, over course of two day observation no further severe range blood pressure. Labs on admit and repeat were all wnl.   -  re-presentation for severe range blood pressure. Three severe range blood pressure (one in PNT and two in [facility]). Due to previous history, waited before treatment, and blood pressure resolved and became mild range on their own.   - Decision to admission to antepartum  - : PC ratio 0.27 (increased from  TLTC). Plt 223 Cr 0.7, LFT . Will obtain 24 hour  urine  - Low threshold to start magnesium if severe range BP  - NPO and continuous monitoring      History of  section  - Plan repeat   - Consents for  and blood transfusion    Obesity (BMI 35.0-39.9 without comorbidity)  - Pre preg BMI 39  - does not meet requirement for PP lovenox  - SCD on while in bed  - Encourage ambulation         Liane Arceo MD  Obstetrics  [facility] [place], operated by [facility] - Antepartum ([place])    Patient seen and examined.  31 year old  admitted at 33w5d for Gestational HTN with SF.  She presented to PNT and then OB ED with multiple severe range BP that ultimately required IV antihypertensive therapy.    She is asymptomatic    Temp:  [97.8 °F (36.6 °C)-98.7 °F (37.1 °C)] 98.4 °F (36.9 °C)  Pulse:  [66-98] 83  Resp:  [16-18] 18  SpO2:  [95 %-98 %] 96 %  BP: (120-179)/(56-98) 167/77    NST: 135 bpm/moderate variability/ 2+ accels/ no decels      Patient continues to have severe ranges this AM. Required Labetalol 20 IV. Will start on Labetalol 200 TID with plans for delivery tomorrow.    Continue MgSO4   CEFM until blood pressures stabilize  Repeat labs in AM  Second dose of rescue betamethasone today    Delivery tomorrow at 34 weeks unless continues to have severe range Bps refractory to medication or develops worsening maternal or fetal signs/symptoms    Gianna Oneal MD  PGY 6  Maternal Fetal Medicine  [facility]

## 2022-04-28 NOTE — ASSESSMENT & PLAN NOTE
- Pre preg BMI 39  - does not meet requirement for PP lovenox  - SCD on while in bed  - Encourage ambulation

## 2022-04-28 NOTE — CARE UPDATE
Notified of severe range BP. Will start magnesium for seizure ppx in setting of gHTN w/SF (BP).     Will initiate rescue course of BMZ. BP set to recheck in 15 minutes. Patient remains asymptomatic.    Liane Arceo MD/MPH  OB/GYN PGY2

## 2022-04-28 NOTE — CARE UPDATE
Sustained severe range BP noted on BP monitoring upon arrival in [facility].     Discussed complex case with [facility] staff. Patient was admitted approximately 1 week ago for severe range BP, however after initial stabilization in [facility], patient's BP were normal/low mild range. Patient was ultimately discharged, as she did not meet criteria for hypertensive disorder with severe features.     Will wait for more BP to determine if this elevation in BP is true elevation.    Liane Arceo MD/MPH  OB/GYN PGY2

## 2022-04-28 NOTE — HOSPITAL COURSE
04/28/2022 Admit to antepartum service for observation and serial blood pressure evaluation. Labs for pre-eclampsia evaluation. PC ratio 0.27 (increased from 4/20 TLTC). Plt 223 Cr 0.7, LFT 11/13. Will obtain 24 hour urine. Patient received BMZ 4/20-4/21.  04/29/2022 Rescue course of BMZ started yesterday. VSS overnight. Monitoring reassuring. Anticipate de-escalating magnesium this morning.  04/30/2022 - NAEON. S/p rescue BMZ. VSS. FHT reactive and reassuring. Restarted on mag in anticipation of delivery this morning. Repeat CBC and CMP stable.

## 2022-04-29 LAB
ALBUMIN SERPL BCP-MCNC: 2.4 G/DL (ref 3.5–5.2)
ALBUMIN SERPL BCP-MCNC: 2.9 G/DL (ref 3.5–5.2)
ALP SERPL-CCNC: 103 U/L (ref 55–135)
ALP SERPL-CCNC: 118 U/L (ref 55–135)
ALT SERPL W/O P-5'-P-CCNC: 12 U/L (ref 10–44)
ALT SERPL W/O P-5'-P-CCNC: 15 U/L (ref 10–44)
ANION GAP SERPL CALC-SCNC: 12 MMOL/L (ref 8–16)
ANION GAP SERPL CALC-SCNC: 15 MMOL/L (ref 8–16)
APTT BLDCRRT: 25.9 SEC (ref 21–32)
AST SERPL-CCNC: 10 U/L (ref 10–40)
AST SERPL-CCNC: 16 U/L (ref 10–40)
BASOPHILS # BLD AUTO: 0.02 K/UL (ref 0–0.2)
BASOPHILS NFR BLD: 0.1 % (ref 0–1.9)
BILIRUB SERPL-MCNC: 0.2 MG/DL (ref 0.1–1)
BILIRUB SERPL-MCNC: 0.2 MG/DL (ref 0.1–1)
BUN SERPL-MCNC: 7 MG/DL (ref 6–20)
BUN SERPL-MCNC: 8 MG/DL (ref 6–20)
CALCIUM SERPL-MCNC: 7.6 MG/DL (ref 8.7–10.5)
CALCIUM SERPL-MCNC: 8 MG/DL (ref 8.7–10.5)
CHLORIDE SERPL-SCNC: 101 MMOL/L (ref 95–110)
CHLORIDE SERPL-SCNC: 98 MMOL/L (ref 95–110)
CO2 SERPL-SCNC: 18 MMOL/L (ref 23–29)
CO2 SERPL-SCNC: 20 MMOL/L (ref 23–29)
CREAT SERPL-MCNC: 0.6 MG/DL (ref 0.5–1.4)
CREAT SERPL-MCNC: 0.7 MG/DL (ref 0.5–1.4)
DIFFERENTIAL METHOD: ABNORMAL
EOSINOPHIL # BLD AUTO: 0 K/UL (ref 0–0.5)
EOSINOPHIL NFR BLD: 0 % (ref 0–8)
ERYTHROCYTE [DISTWIDTH] IN BLOOD BY AUTOMATED COUNT: 13.7 % (ref 11.5–14.5)
ERYTHROCYTE [DISTWIDTH] IN BLOOD BY AUTOMATED COUNT: 13.8 % (ref 11.5–14.5)
ERYTHROCYTE [DISTWIDTH] IN BLOOD BY AUTOMATED COUNT: 13.9 % (ref 11.5–14.5)
EST. GFR  (AFRICAN AMERICAN): >60 ML/MIN/1.73 M^2
EST. GFR  (AFRICAN AMERICAN): >60 ML/MIN/1.73 M^2
EST. GFR  (NON AFRICAN AMERICAN): >60 ML/MIN/1.73 M^2
EST. GFR  (NON AFRICAN AMERICAN): >60 ML/MIN/1.73 M^2
FIBRINOGEN PPP-MCNC: 514 MG/DL (ref 182–400)
GLUCOSE SERPL-MCNC: 109 MG/DL (ref 70–110)
GLUCOSE SERPL-MCNC: 119 MG/DL (ref 70–110)
HCT VFR BLD AUTO: 32.7 % (ref 37–48.5)
HCT VFR BLD AUTO: 35.8 % (ref 37–48.5)
HCT VFR BLD AUTO: 36.5 % (ref 37–48.5)
HGB BLD-MCNC: 10.4 G/DL (ref 12–16)
HGB BLD-MCNC: 11.5 G/DL (ref 12–16)
HGB BLD-MCNC: 11.8 G/DL (ref 12–16)
IMM GRANULOCYTES # BLD AUTO: 0.2 K/UL (ref 0–0.04)
IMM GRANULOCYTES # BLD AUTO: 0.21 K/UL (ref 0–0.04)
IMM GRANULOCYTES # BLD AUTO: 0.25 K/UL (ref 0–0.04)
IMM GRANULOCYTES NFR BLD AUTO: 1.2 % (ref 0–0.5)
IMM GRANULOCYTES NFR BLD AUTO: 1.3 % (ref 0–0.5)
IMM GRANULOCYTES NFR BLD AUTO: 1.5 % (ref 0–0.5)
INR PPP: 0.9 (ref 0.8–1.2)
LYMPHOCYTES # BLD AUTO: 1.1 K/UL (ref 1–4.8)
LYMPHOCYTES # BLD AUTO: 1.2 K/UL (ref 1–4.8)
LYMPHOCYTES # BLD AUTO: 1.3 K/UL (ref 1–4.8)
LYMPHOCYTES NFR BLD: 6.5 % (ref 18–48)
LYMPHOCYTES NFR BLD: 7.1 % (ref 18–48)
LYMPHOCYTES NFR BLD: 8.3 % (ref 18–48)
MCH RBC QN AUTO: 27.8 PG (ref 27–31)
MCH RBC QN AUTO: 27.8 PG (ref 27–31)
MCH RBC QN AUTO: 28.1 PG (ref 27–31)
MCHC RBC AUTO-ENTMCNC: 31.8 G/DL (ref 32–36)
MCHC RBC AUTO-ENTMCNC: 32.1 G/DL (ref 32–36)
MCHC RBC AUTO-ENTMCNC: 32.3 G/DL (ref 32–36)
MCV RBC AUTO: 87 FL (ref 82–98)
MONOCYTES # BLD AUTO: 0.6 K/UL (ref 0.3–1)
MONOCYTES # BLD AUTO: 0.7 K/UL (ref 0.3–1)
MONOCYTES # BLD AUTO: 0.7 K/UL (ref 0.3–1)
MONOCYTES NFR BLD: 3.8 % (ref 4–15)
MONOCYTES NFR BLD: 4.2 % (ref 4–15)
MONOCYTES NFR BLD: 4.6 % (ref 4–15)
NEUTROPHILS # BLD AUTO: 13.5 K/UL (ref 1.8–7.7)
NEUTROPHILS # BLD AUTO: 14.6 K/UL (ref 1.8–7.7)
NEUTROPHILS # BLD AUTO: 14.7 K/UL (ref 1.8–7.7)
NEUTROPHILS NFR BLD: 85.7 % (ref 38–73)
NEUTROPHILS NFR BLD: 87.7 % (ref 38–73)
NEUTROPHILS NFR BLD: 87.8 % (ref 38–73)
NRBC BLD-RTO: 0 /100 WBC
PLATELET # BLD AUTO: 231 K/UL (ref 150–450)
PLATELET # BLD AUTO: 236 K/UL (ref 150–450)
PLATELET # BLD AUTO: ABNORMAL K/UL (ref 150–450)
PLATELET BLD QL SMEAR: ABNORMAL
PMV BLD AUTO: 11.9 FL (ref 9.2–12.9)
PMV BLD AUTO: 12.5 FL (ref 9.2–12.9)
PMV BLD AUTO: 13.2 FL (ref 9.2–12.9)
POTASSIUM SERPL-SCNC: 3.8 MMOL/L (ref 3.5–5.1)
POTASSIUM SERPL-SCNC: 4.3 MMOL/L (ref 3.5–5.1)
PROT 24H UR-MRATE: 372 MG/SPEC (ref 0–100)
PROT SERPL-MCNC: 5.9 G/DL (ref 6–8.4)
PROT SERPL-MCNC: 7 G/DL (ref 6–8.4)
PROT UR-MCNC: 15 MG/DL (ref 0–15)
PROTHROMBIN TIME: 9.7 SEC (ref 9–12.5)
RBC # BLD AUTO: 3.74 M/UL (ref 4–5.4)
RBC # BLD AUTO: 4.13 M/UL (ref 4–5.4)
RBC # BLD AUTO: 4.2 M/UL (ref 4–5.4)
RUBV IGG SER-ACNC: 15.2 IU/ML
RUBV IGG SER-IMP: REACTIVE
SODIUM SERPL-SCNC: 128 MMOL/L (ref 136–145)
SODIUM SERPL-SCNC: 136 MMOL/L (ref 136–145)
URINE COLLECTION DURATION: 24 HR
URINE VOLUME: 2480 ML
WBC # BLD AUTO: 15.76 K/UL (ref 3.9–12.7)
WBC # BLD AUTO: 16.65 K/UL (ref 3.9–12.7)
WBC # BLD AUTO: 16.8 K/UL (ref 3.9–12.7)

## 2022-04-29 PROCEDURE — 36415 COLL VENOUS BLD VENIPUNCTURE: CPT | Performed by: STUDENT IN AN ORGANIZED HEALTH CARE EDUCATION/TRAINING PROGRAM

## 2022-04-29 PROCEDURE — 63600175 PHARM REV CODE 636 W HCPCS: Performed by: STUDENT IN AN ORGANIZED HEALTH CARE EDUCATION/TRAINING PROGRAM

## 2022-04-29 PROCEDURE — 85730 THROMBOPLASTIN TIME PARTIAL: CPT | Performed by: STUDENT IN AN ORGANIZED HEALTH CARE EDUCATION/TRAINING PROGRAM

## 2022-04-29 PROCEDURE — 25000003 PHARM REV CODE 250: Performed by: STUDENT IN AN ORGANIZED HEALTH CARE EDUCATION/TRAINING PROGRAM

## 2022-04-29 PROCEDURE — 80053 COMPREHEN METABOLIC PANEL: CPT | Performed by: STUDENT IN AN ORGANIZED HEALTH CARE EDUCATION/TRAINING PROGRAM

## 2022-04-29 PROCEDURE — 85025 COMPLETE CBC W/AUTO DIFF WBC: CPT | Performed by: STUDENT IN AN ORGANIZED HEALTH CARE EDUCATION/TRAINING PROGRAM

## 2022-04-29 PROCEDURE — 84156 ASSAY OF PROTEIN URINE: CPT | Performed by: STUDENT IN AN ORGANIZED HEALTH CARE EDUCATION/TRAINING PROGRAM

## 2022-04-29 PROCEDURE — 96375 TX/PRO/DX INJ NEW DRUG ADDON: CPT

## 2022-04-29 PROCEDURE — 85610 PROTHROMBIN TIME: CPT | Performed by: STUDENT IN AN ORGANIZED HEALTH CARE EDUCATION/TRAINING PROGRAM

## 2022-04-29 PROCEDURE — 80053 COMPREHEN METABOLIC PANEL: CPT | Mod: 91 | Performed by: STUDENT IN AN ORGANIZED HEALTH CARE EDUCATION/TRAINING PROGRAM

## 2022-04-29 PROCEDURE — 96366 THER/PROPH/DIAG IV INF ADDON: CPT

## 2022-04-29 PROCEDURE — 85025 COMPLETE CBC W/AUTO DIFF WBC: CPT | Mod: 91 | Performed by: STUDENT IN AN ORGANIZED HEALTH CARE EDUCATION/TRAINING PROGRAM

## 2022-04-29 PROCEDURE — 96376 TX/PRO/DX INJ SAME DRUG ADON: CPT

## 2022-04-29 PROCEDURE — 96372 THER/PROPH/DIAG INJ SC/IM: CPT | Performed by: STUDENT IN AN ORGANIZED HEALTH CARE EDUCATION/TRAINING PROGRAM

## 2022-04-29 PROCEDURE — 85384 FIBRINOGEN ACTIVITY: CPT | Performed by: STUDENT IN AN ORGANIZED HEALTH CARE EDUCATION/TRAINING PROGRAM

## 2022-04-29 PROCEDURE — G0378 HOSPITAL OBSERVATION PER HR: HCPCS

## 2022-04-29 RX ORDER — LABETALOL HYDROCHLORIDE 5 MG/ML
20 INJECTION, SOLUTION INTRAVENOUS ONCE
Status: COMPLETED | OUTPATIENT
Start: 2022-04-29 | End: 2022-04-29

## 2022-04-29 RX ORDER — SODIUM CHLORIDE, SODIUM LACTATE, POTASSIUM CHLORIDE, CALCIUM CHLORIDE 600; 310; 30; 20 MG/100ML; MG/100ML; MG/100ML; MG/100ML
INJECTION, SOLUTION INTRAVENOUS CONTINUOUS
Status: DISCONTINUED | OUTPATIENT
Start: 2022-04-30 | End: 2022-04-30

## 2022-04-29 RX ORDER — LABETALOL 100 MG/1
200 TABLET, FILM COATED ORAL EVERY 8 HOURS
Status: DISCONTINUED | OUTPATIENT
Start: 2022-04-29 | End: 2022-04-30

## 2022-04-29 RX ADMIN — ACETAMINOPHEN 650 MG: 325 TABLET ORAL at 02:04

## 2022-04-29 RX ADMIN — Medication 6 MG: at 09:04

## 2022-04-29 RX ADMIN — LABETALOL HYDROCHLORIDE 200 MG: 100 TABLET, FILM COATED ORAL at 05:04

## 2022-04-29 RX ADMIN — LABETALOL HYDROCHLORIDE 200 MG: 100 TABLET, FILM COATED ORAL at 09:04

## 2022-04-29 RX ADMIN — DIPHENHYDRAMINE HYDROCHLORIDE 25 MG: 50 INJECTION, SOLUTION INTRAMUSCULAR; INTRAVENOUS at 02:04

## 2022-04-29 RX ADMIN — BETAMETHASONE SODIUM PHOSPHATE AND BETAMETHASONE ACETATE 12 MG: 3; 3 INJECTION, SUSPENSION INTRA-ARTICULAR; INTRALESIONAL; INTRAMUSCULAR at 05:04

## 2022-04-29 RX ADMIN — LABETALOL HYDROCHLORIDE 20 MG: 5 INJECTION INTRAVENOUS at 09:04

## 2022-04-29 RX ADMIN — MAGNESIUM SULFATE IN WATER 2 G/HR: 40 INJECTION, SOLUTION INTRAVENOUS at 09:04

## 2022-04-29 RX ADMIN — CETIRIZINE HYDROCHLORIDE 10 MG: 5 TABLET, FILM COATED ORAL at 09:04

## 2022-04-29 NOTE — ASSESSMENT & PLAN NOTE
- Severe range BP overnight, requiring push of labetalol 20 mg  - Now meets criteria for gHTN w/SF  - PC ratio 0.27 (increased from 4/20 TLTC). Plt 223 Cr 0.7, LFT 11/13. Will obtain 24 hour urine  - On magnesium overnight, anticipate de-escalating today due to stable VS  - NPO  - Continuous monitoring, reassuring

## 2022-04-29 NOTE — SUBJECTIVE & OBJECTIVE
Obstetric HPI:  Patient reports no contractions, active fetal movement, present vaginal bleeding , absent loss of fluid.    Denies headache, SOB, vision changes, and RUQ pain. Reports increasing edema in upper extremities.     Objective:     Vital Signs (Most Recent):  Temp: 97.8 °F (36.6 °C) (04/29/22 0249)  Pulse: 98 (04/29/22 0550)  Resp: 16 (04/28/22 1902)  BP: 137/80 (04/29/22 0619)  SpO2: 95 % (04/29/22 0443) Vital Signs (24h Range):  Temp:  [97.8 °F (36.6 °C)-98.7 °F (37.1 °C)] 97.8 °F (36.6 °C)  Pulse:  [66-98] 98  Resp:  [16-18] 16  SpO2:  [95 %-98 %] 95 %  BP: (120-179)/(56-98) 137/80     Weight: 116.7 kg (257 lb 2.7 oz)  Body mass index is 47.04 kg/m².    FHT: 130 bpm, moderate variability, +accels, -decels Cat 1 (reassuring)  TOCO:  none      Intake/Output Summary (Last 24 hours) at 4/29/2022 0656  Last data filed at 4/29/2022 0637  Gross per 24 hour   Intake 1577.85 ml   Output 2350 ml   Net -772.15 ml       Cervical Exam: deferred     Significant Labs:  Recent Lab Results         04/28/22  1515   04/28/22  1458        Albumin   2.8       Alkaline Phosphatase   124       ALT   11       Anion Gap   13       AST   11       Baso #   0.01       Basophil %   0.1       BILIRUBIN TOTAL   0.1  Comment: For infants and newborns, interpretation of results should be based  on gestational age, weight and in agreement with clinical  observations.    Premature Infant recommended reference ranges:  Up to 24 hours.............<8.0 mg/dL  Up to 48 hours............<12.0 mg/dL  3-5 days..................<15.0 mg/dL  6-29 days.................<15.0 mg/dL         BUN   11       Calcium   9.4       Chloride   104       CO2   20       Creatinine   0.7       Creatinine, Urine 218.8         Differential Method   Automated       eGFR if    >60       eGFR if non    >60  Comment: Calculation used to obtain the estimated glomerular filtration  rate (eGFR) is the CKD-EPI equation.          Eos #    0.1       Eosinophil %   0.5       Glucose   126       Gran # (ANC)   10.5       Gran %   80.3       Group & Rh   A POS       Hematocrit   37.5       Hemoglobin   12.1       Immature Grans (Abs)   0.08  Comment: Mild elevation in immature granulocytes is non specific and   can be seen in a variety of conditions including stress response,   acute inflammation, trauma and pregnancy. Correlation with other   laboratory and clinical findings is essential.         Immature Granulocytes   0.6       INDIRECT RYLAN   NEG       Lymph #   1.5       Lymph %   11.5       MCH   28.1       MCHC   32.3       MCV   87       Mono #   0.9       Mono %   7.0       MPV   12.9       nRBC   0       Platelets   223       Potassium   3.8       Prot/Creat Ratio, Urine 0.27         PROTEIN TOTAL   7.0       Protein, Urine Random 59         RBC   4.31       RDW   13.6       Sodium   137       WBC   13.10               Physical Exam:   Constitutional: She is oriented to person, place, and time. She appears well-developed and well-nourished. No distress.    HENT:   Head: Normocephalic and atraumatic.    Eyes: Pupils are equal, round, and reactive to light. EOM are normal.     Cardiovascular:  Normal rate.             Pulmonary/Chest: Effort normal. No respiratory distress.        Abdominal: Soft. She exhibits no distension. There is no abdominal tenderness. There is no rebound and no guarding.             Musculoskeletal: Normal range of motion and moves all extremeties. No tenderness or edema.       Neurological: She is alert and oriented to person, place, and time.    Skin: Skin is warm and dry. She is not diaphoretic.

## 2022-04-29 NOTE — PROGRESS NOTES
[facility] Antepartum [facility]  Obstetrics  Antepartum Progress Note    Patient Name: Armida Medina  MRN: [id number]  Admission Date: 2022  Hospital Length of Stay: 0 days  Attending Physician: Fiorella Carpenter DO  Primary Care Provider: Zaina Gutierrez MD    Subjective:     Principal Problem:<principal problem not specified>    HPI:  Armida Medina is a 32 yo  at 33w5d who presents to [facility] for severe range blood pressure in PNT. In the [facility] she had two severe range blood pressures. Due to a slightly abnormal presentation lat week, decision was to hold treatment with acute anti hypertensive medication at that time to evaluate if the severe range blood pressures continued to be sustained. Her repeat BP was mild range. Decision was made to admit patient to antepartum for serial blood pressure monitoring, repeat pre-eclampsia labs. In the [facility], denies HA, scotoma, SOB, CP, RUQ pain.     She denies contractions, leakage of fluids, and vaginal bleeding. Reports active fetal movement.  This IUP is complicated by gHTN, depression, obesity, anemia, h/o c/s x1, and h/o myomectomy.    Previous admission history:  Admitted  for concern for preeclampsia with SF (BP).   Patient had two severe range blood pressures in the [facility] requiring labetalol 20 mg IV x 1  Her pre E labs on admit and repeat during her two day admission were all within normal limits  Admit labs : PC TLC Plt 204, Cr 0.7, AST/ALT 11/10  Repeat labs : Plt 212, CR 0.7, AST/ALT 10/11  During the 48 hours, patient blood pressures were all within normal limits except for one mild range blood pressure.Therefore on review, discussion that patient unlikely to have preeclampsia with SF (BP). Discussion with patient on  and decision to discharge with twice weekly follow up for gHTN.        Hospital Course:  2022 Admit to antepartum service for observation and serial blood pressure evaluation. Labs for pre-eclampsia evaluation. PC ratio  0.27 (increased from 4/20 TLTC). Plt 223 Cr 0.7, LFT 11/13. Will obtain 24 hour urine. Patient received BMZ 4/20-4/21.  04/29/2022 Rescue course of BMZ started yesterday. VSS overnight. Monitoring reassuring. Anticipate de-escalating magnesium this morning.      Obstetric HPI:  Patient reports no contractions, active fetal movement, present vaginal bleeding , absent loss of fluid.    Denies headache, SOB, vision changes, and RUQ pain. Reports increasing edema in upper extremities.     Objective:     Vital Signs (Most Recent):  Temp: 97.8 °F (36.6 °C) (04/29/22 0249)  Pulse: 98 (04/29/22 0550)  Resp: 16 (04/28/22 1902)  BP: 137/80 (04/29/22 0619)  SpO2: 95 % (04/29/22 0443) Vital Signs (24h Range):  Temp:  [97.8 °F (36.6 °C)-98.7 °F (37.1 °C)] 97.8 °F (36.6 °C)  Pulse:  [66-98] 98  Resp:  [16-18] 16  SpO2:  [95 %-98 %] 95 %  BP: (120-179)/(56-98) 137/80     Weight: 116.7 kg (257 lb 2.7 oz)  Body mass index is 47.04 kg/m².    FHT: 130 bpm, moderate variability, +accels, -decels Cat 1 (reassuring)  TOCO:  none      Intake/Output Summary (Last 24 hours) at 4/29/2022 0656  Last data filed at 4/29/2022 0637  Gross per 24 hour   Intake 1577.85 ml   Output 2350 ml   Net -772.15 ml       Cervical Exam: deferred     Significant Labs:  Recent Lab Results         04/28/22  1515   04/28/22  1458        Albumin   2.8       Alkaline Phosphatase   124       ALT   11       Anion Gap   13       AST   11       Baso #   0.01       Basophil %   0.1       BILIRUBIN TOTAL   0.1  Comment: For infants and newborns, interpretation of results should be based  on gestational age, weight and in agreement with clinical  observations.    Premature Infant recommended reference ranges:  Up to 24 hours.............<8.0 mg/dL  Up to 48 hours............<12.0 mg/dL  3-5 days..................<15.0 mg/dL  6-29 days.................<15.0 mg/dL         BUN   11       Calcium   9.4       Chloride   104       CO2   20       Creatinine   0.7        Creatinine, Urine 218.8         Differential Method   Automated       eGFR if    >60       eGFR if non    >60  Comment: Calculation used to obtain the estimated glomerular filtration  rate (eGFR) is the CKD-EPI equation.          Eos #   0.1       Eosinophil %   0.5       Glucose   126       Gran # (ANC)   10.5       Gran %   80.3       Group & Rh   A POS       Hematocrit   37.5       Hemoglobin   12.1       Immature Grans (Abs)   0.08  Comment: Mild elevation in immature granulocytes is non specific and   can be seen in a variety of conditions including stress response,   acute inflammation, trauma and pregnancy. Correlation with other   laboratory and clinical findings is essential.         Immature Granulocytes   0.6       INDIRECT RYLAN   NEG       Lymph #   1.5       Lymph %   11.5       MCH   28.1       MCHC   32.3       MCV   87       Mono #   0.9       Mono %   7.0       MPV   12.9       nRBC   0       Platelets   223       Potassium   3.8       Prot/Creat Ratio, Urine 0.27         PROTEIN TOTAL   7.0       Protein, Urine Random 59         RBC   4.31       RDW   13.6       Sodium   137       WBC   13.10               Physical Exam:   Constitutional: She is oriented to person, place, and time. She appears well-developed and well-nourished. No distress.    HENT:   Head: Normocephalic and atraumatic.    Eyes: Pupils are equal, round, and reactive to light. EOM are normal.     Cardiovascular:  Normal rate.             Pulmonary/Chest: Effort normal. No respiratory distress.        Abdominal: Soft. She exhibits no distension. There is no abdominal tenderness. There is no rebound and no guarding.             Musculoskeletal: Normal range of motion and moves all extremeties. No tenderness or edema.       Neurological: She is alert and oriented to person, place, and time.    Skin: Skin is warm and dry. She is not diaphoretic.      Assessment/Plan:     31 y.o. female  at 33w6d  for:    33 weeks gestation of pregnancy  - s/p BMZ  - Vertex on bedside US      Gestational hypertension, third trimester  - Severe range BP overnight, requiring push of labetalol 20 mg  - Now meets criteria for gHTN w/SF  - PC ratio 0.27 (increased from  TLTC). Plt 223 Cr 0.7, LFT . Will obtain 24 hour urine  - On magnesium overnight, anticipate de-escalating today due to stable VS  - NPO  - Continuous monitoring, reassuring    History of  section  - Plan repeat   - Consents for  and blood transfusion    Obesity (BMI 35.0-39.9 without comorbidity)  - Pre preg BMI 39  - does not meet requirement for PP lovenox  - SCD on while in bed  - Encourage ambulation           Liane Arceo MD  Obstetrics  [facility] - Antepartum ([place])    Patient seen and examined. Please see H/P on  for detailed plan    Gianna Oneal MD  PGY 6  Maternal Fetal Medicine  [facility]

## 2022-04-30 PROBLEM — O14.13 SEVERE PRE-ECLAMPSIA IN THIRD TRIMESTER: Status: ACTIVE | Noted: 2022-04-22

## 2022-04-30 LAB
ALBUMIN SERPL BCP-MCNC: 2.6 G/DL (ref 3.5–5.2)
ALP SERPL-CCNC: 108 U/L (ref 55–135)
ALT SERPL W/O P-5'-P-CCNC: 15 U/L (ref 10–44)
ANION GAP SERPL CALC-SCNC: 12 MMOL/L (ref 8–16)
AST SERPL-CCNC: 12 U/L (ref 10–40)
BASOPHILS # BLD AUTO: ABNORMAL K/UL (ref 0–0.2)
BASOPHILS NFR BLD: 0 % (ref 0–1.9)
BILIRUB SERPL-MCNC: 0.2 MG/DL (ref 0.1–1)
BUN SERPL-MCNC: 10 MG/DL (ref 6–20)
CALCIUM SERPL-MCNC: 8 MG/DL (ref 8.7–10.5)
CHLORIDE SERPL-SCNC: 106 MMOL/L (ref 95–110)
CO2 SERPL-SCNC: 20 MMOL/L (ref 23–29)
CREAT SERPL-MCNC: 0.7 MG/DL (ref 0.5–1.4)
DIFFERENTIAL METHOD: ABNORMAL
EOSINOPHIL # BLD AUTO: ABNORMAL K/UL (ref 0–0.5)
EOSINOPHIL NFR BLD: 0 % (ref 0–8)
ERYTHROCYTE [DISTWIDTH] IN BLOOD BY AUTOMATED COUNT: 13.9 % (ref 11.5–14.5)
EST. GFR  (AFRICAN AMERICAN): >60 ML/MIN/1.73 M^2
EST. GFR  (NON AFRICAN AMERICAN): >60 ML/MIN/1.73 M^2
GLUCOSE SERPL-MCNC: 131 MG/DL (ref 70–110)
HCT VFR BLD AUTO: 31.5 % (ref 37–48.5)
HGB BLD-MCNC: 10.2 G/DL (ref 12–16)
IMM GRANULOCYTES # BLD AUTO: ABNORMAL K/UL (ref 0–0.04)
IMM GRANULOCYTES NFR BLD AUTO: ABNORMAL % (ref 0–0.5)
LYMPHOCYTES # BLD AUTO: ABNORMAL K/UL (ref 1–4.8)
LYMPHOCYTES NFR BLD: 9 % (ref 18–48)
MCH RBC QN AUTO: 28.1 PG (ref 27–31)
MCHC RBC AUTO-ENTMCNC: 32.4 G/DL (ref 32–36)
MCV RBC AUTO: 87 FL (ref 82–98)
METAMYELOCYTES NFR BLD MANUAL: 1 %
MONOCYTES # BLD AUTO: ABNORMAL K/UL (ref 0.3–1)
MONOCYTES NFR BLD: 7 % (ref 4–15)
MYELOCYTES NFR BLD MANUAL: 1 %
NEUTROPHILS NFR BLD: 80 % (ref 38–73)
NEUTS BAND NFR BLD MANUAL: 2 %
NRBC BLD-RTO: 0 /100 WBC
PLATELET # BLD AUTO: 199 K/UL (ref 150–450)
PLATELET BLD QL SMEAR: ABNORMAL
PMV BLD AUTO: 12 FL (ref 9.2–12.9)
POTASSIUM SERPL-SCNC: 4.1 MMOL/L (ref 3.5–5.1)
PROT SERPL-MCNC: 5.9 G/DL (ref 6–8.4)
RBC # BLD AUTO: 3.63 M/UL (ref 4–5.4)
SODIUM SERPL-SCNC: 138 MMOL/L (ref 136–145)
WBC # BLD AUTO: 16.18 K/UL (ref 3.9–12.7)

## 2022-04-30 PROCEDURE — 85007 BL SMEAR W/DIFF WBC COUNT: CPT | Performed by: STUDENT IN AN ORGANIZED HEALTH CARE EDUCATION/TRAINING PROGRAM

## 2022-04-30 PROCEDURE — 25000003 PHARM REV CODE 250: Performed by: STUDENT IN AN ORGANIZED HEALTH CARE EDUCATION/TRAINING PROGRAM

## 2022-04-30 PROCEDURE — 88307 PR  SURG PATH,LEVEL V: ICD-10-PCS | Mod: 26,,, | Performed by: PATHOLOGY

## 2022-04-30 PROCEDURE — 37000008 HC ANESTHESIA 1ST 15 MINUTES: Performed by: OBSTETRICS & GYNECOLOGY

## 2022-04-30 PROCEDURE — 88307 TISSUE EXAM BY PATHOLOGIST: CPT | Performed by: PATHOLOGY

## 2022-04-30 PROCEDURE — 63600175 PHARM REV CODE 636 W HCPCS: Performed by: STUDENT IN AN ORGANIZED HEALTH CARE EDUCATION/TRAINING PROGRAM

## 2022-04-30 PROCEDURE — 51702 INSERT TEMP BLADDER CATH: CPT

## 2022-04-30 PROCEDURE — 36415 COLL VENOUS BLD VENIPUNCTURE: CPT | Performed by: STUDENT IN AN ORGANIZED HEALTH CARE EDUCATION/TRAINING PROGRAM

## 2022-04-30 PROCEDURE — 36004725 HC OB OR TIME LEV III - EA ADD 15 MIN: Performed by: OBSTETRICS & GYNECOLOGY

## 2022-04-30 PROCEDURE — 88305 TISSUE EXAM BY PATHOLOGIST: CPT | Performed by: PATHOLOGY

## 2022-04-30 PROCEDURE — 59510 PRA FULL ROUT OBSTE CARE,CESAREAN DELIV: ICD-10-PCS | Mod: QY,,, | Performed by: ANESTHESIOLOGY

## 2022-04-30 PROCEDURE — 96366 THER/PROPH/DIAG IV INF ADDON: CPT

## 2022-04-30 PROCEDURE — 88307 TISSUE EXAM BY PATHOLOGIST: CPT | Mod: 26,,, | Performed by: PATHOLOGY

## 2022-04-30 PROCEDURE — 59510 CESAREAN DELIVERY: CPT | Mod: AT,,, | Performed by: OBSTETRICS & GYNECOLOGY

## 2022-04-30 PROCEDURE — 88305 TISSUE EXAM BY PATHOLOGIST: CPT | Mod: 26,,, | Performed by: PATHOLOGY

## 2022-04-30 PROCEDURE — 71000033 HC RECOVERY, INTIAL HOUR: Performed by: OBSTETRICS & GYNECOLOGY

## 2022-04-30 PROCEDURE — 88305 TISSUE EXAM BY PATHOLOGIST: ICD-10-PCS | Mod: 26,,, | Performed by: PATHOLOGY

## 2022-04-30 PROCEDURE — 37000009 HC ANESTHESIA EA ADD 15 MINS: Performed by: OBSTETRICS & GYNECOLOGY

## 2022-04-30 PROCEDURE — 96361 HYDRATE IV INFUSION ADD-ON: CPT

## 2022-04-30 PROCEDURE — 11000001 HC ACUTE MED/SURG PRIVATE ROOM

## 2022-04-30 PROCEDURE — 85027 COMPLETE CBC AUTOMATED: CPT | Performed by: STUDENT IN AN ORGANIZED HEALTH CARE EDUCATION/TRAINING PROGRAM

## 2022-04-30 PROCEDURE — 36004724 HC OB OR TIME LEV III - 1ST 15 MIN: Performed by: OBSTETRICS & GYNECOLOGY

## 2022-04-30 PROCEDURE — 80053 COMPREHEN METABOLIC PANEL: CPT | Performed by: STUDENT IN AN ORGANIZED HEALTH CARE EDUCATION/TRAINING PROGRAM

## 2022-04-30 PROCEDURE — 59510 PR FULL ROUT OBSTE CARE,CESAREAN DELIV: ICD-10-PCS | Mod: AT,,, | Performed by: OBSTETRICS & GYNECOLOGY

## 2022-04-30 PROCEDURE — 59510 CESAREAN DELIVERY: CPT | Mod: QY,,, | Performed by: ANESTHESIOLOGY

## 2022-04-30 PROCEDURE — 71000039 HC RECOVERY, EACH ADD'L HOUR: Performed by: OBSTETRICS & GYNECOLOGY

## 2022-04-30 RX ORDER — MAGNESIUM SULFATE HEPTAHYDRATE 40 MG/ML
4 INJECTION, SOLUTION INTRAVENOUS ONCE
Status: COMPLETED | OUTPATIENT
Start: 2022-04-30 | End: 2022-04-30

## 2022-04-30 RX ORDER — BUPIVACAINE HYDROCHLORIDE 7.5 MG/ML
INJECTION, SOLUTION INTRASPINAL
Status: DISCONTINUED | OUTPATIENT
Start: 2022-04-30 | End: 2022-04-30

## 2022-04-30 RX ORDER — PROCHLORPERAZINE EDISYLATE 5 MG/ML
5 INJECTION INTRAMUSCULAR; INTRAVENOUS EVERY 6 HOURS PRN
Status: DISCONTINUED | OUTPATIENT
Start: 2022-04-30 | End: 2022-05-02 | Stop reason: HOSPADM

## 2022-04-30 RX ORDER — METHYLERGONOVINE MALEATE 0.2 MG/ML
200 INJECTION INTRAVENOUS ONCE AS NEEDED
Status: DISCONTINUED | OUTPATIENT
Start: 2022-04-30 | End: 2022-05-02 | Stop reason: HOSPADM

## 2022-04-30 RX ORDER — FAMOTIDINE 10 MG/ML
20 INJECTION INTRAVENOUS
Status: COMPLETED | OUTPATIENT
Start: 2022-04-30 | End: 2022-04-30

## 2022-04-30 RX ORDER — ONDANSETRON 2 MG/ML
4 INJECTION INTRAMUSCULAR; INTRAVENOUS EVERY 6 HOURS PRN
Status: DISCONTINUED | OUTPATIENT
Start: 2022-04-30 | End: 2022-05-02 | Stop reason: HOSPADM

## 2022-04-30 RX ORDER — SODIUM CITRATE AND CITRIC ACID MONOHYDRATE 334; 500 MG/5ML; MG/5ML
30 SOLUTION ORAL
Status: COMPLETED | OUTPATIENT
Start: 2022-04-30 | End: 2022-04-30

## 2022-04-30 RX ORDER — ENOXAPARIN SODIUM 100 MG/ML
40 INJECTION SUBCUTANEOUS EVERY 12 HOURS
Status: DISCONTINUED | OUTPATIENT
Start: 2022-05-01 | End: 2022-05-02 | Stop reason: HOSPADM

## 2022-04-30 RX ORDER — ONDANSETRON 8 MG/1
8 TABLET, ORALLY DISINTEGRATING ORAL EVERY 8 HOURS PRN
Status: DISCONTINUED | OUTPATIENT
Start: 2022-04-30 | End: 2022-05-02 | Stop reason: HOSPADM

## 2022-04-30 RX ORDER — MAGNESIUM SULFATE HEPTAHYDRATE 40 MG/ML
2 INJECTION, SOLUTION INTRAVENOUS CONTINUOUS
Status: DISCONTINUED | OUTPATIENT
Start: 2022-04-30 | End: 2022-05-01

## 2022-04-30 RX ORDER — OXYCODONE HYDROCHLORIDE 5 MG/1
10 TABLET ORAL EVERY 4 HOURS PRN
Status: DISCONTINUED | OUTPATIENT
Start: 2022-04-30 | End: 2022-05-02 | Stop reason: HOSPADM

## 2022-04-30 RX ORDER — AMOXICILLIN 250 MG
1 CAPSULE ORAL NIGHTLY PRN
Status: DISCONTINUED | OUTPATIENT
Start: 2022-04-30 | End: 2022-05-02 | Stop reason: HOSPADM

## 2022-04-30 RX ORDER — SODIUM CHLORIDE, SODIUM LACTATE, POTASSIUM CHLORIDE, CALCIUM CHLORIDE 600; 310; 30; 20 MG/100ML; MG/100ML; MG/100ML; MG/100ML
INJECTION, SOLUTION INTRAVENOUS CONTINUOUS
Status: DISCONTINUED | OUTPATIENT
Start: 2022-04-30 | End: 2022-04-30

## 2022-04-30 RX ORDER — ACETAMINOPHEN 10 MG/ML
1000 INJECTION, SOLUTION INTRAVENOUS EVERY 8 HOURS
Status: DISCONTINUED | OUTPATIENT
Start: 2022-04-30 | End: 2022-04-30

## 2022-04-30 RX ORDER — CARBOPROST TROMETHAMINE 250 UG/ML
250 INJECTION, SOLUTION INTRAMUSCULAR
Status: DISCONTINUED | OUTPATIENT
Start: 2022-04-30 | End: 2022-05-02 | Stop reason: HOSPADM

## 2022-04-30 RX ORDER — CLINDAMYCIN PHOSPHATE 900 MG/50ML
900 INJECTION, SOLUTION INTRAVENOUS
Status: DISCONTINUED | OUTPATIENT
Start: 2022-04-30 | End: 2022-05-02 | Stop reason: HOSPADM

## 2022-04-30 RX ORDER — ENOXAPARIN SODIUM 100 MG/ML
40 INJECTION SUBCUTANEOUS EVERY 24 HOURS
Status: DISCONTINUED | OUTPATIENT
Start: 2022-04-30 | End: 2022-04-30

## 2022-04-30 RX ORDER — TRANEXAMIC ACID 100 MG/ML
1000 INJECTION, SOLUTION INTRAVENOUS ONCE AS NEEDED
Status: DISCONTINUED | OUTPATIENT
Start: 2022-04-30 | End: 2022-05-02 | Stop reason: HOSPADM

## 2022-04-30 RX ORDER — OXYTOCIN/RINGER'S LACTATE 30/500 ML
95 PLASTIC BAG, INJECTION (ML) INTRAVENOUS CONTINUOUS
Status: DISCONTINUED | OUTPATIENT
Start: 2022-04-30 | End: 2022-05-02 | Stop reason: HOSPADM

## 2022-04-30 RX ORDER — PRENATAL WITH FERROUS FUM AND FOLIC ACID 3080; 920; 120; 400; 22; 1.84; 3; 20; 10; 1; 12; 200; 27; 25; 2 [IU]/1; [IU]/1; MG/1; [IU]/1; MG/1; MG/1; MG/1; MG/1; MG/1; MG/1; UG/1; MG/1; MG/1; MG/1; MG/1
1 TABLET ORAL DAILY
Status: DISCONTINUED | OUTPATIENT
Start: 2022-04-30 | End: 2022-05-02 | Stop reason: HOSPADM

## 2022-04-30 RX ORDER — MISOPROSTOL 200 UG/1
800 TABLET ORAL ONCE AS NEEDED
Status: DISCONTINUED | OUTPATIENT
Start: 2022-04-30 | End: 2022-05-02 | Stop reason: HOSPADM

## 2022-04-30 RX ORDER — MORPHINE SULFATE 0.5 MG/ML
INJECTION, SOLUTION EPIDURAL; INTRATHECAL; INTRAVENOUS
Status: DISCONTINUED | OUTPATIENT
Start: 2022-04-30 | End: 2022-04-30

## 2022-04-30 RX ORDER — DEXAMETHASONE SODIUM PHOSPHATE 4 MG/ML
INJECTION, SOLUTION INTRA-ARTICULAR; INTRALESIONAL; INTRAMUSCULAR; INTRAVENOUS; SOFT TISSUE
Status: DISCONTINUED | OUTPATIENT
Start: 2022-04-30 | End: 2022-04-30

## 2022-04-30 RX ORDER — DOCUSATE SODIUM 100 MG/1
200 CAPSULE, LIQUID FILLED ORAL 2 TIMES DAILY
Status: DISCONTINUED | OUTPATIENT
Start: 2022-04-30 | End: 2022-05-02 | Stop reason: HOSPADM

## 2022-04-30 RX ORDER — ONDANSETRON HYDROCHLORIDE 2 MG/ML
INJECTION, SOLUTION INTRAMUSCULAR; INTRAVENOUS
Status: DISCONTINUED | OUTPATIENT
Start: 2022-04-30 | End: 2022-04-30

## 2022-04-30 RX ORDER — SODIUM CHLORIDE, SODIUM LACTATE, POTASSIUM CHLORIDE, CALCIUM CHLORIDE 600; 310; 30; 20 MG/100ML; MG/100ML; MG/100ML; MG/100ML
INJECTION, SOLUTION INTRAVENOUS CONTINUOUS PRN
Status: DISCONTINUED | OUTPATIENT
Start: 2022-04-30 | End: 2022-04-30

## 2022-04-30 RX ORDER — DIPHENHYDRAMINE HCL 25 MG
25 CAPSULE ORAL EVERY 6 HOURS PRN
Status: DISCONTINUED | OUTPATIENT
Start: 2022-04-30 | End: 2022-05-02 | Stop reason: HOSPADM

## 2022-04-30 RX ORDER — SIMETHICONE 80 MG
1 TABLET,CHEWABLE ORAL EVERY 6 HOURS PRN
Status: DISCONTINUED | OUTPATIENT
Start: 2022-04-30 | End: 2022-05-02 | Stop reason: HOSPADM

## 2022-04-30 RX ORDER — ACETAMINOPHEN 500 MG
1000 TABLET ORAL EVERY 8 HOURS
Status: DISCONTINUED | OUTPATIENT
Start: 2022-04-30 | End: 2022-05-02 | Stop reason: HOSPADM

## 2022-04-30 RX ORDER — CLINDAMYCIN PHOSPHATE 900 MG/50ML
900 INJECTION, SOLUTION INTRAVENOUS
Status: DISCONTINUED | OUTPATIENT
Start: 2022-04-30 | End: 2022-04-30

## 2022-04-30 RX ORDER — DIPHENHYDRAMINE HYDROCHLORIDE 50 MG/ML
12.5 INJECTION INTRAMUSCULAR; INTRAVENOUS
Status: DISCONTINUED | OUTPATIENT
Start: 2022-04-30 | End: 2022-05-02 | Stop reason: HOSPADM

## 2022-04-30 RX ORDER — OXYCODONE HYDROCHLORIDE 5 MG/1
5 TABLET ORAL EVERY 4 HOURS PRN
Status: DISCONTINUED | OUTPATIENT
Start: 2022-04-30 | End: 2022-05-02 | Stop reason: HOSPADM

## 2022-04-30 RX ORDER — ACETAMINOPHEN 500 MG
1000 TABLET ORAL
Status: COMPLETED | OUTPATIENT
Start: 2022-04-30 | End: 2022-04-30

## 2022-04-30 RX ORDER — ACETAMINOPHEN 325 MG/1
650 TABLET ORAL
Status: DISCONTINUED | OUTPATIENT
Start: 2022-04-30 | End: 2022-04-30

## 2022-04-30 RX ORDER — NALBUPHINE HYDROCHLORIDE 10 MG/ML
5 INJECTION, SOLUTION INTRAMUSCULAR; INTRAVENOUS; SUBCUTANEOUS ONCE AS NEEDED
Status: COMPLETED | OUTPATIENT
Start: 2022-04-30 | End: 2022-04-30

## 2022-04-30 RX ORDER — SODIUM CHLORIDE, SODIUM LACTATE, POTASSIUM CHLORIDE, CALCIUM CHLORIDE 600; 310; 30; 20 MG/100ML; MG/100ML; MG/100ML; MG/100ML
INJECTION, SOLUTION INTRAVENOUS CONTINUOUS
Status: DISCONTINUED | OUTPATIENT
Start: 2022-04-30 | End: 2022-05-02 | Stop reason: HOSPADM

## 2022-04-30 RX ORDER — OXYTOCIN 10 [USP'U]/ML
INJECTION, SOLUTION INTRAMUSCULAR; INTRAVENOUS
Status: DISCONTINUED | OUTPATIENT
Start: 2022-04-30 | End: 2022-04-30

## 2022-04-30 RX ORDER — FENTANYL CITRATE 50 UG/ML
INJECTION, SOLUTION INTRAMUSCULAR; INTRAVENOUS
Status: DISCONTINUED | OUTPATIENT
Start: 2022-04-30 | End: 2022-04-30

## 2022-04-30 RX ORDER — HYDROCORTISONE 25 MG/G
CREAM TOPICAL 3 TIMES DAILY PRN
Status: DISCONTINUED | OUTPATIENT
Start: 2022-04-30 | End: 2022-05-02 | Stop reason: HOSPADM

## 2022-04-30 RX ORDER — MORPHINE SULFATE 1 MG/ML
INJECTION, SOLUTION EPIDURAL; INTRATHECAL; INTRAVENOUS
Status: DISCONTINUED | OUTPATIENT
Start: 2022-04-30 | End: 2022-04-30

## 2022-04-30 RX ADMIN — TOBRAMYCIN SULFATE 385 MG: 40 INJECTION, SOLUTION INTRAMUSCULAR; INTRAVENOUS at 09:04

## 2022-04-30 RX ADMIN — SODIUM CHLORIDE, SODIUM LACTATE, POTASSIUM CHLORIDE, AND CALCIUM CHLORIDE: 600; 310; 30; 20 INJECTION, SOLUTION INTRAVENOUS at 09:04

## 2022-04-30 RX ADMIN — BUPIVACAINE HYDROCHLORIDE IN DEXTROSE 1.6 ML: 7.5 INJECTION, SOLUTION SUBARACHNOID at 09:04

## 2022-04-30 RX ADMIN — DOCUSATE SODIUM 200 MG: 100 CAPSULE, LIQUID FILLED ORAL at 08:04

## 2022-04-30 RX ADMIN — OXYTOCIN 3 UNITS: 10 INJECTION, SOLUTION INTRAMUSCULAR; INTRAVENOUS at 09:04

## 2022-04-30 RX ADMIN — SODIUM CHLORIDE, SODIUM LACTATE, POTASSIUM CHLORIDE, AND CALCIUM CHLORIDE 1000 ML: .6; .31; .03; .02 INJECTION, SOLUTION INTRAVENOUS at 03:04

## 2022-04-30 RX ADMIN — NALBUPHINE HYDROCHLORIDE 5 MG: 10 INJECTION, SOLUTION INTRAMUSCULAR; INTRAVENOUS; SUBCUTANEOUS at 04:04

## 2022-04-30 RX ADMIN — MAGNESIUM SULFATE IN WATER 2 G/HR: 40 INJECTION, SOLUTION INTRAVENOUS at 09:04

## 2022-04-30 RX ADMIN — FAMOTIDINE 20 MG: 10 INJECTION, SOLUTION INTRAVENOUS at 08:04

## 2022-04-30 RX ADMIN — Medication 95 MILLI-UNITS/MIN: at 10:04

## 2022-04-30 RX ADMIN — ACETAMINOPHEN 1000 MG: 500 TABLET, FILM COATED ORAL at 09:04

## 2022-04-30 RX ADMIN — SODIUM CHLORIDE, SODIUM LACTATE, POTASSIUM CHLORIDE, AND CALCIUM CHLORIDE: .6; .31; .03; .02 INJECTION, SOLUTION INTRAVENOUS at 12:04

## 2022-04-30 RX ADMIN — MORPHINE SULFATE 0.1 MG: 1 INJECTION, SOLUTION EPIDURAL; INTRATHECAL; INTRAVENOUS at 09:04

## 2022-04-30 RX ADMIN — PROCHLORPERAZINE EDISYLATE 5 MG: 5 INJECTION INTRAMUSCULAR; INTRAVENOUS at 11:04

## 2022-04-30 RX ADMIN — MAGNESIUM SULFATE HEPTAHYDRATE 4 G: 40 INJECTION, SOLUTION INTRAVENOUS at 03:04

## 2022-04-30 RX ADMIN — FENTANYL CITRATE 10 MCG: 50 INJECTION, SOLUTION INTRAMUSCULAR; INTRAVENOUS at 09:04

## 2022-04-30 RX ADMIN — ONDANSETRON 4 MG: 2 INJECTION INTRAMUSCULAR; INTRAVENOUS at 09:04

## 2022-04-30 RX ADMIN — ACETAMINOPHEN 1000 MG: 500 TABLET, FILM COATED ORAL at 08:04

## 2022-04-30 RX ADMIN — ACETAMINOPHEN 1000 MG: 10 INJECTION INTRAVENOUS at 04:04

## 2022-04-30 RX ADMIN — DEXAMETHASONE SODIUM PHOSPHATE 4 MG: 4 INJECTION INTRA-ARTICULAR; INTRALESIONAL; INTRAMUSCULAR; INTRAVENOUS; SOFT TISSUE at 09:04

## 2022-04-30 RX ADMIN — SODIUM CITRATE AND CITRIC ACID MONOHYDRATE 30 ML: 500; 334 SOLUTION ORAL at 08:04

## 2022-04-30 RX ADMIN — LABETALOL HYDROCHLORIDE 200 MG: 100 TABLET, FILM COATED ORAL at 12:04

## 2022-04-30 RX ADMIN — DEXTROSE 2 G: 50 INJECTION, SOLUTION INTRAVENOUS at 09:04

## 2022-04-30 RX ADMIN — PHENYLEPHRINE HYDROCHLORIDE 0.5 MCG/KG/MIN: 10 INJECTION INTRAVENOUS at 09:04

## 2022-04-30 RX ADMIN — SODIUM CHLORIDE, SODIUM LACTATE, POTASSIUM CHLORIDE, AND CALCIUM CHLORIDE: .6; .31; .03; .02 INJECTION, SOLUTION INTRAVENOUS at 02:04

## 2022-04-30 RX ADMIN — CETIRIZINE HYDROCHLORIDE 10 MG: 5 TABLET, FILM COATED ORAL at 08:04

## 2022-04-30 RX ADMIN — Medication 1 MG: at 10:04

## 2022-04-30 NOTE — PROGRESS NOTES
Pt pumping to provide breast milk for baby in NICU.  Pt instructed on hand hygiene and use of pump.

## 2022-04-30 NOTE — PROGRESS NOTES
[facility] - Antepartum [facility])  Obstetrics  Antepartum Progress Note    Patient Name: Armida Medina  MRN: [id number]  Admission Date: 2022  Hospital Length of Stay: 0 days  Attending Physician: Fiorella Carpenter DO  Primary Care Provider: Zaina Gutierrez MD    Subjective:     Principal Problem:Severe pre-eclampsia in third trimester    HPI:  Armida Medina is a 32 yo  at 33w5d who presents to [facility] for severe range blood pressure in PNT. In the [facility] she had two severe range blood pressures. Due to a slightly abnormal presentation lat week, decision was to hold treatment with acute anti hypertensive medication at that time to evaluate if the severe range blood pressures continued to be sustained. Her repeat BP was mild range. Decision was made to admit patient to antepartum for serial blood pressure monitoring, repeat pre-eclampsia labs. In the [facility], denies HA, scotoma, SOB, CP, RUQ pain.     She denies contractions, leakage of fluids, and vaginal bleeding. Reports active fetal movement.  This IUP is complicated by gHTN, depression, obesity, anemia, h/o c/s x1, and h/o myomectomy.    Previous admission history:  Admitted  for concern for preeclampsia with SF (BP).   Patient had two severe range blood pressures in the [facility] requiring labetalol 20 mg IV x 1  Her pre E labs on admit and repeat during her two day admission were all within normal limits  Admit labs : PC TLC Plt 204, Cr 0.7, AST/ALT 11/10  Repeat labs : Plt 212, CR 0.7, AST/ALT 10/11  During the 48 hours, patient blood pressures were all within normal limits except for one mild range blood pressure.Therefore on review, discussion that patient unlikely to have preeclampsia with SF (BP). Discussion with patient on  and decision to discharge with twice weekly follow up for gHTN.        Hospital Course:  2022 Admit to antepartum service for observation and serial blood pressure evaluation. Labs for pre-eclampsia evaluation. PC  ratio 0.27 (increased from 4/20 TLTC). Plt 223 Cr 0.7, LFT 11/13. Will obtain 24 hour urine. Patient received BMZ 4/20-4/21.  04/29/2022 Rescue course of BMZ started yesterday. VSS overnight. Monitoring reassuring. Anticipate de-escalating magnesium this morning.  04/30/2022 - NAEON. S/p rescue BMZ. VSS. FHT reactive and reassuring. Restarted on mag in anticipation of delivery this morning. Repeat CBC and CMP stable.       Obstetric HPI:  Patient reports no contractions, active fetal movement, present vaginal bleeding , absent loss of fluid.    Denies headache, SOB, vision changes, and RUQ pain. Reports increasing edema in upper extremities.     Objective:     Vital Signs (Most Recent):  Temp: 98.2 °F (36.8 °C) (04/30/22 0030)  Pulse: 85 (04/30/22 0453)  Resp: 18 (04/30/22 0030)  BP: 126/60 (04/30/22 0453)  SpO2: 95 % (04/30/22 0449) Vital Signs (24h Range):  Temp:  [98 °F (36.7 °C)-98.2 °F (36.8 °C)] 98.2 °F (36.8 °C)  Pulse:  [68-95] 85  Resp:  [18] 18  SpO2:  [93 %-98 %] 95 %  BP: (114-167)/(55-89) 126/60     Weight: 116.7 kg (257 lb 2.7 oz)  Body mass index is 47.04 kg/m².    FHT: 140 bpm, moderate variability, +accels, -decels Cat 1 (reassuring)  TOCO:  none      Intake/Output Summary (Last 24 hours) at 4/30/2022 0724  Last data filed at 4/30/2022 0330  Gross per 24 hour   Intake 1182.83 ml   Output 2400 ml   Net -1217.17 ml         Cervical Exam: deferred     Significant Labs:  Recent Labs   Lab 04/29/22  1240 04/29/22  1659 04/30/22  0553   WBC 16.80* 15.76* 16.18*   HGB 11.5* 11.8* 10.2*   HCT 35.8* 36.5* 31.5*   MCV 87 87 87    236 199      Recent Labs   Lab 04/29/22  1118 04/29/22  1659 04/30/22  0553   * 136 138   K 3.8 4.3 4.1   CL 98 101 106   CO2 18* 20* 20*   BUN 7 8 10   CREATININE 0.6 0.7 0.7    119* 131*   PROT 5.9* 7.0 5.9*   BILITOT 0.2 0.2 0.2   ALKPHOS 103 118 108   ALT 12 15 15   AST 10 16 12            Physical Exam:   Constitutional: She is oriented to person, place, and  time. She appears well-developed and well-nourished. No distress.    HENT:   Head: Normocephalic and atraumatic.    Eyes: Pupils are equal, round, and reactive to light. EOM are normal.     Cardiovascular:  Normal rate.             Pulmonary/Chest: Effort normal. No respiratory distress.        Abdominal: Soft. She exhibits no distension. There is no abdominal tenderness. There is no rebound and no guarding.             Musculoskeletal: Normal range of motion and moves all extremeties. No tenderness or edema.       Neurological: She is alert and oriented to person, place, and time.    Skin: Skin is warm and dry. She is not diaphoretic.      Assessment/Plan:     31 y.o. female  at 34w0d for:    * Severe pre-eclampsia in third trimester  - Severe range BP overnight, requiring push of labetalol 20 mg  - Now meets criteria for pre-e w/SF based on severe range BP and elevated 24 hour urine protein (327)  - PC ratio 0.27 (increased from  TLTC). Plt 223 Cr 0.7, LFT .   - On magnesium overnight for anticipated delivery this morning.   - NPO  - Continuous monitoring, reassuring  - s/p rescue BMZ    33 weeks gestation of pregnancy  - s/p BMZ and rBMZ  - Vertex on bedside US      History of  section  - Plan repeat   - Consents for  and blood transfusion    Obesity (BMI 35.0-39.9 without comorbidity)  - Pre preg BMI 39  - does not meet requirement for PP lovenox  - SCD on while in bed  - Encourage ambulation           Nirmala Bravo MD  Obstetrics  [facility] - Antepartum ([place])

## 2022-04-30 NOTE — LACTATION NOTE
04/30/22 1740   Maternal Assessment   Breast Shape Bilateral:;round   Breast Density soft   Areola elastic   Nipples everted;graspable   Maternal Infant Feeding   Maternal Emotional State relaxed;assist needed   Equipment Type   Breast Pump Type double electric, hospital grade   Breast Pump Flange Type hard   Breast Pump Flange Size 24 mm   Breast Pumping   Breast Pumping Interventions frequent pumping encouraged;early pumping promoted   Breast Pumping hand expression utilized   Pt pumping when LC entered room. LC reviewed NICU lactation basics, including use of double electric breast pump. Pt has number and will ask for baby's labels, and is aware how to store and transport milk. Reviewed cleaning and sanitization of pump parts. Pt expressed concern about milk supply; LC used NICU Lactation Booklet to review normal expectations for milk production when pumping for NICU baby. LC reviewed techniques to increase supply. LC assisted with hand expression. Glistens to face of nipple.  Pt aware of how to use NICView. All questions answered and pt verbalized understanding.

## 2022-04-30 NOTE — SUBJECTIVE & OBJECTIVE
Obstetric HPI:  Patient reports no contractions, active fetal movement, present vaginal bleeding , absent loss of fluid.    Denies headache, SOB, vision changes, and RUQ pain. Reports increasing edema in upper extremities.     Objective:     Vital Signs (Most Recent):  Temp: 98.2 °F (36.8 °C) (04/30/22 0030)  Pulse: 85 (04/30/22 0453)  Resp: 18 (04/30/22 0030)  BP: 126/60 (04/30/22 0453)  SpO2: 95 % (04/30/22 0449) Vital Signs (24h Range):  Temp:  [98 °F (36.7 °C)-98.2 °F (36.8 °C)] 98.2 °F (36.8 °C)  Pulse:  [68-95] 85  Resp:  [18] 18  SpO2:  [93 %-98 %] 95 %  BP: (114-167)/(55-89) 126/60     Weight: 116.7 kg (257 lb 2.7 oz)  Body mass index is 47.04 kg/m².    FHT: 140 bpm, moderate variability, +accels, -decels Cat 1 (reassuring)  TOCO:  none      Intake/Output Summary (Last 24 hours) at 4/30/2022 0724  Last data filed at 4/30/2022 0330  Gross per 24 hour   Intake 1182.83 ml   Output 2400 ml   Net -1217.17 ml         Cervical Exam: deferred     Significant Labs:  Recent Labs   Lab 04/29/22  1240 04/29/22  1659 04/30/22  0553   WBC 16.80* 15.76* 16.18*   HGB 11.5* 11.8* 10.2*   HCT 35.8* 36.5* 31.5*   MCV 87 87 87    236 199      Recent Labs   Lab 04/29/22  1118 04/29/22  1659 04/30/22  0553   * 136 138   K 3.8 4.3 4.1   CL 98 101 106   CO2 18* 20* 20*   BUN 7 8 10   CREATININE 0.6 0.7 0.7    119* 131*   PROT 5.9* 7.0 5.9*   BILITOT 0.2 0.2 0.2   ALKPHOS 103 118 108   ALT 12 15 15   AST 10 16 12            Physical Exam:   Constitutional: She is oriented to person, place, and time. She appears well-developed and well-nourished. No distress.    HENT:   Head: Normocephalic and atraumatic.    Eyes: Pupils are equal, round, and reactive to light. EOM are normal.     Cardiovascular:  Normal rate.             Pulmonary/Chest: Effort normal. No respiratory distress.        Abdominal: Soft. She exhibits no distension. There is no abdominal tenderness. There is no rebound and no guarding.              Musculoskeletal: Normal range of motion and moves all extremeties. No tenderness or edema.       Neurological: She is alert and oriented to person, place, and time.    Skin: Skin is warm and dry. She is not diaphoretic.

## 2022-04-30 NOTE — NURSING
No acute events noted overnight. VSS and afebrile. Patient had no complaints of vaginal bleeding, contractions, leaking of vaginal fluids or decreased fetal movement. Patient reports positive fetal movement. Pt NPO at midnight. Mag restarted at 0336 per MD order. Pt prepped for  this AM. All patient needs met and questions were answered. Pt safety maintained. Bed in the lowest position, wheels locked, call light within reach. Will continue to monitor.

## 2022-04-30 NOTE — ANESTHESIA PROCEDURE NOTES
CSE    Patient location during procedure: OR  Start time: 4/30/2022 9:18 AM  Timeout: 4/30/2022 9:15 AM  End time: 4/30/2022 9:22 AM      Staffing  Authorizing Provider: Manuel Ferrari MD  Performing Provider: Jak Carroll MD    Preanesthetic Checklist  Completed: patient identified, IV checked, site marked, risks and benefits discussed, surgical consent, monitors and equipment checked, pre-op evaluation and timeout performed  CSE  Patient position: sitting  Prep: ChloraPrep  Patient monitoring: heart rate, cardiac monitor and continuous pulse ox  Approach: midline  Spinal Needle  Needle type: Nini   Needle gauge: 25 G  Needle length: 5 in  Epidural Needle  Injection technique: FRANC air  Needle type: Tuohy   Needle gauge: 17 G  Needle length: 3.5 in  Needle insertion depth: 7.5 cm  Location: L4-5  Needle localization: anatomical landmarks   Catheter  Catheter type: springwGloboforce  Catheter size: 19 G  Catheter at skin depth: 12 cm  Test dose: lidocaine 1.5% with Epi 1-to-200,000  Test dose: 3 mL  Additional Documentation: incremental injection, negative aspiration for CSF, negative aspiration for heme, no paresthesia on injection and negative test dose  Assessment  Sensory level: T8   Dermatomal levels determined by pinch or prick  Intrathecal Medications:   administered: primary anesthetic mcg of

## 2022-04-30 NOTE — ASSESSMENT & PLAN NOTE
- Severe range BP overnight, requiring push of labetalol 20 mg  - Now meets criteria for pre-e w/SF based on severe range BP and elevated 24 hour urine protein (327)  - PC ratio 0.27 (increased from 4/20 TLTC). Plt 223 Cr 0.7, LFT 11/13.   - On magnesium overnight for anticipated delivery this morning.   - NPO  - Continuous monitoring, reassuring  - s/p rescue BMZ

## 2022-04-30 NOTE — TRANSFER OF CARE
"Anesthesia Transfer of Care Note    Patient: Armida Medina    Procedure(s) Performed: Procedure(s) (LRB):   SECTION (N/A)    Patient location: Labor and Delivery    Anesthesia Type: CSE    Transport from OR: Transported from OR on room air with adequate spontaneous ventilation    Post pain: adequate analgesia    Post assessment: no apparent anesthetic complications    Post vital signs: stable    Level of consciousness: awake, alert and oriented    Nausea/Vomiting: no nausea/vomiting    Complications: none    Transfer of care protocol was followed      Last vitals:   Visit Vitals  /64   Pulse 74   Temp 36.9 °C (98.5 °F) (Oral)   Resp 18   Ht 5' 2" (1.575 m)   Wt 116.7 kg (257 lb 2.7 oz)   LMP 2021   SpO2 (!) 94%   Breastfeeding No   BMI 47.04 kg/m²     "

## 2022-04-30 NOTE — L&D DELIVERY NOTE
[facility] - Labor & Delivery   Section   Operative Note    SUMMARY     Date of Procedure: 2022     Procedure: Procedure(s) (LRB):   SECTION (N/A)    Surgeon(s) and Role:     * Fiorella Carpenter, DO - Primary     * Nirmala Bravo MD - Resident - Assisting      Pre-Operative Diagnosis:   History of myomectomy [Z98.890]  History of  section [Z98.891]  Pre-e w/SF    Post-Operative Diagnosis: Post-Op Diagnosis Codes:  History of myomectomy [Z98.890]  History of  section [Z98.891]  Pre-e w/SF  S/p repeat     Anesthesia: Spinal/Epidural    Findings:   1.Normal appearing ovaries. Normal appearing R fallopian tube and L fallopian tube adhered to the L side of the uterus.   2. Prior myomectomy scar seen at the uterine fundus  3. Normal placenta with some small areas of calcifications noted    Estimated Blood Loss:  545 mL    Specimens:   1. Single viable male infant   2. Placenta which was sent to pathology    PreOp CBC:   Lab Results   Component Value Date    WBC 16.18 (H) 2022    HGB 10.2 (L) 2022    HCT 31.5 (L) 2022    MCV 87 2022     2022                     Complications:  None; patient tolerated the procedure well.           Disposition: PACU - hemodynamically stable.           Condition: stable    Indication for delivery: Pre-E with severe features at 34 weeks gestation.     Procedure Details   The patient was seen in the Holding Room. The risks, benefits, complications, treatment options, and expected outcomes were discussed with the patient.  The patient concurred with the proposed plan, giving informed consent.  The site of surgery properly noted/marked. The patient was taken to Operating Room, identified as Armida Medina and the procedure verified as  Delivery. A Time Out was held and the above information confirmed.    After induction of anesthesia, the patient was prepped and draped in the usual sterile manner  while placed in a dorsal supine position with a left lateral tilt.  A bar catheter was also placed per nursing. Preoperative antibiotics were administered and an allis test was performed yielding adequate anesthesia.  A Pfannenstiel incision was made and carried down through the subcutaneous tissue to the fascia. Fascial incision was made and extended transversely. The fascia was grasped with Kocher clamps and  from the underlying rectus tissue superiorly and inferiorly.     The muscle was noted to have some scarring in the midline, so was grasped with 2 irvin clamps and incised to the level of the peritoneum, which was noted to be free of adherent bowel and entered bluntly.     Peritoneal incision was extended longitudinally. The vesico-uterine peritoneum was identified and bladder blade was inserted. There was a small adhesion noted from the bladder flap to the anterior uterus that was taken down by incising the vesico-uterine peritoneum and the bladder flap was bluntly freed from the lower uterine segment. The bladder blade was reinserted to keep the bladder out of the operative field.     A low transverse uterine incision was made with knife and extended with finger fracture. The amniotic sac was ruptured with fingers and the infant was noted to be in cephalic position. The head was brought to the incision and elevated out of the pelvis. The patient delivered a single viable male infant. Infant weighed 2280 grams with Apgar scores of 8/9 at one and five minutes respectively. After the umbilical cord was clamped and cut, cord blood was obtained for evaluation. The placenta was removed intact and appeared overall normal with small areas of calcification and was sent to pathology.     The uterus was exteriorized. The L tube was noted to be adhered to the uterus L side of the uterus but overall appeared normal, and the R tube appeared normal. A prior myomectomy scar was noted on the fundus of the uterus,  but otherwise the uterus appeared normal.  The uterine incision was closed with running locked sutures of 0 chromic. Hemostasis was observed. The uterus was returned to the abdominal cavity. Incision was reinspected and good hemostasis was noted. The abdominal cavity was irrigated to remove clots. The peritoneum and muscle was reapproximated with 2-0 chromic. The fascia was then reapproximated with running sutures of 1 PDS. The subcutaneous fat and skin was reapproximated with 2-0 plain gut and 4-0 monocryl respectively.    Instrument, sponge, and needle counts were correct prior the abdominal closure and at the conclusion of the case.     Pt tolerated procedure well and was in good condition after the procedure.        Specimens:   Specimen (24h ago, onward)             Start     Ordered    22 1012  Specimen to Pathology, Surgery Gynecology and Obstetrics  Once        Comments: Pre-op Diagnosis: History of myomectomy [Z98.890]History of  section [Z98.891]Procedure(s): SECTION Number of specimens: 1Name of specimens: skin tag     References:    Click here for ordering Quick Tip   Question Answer Comment   Procedure Type: Gynecology and Obstetrics    Which provider would you like to cc? KARY JUAREZ    Release to patient Immediate        22 1011    22 1005  Specimen to Pathology, Surgery Gynecology and Obstetrics  Once        Comments: Pre-op Diagnosis: History of myomectomy [Z98.890]History of  section [Z98.891]Procedure(s): SECTION Number of specimens: 1Name of specimens: 34w0d placenta. Pre-e     References:    Click here for ordering Quick Tip   Question Answer Comment   Procedure Type: Gynecology and Obstetrics    Which provider would you like to cc? KARY JUAREZ    Release to patient Immediate        22 1005                       Delivery Information for Héctor Medina    Birth information:  YOB: 2023   Time of birth: 9:48 AM    Sex: male   Head Delivery Date/Time: 2022  9:48 AM   Delivery type: , Low Transverse   Gestational Age: 34w0d    Delivery Providers    Delivering clinician: Fiorella Carpenter DO   Provider Role    Nirmala Bravo MD Resident    Kristina Benedict, RN Circulator    Gemini Mcmahan,  Surgical [facility]    Jak Carroll MD Resident    Manuel Ferrari MD Anesthesiologist            Measurements    Weight: 2280 g  Weight (lbs): 5 lb 0.4 oz  Length:          Apgars    Living status: Living  Apgars:  1 min.:  5 min.:  10 min.:  15 min.:  20 min.:    Skin color:  0  1       Heart rate:  2  2       Reflex irritability:  2  2       Muscle tone:  2  2       Respiratory effort:  2  2       Total:  8  9       Apgars assigned by: NICU         Operative Delivery    Forceps attempted?: No  Vacuum extractor attempted?: No         Shoulder Dystocia    Shoulder dystocia present?: No           Presentation    Presentation: Vertex  Position: Middle Occiput Anterior           Interventions/Resuscitation    Method: NICU Attended       Cord    Vessels: 3 vessels  Complications: None  Delayed Cord Clamping?: Yes  Cord Clamped Date/Time: 2022  9:49 AM  Cord Blood Disposition: Sent with Baby  Gases Sent?: No  Stem Cell Collection (by MD): No       Placenta    Placenta delivery date/time: 2022 0950  Placenta removal: Manual removal  Placenta appearance: Intact  Placenta disposition: discarded, pathology           Labor Events:       labor:       Labor Onset Date/Time:         Dilation Complete Date/Time:         Start Pushing Date/Time:         Start Pushing Date/Time:       Rupture Date/Time:            Rupture type:          Fluid Amount:       Fluid Color:       Fluid Odor:       Membrane Status:                steroids: Full Course     Antibiotics given for GBS: No     Induction:       Indications for induction:        Augmentation:       Indications for augmentation:       Labor  complications: None     Additional complications:          Cervical ripening:                     Delivery:      Episiotomy:       Indication for Episiotomy:       Perineal Lacerations:   Repaired:      Periurethral Laceration:   Repaired:     Labial Laceration:   Repaired:     Sulcus Laceration:   Repaired:     Vaginal Laceration:   Repaired:     Cervical Laceration:   Repaired:     Repair suture:       Repair # of packets:       Last Value - EBL - Nursing (mL):       Sum - EBL - Nursing (mL): 0     Last Value - EBL - Anesthesia (mL):      Calculated QBL (mL): 545      Vaginal Sweep Performed:       Surgicount Correct:         Other providers:       Anesthesia    Method: Spinal, Epidural          Details (if applicable):  Trial of Labor No    Categorization: Repeat    Priority: Routine   Indications for : Repeat Section   Incision Type: low transverse     Additional  information:  Forceps:    Vacuum:    Breech:    Observed anomalies    Other (Comments):           Nirmala Bravo M.D.   OB/GYN  PGY-3

## 2022-05-01 LAB
BASOPHILS # BLD AUTO: 0.03 K/UL (ref 0–0.2)
BASOPHILS NFR BLD: 0.2 % (ref 0–1.9)
DIFFERENTIAL METHOD: ABNORMAL
EOSINOPHIL # BLD AUTO: 0 K/UL (ref 0–0.5)
EOSINOPHIL NFR BLD: 0.1 % (ref 0–8)
ERYTHROCYTE [DISTWIDTH] IN BLOOD BY AUTOMATED COUNT: 14.2 % (ref 11.5–14.5)
HCT VFR BLD AUTO: 35.7 % (ref 37–48.5)
HGB BLD-MCNC: 11.1 G/DL (ref 12–16)
IMM GRANULOCYTES # BLD AUTO: 0.29 K/UL (ref 0–0.04)
IMM GRANULOCYTES NFR BLD AUTO: 1.9 % (ref 0–0.5)
LYMPHOCYTES # BLD AUTO: 1.7 K/UL (ref 1–4.8)
LYMPHOCYTES NFR BLD: 10.9 % (ref 18–48)
MCH RBC QN AUTO: 27.8 PG (ref 27–31)
MCHC RBC AUTO-ENTMCNC: 31.1 G/DL (ref 32–36)
MCV RBC AUTO: 90 FL (ref 82–98)
MONOCYTES # BLD AUTO: 1.2 K/UL (ref 0.3–1)
MONOCYTES NFR BLD: 7.8 % (ref 4–15)
NEUTROPHILS # BLD AUTO: 12 K/UL (ref 1.8–7.7)
NEUTROPHILS NFR BLD: 79.1 % (ref 38–73)
NRBC BLD-RTO: 0 /100 WBC
PLATELET # BLD AUTO: 209 K/UL (ref 150–450)
PMV BLD AUTO: 12 FL (ref 9.2–12.9)
RBC # BLD AUTO: 3.99 M/UL (ref 4–5.4)
WBC # BLD AUTO: 15.17 K/UL (ref 3.9–12.7)

## 2022-05-01 PROCEDURE — 11000001 HC ACUTE MED/SURG PRIVATE ROOM

## 2022-05-01 PROCEDURE — 25000003 PHARM REV CODE 250: Performed by: STUDENT IN AN ORGANIZED HEALTH CARE EDUCATION/TRAINING PROGRAM

## 2022-05-01 PROCEDURE — 63600175 PHARM REV CODE 636 W HCPCS: Performed by: STUDENT IN AN ORGANIZED HEALTH CARE EDUCATION/TRAINING PROGRAM

## 2022-05-01 PROCEDURE — 85025 COMPLETE CBC W/AUTO DIFF WBC: CPT | Performed by: STUDENT IN AN ORGANIZED HEALTH CARE EDUCATION/TRAINING PROGRAM

## 2022-05-01 PROCEDURE — 94761 N-INVAS EAR/PLS OXIMETRY MLT: CPT

## 2022-05-01 PROCEDURE — 36415 COLL VENOUS BLD VENIPUNCTURE: CPT | Performed by: STUDENT IN AN ORGANIZED HEALTH CARE EDUCATION/TRAINING PROGRAM

## 2022-05-01 RX ORDER — OXYCODONE HYDROCHLORIDE 5 MG/1
5 TABLET ORAL ONCE
Status: COMPLETED | OUTPATIENT
Start: 2022-05-01 | End: 2022-05-01

## 2022-05-01 RX ADMIN — SODIUM CHLORIDE, SODIUM LACTATE, POTASSIUM CHLORIDE, AND CALCIUM CHLORIDE: .6; .31; .03; .02 INJECTION, SOLUTION INTRAVENOUS at 04:05

## 2022-05-01 RX ADMIN — ENOXAPARIN SODIUM 40 MG: 100 INJECTION SUBCUTANEOUS at 10:05

## 2022-05-01 RX ADMIN — CETIRIZINE HYDROCHLORIDE 10 MG: 5 TABLET, FILM COATED ORAL at 10:05

## 2022-05-01 RX ADMIN — ACETAMINOPHEN 1000 MG: 500 TABLET, FILM COATED ORAL at 05:05

## 2022-05-01 RX ADMIN — ENOXAPARIN SODIUM 40 MG: 100 INJECTION SUBCUTANEOUS at 08:05

## 2022-05-01 RX ADMIN — DOCUSATE SODIUM 200 MG: 100 CAPSULE, LIQUID FILLED ORAL at 10:05

## 2022-05-01 RX ADMIN — OXYCODONE 5 MG: 5 TABLET ORAL at 02:05

## 2022-05-01 RX ADMIN — ACETAMINOPHEN 1000 MG: 500 TABLET, FILM COATED ORAL at 08:05

## 2022-05-01 RX ADMIN — DOCUSATE SODIUM 200 MG: 100 CAPSULE, LIQUID FILLED ORAL at 08:05

## 2022-05-01 NOTE — ANESTHESIA POSTPROCEDURE EVALUATION
Anesthesia Post Evaluation    Patient: Armida Medina    Procedure(s) Performed: Procedure(s) (LRB):   SECTION (N/A)    Final Anesthesia Type: CSE      Patient location during evaluation: floor  Patient participation: Yes- Able to Participate  Level of consciousness: awake and alert  Post-procedure vital signs: reviewed and stable  Pain management: adequate  Airway patency: patent  JUAN mitigation strategies: Use of major conduction anesthesia (spinal/epidural) or peripheral nerve block and Multimodal analgesia  PONV status at discharge: No PONV  Anesthetic complications: no      Cardiovascular status: hemodynamically stable and blood pressure returned to baseline  Respiratory status: unassisted, spontaneous ventilation and room air  Hydration status: euvolemic  Follow-up not needed.          Vitals Value Taken Time   /71 22 0941   Temp 36.8 °C (98.3 °F) 22 0941   Pulse 75 22 0941   Resp 20 22 0941   SpO2 95 % 22 0939         Event Time   Out of Recovery 2022 13:40:00         Pain/David Score: Pain Rating Prior to Med Admin: 4 (2022  8:41 AM)

## 2022-05-01 NOTE — LACTATION NOTE
Mother should pump 8 or more times a day. Clean all pump parts with each use. Sterilize parts once a day. Label milk with date, time and meds. Bring milk straight to NICU or give to nurse to store in [facility] fridge. Call LC with questions.     Mom pumped 5mls during the pumping session.

## 2022-05-01 NOTE — PROGRESS NOTES
POSTPARTUM PROGRESS NOTE    Subjective:     PPD/POD#: 1   Procedure: Repeat LTCS   EGA: 34w0d   N/V: No   F/C: No   Abd Pain: Mild, well-controlled with oral pain medication   Lochia: Mild   Voiding: Yes   Ambulating: Yes   Bowel fnc: No   Breastfeeding: Yes   Contraception: Per primary OB   Circumcision: N/A, baby in NICU     Objective:      Temp:  [97.9 °F (36.6 °C)-98.5 °F (36.9 °C)] 97.9 °F (36.6 °C)  Pulse:  [58-89] 60  Resp:  [16-18] 17  SpO2:  [92 %-100 %] 96 %  BP: (114-144)/(55-75) 144/64    Lung: Normal respiratory effort   Abdomen: Soft, appropriately tender   Uterus: Firm, no fundal tenderness   Incision: Bandage in place without shadowing   : Deferred   Extremities: Bilateral 2+ non-pitting edema     Lab Review    Recent Labs   Lab 04/29/22  1118 04/29/22  1659 04/30/22  0553   * 136 138   K 3.8 4.3 4.1   CL 98 101 106   CO2 18* 20* 20*   BUN 7 8 10   CREATININE 0.6 0.7 0.7    119* 131*   PROT 5.9* 7.0 5.9*   BILITOT 0.2 0.2 0.2   ALKPHOS 103 118 108   ALT 12 15 15   AST 10 16 12       Recent Labs   Lab 04/29/22  1240 04/29/22  1659 04/30/22  0553   WBC 16.80* 15.76* 16.18*   HGB 11.5* 11.8* 10.2*   HCT 35.8* 36.5* 31.5*   MCV 87 87 87    236 199         I/O    Intake/Output Summary (Last 24 hours) at 5/1/2022 0236  Last data filed at 4/30/2022 2200  Gross per 24 hour   Intake 1398.21 ml   Output 2490 ml   Net -1091.79 ml        Assessment and Plan:   Postpartum care:  - Patient doing well.  - Continue routine management and advances.  - Patient allergic to NSAIDs, will avoid ibuprofen    PreE w/SF  - BP as above  - asymptomatic  - preE labs as above  - UOP: 0.6 cc/kg/hr  - Mag: continue  - Hypertensive agent previously on Labetalol 200 TID- was started late in pregnacy. Not restarted as HTN agent not indicated at this time    Mood Disorder  - Mood stable  - Medications: none  - Will need 1-2 week postpartum mood check    Obesity  - PrePreg BMI 39  - Encourage ambulation  -  Lovenox: 40 mg daily              Nirmala Bravo

## 2022-05-01 NOTE — PROGRESS NOTES
Pt remained stable throughout shift whileon the magnesium infusion. Ovalle was d/melony without complications and the pt was able to ambulate and void spontaneously within 6 hours . Pt's O2 was d/melony after 0300 per order parameters. Pain was well controlled with tylenolSafety environmental maintained throughout shift. Frequent pumping and hand expression performed throughout the shift. All questions answers and needs met.

## 2022-05-02 VITALS
HEIGHT: 62 IN | RESPIRATION RATE: 16 BRPM | BODY MASS INDEX: 47.33 KG/M2 | DIASTOLIC BLOOD PRESSURE: 67 MMHG | WEIGHT: 257.19 LBS | OXYGEN SATURATION: 97 % | SYSTOLIC BLOOD PRESSURE: 142 MMHG | TEMPERATURE: 99 F | HEART RATE: 76 BPM

## 2022-05-02 PROBLEM — Z98.891 STATUS POST CESAREAN DELIVERY: Status: ACTIVE | Noted: 2022-05-02

## 2022-05-02 PROBLEM — Z3A.33 33 WEEKS GESTATION OF PREGNANCY: Status: RESOLVED | Noted: 2022-04-28 | Resolved: 2022-05-02

## 2022-05-02 PROCEDURE — 63600175 PHARM REV CODE 636 W HCPCS: Performed by: STUDENT IN AN ORGANIZED HEALTH CARE EDUCATION/TRAINING PROGRAM

## 2022-05-02 PROCEDURE — 25000003 PHARM REV CODE 250: Performed by: STUDENT IN AN ORGANIZED HEALTH CARE EDUCATION/TRAINING PROGRAM

## 2022-05-02 RX ORDER — OXYCODONE HYDROCHLORIDE 5 MG/1
5 TABLET ORAL EVERY 4 HOURS PRN
Qty: 25 TABLET | Refills: 0 | Status: SHIPPED | OUTPATIENT
Start: 2022-05-02 | End: 2022-08-11

## 2022-05-02 RX ORDER — DOCUSATE SODIUM 100 MG/1
200 CAPSULE, LIQUID FILLED ORAL 2 TIMES DAILY
Qty: 60 CAPSULE | Refills: 2 | Status: SHIPPED | OUTPATIENT
Start: 2022-05-02 | End: 2022-08-11

## 2022-05-02 RX ORDER — ACETAMINOPHEN 500 MG
1000 TABLET ORAL EVERY 8 HOURS
Qty: 30 TABLET | Refills: 2 | Status: SHIPPED | OUTPATIENT
Start: 2022-05-02 | End: 2022-08-11

## 2022-05-02 RX ADMIN — ACETAMINOPHEN 1000 MG: 500 TABLET, FILM COATED ORAL at 01:05

## 2022-05-02 RX ADMIN — DOCUSATE SODIUM 200 MG: 100 CAPSULE, LIQUID FILLED ORAL at 09:05

## 2022-05-02 RX ADMIN — PRENATAL VIT W/ FE FUMARATE-FA TAB 27-0.8 MG 1 TABLET: 27-0.8 TAB at 09:05

## 2022-05-02 RX ADMIN — OXYCODONE 10 MG: 5 TABLET ORAL at 01:05

## 2022-05-02 RX ADMIN — ACETAMINOPHEN 1000 MG: 500 TABLET, FILM COATED ORAL at 09:05

## 2022-05-02 RX ADMIN — ENOXAPARIN SODIUM 40 MG: 100 INJECTION SUBCUTANEOUS at 09:05

## 2022-05-02 NOTE — PLAN OF CARE
Pt discharged to home with  at side. Discharge instructions reviewed, self care guide give, pt verbalizes understanding. Will follow up in clinic with Dr. Carpenter for postpartal appointment.

## 2022-05-02 NOTE — DISCHARGE SUMMARY
Delivery Discharge Summary  Obstetrics      Primary OB Clinician: Fiorella Carpenter DO      Admission date: 2022  Discharge date: 2022    Disposition: To home, self care    Discharge Diagnosis List:      Patient Active Problem List   Diagnosis    Hashimoto's thyroiditis    Childhood asthma without complication    Iron deficiency anemia    Obesity (BMI 35.0-39.9 without comorbidity)    Mixed hyperlipidemia    Statin myopathy    Vitamin D deficiency    History of depression    History of myomectomy    History of  section    Pregnancy with one fetus, antepartum    Severe pre-eclampsia in third trimester    Postpartum hemorrhage    Status post  delivery       Procedure: , due to history of rLTCS    Hospital Course:  Armida Medina is a 31 y.o. now , POD #2 who was admitted on 2022 at 33w5d for PreE with SF. Patient was subsequently admitted to labor and delivery unit with signed consents.     Patient was started on MgSO4 for seizure prophylaxis and started on Labetalol 200 TID and underwent scheduled repeat  section at 34w0d without complications.    Please see delivery note for further details. Her postpartum course was uncomplicated. She is s/p MgSO4 for 24 hours. She did not meet criteria for hypertensive agents in the post partum period. On discharge day, patient's pain is controlled with oral pain medications. Pt is tolerating ambulation without SOB or CP, and regular diet without N/V. Reports lochia is mild. Denies any HA, vision changes, F/C, LE swelling. Denies any breast pain/soreness.    Pt in stable condition and ready for discharge. She has been instructed to start and/or continue medications and follow up with her obstetrics provider as listed below.    Pertinent studies:  CBC  Recent Labs   Lab 22  1659 22  0553 22  0847   WBC 15.76* 16.18* 15.17*   HGB 11.8* 10.2* 11.1*   HCT 36.5* 31.5* 35.7*   MCV 87 87 90     199 209          Immunization History   Administered Date(s) Administered    COVID-19, MRNA, LN-S, PF (Pfizer) (Purple Cap) 2021, 2021    DTaP 1989, 1990, 1991, 1992, 1994    HIB 1992    HPV Quadrivalent 2007, 2007, 2008    Hepatitis B, Pediatric/Adolescent 1998, 1998, 01/15/1999    IPV 1989, 1990, 1991, 1994    Influenza 10/04/2018, 10/23/2020    Influenza - Trivalent (ADULT) 2007, 10/09/2008    Influenza Split 2007, 10/09/2008    MMR 1990, 1994    Measles 1994    Meningococcal Conjugate (MCV4P) 2006, 2006    Mumps 1990    Rubella 1990    Td (ADULT) 2003, 2003    Td (Adult), Unspecified Formulation 2003, 2003    Tdap 10/09/2008, 10/24/2018, 2022        Delivery:    Episiotomy:     Lacerations:     Repair suture:     Repair # of packets:     Blood loss (ml):       Birth information:  YOB: 2023   Time of birth: 9:48 AM   Sex: male   Delivery type: , Low Transverse   Gestational Age: 34w0d    Delivery Clinician:      Other providers:       Additional  information:  Forceps:    Vacuum:    Breech:    Observed anomalies      Living?:           APGARS  One minute Five minutes Ten minutes   Skin color:         Heart rate:         Grimace:         Muscle tone:         Breathing:         Totals: 8  9        Placenta: Delivered:       appearance      Patient Instructions:   Current Discharge Medication List      START taking these medications    Details   acetaminophen (TYLENOL) 500 MG tablet Take 2 tablets (1,000 mg total) by mouth every 8 (eight) hours.  Qty: 30 tablet, Refills: 2      docusate sodium (COLACE) 100 MG capsule Take 2 capsules (200 mg total) by mouth 2 (two) times daily.  Qty: 60 capsule, Refills: 2      oxyCODONE (ROXICODONE) 5 MG immediate release tablet Take 1 tablet (5 mg  total) by mouth every 4 (four) hours as needed for Pain.  Qty: 25 tablet, Refills: 0    Comments: Quantity prescribed more than 7 day supply? No         CONTINUE these medications which have NOT CHANGED    Details   cetirizine (ZYRTEC) 10 MG tablet Take 10 mg by mouth once daily.      ergocalciferol (ERGOCALCIFEROL) 50,000 unit Cap TAKE 1 CAPSULE BY MOUTH EVERY 7 DAYS  Qty: 12 capsule, Refills: 3    Associated Diagnoses: Vitamin D deficiency      famotidine (PEPCID) 20 MG tablet Take 20 mg by mouth 2 (two) times daily as needed for Heartburn.         STOP taking these medications       aspirin 81 mg Cap Comments:   Reason for Stopping:               Discharge Procedure Orders   Diet Adult Regular     Lifting restrictions   Order Comments: Nothing heavier than baby for 6 weeks     No driving until:   Order Comments: Until not taking narcotics     Pelvic Rest   Order Comments: Nothing in the vagina for 6 weeks     Notify your health care provider if you experience any of the following:  temperature >100.4     Notify your health care provider if you experience any of the following:  persistent nausea and vomiting or diarrhea     Notify your health care provider if you experience any of the following:  severe uncontrolled pain     Notify your health care provider if you experience any of the following:  severe persistent headache     Notify your health care provider if you experience any of the following:   Order Comments: Heavy vaginal bleeding, saturating one/pad hour for two consecutive hours     Activity as tolerated        Follow-up Information     Fiorella Carpenter, DO Follow up.    Specialty: Obstetrics and Gynecology  Why: 1-2 weeks for blood pressure check  6 week post partum visit  Contact information:  [address] [id number] [phone]                          Katherine Boecking MD   Ob/Gyn PGY-2

## 2022-05-02 NOTE — NURSING
VSS overnight. Pt denies HA and RUQ pain. Pt reported swelling in bilateral lower legs. MD notified of this occurrence. I&O recorded. Ambulation encouraged. Pain well controlled with medication. Light vaginal bleeding noted. Pumping performed throughout shift. All needs and questions met. Safety maintained.

## 2022-05-02 NOTE — MEDICAL/APP STUDENT
POSTPARTUM PROGRESS NOTE     Armida Medina is a 31 y.o. female POD #2 status post Repeat  section at 34w0d in a pregnancy complicated by PreE with SF, depression, and anemia.   Patient is doing well this morning. She denies nausea, vomiting, fever or chills.  Patient reports moderate abdominal pain that is well relieved by oral pain medications. Lochia is moderate. Patient is voiding without difficulty and ambulating with no difficulty. She has passed flatus, and has not had BM.  Patient does plan to breast feed. Per primary OB for contraception.     Circumcision: n/a, baby in NICU    Objective:       Temp:  [98.3 °F (36.8 °C)-99.1 °F (37.3 °C)] 99.1 °F (37.3 °C)  Pulse:  [66-80] 79  Resp:  [16-20] 18  SpO2:  [95 %-98 %] 97 %  BP: (128-154)/(60-71) 128/66    General:   alert, appears stated age, cooperative and no distress   Lungs:   clear to auscultation bilaterally   Heart:   regular rate and rhythm, S1, S2 normal, no murmur, click, rub or gallop   Abdomen:  soft, non-tender; bowel sounds normal; no masses,  no organomegaly   Uterus:  firm located at the umblicus.        Incision: Bandage in place, clean, dry and intact   Extremities: peripheral pulses normal, no pedal edema, no clubbing or cyanosis     Lab Review  No results found for this or any previous visit (from the past 4 hour(s)).    I/O    Intake/Output Summary (Last 24 hours) at 2022 0629  Last data filed at 2022 0030  Gross per 24 hour   Intake 468.93 ml   Output 1700 ml   Net -1231.07 ml        Assessment:     Patient Active Problem List   Diagnosis    Hashimoto's thyroiditis    Childhood asthma without complication    Iron deficiency anemia    Obesity (BMI 35.0-39.9 without comorbidity)    Mixed hyperlipidemia    Statin myopathy    Vitamin D deficiency    History of depression    History of myomectomy    History of  section    Pregnancy with one fetus, antepartum    Severe pre-eclampsia in third trimester     33 weeks gestation of pregnancy    Postpartum hemorrhage        Plan:   1. Postpartum care:  - Patient doing well. Continue routine management and advances. Avoid ibuprofen, pt allergic to NSAIDS. Patient reports feeling well enough to go home today.  - Continue PO pain meds. Pain well controlled.  - Heme: H/h Pre:10/31. Post: 11/35  - Encourage ambulation  - Circumcision: n/a, baby in NICU  - Contraception: Per primary OB  - Lactation: Plans to breastfeed  - Rh status positive    2. PreE w/ SF  - BP stable, 128/66  - asymptomatic  - UOP: 0.6 cc/kg/hr  - Mag: s/p mag  - Hypertensive agent previously on Labetalol 200 TID- was started late in pregnacy. Not restarted as HTN agent not indicated at this time    Mood Disorder  - Mood stable  - Medications: none  - Will need 1-2 week postpartum mood check     Obesity  - PrePreg BMI 39  - Encourage ambulation  - Lovenox: 40 mg daily       Dispo: As patient meets milestones, will plan to discharge today 5/2/2022.    Amalia Kurtz, MS4

## 2022-05-03 NOTE — LACTATION NOTE
Discharge pumping instructions for NICU baby reviewed with mother. Discussed pumping 8 or more times in 24 hour period x 20-30 min. Discussed target amounts expected, proper labeling, storage, and transport guidelines. Mother informed if more storage containers or pumping supplies needed to ask NICU staff. Encouraged mother to pump at baby's bedside and skin to skin when she visits baby.

## 2022-05-05 LAB
FINAL PATHOLOGIC DIAGNOSIS: NORMAL
GROSS: NORMAL
Lab: NORMAL
MICROSCOPIC EXAM: NORMAL

## 2022-05-09 ENCOUNTER — HOSPITAL ENCOUNTER (EMERGENCY)
Facility: OTHER | Age: 32
Discharge: HOME | End: 2022-05-09
Attending: OBSTETRICS & GYNECOLOGY
Payer: COMMERCIAL

## 2022-05-09 VITALS
SYSTOLIC BLOOD PRESSURE: 105 MMHG | HEART RATE: 60 BPM | RESPIRATION RATE: 16 BRPM | TEMPERATURE: 98 F | DIASTOLIC BLOOD PRESSURE: 63 MMHG | OXYGEN SATURATION: 97 %

## 2022-05-09 DIAGNOSIS — R51.9 ACUTE NONINTRACTABLE HEADACHE, UNSPECIFIED HEADACHE TYPE: Primary | ICD-10-CM

## 2022-05-09 LAB
ALBUMIN SERPL BCP-MCNC: 3.4 G/DL (ref 3.5–5.2)
ALP SERPL-CCNC: 118 U/L (ref 55–135)
ALT SERPL W/O P-5'-P-CCNC: 21 U/L (ref 10–44)
ANION GAP SERPL CALC-SCNC: 15 MMOL/L (ref 8–16)
AST SERPL-CCNC: 16 U/L (ref 10–40)
BASOPHILS # BLD AUTO: 0.08 K/UL (ref 0–0.2)
BASOPHILS NFR BLD: 0.7 % (ref 0–1.9)
BILIRUB SERPL-MCNC: 0.2 MG/DL (ref 0.1–1)
BUN SERPL-MCNC: 11 MG/DL (ref 6–20)
CALCIUM SERPL-MCNC: 8.9 MG/DL (ref 8.7–10.5)
CHLORIDE SERPL-SCNC: 105 MMOL/L (ref 95–110)
CO2 SERPL-SCNC: 22 MMOL/L (ref 23–29)
CREAT SERPL-MCNC: 0.7 MG/DL (ref 0.5–1.4)
DIFFERENTIAL METHOD: ABNORMAL
EOSINOPHIL # BLD AUTO: 0.2 K/UL (ref 0–0.5)
EOSINOPHIL NFR BLD: 1.9 % (ref 0–8)
ERYTHROCYTE [DISTWIDTH] IN BLOOD BY AUTOMATED COUNT: 14.3 % (ref 11.5–14.5)
EST. GFR  (AFRICAN AMERICAN): >60 ML/MIN/1.73 M^2
EST. GFR  (NON AFRICAN AMERICAN): >60 ML/MIN/1.73 M^2
FINAL PATHOLOGIC DIAGNOSIS: NORMAL
GLUCOSE SERPL-MCNC: 98 MG/DL (ref 70–110)
HCT VFR BLD AUTO: 38.7 % (ref 37–48.5)
HGB BLD-MCNC: 12 G/DL (ref 12–16)
IMM GRANULOCYTES # BLD AUTO: 0.03 K/UL (ref 0–0.04)
IMM GRANULOCYTES NFR BLD AUTO: 0.3 % (ref 0–0.5)
LYMPHOCYTES # BLD AUTO: 2.2 K/UL (ref 1–4.8)
LYMPHOCYTES NFR BLD: 19.7 % (ref 18–48)
Lab: NORMAL
MCH RBC QN AUTO: 27.7 PG (ref 27–31)
MCHC RBC AUTO-ENTMCNC: 31 G/DL (ref 32–36)
MCV RBC AUTO: 89 FL (ref 82–98)
MONOCYTES # BLD AUTO: 0.6 K/UL (ref 0.3–1)
MONOCYTES NFR BLD: 5.6 % (ref 4–15)
NEUTROPHILS # BLD AUTO: 7.9 K/UL (ref 1.8–7.7)
NEUTROPHILS NFR BLD: 71.8 % (ref 38–73)
NRBC BLD-RTO: 0 /100 WBC
PLATELET # BLD AUTO: 266 K/UL (ref 150–450)
PMV BLD AUTO: 10.9 FL (ref 9.2–12.9)
POTASSIUM SERPL-SCNC: 3.8 MMOL/L (ref 3.5–5.1)
PROT SERPL-MCNC: 7.1 G/DL (ref 6–8.4)
RBC # BLD AUTO: 4.33 M/UL (ref 4–5.4)
SODIUM SERPL-SCNC: 142 MMOL/L (ref 136–145)
WBC # BLD AUTO: 10.99 K/UL (ref 3.9–12.7)

## 2022-05-09 PROCEDURE — 99283 EMERGENCY DEPT VISIT LOW MDM: CPT | Mod: 25

## 2022-05-09 PROCEDURE — 85025 COMPLETE CBC W/AUTO DIFF WBC: CPT | Performed by: STUDENT IN AN ORGANIZED HEALTH CARE EDUCATION/TRAINING PROGRAM

## 2022-05-09 PROCEDURE — 99284 PR EMERGENCY DEPT VISIT,LEVEL IV: ICD-10-PCS | Mod: ,,, | Performed by: OBSTETRICS & GYNECOLOGY

## 2022-05-09 PROCEDURE — 63600175 PHARM REV CODE 636 W HCPCS: Performed by: STUDENT IN AN ORGANIZED HEALTH CARE EDUCATION/TRAINING PROGRAM

## 2022-05-09 PROCEDURE — 99284 EMERGENCY DEPT VISIT MOD MDM: CPT | Mod: ,,, | Performed by: OBSTETRICS & GYNECOLOGY

## 2022-05-09 PROCEDURE — 80053 COMPREHEN METABOLIC PANEL: CPT | Performed by: STUDENT IN AN ORGANIZED HEALTH CARE EDUCATION/TRAINING PROGRAM

## 2022-05-09 RX ORDER — LABETALOL 200 MG/1
200 TABLET, FILM COATED ORAL 2 TIMES DAILY
Qty: 60 TABLET | Refills: 11 | Status: SHIPPED | OUTPATIENT
Start: 2022-05-09 | End: 2022-08-11

## 2022-05-09 RX ORDER — PROCHLORPERAZINE MALEATE 5 MG
10 TABLET ORAL ONCE
Status: COMPLETED | OUTPATIENT
Start: 2022-05-09 | End: 2022-05-09

## 2022-05-09 RX ADMIN — PROCHLORPERAZINE MALEATE 10 MG: 5 TABLET ORAL at 05:05

## 2022-05-09 NOTE — DISCHARGE INSTRUCTIONS
Stay hydrated by drinking 2-3 liters of water per day. Continue monitoring your bleeding at home. If you are saturating a pad in an hour less, call or come to the [facility] for further evaluation. If you have a history of elevated blood pressures, call us for increased blood pressure readings at home, a headache that persists after taking Tylenol, blurred vision, shortness of breath, or sharp upper abdominal pain.     [facility] Emergency Department: [phone]

## 2022-05-09 NOTE — ED PROVIDER NOTES
Encounter Date: 2022      SCRIBE #2 NOTE: I, am scribing for, and in the presence of. the APC attestation.     History     Chief Complaint   Patient presents with    Hypertension     Armida Medina is a 31 y.o. [id number] now POD#9 s/p rLTCS presents complaining of elevated blood pressure.  She reports that she woke up with a headache this morning, which prompted her to take her blood pressure which was severely elevated. She contacted her primary OB who sent the patient a prescription for Labetalol 200 BID. The patient's pregnancy was complicated by PreE w/ SF requiring delivery at 34 weeks. She was taking Labetalol 200 TID during pregnancy, however she was not taking anything immediately postpartum as her blood pressures were normal to mild range.    The patient reports she took the first dose of Labetalol at 3 pm today. She states her headache was not relieved with Tylenol. She currently rates the pain as a 6/10.     HPI  Review of patient's allergies indicates:   Allergen Reactions    Ibuprofen Hives and Shortness Of Breath     bronchospasm    Ancef [cefazolin] Rash     Past Medical History:   Diagnosis Date    Abnormal Pap smear of cervix     Acne     Allergy     Asthma     Hashimoto's thyroiditis 2017    Preeclampsia, severe, third trimester 2018     Past Surgical History:   Procedure Laterality Date     SECTION N/A 2018    Procedure:  SECTION;  Surgeon: Sawyer Lake Jr., MD;  Location: [facility];  Service: OB/GYN;  Laterality: N/A;     SECTION N/A 2022    Procedure:  SECTION;  Surgeon: Fiorella Carpenter DO;  Location: [facility] L&D;  Service: OB/GYN;  Laterality: N/A;     SECTION N/A 2022    Procedure:  SECTION;  Surgeon: Fiorella Carpenter DO;  Location: [facility] L&D;  Service: OB/GYN;  Laterality: N/A;    CYST REMOVAL      Left ear @ age 2    DILATION AND CURETTAGE OF UTERUS USING SUCTION N/A 2021    Procedure:  DILATION AND CURETTAGE, UTERUS, USING SUCTION;  Surgeon: Fiorella Carpenter DO;  Location: [facility] OR;  Service: OB/GYN;  Laterality: N/A;    ROBOT-ASSISTED LAPAROSCOPIC UTERINE MYOMECTOMY N/A 2020    Procedure: ROBOTIC MYOMECTOMY, UTERUS;  Surgeon: Sylvie Zavaleta MD;  Location: [facility] OR;  Service: OB/GYN;  Laterality: N/A;    TONSILLECTOMY, ADENOIDECTOMY       Family History   Problem Relation Age of Onset    No Known Problems Father     Miscarriages / Stillbirths Mother     Hypothyroidism Mother     Coronary artery disease Maternal Grandmother         s/p CABG 4v    Hypothyroidism Maternal Grandmother     Coronary artery disease Paternal Grandmother         s/p CABG    Breast cancer Neg Hx     Cancer Neg Hx     Colon cancer Neg Hx     Diabetes Neg Hx     Eclampsia Neg Hx     Hypertension Neg Hx     Stroke Neg Hx      labor Neg Hx     Ovarian cancer Neg Hx     Melanoma Neg Hx      Social History     Tobacco Use    Smoking status: Never Smoker    Smokeless tobacco: Never Used   Substance Use Topics    Alcohol use: Not Currently     Comment: social     Drug use: No     Review of Systems   Constitutional: Negative for chills, fatigue and fever.   HENT: Negative for congestion, rhinorrhea and sore throat.    Eyes: Negative for visual disturbance.   Respiratory: Negative for cough, chest tightness and shortness of breath.    Cardiovascular: Negative for chest pain and leg swelling.   Gastrointestinal: Negative for abdominal pain, nausea and vomiting.   Genitourinary: Negative for dysuria, pelvic pain, vaginal bleeding and vaginal discharge.   Musculoskeletal: Negative for back pain.   Neurological: Positive for headaches. Negative for dizziness.       Physical Exam     Initial Vitals [22 1735]   BP Pulse Resp Temp SpO2   118/66 75 16 98.1 °F (36.7 °C) 97 %      MAP       --         Physical Exam    Constitutional: She appears well-developed and well-nourished. No distress.    HENT:   Head: Normocephalic and atraumatic.   Eyes: EOM are normal.   Neck:   Normal range of motion.  Cardiovascular: Normal rate and regular rhythm.   Pulmonary/Chest: No respiratory distress.   Abdominal: There is no abdominal tenderness.   Pfannenstiel incision c/d/i There is no rebound and no guarding.   Musculoskeletal:         General: No edema.      Cervical back: Normal range of motion.     Neurological: She is alert and oriented to person, place, and time.   Skin: Skin is warm and dry.   Psychiatric: She has a normal mood and affect. Thought content normal.         ED Course   Procedures  Labs Reviewed   COMPREHENSIVE METABOLIC PANEL - Abnormal; Notable for the following components:       Result Value    CO2 22 (*)     Albumin 3.4 (*)     All other components within normal limits   CBC W/ AUTO DIFFERENTIAL - Abnormal; Notable for the following components:    MCHC 31.0 (*)     Gran # (ANC) 7.9 (*)     All other components within normal limits          Imaging Results    None          Medications   prochlorperazine tablet 10 mg (10 mg Oral Given 5/9/22 1753)     Medical Decision Making:   ED Management:  - Afebrile, BP all normal range in [facility]   - Compazine given with resolution of headache   - CBC 11/12/38/266  - Cr 0.7, AST/ALT 16/21  - Due to normal labs, normal BP, and resolution of headache patient is stable for discharge at this time  - ED return precautions given  - Patient voiced understanding and agreement with the plan            Attending Attestation:   Physician Attestation Statement for Resident:  As the supervising MD   Physician Attestation Statement: I have personally seen and examined this patient.   I agree with the above history. -:   As the supervising MD I agree with the above PE.    As the supervising MD I agree with the above treatment, course, plan, and disposition.   -: Normal BP after initiation of labetalol prior to presentation. HA resolved and labs wnl.  No severe symptoms. Pre  e precautions reviewed.  I was personally present during the critical portions of the procedure(s) performed by the resident and was immediately available in the ED to provide services and assistance as needed during the entire procedure.  I have reviewed and agree with the residents interpretation of the following: lab data.                            Clinical Impression:   Final diagnoses:  [R51.9] Acute nonintractable headache, unspecified headache type (Primary)          ED Disposition Condition    Discharge Stable        ED Prescriptions     None        Follow-up Information    None         Stephany August MD  OBGYN, PGY-1       Eliana Pennington MD  05/11/22 2680

## 2022-05-17 ENCOUNTER — POSTPARTUM VISIT (OUTPATIENT)
Dept: OBSTETRICS AND GYNECOLOGY | Facility: CLINIC | Age: 32
End: 2022-05-17
Attending: OBSTETRICS & GYNECOLOGY
Payer: COMMERCIAL

## 2022-05-17 VITALS
WEIGHT: 233.69 LBS | BODY MASS INDEX: 43 KG/M2 | SYSTOLIC BLOOD PRESSURE: 120 MMHG | DIASTOLIC BLOOD PRESSURE: 74 MMHG | HEIGHT: 62 IN

## 2022-05-17 DIAGNOSIS — Z01.30 BP CHECK: ICD-10-CM

## 2022-05-17 PROCEDURE — 0503F PR POSTPARTUM CARE VISIT: ICD-10-PCS | Mod: CPTII,S$GLB,, | Performed by: NURSE PRACTITIONER

## 2022-05-17 PROCEDURE — 99999 PR PBB SHADOW E&M-EST. PATIENT-LVL III: ICD-10-PCS | Mod: PBBFAC,,, | Performed by: NURSE PRACTITIONER

## 2022-05-17 PROCEDURE — 99999 PR PBB SHADOW E&M-EST. PATIENT-LVL III: CPT | Mod: PBBFAC,,, | Performed by: NURSE PRACTITIONER

## 2022-05-17 PROCEDURE — 0503F POSTPARTUM CARE VISIT: CPT | Mod: CPTII,S$GLB,, | Performed by: NURSE PRACTITIONER

## 2022-05-17 NOTE — PROGRESS NOTES
Postpartum Visit  Armida Medina is a 31 y.o. female  is here for a postpartum BP check. She is 2 weeks postpartum following a low cervical transverse  section, with Anesthesia: spinal. The delivery was at 34w0d for severe pre-eclampsia. She was discharged home on no meds- returned to [facility] on POD #9 day with c/o a headache and elevated BP readings. Started on Labetalol 200 mg PO BID and is still on this. Feels good, sleeping a little more since  is off work for this week. Family is helpful- mother stayed with her the first week. Baby was in the NICU for 8 days- came home last weekend. Breast feeding. No issues with voiding or BMs. Denies incisional pain. Taking BP daily at home.    Pregnancy was complicated by: prior CS, preeclampsia.      Delivery Date: 22  Delivery MD: Jayden  Gender: male  Birth Weight: 5# 0.4 oz  Breast Feeding: yes  Depression: no    OB History    Para Term  AB Living   3 2 1 1 1 2   SAB IAB Ectopic Multiple Live Births   1 0 0 0 2      # Outcome Date GA Lbr Jacob/2nd Weight Sex Delivery Anes PTL Lv   3  22 34w0d  2.28 kg (5 lb 0.4 oz) M CS-LTranv Spinal, EPI  RAFAEL   2 SAB 21 8w0d          1 Term 18 37w3d  2.95 kg (6 lb 8.1 oz) M CS-LTranv Spinal N RAFAEL      Obstetric Comments   Second pregnancy -Blighted ovum       Postpartum course has been uncomplicated.  Bleeding no bleeding. Bowel/ bladder function is normal. Her last pap was .  Patient is not sexually active.  Postpartum depression screening: negative.      ROS:  GENERAL: No fever, chills, fatigability.  VULVAR: No pain, no lesions and no itching.  VAGINAL: No relaxation, no itching, no discharge, no abnormal bleeding and no lesions.  ABDOMEN: No abdominal pain. Denies nausea. Denies vomiting. No diarrhea. No constipation  BREAST: Denies pain. No lumps. No discharge.  URINARY: No incontinence, no nocturia, no frequency and no dysuria.  CARDIOVASCULAR: No chest pain. No  shortness of breath. No leg cramps.  NEUROLOGICAL: No headaches. No vision changes.      General appearance - alert, well appearing, and in no distress and obese  Mental status - alert, oriented to person, place, and time, normal mood, behavior, speech, dress, motor activity, and thought processes  Skin - coloration normal for race, good turgor, warm to touch, no rashes  Abdomen - soft, nontender, nondistended, no masses or organomegaly  Pfannenstiel incision: Clean, dry, intact - healing well.  NO PELVIC EXAM    Armida was seen today for postpartum care.    Diagnoses and all orders for this visit:    Encounter for postpartum visit    BP check      Counseling regarding resuming normal activities of exercise and work.  Postpartum precautions reviewed  Incision healing well  Mood is good  C/w labetalol 200 mg BID  RTC in 4 weeks

## 2022-05-30 ENCOUNTER — PATIENT MESSAGE (OUTPATIENT)
Dept: ADMINISTRATIVE | Facility: HOSPITAL | Age: 32
End: 2022-05-30
Payer: COMMERCIAL

## 2022-06-24 ENCOUNTER — POSTPARTUM VISIT (OUTPATIENT)
Dept: OBSTETRICS AND GYNECOLOGY | Facility: CLINIC | Age: 32
End: 2022-06-24
Attending: OBSTETRICS & GYNECOLOGY
Payer: COMMERCIAL

## 2022-06-24 VITALS
SYSTOLIC BLOOD PRESSURE: 108 MMHG | HEIGHT: 62 IN | WEIGHT: 235.88 LBS | BODY MASS INDEX: 43.41 KG/M2 | DIASTOLIC BLOOD PRESSURE: 70 MMHG

## 2022-06-24 DIAGNOSIS — F41.9 ANXIETY: Primary | ICD-10-CM

## 2022-06-24 PROBLEM — Z98.891 HISTORY OF CESAREAN SECTION: Status: RESOLVED | Noted: 2021-06-29 | Resolved: 2022-06-24

## 2022-06-24 PROBLEM — O14.13 SEVERE PRE-ECLAMPSIA IN THIRD TRIMESTER: Status: RESOLVED | Noted: 2022-04-22 | Resolved: 2022-06-24

## 2022-06-24 PROBLEM — Z98.891 STATUS POST CESAREAN DELIVERY: Status: RESOLVED | Noted: 2022-05-02 | Resolved: 2022-06-24

## 2022-06-24 PROBLEM — Z34.90 PREGNANCY WITH ONE FETUS, ANTEPARTUM: Status: RESOLVED | Noted: 2021-12-14 | Resolved: 2022-06-24

## 2022-06-24 PROCEDURE — 0503F PR POSTPARTUM CARE VISIT: ICD-10-PCS | Mod: CPTII,S$GLB,, | Performed by: OBSTETRICS & GYNECOLOGY

## 2022-06-24 PROCEDURE — 99999 PR PBB SHADOW E&M-EST. PATIENT-LVL III: CPT | Mod: PBBFAC,,, | Performed by: OBSTETRICS & GYNECOLOGY

## 2022-06-24 PROCEDURE — 99999 PR PBB SHADOW E&M-EST. PATIENT-LVL III: ICD-10-PCS | Mod: PBBFAC,,, | Performed by: OBSTETRICS & GYNECOLOGY

## 2022-06-24 PROCEDURE — 0503F POSTPARTUM CARE VISIT: CPT | Mod: CPTII,S$GLB,, | Performed by: OBSTETRICS & GYNECOLOGY

## 2022-06-24 RX ORDER — BUPROPION HYDROCHLORIDE 150 MG/1
150 TABLET ORAL EVERY MORNING
Qty: 90 TABLET | Refills: 3 | Status: SHIPPED | OUTPATIENT
Start: 2022-06-24 | End: 2022-09-02 | Stop reason: SDUPTHER

## 2022-06-24 NOTE — PROGRESS NOTES
"     Armida Medina is a 32 y.o.  who presents for a postpartum visit.  She is status post   delivery secondary to pre-e with severe features at 34 weeks  6 weeks ago.  Her hospitalization was complicated by above.  She reports increased anxiety and feels overwhelmed easily.  Interested in discussion of medications for this.      EPDS: 13      Past Medical History:   Diagnosis Date    Abnormal Pap smear of cervix     Acne     Allergy     Asthma     Hashimoto's thyroiditis 2017    History of  section 2021    Postpartum hemorrhage 2022    Preeclampsia, severe, third trimester 2018    Severe pre-eclampsia in third trimester 2022    Admitted  for concern for preeclampsia with SF (BP).  Patient had two severe range blood pressures in the [facility] requiring labetalol 20 mg IV x 1 Her pre E labs on admit and repeat during her two day admission were all within normal limits Admit labs : PC TLC Plt 204, Cr 0.7, AST/ALT 11/10 Repeat labs : Plt 212, CR 0.7, AST/ALT 10/ During the 48 hours, patient blood pressures were all        Objective:     /70   Ht 5' 2" (1.575 m)   Wt 107 kg (235 lb 14.3 oz)   Breastfeeding No   BMI 43.15 kg/m²     General: healthy, alert  Abdomen: Normal, benign. and no masses, hepatosplenomegaly, no hernias, incision well healed  External Genitalia: deferred  Vagina: deferred  Cervix: deferred  Uterus: normal size, well involuted, firm, non-tender  Adnexa: no masses palpable and nontender  Psych: BEHAVIOR: cooperative, MOOD: appropriate, THOUGHT CONTENT: appropriate, PATIENT ASSETS: education good, employment stable with appropriate leave, family support good, and insight into problems excellent   Assessment:     Anxiety  -     Ambulatory referral/consult to Psychology; Future; Expected date: 2022    Other orders  -     buPROPion (WELLBUTRIN XL) 150 MG TB24 tablet; Take 1 tablet (150 mg total) by mouth every morning.  " Dispense: 90 tablet; Refill: 3        Plan:     1. Return to clinic in 3-6 months for annual exam

## 2022-08-11 ENCOUNTER — HOSPITAL ENCOUNTER (OUTPATIENT)
Dept: RADIOLOGY | Facility: OTHER | Age: 32
Discharge: HOME | End: 2022-08-11
Attending: INTERNAL MEDICINE
Payer: COMMERCIAL

## 2022-08-11 ENCOUNTER — PATIENT MESSAGE (OUTPATIENT)
Dept: INTERNAL MEDICINE | Facility: CLINIC | Age: 32
End: 2022-08-11

## 2022-08-11 ENCOUNTER — OFFICE VISIT (OUTPATIENT)
Dept: INTERNAL MEDICINE | Facility: CLINIC | Age: 32
End: 2022-08-11
Payer: COMMERCIAL

## 2022-08-11 VITALS
BODY MASS INDEX: 42.52 KG/M2 | OXYGEN SATURATION: 98 % | DIASTOLIC BLOOD PRESSURE: 82 MMHG | HEART RATE: 67 BPM | WEIGHT: 231.06 LBS | SYSTOLIC BLOOD PRESSURE: 124 MMHG | HEIGHT: 62 IN

## 2022-08-11 DIAGNOSIS — E55.9 VITAMIN D DEFICIENCY: ICD-10-CM

## 2022-08-11 DIAGNOSIS — D50.9 IRON DEFICIENCY ANEMIA, UNSPECIFIED IRON DEFICIENCY ANEMIA TYPE: ICD-10-CM

## 2022-08-11 DIAGNOSIS — M79.672 LEFT FOOT PAIN: Primary | ICD-10-CM

## 2022-08-11 DIAGNOSIS — M79.672 LEFT FOOT PAIN: ICD-10-CM

## 2022-08-11 DIAGNOSIS — E78.2 MIXED HYPERLIPIDEMIA: ICD-10-CM

## 2022-08-11 DIAGNOSIS — E66.9 OBESITY (BMI 35.0-39.9 WITHOUT COMORBIDITY): ICD-10-CM

## 2022-08-11 DIAGNOSIS — Z00.00 ANNUAL PHYSICAL EXAM: ICD-10-CM

## 2022-08-11 DIAGNOSIS — E66.9 OBESITY (BMI 35.0-39.9 WITHOUT COMORBIDITY): Primary | ICD-10-CM

## 2022-08-11 DIAGNOSIS — R03.0 ELEVATED BLOOD PRESSURE READING IN OFFICE WITHOUT DIAGNOSIS OF HYPERTENSION: ICD-10-CM

## 2022-08-11 PROCEDURE — 3074F PR MOST RECENT SYSTOLIC BLOOD PRESSURE < 130 MM HG: ICD-10-PCS | Mod: CPTII,S$GLB,, | Performed by: INTERNAL MEDICINE

## 2022-08-11 PROCEDURE — 99999 PR PBB SHADOW E&M-EST. PATIENT-LVL IV: ICD-10-PCS | Mod: PBBFAC,,, | Performed by: INTERNAL MEDICINE

## 2022-08-11 PROCEDURE — 1160F PR REVIEW ALL MEDS BY PRESCRIBER/CLIN PHARMACIST DOCUMENTED: ICD-10-PCS | Mod: CPTII,S$GLB,, | Performed by: INTERNAL MEDICINE

## 2022-08-11 PROCEDURE — 3079F PR MOST RECENT DIASTOLIC BLOOD PRESSURE 80-89 MM HG: ICD-10-PCS | Mod: CPTII,S$GLB,, | Performed by: INTERNAL MEDICINE

## 2022-08-11 PROCEDURE — 99999 PR PBB SHADOW E&M-EST. PATIENT-LVL IV: CPT | Mod: PBBFAC,,, | Performed by: INTERNAL MEDICINE

## 2022-08-11 PROCEDURE — 99395 PREV VISIT EST AGE 18-39: CPT | Mod: S$GLB,,, | Performed by: INTERNAL MEDICINE

## 2022-08-11 PROCEDURE — 3008F BODY MASS INDEX DOCD: CPT | Mod: CPTII,S$GLB,, | Performed by: INTERNAL MEDICINE

## 2022-08-11 PROCEDURE — 73630 X-RAY EXAM OF FOOT: CPT | Mod: TC,FY,LT

## 2022-08-11 PROCEDURE — 1159F PR MEDICATION LIST DOCUMENTED IN MEDICAL RECORD: ICD-10-PCS | Mod: CPTII,S$GLB,, | Performed by: INTERNAL MEDICINE

## 2022-08-11 PROCEDURE — 1160F RVW MEDS BY RX/DR IN RCRD: CPT | Mod: CPTII,S$GLB,, | Performed by: INTERNAL MEDICINE

## 2022-08-11 PROCEDURE — 3074F SYST BP LT 130 MM HG: CPT | Mod: CPTII,S$GLB,, | Performed by: INTERNAL MEDICINE

## 2022-08-11 PROCEDURE — 3079F DIAST BP 80-89 MM HG: CPT | Mod: CPTII,S$GLB,, | Performed by: INTERNAL MEDICINE

## 2022-08-11 PROCEDURE — 3008F PR BODY MASS INDEX (BMI) DOCUMENTED: ICD-10-PCS | Mod: CPTII,S$GLB,, | Performed by: INTERNAL MEDICINE

## 2022-08-11 PROCEDURE — 1159F MED LIST DOCD IN RCRD: CPT | Mod: CPTII,S$GLB,, | Performed by: INTERNAL MEDICINE

## 2022-08-11 PROCEDURE — 73630 X-RAY EXAM OF FOOT: CPT | Mod: 26,LT,, | Performed by: RADIOLOGY

## 2022-08-11 PROCEDURE — 73630 XR FOOT COMPLETE 3 VIEW LEFT: ICD-10-PCS | Mod: 26,LT,, | Performed by: RADIOLOGY

## 2022-08-11 PROCEDURE — 99395 PR PREVENTIVE VISIT,EST,18-39: ICD-10-PCS | Mod: S$GLB,,, | Performed by: INTERNAL MEDICINE

## 2022-08-11 RX ORDER — DULAGLUTIDE 0.75 MG/.5ML
0.75 INJECTION, SOLUTION SUBCUTANEOUS WEEKLY
Qty: 4 PEN | Refills: 11 | Status: SHIPPED | OUTPATIENT
Start: 2022-08-11 | End: 2022-11-01

## 2022-08-11 NOTE — TELEPHONE ENCOUNTER
I sent in a prescription for Ozempic.  (I kept both Ozempic and Trulicity on her med list to see which one her insurance covers.)  Please start prior authorization process if needed. Thanks!

## 2022-08-11 NOTE — PROGRESS NOTES
Subjective:       Patient ID: Armida Medina is a 32 y.o. female who  has a past medical history of Abnormal Pap smear of cervix (), Acne, Allergy, Asthma, Hashimoto's thyroiditis (2017), History of  section (2021), Postpartum hemorrhage (2022), Preeclampsia, severe, third trimester (2018), and Severe pre-eclampsia in third trimester (2022).    Chief Complaint: Annual Exam, Foot Pain (Started last year before pregnancy, got better during pregnancy, but came back), and Obesity     History was obtained from the patient and supplemented through chart review.  Following with OBGYN for postpartum care    Has a 3 y/o child and a .  Is not breast feeding.    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  Referred by Mendy and Dr. Anton.      HPI     L foot pain:  For about 1 year. No inciting event. Began before her pregnancy, but resolved during her pregnancy and recurrent.  Started before she returned to work.  Mild pain that is constant, nagging. Pain at the lateral aspect of her MTPs.  Occur if she crosses her legs and sits on her foot.  Worse after [place] classes, when she's on her tip toes. [place] is low impact, no jumping. Has noticed that her L shoe is a little tighter.    Obesity:    Has seen a dietitian through her workplace.      H/o HA from TMJ/bruxism.     H/o depression, anxiety.  Did not like Lexpro in the past.  Restarted Wellbutrin after she gave birth; now up to 300mg.  Mood improved.  No SE.  Helps with appetite, cravings.    She is interested in Ozempic.    Exercise: Barre3 2/week.   BMI Readings from Last 3 Encounters:   22 42.26 kg/m²   22 43.15 kg/m²   22 42.74 kg/m²     Lab Results   Component Value Date/Time    HGBA1C 5.1 12/10/2020 07:34 AM    HGBA1C 5.2 2019 07:07 AM    HGBA1C 5.0 2018 03:28 PM     Elevated BP:  Was on labetalol after pregnancy .  She was able to discontinue since home BP was controlled.    HLD, statin  myopathy:    H/o joint aches with pravastatin 20.  W/u for myopathy revealed low Vit D. Diet as above.    Lab Results   Component Value Date    LDLCALC 116 (H) 12/10/2020     The ASCVD Risk score (Sally LANE Jr., et al., 2013) failed to calculate for the following reasons:    The 2013 ASCVD risk score is only valid for ages 40 to 79    STACIE:  Noted during pregnancy.  Has had heavier menstrual cycles since pregnancy few years ago.  Regular menstrual cycles. Ultrasound with uterine fibroid.  S/p myomectomy with OBGYN.  Menstrual cycles are largely unchanged.  Still a little heavy.  Not on iron supplement.  Pt previously declined testing for celiac d/t cost.    Lab Results   Component Value Date    IRON 146 12/10/2020    TIBC 360 12/10/2020    FERRITIN 18 12/10/2020     Lab Results   Component Value Date    JDLDIBGT32 339 04/12/2019     No results found for: FOLATE    Vitamin D deficiency:    Discovered during workup for a myopathy as above.  Level 16 in 2019.  Taking 50K 1/week.   No results found for: [id number]            Not addressed today.  Childhood asthma:    Symptoms are well controlled.  Has not needed to use her inhaler.  No acute issues.    Well controlled.  Continue inhaler p.r.n..    Hashimoto's thyroiditis:  Prior to pregnancy.  Was monitored during pregnancy.  Had mild elevation of TPO to 7.7.  TFTs have been normal.  Mom and maternal grandmother with hypothyroidism.   +FHx. Elevated TPO.  TSH WNL.  A1C wnl.  Lab Results   Component Value Date    TSH 0.717 03/14/2022    FREET4 1.05 11/18/2021     Fibroid:  Following with OBGYN. S/p myomectomy.    Review of Systems   Constitutional: Positive for appetite change. Negative for activity change.   Respiratory: Negative for wheezing.    Cardiovascular: Positive for leg swelling.   Musculoskeletal: Positive for arthralgias. Negative for gait problem.   Skin: Negative for rash and wound.   Neurological: Negative for dizziness.   Hematological: Negative for  "adenopathy.   Psychiatric/Behavioral: Negative for confusion and dysphoric mood. The patient is not nervous/anxious.        I personally reviewed Past Medical History, Past Surgical History, Social History, and Family History.    Objective:      Vitals:    08/11/22 0730 08/11/22 0818   BP: (!) 140/92 124/82   BP Location: Right arm Right arm   Patient Position: Sitting Sitting   Pulse: 67    SpO2: 98%    Weight: 104.8 kg (231 lb 0.7 oz)    Height: 5' 2" (1.575 m)       Physical Exam  Constitutional:       General: She is not in acute distress.     Appearance: She is well-developed. She is not diaphoretic.   HENT:      Head: Normocephalic and atraumatic.   Eyes:      General: No scleral icterus.        Right eye: No discharge.         Left eye: No discharge.   Neck:      Thyroid: No thyromegaly.      Trachea: No tracheal deviation.   Cardiovascular:      Rate and Rhythm: Normal rate and regular rhythm.      Heart sounds: Normal heart sounds. No murmur heard.  Pulmonary:      Effort: Pulmonary effort is normal. No respiratory distress.      Breath sounds: Normal breath sounds. No wheezing.   Abdominal:      General: Bowel sounds are normal. There is no distension.      Palpations: Abdomen is soft.      Tenderness: There is no abdominal tenderness.   Musculoskeletal:         General: No deformity.      Cervical back: Neck supple.      Right lower leg: No edema.      Left lower leg: No edema.        Feet:    Lymphadenopathy:      Cervical: No cervical adenopathy.   Skin:     General: Skin is warm and dry.      Findings: No erythema.   Neurological:      Mental Status: She is alert.      Gait: Gait normal.   Psychiatric:         Behavior: Behavior normal.           Lab Results   Component Value Date    WBC 10.99 05/09/2022    HGB 12.0 05/09/2022    HCT 38.7 05/09/2022     05/09/2022    CHOL 181 12/10/2020    TRIG 112 12/10/2020    HDL 43 (L) 12/10/2020    ALT 21 05/09/2022    AST 16 05/09/2022     05/09/2022 "    K 3.8 05/09/2022     05/09/2022    CREATININE 0.7 05/09/2022    BUN 11 05/09/2022    CO2 22 (L) 05/09/2022    TSH 0.717 03/14/2022    INR 0.9 04/29/2022    HGBA1C 5.1 12/10/2020       The ASCVD Risk score (Sallyshannon LANE Jr., et al., 2013) failed to calculate for the following reasons:    The 2013 ASCVD risk score is only valid for ages 40 to 79    (Imaging have been independently reviewed)  pelvic ultrasound with fibroid    Assessment:       1. Left foot pain    2. Obesity (BMI 35.0-39.9 without comorbidity)    3. Elevated blood pressure reading in office without diagnosis of hypertension    4. Mixed hyperlipidemia    5. Iron deficiency anemia, unspecified iron deficiency anemia type    6. Vitamin D deficiency    7. Annual physical exam          Plan:       Armida was seen today for annual exam, foot pain and obesity.    Diagnoses and all orders for this visit:    Left foot pain  Comments:  Mild, but persistent. Possible stress fx. Xray s fx. Refer to Podiatry.  Orders:  -     X-Ray Foot Complete 3 view Left; Future  -     Ambulatory referral/consult to Podiatry; Future    Obesity (BMI 35.0-39.9 without comorbidity)  Comments:  Persistent. Cont Wellbutrin. Ozempic not on her insurance formulary. Trulicity if covered. Consider Topamax given h/o HA. Check A1c.   Orders:  -     Hemoglobin A1C; Future  -     Lipid Panel; Future  -     Vitamin D; Future  -     dulaglutide (TRULICITY) 0.75 mg/0.5 mL pen injector; Inject 0.75 mg into the skin once a week.    Elevated blood pressure reading in office without diagnosis of hypertension  Comments:  Controlled on repeat BP check.  Monitor home BP. Will discuss lifestyle changes in a future visit.    Mixed hyperlipidemia  Comments:  FLP controlled.  No need for statin at this time.  H/o myalgias; on Vit D. Monitor FLP annually.  Orders:  -     Lipid Panel; Future  -     Vitamin D; Future    Iron deficiency anemia, unspecified iron deficiency anemia type  Comments:  2/2  menorrhagia. Anemia resolved.  Not on iron supplement. Check iron panel.  Orders:  -     Ferritin; Future  -     Iron and TIBC; Future    Vitamin D deficiency  Comments:  Taking 50K 1/week. H/o statin myalgias as above.  Repeat Vit D level  Orders:  -     Vitamin D; Future    Annual physical exam  -     Hemoglobin A1C; Future  -     Lipid Panel; Future  -     Vitamin D; Future  -     Ferritin; Future  -     Iron and TIBC; Future         Side effects of medication(s) were discussed in detail and patient voiced understanding.  Patient will call back for any issues or complications.     I have spent a total of 45 minutes with the patient as well as reviewing the chart/medical record and placing orders on the day of the visit. Discussed foot pain, weight, BP.     RTC in 3 month(s) or sooner PRN for weight, BP.

## 2022-08-12 ENCOUNTER — LAB VISIT (OUTPATIENT)
Dept: LAB | Facility: OTHER | Age: 32
End: 2022-08-12
Attending: INTERNAL MEDICINE
Payer: COMMERCIAL

## 2022-08-12 DIAGNOSIS — Z00.00 ANNUAL PHYSICAL EXAM: ICD-10-CM

## 2022-08-12 DIAGNOSIS — E66.9 OBESITY (BMI 35.0-39.9 WITHOUT COMORBIDITY): ICD-10-CM

## 2022-08-12 DIAGNOSIS — E55.9 VITAMIN D DEFICIENCY: ICD-10-CM

## 2022-08-12 DIAGNOSIS — D50.9 IRON DEFICIENCY ANEMIA, UNSPECIFIED IRON DEFICIENCY ANEMIA TYPE: ICD-10-CM

## 2022-08-12 DIAGNOSIS — E78.2 MIXED HYPERLIPIDEMIA: ICD-10-CM

## 2022-08-12 LAB
25(OH)D3+25(OH)D2 SERPL-MCNC: 26 NG/ML (ref 30–96)
CHOLEST SERPL-MCNC: 219 MG/DL (ref 120–199)
CHOLEST/HDLC SERPL: 4.7 {RATIO} (ref 2–5)
ESTIMATED AVG GLUCOSE: 100 MG/DL (ref 68–131)
FERRITIN SERPL-MCNC: 28 NG/ML (ref 20–300)
HBA1C MFR BLD: 5.1 % (ref 4–5.6)
HDLC SERPL-MCNC: 47 MG/DL (ref 40–75)
HDLC SERPL: 21.5 % (ref 20–50)
IRON SERPL-MCNC: 64 UG/DL (ref 30–160)
LDLC SERPL CALC-MCNC: 135.8 MG/DL (ref 63–159)
NONHDLC SERPL-MCNC: 172 MG/DL
SATURATED IRON: 15 % (ref 20–50)
TOTAL IRON BINDING CAPACITY: 429 UG/DL (ref 250–450)
TRANSFERRIN SERPL-MCNC: 290 MG/DL (ref 200–375)
TRIGL SERPL-MCNC: 181 MG/DL (ref 30–150)

## 2022-08-12 PROCEDURE — 36415 COLL VENOUS BLD VENIPUNCTURE: CPT | Performed by: INTERNAL MEDICINE

## 2022-08-12 PROCEDURE — 84466 ASSAY OF TRANSFERRIN: CPT | Performed by: INTERNAL MEDICINE

## 2022-08-12 PROCEDURE — 83036 HEMOGLOBIN GLYCOSYLATED A1C: CPT | Performed by: INTERNAL MEDICINE

## 2022-08-12 PROCEDURE — 80061 LIPID PANEL: CPT | Performed by: INTERNAL MEDICINE

## 2022-08-12 PROCEDURE — 82306 VITAMIN D 25 HYDROXY: CPT | Performed by: INTERNAL MEDICINE

## 2022-08-12 PROCEDURE — 82728 ASSAY OF FERRITIN: CPT | Performed by: INTERNAL MEDICINE

## 2022-08-17 DIAGNOSIS — E66.9 OBESITY (BMI 35.0-39.9 WITHOUT COMORBIDITY): ICD-10-CM

## 2022-08-17 RX ORDER — SEMAGLUTIDE 1.34 MG/ML
INJECTION, SOLUTION SUBCUTANEOUS
Refills: 0 | OUTPATIENT
Start: 2022-08-17

## 2022-08-17 NOTE — TELEPHONE ENCOUNTER
No new care gaps identified.  [facility] Embedded Care Gaps. Reference number: [id number]. 8/17/2022   1:02:49 PM CDT

## 2022-08-17 NOTE — TELEPHONE ENCOUNTER
Refill Decision Note   Armida Medina  is requesting a refill authorization.  Brief Assessment and Rationale for Refill:  Quick Discontinue     Medication Therapy Plan:  No sig   Pharmacy is requesting new scripts for the following medications without required information, (sig/ frequency/qty/etc)      Medication Reconciliation Completed: No     Comments: Pharmacies have been requesting medications for patients without required information, (sig, frequency, qty, etc.). In addition, requests are sent for medication(s) pt. are currently not taking, and medications patients have never taken.    We have spoken to the pharmacies about these request types and advised their teams previously that we are unable to assess these New Script requests and require all details for these requests. This is a known issue and has been reported.     Note composed:1:28 PM 08/17/2022

## 2022-08-18 DIAGNOSIS — E55.9 VITAMIN D DEFICIENCY: ICD-10-CM

## 2022-08-18 DIAGNOSIS — D50.9 IRON DEFICIENCY ANEMIA, UNSPECIFIED IRON DEFICIENCY ANEMIA TYPE: ICD-10-CM

## 2022-08-18 DIAGNOSIS — Z00.00 ANNUAL PHYSICAL EXAM: Primary | ICD-10-CM

## 2022-08-18 RX ORDER — FERROUS SULFATE 325(65) MG
325 TABLET, DELAYED RELEASE (ENTERIC COATED) ORAL EVERY OTHER DAY
Qty: 45 TABLET | Refills: 3 | Status: SHIPPED | OUTPATIENT
Start: 2022-08-18 | End: 2023-08-18

## 2022-08-18 RX ORDER — ERGOCALCIFEROL 1.25 MG/1
50000 CAPSULE ORAL
Qty: 24 CAPSULE | Refills: 3 | Status: SHIPPED | OUTPATIENT
Start: 2022-08-18 | End: 2023-03-01 | Stop reason: SDUPTHER

## 2022-08-19 ENCOUNTER — TELEPHONE (OUTPATIENT)
Dept: INTERNAL MEDICINE | Facility: CLINIC | Age: 32
End: 2022-08-19
Payer: COMMERCIAL

## 2022-08-19 NOTE — TELEPHONE ENCOUNTER
----- Message from Zaina Gutierrez MD sent at 8/18/2022  3:30 PM CDT -----  -Please schedule labs a week before our appt.  Thanks!            -------------------------  Obesity (BMI 35.0-39.9 without comorbidity)  Persistent. Cont Wellbutrin. Ozempic not on her insurance formulary. Trulicity if covered. Consider Topamax given h/o HA. A1c wnl.     HLD:  FLP borderline high. Not on statin.  H/o myalgias; on Vit D. Will discuss diet. Monitor FLP annually.    STACIE:  2/2 menorrhagia. Anemia resolved.  Ferritin improved, borderline low. TIBC higher end of normal.  Start iron qOD with repeat labs in 3 mo.     Vitamin D deficiency  H/o statin myalgias as above. Vit D level improved, borderline low. Increase 50K 1->2/week. Repeat labs in 3 mo.    The ASCVD Risk score (Sally DOMINGO Jr., et al., 2013) failed to calculate for the following reasons:    The 2013 ASCVD risk score is only valid for ages 40 to 79

## 2022-08-19 NOTE — TELEPHONE ENCOUNTER
Spoke to Ms. Medina and confirmed appt date and time, for lab work.  Patient states understanding.

## 2022-08-24 ENCOUNTER — PATIENT MESSAGE (OUTPATIENT)
Dept: DERMATOLOGY | Facility: CLINIC | Age: 32
End: 2022-08-24
Payer: COMMERCIAL

## 2022-08-24 ENCOUNTER — TELEPHONE (OUTPATIENT)
Dept: DERMATOLOGY | Facility: CLINIC | Age: 32
End: 2022-08-24
Payer: COMMERCIAL

## 2022-08-24 NOTE — TELEPHONE ENCOUNTER
----- Message from Bisi Trujillo sent at 8/24/2022  9:12 AM CDT -----  Regarding: Moles Removal  Contact: @[phone]  Pt requesting a call back to schedule an appt for Moles Removal.  I attempted to schedule with no available.  Please call to discuss further.

## 2022-09-14 ENCOUNTER — OFFICE VISIT (OUTPATIENT)
Dept: DERMATOLOGY | Facility: CLINIC | Age: 32
End: 2022-09-14
Payer: COMMERCIAL

## 2022-09-14 DIAGNOSIS — Z12.83 SCREENING EXAM FOR SKIN CANCER: ICD-10-CM

## 2022-09-14 DIAGNOSIS — D22.60 MULTIPLE BENIGN MELANOCYTIC NEVI OF UPPER EXTREMITY, LOWER EXTREMITY, AND TRUNK: ICD-10-CM

## 2022-09-14 DIAGNOSIS — D22.30 MELANOCYTIC NEVI OF FACE: ICD-10-CM

## 2022-09-14 DIAGNOSIS — D48.5 NEOPLASM OF UNCERTAIN BEHAVIOR OF SKIN: Primary | ICD-10-CM

## 2022-09-14 DIAGNOSIS — D22.5 MULTIPLE BENIGN MELANOCYTIC NEVI OF UPPER EXTREMITY, LOWER EXTREMITY, AND TRUNK: ICD-10-CM

## 2022-09-14 DIAGNOSIS — L21.9 SEBORRHEIC DERMATITIS: ICD-10-CM

## 2022-09-14 DIAGNOSIS — D22.70 MULTIPLE BENIGN MELANOCYTIC NEVI OF UPPER EXTREMITY, LOWER EXTREMITY, AND TRUNK: ICD-10-CM

## 2022-09-14 PROCEDURE — 88305 TISSUE EXAM BY PATHOLOGIST: ICD-10-PCS | Mod: 26,,, | Performed by: PATHOLOGY

## 2022-09-14 PROCEDURE — 1159F MED LIST DOCD IN RCRD: CPT | Mod: CPTII,S$GLB,, | Performed by: DERMATOLOGY

## 2022-09-14 PROCEDURE — 1160F PR REVIEW ALL MEDS BY PRESCRIBER/CLIN PHARMACIST DOCUMENTED: ICD-10-PCS | Mod: CPTII,S$GLB,, | Performed by: DERMATOLOGY

## 2022-09-14 PROCEDURE — 11102 PR TANGENTIAL BIOPSY, SKIN, SINGLE LESION: ICD-10-PCS | Mod: S$GLB,,, | Performed by: DERMATOLOGY

## 2022-09-14 PROCEDURE — 1159F PR MEDICATION LIST DOCUMENTED IN MEDICAL RECORD: ICD-10-PCS | Mod: CPTII,S$GLB,, | Performed by: DERMATOLOGY

## 2022-09-14 PROCEDURE — 88305 TISSUE EXAM BY PATHOLOGIST: CPT | Mod: 26,,, | Performed by: PATHOLOGY

## 2022-09-14 PROCEDURE — 1160F RVW MEDS BY RX/DR IN RCRD: CPT | Mod: CPTII,S$GLB,, | Performed by: DERMATOLOGY

## 2022-09-14 PROCEDURE — 88305 TISSUE EXAM BY PATHOLOGIST: CPT | Performed by: PATHOLOGY

## 2022-09-14 PROCEDURE — 11102 TANGNTL BX SKIN SINGLE LES: CPT | Mod: S$GLB,,, | Performed by: DERMATOLOGY

## 2022-09-14 PROCEDURE — 3044F PR MOST RECENT HEMOGLOBIN A1C LEVEL <7.0%: ICD-10-PCS | Mod: CPTII,S$GLB,, | Performed by: DERMATOLOGY

## 2022-09-14 PROCEDURE — 99204 PR OFFICE/OUTPT VISIT, NEW, LEVL IV, 45-59 MIN: ICD-10-PCS | Mod: 25,S$GLB,, | Performed by: DERMATOLOGY

## 2022-09-14 PROCEDURE — 99204 OFFICE O/P NEW MOD 45 MIN: CPT | Mod: 25,S$GLB,, | Performed by: DERMATOLOGY

## 2022-09-14 PROCEDURE — 3044F HG A1C LEVEL LT 7.0%: CPT | Mod: CPTII,S$GLB,, | Performed by: DERMATOLOGY

## 2022-09-14 RX ORDER — KETOCONAZOLE 20 MG/ML
SHAMPOO, SUSPENSION TOPICAL
Qty: 240 ML | Refills: 5 | Status: SHIPPED | OUTPATIENT
Start: 2022-09-14 | End: 2024-02-21

## 2022-09-14 NOTE — PATIENT INSTRUCTIONS
Biopsy Wound Care Instructions    Leave the bandage on for 24 hours without getting it wet.   Clean the area once a day with a gentle soap and water, then pat dry and apply Vaseline and a bandaid.  The site should be kept moist with Vaseline at all times to improve healing. Reapply a thick coating as needed. Do not let the site air out or form a scab, as this will delay healing and worsen scarring.  If any bleeding or oozing occurs once you return home, apply firm pressure to the area for 30 minutes straight without peeking. If bleeding continues, call the office immediately.  Please message us via [facility], call us at [phone], or return to the office at any sign of increasing redness, swelling, tenderness, pain, heat, yellow drainage/discharge, or continued bleeding.      Receiving Your Pathology Results    Your pathology results will be released to you on [facility] at the same time that Dr. Landaverde receives them.   Dr. Landaverde will then message you with her interpretation of the results and/or with the plan going forward.  If you do not use [facility] or if your pathology results require more of an explanation, you will receive your results via a phone call.  If 2 weeks go by and you have not received your results, please message us via [facility] or call us at [phone] to inform us.

## 2022-09-14 NOTE — PROGRESS NOTES
"  Patient Information  Name: Arimda Medina  : 1990  MRN: [id number]     Referring Physician:  No ref. provider found   Primary Care Physician:  Zaina Gutierrez MD   Date of Visit: 2022      Subjective:     History of Present lllness:    Armida Medina is a 32 y.o. female who presents with a chief complaint of moles.  Patient is here today for a "mole" check.     Today, patient complains of lesion(s):  Location: back  Duration: many years  Symptoms: grew during pregnancy  Relieving factors/Previous treatments: none    She also has itching in scalp. Scalp has been oilier lately. Itching is worse the day before she washes her hair. Improves with washing.    Clinical documentation obtained by nursing staff reviewed.    Review of Systems   Skin:  Negative for itching and rash.   Hematologic/Lymphatic: Does not bruise/bleed easily.     Objective:   Physical Exam   Constitutional: She appears well-developed and well-nourished. No distress.   Neurological: She is alert and oriented to person, place, and time. She is not disoriented.   Psychiatric: She has a normal mood and affect.   Skin:   Areas Examined (abnormalities noted in diagram):   Scalp / Hair Palpated and Inspected  Head / Face Inspection Performed  Neck Inspection Performed  Chest / Axilla Inspection Performed  Abdomen Inspection Performed  Genitals / Buttocks / Groin Inspection Performed  Back Inspection Performed  RUE Inspected  LUE Inspection Performed  RLE Inspected  LLE Inspection Performed  Nails and Digits Inspection Performed               Diagram Legend     Erythematous scaling macule/papule c/w actinic keratosis       Vascular papule c/w angioma      Pigmented verrucoid papule/plaque c/w seborrheic keratosis      Yellow umbilicated papule c/w sebaceous hyperplasia      Irregularly shaped tan macule c/w lentigo     1-2 mm smooth white papules consistent with Milia      Movable subcutaneous cyst with punctum c/w epidermal inclusion " cyst      Subcutaneous movable cyst c/w pilar cyst      Firm pink to brown papule c/w dermatofibroma      Pedunculated fleshy papule(s) c/w skin tag(s)      Evenly pigmented macule c/w junctional nevus     Mildly variegated pigmented, slightly irregular-bordered macule c/w mildly atypical nevus      Flesh colored to evenly pigmented papule c/w intradermal nevus       Pink pearly papule/plaque c/w basal cell carcinoma      Erythematous hyperkeratotic cursted plaque c/w SCC      Surgical scar with no sign of skin cancer recurrence      Open and closed comedones      Inflammatory papules and pustules      Verrucoid papule consistent consistent with wart     Erythematous eczematous patches and plaques     Dystrophic onycholytic nail with subungual debris c/w onychomycosis     Umbilicated papule    Erythematous-base heme-crusted tan verrucoid plaque consistent with inflamed seborrheic keratosis     Erythematous Silvery Scaling Plaque c/w Psoriasis     See annotation          [] Data reviewed  [] Prior external notes reviewed  [] Independent review of test  [] Management discussed with another provider  [] Independent historian    Assessment / Plan:      Pathology Orders:       Normal Orders This Visit    Specimen to Pathology, Dermatology     Questions:    Procedure Type: Dermatology and skin neoplasms    Number of Specimens: 1    ------------------------: -------------------------    Spec 1 Procedure: Biopsy    Spec 1 Clinical Impression: r/o irritated nevus    Spec 1 Source: right upper back    Release to patient:           Neoplasm of uncertain behavior of skin  -     Specimen to Pathology, Dermatology    Shave biopsy procedure note:  Risk, benefits, and alternatives of biopsy are discussed with the patient, including risk of infection, scar, recurrence, and need for additional treatment of site. The patient agrees to the procedure by verbal consent. The area is marked and prepped with alcohol.  Approximately 1 mL of  lidocaine 1% with epinephrine is used for local anesthesia. A sharp blade is used to remove the lesion. The specimen is sent for pathology. Hemostasis is obtained with aluminum chloride and/or monopolar hyfrecation if needed. The area is then dressed and bandaged. The patient tolerated the procedure well without adverse event. Written instructions on wound care were given and were reviewed with the patient, who is to call for any signs of bleeding or infection. The patient will be notified of the pathology results.    Seborrheic dermatitis  - chronic problem, not at treatment goal  Seborrheic dermatitis is a common skin condition that usually lasts for years. It may be caused by multiple factors, including the yeast that normally lives on our skin, our genes, a cold and dry climate, stress, and a persons overall health.  -     ketoconazole (NIZORAL) 2 % shampoo; Wash scalp with medicated shampoo at least 2x/week. Let sit on scalp at least 5 minutes prior to rinsing  Dispense: 240 mL; Refill: 5    Melanocytic nevi of face  Multiple benign melanocytic nevi of upper extremity, lower extremity, and trunk  Multiple benign-appearing nevi present on exam today. Reassurance provided. Counseled patient to periodically examine moles and return to clinic if any changes in size, shape, or color are noted or if it becomes symptomatic (bleeding, itching, pain, etc).  Recommend using a broad-spectrum, water-resistant sunscreen with SPF of 30 or higher--reapply every 2 hours. Seek shade, wear sun-protective clothing, and perform regular skin self-exams.    Screening exam for skin cancer  Total body skin examination performed today as noted in physical exam. No lesions suspicious for malignancy were seen.  Recommend using a broad-spectrum, water-resistant sunscreen with SPF of 30 or higher--reapply every 2 hours. Seek shade, wear sun-protective clothing, and perform regular skin self-exams.    Follow up for any new problems or  changing lesions.      Teresa Landaverde MD, FAAD  [facility]

## 2022-09-18 ENCOUNTER — HOSPITAL ENCOUNTER (EMERGENCY)
Facility: HOSPITAL | Age: 32
Discharge: HOME | End: 2022-09-18
Attending: EMERGENCY MEDICINE
Payer: COMMERCIAL

## 2022-09-18 VITALS
TEMPERATURE: 98 F | BODY MASS INDEX: 40.63 KG/M2 | HEIGHT: 64 IN | RESPIRATION RATE: 17 BRPM | SYSTOLIC BLOOD PRESSURE: 130 MMHG | DIASTOLIC BLOOD PRESSURE: 84 MMHG | WEIGHT: 238 LBS | HEART RATE: 71 BPM | OXYGEN SATURATION: 99 %

## 2022-09-18 DIAGNOSIS — R10.13 EPIGASTRIC ABDOMINAL PAIN: ICD-10-CM

## 2022-09-18 DIAGNOSIS — K82.9 GALLBLADDER DISEASE: Primary | ICD-10-CM

## 2022-09-18 DIAGNOSIS — R10.11 RUQ PAIN: ICD-10-CM

## 2022-09-18 LAB
ALBUMIN SERPL-MCNC: 3.7 G/DL (ref 3.3–5.5)
ALLENS TEST: ABNORMAL
ALP SERPL-CCNC: 136 U/L (ref 42–141)
B-HCG UR QL: NEGATIVE
BILIRUB SERPL-MCNC: 0.4 MG/DL (ref 0.2–1.6)
BILIRUBIN, POC UA: NEGATIVE
BLOOD, POC UA: NEGATIVE
BUN SERPL-MCNC: 9 MG/DL (ref 7–22)
CALCIUM SERPL-MCNC: 9.4 MG/DL (ref 8–10.3)
CHLORIDE SERPL-SCNC: 105 MMOL/L (ref 98–108)
CLARITY, POC UA: CLEAR
COLOR, POC UA: YELLOW
CREAT SERPL-MCNC: 0.7 MG/DL (ref 0.6–1.2)
CTP QC/QA: YES
GLUCOSE SERPL-MCNC: 96 MG/DL (ref 73–118)
GLUCOSE, POC UA: NEGATIVE
HCO3 UR-SCNC: 27.2 MMOL/L (ref 24–28)
KETONES, POC UA: NEGATIVE
LDH SERPL L TO P-CCNC: 0.71 MMOL/L (ref 0.5–2.2)
LEUKOCYTE EST, POC UA: NEGATIVE
LIPASE SERPL-CCNC: 16 U/L (ref 4–60)
NITRITE, POC UA: NEGATIVE
PCO2 BLDA: 47.8 MMHG (ref 35–45)
PH SMN: 7.36 [PH] (ref 7.35–7.45)
PH UR STRIP: 6 [PH]
PO2 BLDA: 24 MMHG (ref 40–60)
POC ALT (SGPT): 38 U/L (ref 10–47)
POC AST (SGOT): 46 U/L (ref 11–38)
POC BE: 1 MMOL/L
POC SATURATED O2: 39 % (ref 95–100)
POC TCO2: 27 MMOL/L (ref 18–33)
POC TCO2: 29 MMOL/L (ref 24–29)
POTASSIUM BLD-SCNC: 4.2 MMOL/L (ref 3.6–5.1)
PROTEIN, POC UA: NEGATIVE
PROTEIN, POC: 7.1 G/DL (ref 6.4–8.1)
SAMPLE: ABNORMAL
SITE: ABNORMAL
SODIUM BLD-SCNC: 143 MMOL/L (ref 128–145)
SPECIFIC GRAVITY, POC UA: 1.02
UROBILINOGEN, POC UA: 0.2 E.U./DL

## 2022-09-18 PROCEDURE — 81003 URINALYSIS AUTO W/O SCOPE: CPT | Mod: ER

## 2022-09-18 PROCEDURE — 93010 EKG 12-LEAD: ICD-10-PCS | Mod: ,,, | Performed by: INTERNAL MEDICINE

## 2022-09-18 PROCEDURE — 81025 URINE PREGNANCY TEST: CPT | Mod: ER | Performed by: EMERGENCY MEDICINE

## 2022-09-18 PROCEDURE — 82803 BLOOD GASES ANY COMBINATION: CPT | Mod: ER

## 2022-09-18 PROCEDURE — 83690 ASSAY OF LIPASE: CPT | Performed by: EMERGENCY MEDICINE

## 2022-09-18 PROCEDURE — 99285 EMERGENCY DEPT VISIT HI MDM: CPT | Mod: 25,ER

## 2022-09-18 PROCEDURE — 85025 COMPLETE CBC W/AUTO DIFF WBC: CPT | Mod: ER

## 2022-09-18 PROCEDURE — 93010 ELECTROCARDIOGRAM REPORT: CPT | Mod: ,,, | Performed by: INTERNAL MEDICINE

## 2022-09-18 PROCEDURE — 93005 ELECTROCARDIOGRAM TRACING: CPT | Mod: ER

## 2022-09-18 PROCEDURE — 80053 COMPREHEN METABOLIC PANEL: CPT | Mod: ER

## 2022-09-18 RX ORDER — ONDANSETRON 4 MG/1
4 TABLET, FILM COATED ORAL EVERY 6 HOURS
Qty: 12 TABLET | Refills: 0 | Status: SHIPPED | OUTPATIENT
Start: 2022-09-18 | End: 2022-11-01

## 2022-09-18 RX ORDER — OXYCODONE AND ACETAMINOPHEN 5; 325 MG/1; MG/1
1 TABLET ORAL EVERY 4 HOURS PRN
Qty: 18 TABLET | Refills: 0 | Status: SHIPPED | OUTPATIENT
Start: 2022-09-18 | End: 2022-09-27

## 2022-09-18 NOTE — ED TRIAGE NOTES
Patient presents to ED c/o abd pain, pt reports intermittent mid upper abd pain x 2 days, reports pain began this morning pain 10/10 lasting approx 20 min. Patient also reports nausea, pain radiates to mid back.     Denies v/d, fever chills, sob, chest pain  Denies taking otc medication for symptoms    AAO x 4

## 2022-09-18 NOTE — ED PROVIDER NOTES
Encounter Date: 2022    SCRIBE #1 NOTE: I, Alisa Fuchs, am scribing for, and in the presence of,  Kayce Trammell MD. I have scribed the following portions of the note - Other sections scribed: HPI, ROS, PE.     History     Chief Complaint   Patient presents with    Abdominal Pain     Patient presents to ED c/o abd pain, pt reports intermittent mid upper abd pain x 2 days, reports pain began this morning pain 10/10 lasting approx 20 min. Patient also reports nausea, pain radiates to mid back.   C section 4 months ago      32 y.o. female with Hx of  3 weeks ago, Preeclampsia, Hashimoto's, and others who presents to the ED with chief complaint of acute upper abdominal pain radiating to her back intermittently for 2 days. She reports an episode of severe 10/10 abdominal pain occurring yesterday at 5:30 AM and today at 6:30 AM, both episodes lasted for 10-15 minutes. She did not attempt to take any medications because of the duration. The episodes are not related to eating. She also endorses chronic diarrhea and constipation with an episode of diarrhea 1 week ago that had a yellow and orange greasy film underneath it. Patient started taking Ozempic 3 weeks ago with associated nausea. Patient denies melena, hematochezia, vaginal discharge, vaginal pain, dysuria, or frequency. She denies history of appendectomy or cholecystectomy. Denies any EtOH use. She is not breastfeeding.    The history is provided by the patient. No  was used.   Review of patient's allergies indicates:   Allergen Reactions    Ibuprofen Hives and Shortness Of Breath     bronchospasm    Ancef [cefazolin] Rash     Past Medical History:   Diagnosis Date    Abnormal Pap smear of cervix     Acne     Allergy     Asthma     Hashimoto's thyroiditis 2017    History of  section 2021    Postpartum hemorrhage 2022    Preeclampsia, severe, third trimester 2018    Severe pre-eclampsia in third  trimester 2022    Admitted  for concern for preeclampsia with SF (BP).  Patient had two severe range blood pressures in the RENATA requiring labetalol 20 mg IV x 1 Her pre E labs on admit and repeat during her two day admission were all within normal limits Admit labs : PC TLC Plt 204, Cr 0.7, AST/ALT 11/10 Repeat labs : Plt 212, CR 0.7, AST/ALT 10/11 During the 48 hours, patient blood pressures were all      Past Surgical History:   Procedure Laterality Date     SECTION N/A 2018    Procedure:  SECTION;  Surgeon: Sawyer Lake Jr., MD;  Location: [facility] L&D;  Service: OB/GYN;  Laterality: N/A;     SECTION N/A 2022    Procedure:  SECTION;  Surgeon: Fiorella Carpenter DO;  Location: [facility] L&D;  Service: OB/GYN;  Laterality: N/A;     SECTION N/A 2022    Procedure:  SECTION;  Surgeon: Fiorella Carpenter DO;  Location: [facility] L&D;  Service: OB/GYN;  Laterality: N/A;     SECTION      CYST REMOVAL      Left ear @ age 2    DILATION AND CURETTAGE OF UTERUS USING SUCTION N/A 2021    Procedure: DILATION AND CURETTAGE, UTERUS, USING SUCTION;  Surgeon: Fiorella Carpenter DO;  Location: [facility] OR;  Service: OB/GYN;  Laterality: N/A;    ROBOT-ASSISTED LAPAROSCOPIC UTERINE MYOMECTOMY N/A 2020    Procedure: ROBOTIC MYOMECTOMY, UTERUS;  Surgeon: Sylvie Zavaleta MD;  Location: [facility] OR;  Service: OB/GYN;  Laterality: N/A;    TONSILLECTOMY, ADENOIDECTOMY       Family History   Problem Relation Age of Onset    No Known Problems Father     Miscarriages / Stillbirths Mother     Hypothyroidism Mother     Arthritis Mother     Coronary artery disease Maternal Grandmother         s/p CABG 4v    Hypothyroidism Maternal Grandmother     Coronary artery disease Paternal Grandmother         s/p CABG    Breast cancer Neg Hx     Cancer Neg Hx     Colon cancer Neg Hx     Diabetes Neg Hx     Eclampsia Neg Hx     Hypertension Neg Hx     Stroke Neg Hx       labor Neg Hx     Ovarian cancer Neg Hx     Melanoma Neg Hx      Social History     Tobacco Use    Smoking status: Never    Smokeless tobacco: Never   Substance Use Topics    Alcohol use: Not Currently     Comment: social     Drug use: No     Review of Systems   Gastrointestinal:  Positive for abdominal pain, constipation (chronic), diarrhea and nausea. Negative for blood in stool.        (-) Melena.   Genitourinary:  Negative for dysuria, frequency, vaginal discharge and vaginal pain.   All other systems reviewed and are negative.    Physical Exam     Initial Vitals [22 0845]   BP Pulse Resp Temp SpO2   (!) 149/94 76 17 98 °F (36.7 °C) 97 %      MAP       --         Physical Exam    Nursing note and vitals reviewed.  Constitutional: She appears well-developed and well-nourished.   HENT:   Head: Normocephalic and atraumatic.   Eyes: Conjunctivae and EOM are normal. Pupils are equal, round, and reactive to light. Right eye exhibits no discharge. Left eye exhibits no discharge.   Neck: Neck supple.   Cardiovascular:  Normal heart sounds.           Pulmonary/Chest: Breath sounds normal.   Abdominal: Abdomen is soft. There is abdominal tenderness in the right upper quadrant and epigastric area.   Musculoskeletal:         General: No edema. Normal range of motion.      Cervical back: Neck supple.     Neurological: She is alert and oriented to person, place, and time.   Skin: Skin is warm.   Psychiatric: She has a normal mood and affect.       ED Course   Procedures  Labs Reviewed   ISTAT PROCEDURE - Abnormal; Notable for the following components:       Result Value    POC PCO2 47.8 (*)     POC PO2 24 (*)     POC SATURATED O2 39 (*)     All other components within normal limits   POCT CMP - Abnormal; Notable for the following components:    AST (SGOT), POC 46 (*)     All other components within normal limits   LIPASE   POCT CBC   POCT URINALYSIS W/O SCOPE   POCT URINE PREGNANCY   POCT URINALYSIS W/O SCOPE    POCT CMP       EKG Readings: (Independently Interpreted)   Initial Reading: No STEMI. Rhythm: Normal Sinus Rhythm. Heart Rate: 85. ST Segments: Non-Specific ST Segment Depression. Clinical Impression: with PVCs     Imaging Results              US Abdomen Limited (Final result)  Result time 09/18/22 10:16:38   Procedure changed from US Abdomen Complete     Final result by John Mckenzie MD (09/18/22 10:16:38)                   Impression:      Small gallstones versus tumefactive sludge.    No evidence of cholecystitis by ultrasound criteria.    Common bile duct at the upper limits of normal.  Correlate with liver function tests and bilirubin level.    If further imaging is clinically warranted, consider HIDA scan.      Electronically signed by: John Mckenzie  Date:    09/18/2022  Time:    10:16               Narrative:    EXAMINATION:  US ABDOMEN LIMITED    CLINICAL HISTORY:  RUQ pain; Right upper quadrant pain    TECHNIQUE:  Limited ultrasound of the right upper quadrant of the abdomen (including pancreas, liver, gallbladder, common bile duct, and spleen) was performed.    COMPARISON:  None.    FINDINGS:  Liver: Normal in size, measuring 16.7 cm. Homogeneous echotexture. No focal hepatic lesions.    Gallbladder: Multiple small calculi or dense sludge is noted within the gallbladder.  There is no wall thickening at 2 mm.  No pericholecystic fluid or hypervascularity of the wall is evident.  There is a negative sonographic Interiano sign.    Biliary system: The common duct is upper limits of normal, measuring 5.7 mm.  No intrahepatic ductal dilatation.    Spleen: Normal in size and echotexture, measuring 12.6 x 4.8 cm.    Miscellaneous: No upper abdominal ascites.                                       Medications - No data to display  Medical Decision Making:   History:   Old Medical Records: I decided to obtain old medical records.  Clinical Tests:   Lab Tests: Ordered and Reviewed  Radiological Study: Ordered  and Reviewed  32-year-old patient, moderately obese, with history of epigastric and right upper quadrant pain episode x2.  Complete relief between symptoms.  no nausea.    On exam patient is comfortable, does have right upper quadrant tenderness to palpation. No guarding and no generalized abdominal pain    Differential diagnosis cholelithiasis cholecystitis pancreatitis gastritis duodenal ulcer GI bleed endometritis ectopic pregnancy vaginitis PID    History exam and ultrasound consistent with cholelithiasis versus sludge.  No signs of cholecystitis.    Patient is tolerating p.o. has been pain-free her entire ED visit, with normal LFTs and normal white cell count and no fever.  I will discharge her with close follow-up, and General surgery can decide if the HIDA scan is warranted.  I discussed this with the patient.  I will discharge her on pain medicine in case she has pain over the weekend, and I encouraged her to come back to the emergency room if she has another extremely painful episode.  I advised her specifically what foods to avoid in order to avoid possibility of gallbladder attack.        Scribe Attestation:   Scribe #1: I performed the above scribed service and the documentation accurately describes the services I performed. I attest to the accuracy of the note.                 I, Kayce Trammell MD   personally performed the services described in this documentation.  All medical record entries made by the scribe were at my direction and in my presence.  I have reviewed the chart and agree that the record reflects my personal performance and is accurate and complete.  Clinical Impression:   Final diagnoses:  [R10.13] Epigastric abdominal pain  [R10.11] RUQ pain  [K82.9] Gallbladder disease (Primary)        ED Disposition Condition    Discharge Stable          ED Prescriptions       Medication Sig Dispense Start Date End Date Auth. Provider    oxyCODONE-acetaminophen (PERCOCET) 5-325 mg per tablet Take 1  tablet by mouth every 4 (four) hours as needed for Pain. 18 tablet 9/18/2022 -- Kayce Trammell MD    ondansetron (ZOFRAN) 4 MG tablet Take 1 tablet (4 mg total) by mouth every 6 (six) hours. 12 tablet 9/18/2022 -- Kayce Trammell MD          Follow-up Information       Follow up With Specialties Details Why Contact Info Additional Information    [facility] General Surgery Schedule an appointment as soon as possible for a visit in 2 days  [address] [place] [address]  [phone]     [facility] GENERAL SURGERY General Surgery Schedule an appointment as soon as possible for a visit in 2 days  [address] [facility] [id number] [phone]     [facility] Surgery Schedule an appointment as soon as possible for a visit in 2 days  [address]  [facility] [address] [phone] Multispecialty Surgery Clinic - Main Building, 2nd Floor Please park in [facility] and use escalator or Clinic elevator             Kayce Trammell MD  09/18/22 3314

## 2022-09-21 ENCOUNTER — OFFICE VISIT (OUTPATIENT)
Dept: SURGERY | Facility: CLINIC | Age: 32
End: 2022-09-21
Payer: COMMERCIAL

## 2022-09-21 VITALS
SYSTOLIC BLOOD PRESSURE: 144 MMHG | HEIGHT: 62 IN | BODY MASS INDEX: 41.6 KG/M2 | DIASTOLIC BLOOD PRESSURE: 80 MMHG | WEIGHT: 226.06 LBS | HEART RATE: 73 BPM

## 2022-09-21 DIAGNOSIS — K82.9 GALLBLADDER DISEASE: ICD-10-CM

## 2022-09-21 DIAGNOSIS — R10.11 RUQ PAIN: ICD-10-CM

## 2022-09-21 DIAGNOSIS — R10.13 EPIGASTRIC ABDOMINAL PAIN: ICD-10-CM

## 2022-09-21 DIAGNOSIS — K80.20 SYMPTOMATIC CHOLELITHIASIS: Primary | ICD-10-CM

## 2022-09-21 PROCEDURE — 3008F PR BODY MASS INDEX (BMI) DOCUMENTED: ICD-10-PCS | Mod: CPTII,S$GLB,, | Performed by: SURGERY

## 2022-09-21 PROCEDURE — 1159F MED LIST DOCD IN RCRD: CPT | Mod: CPTII,S$GLB,, | Performed by: SURGERY

## 2022-09-21 PROCEDURE — 1160F RVW MEDS BY RX/DR IN RCRD: CPT | Mod: CPTII,S$GLB,, | Performed by: SURGERY

## 2022-09-21 PROCEDURE — 3079F DIAST BP 80-89 MM HG: CPT | Mod: CPTII,S$GLB,, | Performed by: SURGERY

## 2022-09-21 PROCEDURE — 3077F PR MOST RECENT SYSTOLIC BLOOD PRESSURE >= 140 MM HG: ICD-10-PCS | Mod: CPTII,S$GLB,, | Performed by: SURGERY

## 2022-09-21 PROCEDURE — 99204 OFFICE O/P NEW MOD 45 MIN: CPT | Mod: S$GLB,,, | Performed by: SURGERY

## 2022-09-21 PROCEDURE — 99999 PR PBB SHADOW E&M-EST. PATIENT-LVL V: CPT | Mod: PBBFAC,,, | Performed by: SURGERY

## 2022-09-21 PROCEDURE — 3044F HG A1C LEVEL LT 7.0%: CPT | Mod: CPTII,S$GLB,, | Performed by: SURGERY

## 2022-09-21 PROCEDURE — 99204 PR OFFICE/OUTPT VISIT, NEW, LEVL IV, 45-59 MIN: ICD-10-PCS | Mod: S$GLB,,, | Performed by: SURGERY

## 2022-09-21 PROCEDURE — 3079F PR MOST RECENT DIASTOLIC BLOOD PRESSURE 80-89 MM HG: ICD-10-PCS | Mod: CPTII,S$GLB,, | Performed by: SURGERY

## 2022-09-21 PROCEDURE — 3008F BODY MASS INDEX DOCD: CPT | Mod: CPTII,S$GLB,, | Performed by: SURGERY

## 2022-09-21 PROCEDURE — 3044F PR MOST RECENT HEMOGLOBIN A1C LEVEL <7.0%: ICD-10-PCS | Mod: CPTII,S$GLB,, | Performed by: SURGERY

## 2022-09-21 PROCEDURE — 1160F PR REVIEW ALL MEDS BY PRESCRIBER/CLIN PHARMACIST DOCUMENTED: ICD-10-PCS | Mod: CPTII,S$GLB,, | Performed by: SURGERY

## 2022-09-21 PROCEDURE — 99999 PR PBB SHADOW E&M-EST. PATIENT-LVL V: ICD-10-PCS | Mod: PBBFAC,,, | Performed by: SURGERY

## 2022-09-21 PROCEDURE — 1159F PR MEDICATION LIST DOCUMENTED IN MEDICAL RECORD: ICD-10-PCS | Mod: CPTII,S$GLB,, | Performed by: SURGERY

## 2022-09-21 PROCEDURE — 3077F SYST BP >= 140 MM HG: CPT | Mod: CPTII,S$GLB,, | Performed by: SURGERY

## 2022-09-21 NOTE — H&P (VIEW-ONLY)
General Surgery Office Visit   History and Physical    Patient Name: Armida Medina  YOB: 1990 (32 y.o.)  MRN: [id number]  Today's Date: 2022    Referring Md:   Aaareferral Self  No address on file    SUBJECTIVE:     Chief Complaint: RUQ pain    History of Present Illness:  Armida Medina is a healthy 32 y.o. female recently pregnant who presents with two episodes of RUQ pain in the past week. She did not notice any precipitating events. The pain lasted for 15 minutes, then stopped each time. She presented to the ED, where labwork was not alarming. US was performed which revealed gallstones vs sludge with a borderline common duct, no evidence of cholecystitis. She denies fevers, chills, changes in bowel habits.    Review of patient's allergies indicates:   Allergen Reactions    Ibuprofen Hives and Shortness Of Breath     bronchospasm    Ancef [cefazolin] Rash     Past Medical History:   Diagnosis Date    Abnormal Pap smear of cervix     Acne     Allergy     Asthma     Hashimoto's thyroiditis 2017    History of  section 2021    Postpartum hemorrhage 2022    Preeclampsia, severe, third trimester 2018    Severe pre-eclampsia in third trimester 2022    Admitted  for concern for preeclampsia with SF (BP).  Patient had two severe range blood pressures in the RENATA requiring labetalol 20 mg IV x 1 Her pre E labs on admit and repeat during her two day admission were all within normal limits Admit labs : PC TLC Plt 204, Cr 0.7, AST/ALT 11/10 Repeat labs : Plt 212, CR 0.7, AST/ALT 10/11 During the 48 hours, patient blood pressures were all      Past Surgical History:   Procedure Laterality Date     SECTION N/A 2018    Procedure:  SECTION;  Surgeon: Sawyer Lake Jr., MD;  [facility];  Service: OB/GYN;  Laterality: N/A;     SECTION N/A 2022    Procedure:  SECTION;  Surgeon: Fiorella Carpenter, DO;   Location: [facility] L&D;  Service: OB/GYN;  Laterality: N/A;     SECTION N/A 2022    Procedure:  SECTION;  Surgeon: Fiorella Carpenter DO;  Location: [facility] L&D;  Service: OB/GYN;  Laterality: N/A;     SECTION      CYST REMOVAL      Left ear @ age 2    DILATION AND CURETTAGE OF UTERUS USING SUCTION N/A 2021    Procedure: DILATION AND CURETTAGE, UTERUS, USING SUCTION;  Surgeon: Fiorella Carpenter DO;  Location: [facility] OR;  Service: OB/GYN;  Laterality: N/A;    ROBOT-ASSISTED LAPAROSCOPIC UTERINE MYOMECTOMY N/A 2020    Procedure: ROBOTIC MYOMECTOMY, UTERUS;  Surgeon: Sylvie Zavaleta MD;  Location: [facility] OR;  Service: OB/GYN;  Laterality: N/A;    TONSILLECTOMY, ADENOIDECTOMY       Family History   Problem Relation Age of Onset    No Known Problems Father     Miscarriages / Stillbirths Mother     Hypothyroidism Mother     Arthritis Mother     Coronary artery disease Maternal Grandmother         s/p CABG 4v    Hypothyroidism Maternal Grandmother     Coronary artery disease Paternal Grandmother         s/p CABG    Breast cancer Neg Hx     Cancer Neg Hx     Colon cancer Neg Hx     Diabetes Neg Hx     Eclampsia Neg Hx     Hypertension Neg Hx     Stroke Neg Hx      labor Neg Hx     Ovarian cancer Neg Hx     Melanoma Neg Hx      Social History     Tobacco Use    Smoking status: Never    Smokeless tobacco: Never   Substance Use Topics    Alcohol use: Not Currently     Comment: social     Drug use: No        Review of Systems:  Review of Systems   Constitutional:  Negative for chills and fever.   HENT:  Negative for hearing loss and sore throat.    Eyes:  Negative for pain and discharge.   Respiratory:  Negative for cough and shortness of breath.    Cardiovascular:  Negative for chest pain and palpitations.   Gastrointestinal:  Positive for abdominal pain. Negative for constipation, diarrhea, nausea and vomiting.   Genitourinary:  Negative for dysuria and hematuria.  "  Musculoskeletal:  Negative for joint pain and myalgias.   Neurological:  Negative for dizziness and headaches.   Psychiatric/Behavioral:  Negative for depression. The patient is not nervous/anxious.      OBJECTIVE:     Vital Signs (Most Recent)  BP (!) 144/80   Pulse 73   Ht 5' 2" (1.575 m)   Wt 102.6 kg (226 lb 1.3 oz)   BMI 41.35 kg/m²     Physical Exam:  Physical Exam  Constitutional:       General: She is not in acute distress.     Appearance: Normal appearance.   HENT:      Head: Normocephalic and atraumatic.   Cardiovascular:      Rate and Rhythm: Normal rate and regular rhythm.   Pulmonary:      Effort: Pulmonary effort is normal. No respiratory distress.      Breath sounds: Normal breath sounds.   Abdominal:      General: Abdomen is flat. There is no distension.      Palpations: Abdomen is soft.      Tenderness: There is abdominal tenderness.      Comments: +Interiano's sign   Neurological:      General: No focal deficit present.      Mental Status: She is alert and oriented to person, place, and time.   Psychiatric:         Behavior: Behavior normal.         Thought Content: Thought content normal.       Labs:   CBC: [unfilled]  CMP: [unfilled]      Diagnostic Results:  US: Reviewed  Gallstones    ASSESSMENT/PLAN:     Armida Medina is a 32 y.o. female presenting for evaluation of symptomatic cholelithiasis    - To OR for lap torsten  - consent obtained in clinic    Riley Miramontes MD  [facility]      "

## 2022-09-21 NOTE — PROGRESS NOTES
General Surgery Office Visit   History and Physical    Patient Name: Armida Medina  YOB: 1990 (32 y.o.)  MRN: [id number]  Today's Date: 2022    Referring Md:   Aaareferral Self  No address on file    SUBJECTIVE:     Chief Complaint: RUQ pain    History of Present Illness:  Armida Medina is a healthy 32 y.o. female recently pregnant who presents with two episodes of RUQ pain in the past week. She did not notice any precipitating events. The pain lasted for 15 minutes, then stopped each time. She presented to the ED, where labwork was not alarming. US was performed which revealed gallstones vs sludge with a borderline common duct, no evidence of cholecystitis. She denies fevers, chills, changes in bowel habits.    Review of patient's allergies indicates:   Allergen Reactions    Ibuprofen Hives and Shortness Of Breath     bronchospasm    Ancef [cefazolin] Rash     Past Medical History:   Diagnosis Date    Abnormal Pap smear of cervix     Acne     Allergy     Asthma     Hashimoto's thyroiditis 2017    History of  section 2021    Postpartum hemorrhage 2022    Preeclampsia, severe, third trimester 2018    Severe pre-eclampsia in third trimester 2022    Admitted  for concern for preeclampsia with SF (BP).  Patient had two severe range blood pressures in the RENATA requiring labetalol 20 mg IV x 1 Her pre E labs on admit and repeat during her two day admission were all within normal limits Admit labs : PC TLC Plt 204, Cr 0.7, AST/ALT 11/10 Repeat labs : Plt 212, CR 0.7, AST/ALT 10/11 During the 48 hours, patient blood pressures were all      Past Surgical History:   Procedure Laterality Date     SECTION N/A 2018    Procedure:  SECTION;  Surgeon: Sawyer Lake Jr., MD;  [facility];  Service: OB/GYN;  Laterality: N/A;     SECTION N/A 2022    Procedure:  SECTION;  Surgeon: Fiorella Carpenter, DO;   Location: [facility] L&D;  Service: OB/GYN;  Laterality: N/A;     SECTION N/A 2022    Procedure:  SECTION;  Surgeon: Fiorella Carpenter DO;  Location: [facility] L&D;  Service: OB/GYN;  Laterality: N/A;     SECTION      CYST REMOVAL      Left ear @ age 2    DILATION AND CURETTAGE OF UTERUS USING SUCTION N/A 2021    Procedure: DILATION AND CURETTAGE, UTERUS, USING SUCTION;  Surgeon: Fiorella Carpenter DO;  Location: [facility] OR;  Service: OB/GYN;  Laterality: N/A;    ROBOT-ASSISTED LAPAROSCOPIC UTERINE MYOMECTOMY N/A 2020    Procedure: ROBOTIC MYOMECTOMY, UTERUS;  Surgeon: Sylvie Zavaleta MD;  Location: [facility] OR;  Service: OB/GYN;  Laterality: N/A;    TONSILLECTOMY, ADENOIDECTOMY       Family History   Problem Relation Age of Onset    No Known Problems Father     Miscarriages / Stillbirths Mother     Hypothyroidism Mother     Arthritis Mother     Coronary artery disease Maternal Grandmother         s/p CABG 4v    Hypothyroidism Maternal Grandmother     Coronary artery disease Paternal Grandmother         s/p CABG    Breast cancer Neg Hx     Cancer Neg Hx     Colon cancer Neg Hx     Diabetes Neg Hx     Eclampsia Neg Hx     Hypertension Neg Hx     Stroke Neg Hx      labor Neg Hx     Ovarian cancer Neg Hx     Melanoma Neg Hx      Social History     Tobacco Use    Smoking status: Never    Smokeless tobacco: Never   Substance Use Topics    Alcohol use: Not Currently     Comment: social     Drug use: No        Review of Systems:  Review of Systems   Constitutional:  Negative for chills and fever.   HENT:  Negative for hearing loss and sore throat.    Eyes:  Negative for pain and discharge.   Respiratory:  Negative for cough and shortness of breath.    Cardiovascular:  Negative for chest pain and palpitations.   Gastrointestinal:  Positive for abdominal pain. Negative for constipation, diarrhea, nausea and vomiting.   Genitourinary:  Negative for dysuria and hematuria.  "  Musculoskeletal:  Negative for joint pain and myalgias.   Neurological:  Negative for dizziness and headaches.   Psychiatric/Behavioral:  Negative for depression. The patient is not nervous/anxious.      OBJECTIVE:     Vital Signs (Most Recent)  BP (!) 144/80   Pulse 73   Ht 5' 2" (1.575 m)   Wt 102.6 kg (226 lb 1.3 oz)   BMI 41.35 kg/m²     Physical Exam:  Physical Exam  Constitutional:       General: She is not in acute distress.     Appearance: Normal appearance.   HENT:      Head: Normocephalic and atraumatic.   Cardiovascular:      Rate and Rhythm: Normal rate and regular rhythm.   Pulmonary:      Effort: Pulmonary effort is normal. No respiratory distress.      Breath sounds: Normal breath sounds.   Abdominal:      General: Abdomen is flat. There is no distension.      Palpations: Abdomen is soft.      Tenderness: There is abdominal tenderness.      Comments: +Interiano's sign   Neurological:      General: No focal deficit present.      Mental Status: She is alert and oriented to person, place, and time.   Psychiatric:         Behavior: Behavior normal.         Thought Content: Thought content normal.       Labs:   CBC: [unfilled]  CMP: [unfilled]      Diagnostic Results:  US: Reviewed  Gallstones    ASSESSMENT/PLAN:     Armida Medina is a 32 y.o. female presenting for evaluation of symptomatic cholelithiasis    - To OR for lap torsten  - consent obtained in clinic    Riley Miramontes MD  [facility]      "

## 2022-09-26 ENCOUNTER — ANESTHESIA EVENT (OUTPATIENT)
Dept: SURGERY | Facility: HOSPITAL | Age: 32
End: 2022-09-26
Payer: COMMERCIAL

## 2022-09-26 NOTE — PROGRESS NOTES
Patient's Anesthesia and Medical records reviewed.  She was previously classified as an ASA 3 for a  -30-22.  She is an appropriate candidate for surgery at [facility] per the screening guidelines.

## 2022-09-26 NOTE — ANESTHESIA PAT ROS NOTE
Chart review complete. Patient's medical history reviewed.  OK to proceed at [facility].  Raven Silva MD

## 2022-09-27 ENCOUNTER — TELEPHONE (OUTPATIENT)
Dept: INTERNAL MEDICINE | Facility: CLINIC | Age: 32
End: 2022-09-27
Payer: COMMERCIAL

## 2022-09-27 RX ORDER — CLINDAMYCIN PHOSPHATE 900 MG/50ML
900 INJECTION, SOLUTION INTRAVENOUS
Status: CANCELLED | OUTPATIENT
Start: 2022-09-27

## 2022-09-27 RX ORDER — SODIUM CHLORIDE 9 MG/ML
INJECTION, SOLUTION INTRAVENOUS CONTINUOUS
Status: CANCELLED | OUTPATIENT
Start: 2022-09-27

## 2022-09-27 NOTE — TELEPHONE ENCOUNTER
Armida Medina ([place]: [id number]) - [id number]  Ozempic (0.25 or 0.5 MG/DOSE) 2MG/1.5ML pen-injectors       Status: PA Response - Approved    Created: September 27th, 2022 [id number]    Sent: September 27th, 2022

## 2022-09-29 LAB
FINAL PATHOLOGIC DIAGNOSIS: NORMAL
GROSS: NORMAL
Lab: NORMAL

## 2022-09-29 NOTE — PROGRESS NOTES
Final Pathologic Diagnosis   APS DIAGNOSIS:   A. Right Upper Back , Shave Biopsy:   - Intradermal melanocytic nevus, extending to peripheral and deep margins

## 2022-10-04 ENCOUNTER — TELEPHONE (OUTPATIENT)
Dept: INTERNAL MEDICINE | Facility: CLINIC | Age: 32
End: 2022-10-04
Payer: COMMERCIAL

## 2022-10-04 NOTE — TELEPHONE ENCOUNTER
CaseId:[id number];Status:Approved;Review Type:Prior Auth;Coverage Start Date:08/28/2022;Coverage End Date:09/27/2023; for Ozempic

## 2022-10-06 ENCOUNTER — TELEPHONE (OUTPATIENT)
Dept: SURGERY | Facility: CLINIC | Age: 32
End: 2022-10-06
Payer: COMMERCIAL

## 2022-10-07 ENCOUNTER — HOSPITAL ENCOUNTER (OUTPATIENT)
Facility: HOSPITAL | Age: 32
Discharge: HOME | End: 2022-10-07
Attending: SURGERY | Admitting: SURGERY
Payer: COMMERCIAL

## 2022-10-07 ENCOUNTER — ANESTHESIA (OUTPATIENT)
Dept: SURGERY | Facility: HOSPITAL | Age: 32
End: 2022-10-07
Payer: COMMERCIAL

## 2022-10-07 VITALS
BODY MASS INDEX: 41.59 KG/M2 | SYSTOLIC BLOOD PRESSURE: 140 MMHG | HEART RATE: 76 BPM | TEMPERATURE: 98 F | RESPIRATION RATE: 18 BRPM | WEIGHT: 226 LBS | OXYGEN SATURATION: 97 % | DIASTOLIC BLOOD PRESSURE: 79 MMHG | HEIGHT: 62 IN

## 2022-10-07 DIAGNOSIS — K80.20 SYMPTOMATIC CHOLELITHIASIS: ICD-10-CM

## 2022-10-07 DIAGNOSIS — K82.9 GALLBLADDER DISEASE: Primary | ICD-10-CM

## 2022-10-07 LAB
B-HCG UR QL: NEGATIVE
CTP QC/QA: YES

## 2022-10-07 PROCEDURE — 81025 URINE PREGNANCY TEST: CPT | Performed by: SURGERY

## 2022-10-07 PROCEDURE — 27201423 OPTIME MED/SURG SUP & DEVICES STERILE SUPPLY: Performed by: SURGERY

## 2022-10-07 PROCEDURE — 63600175 PHARM REV CODE 636 W HCPCS: Performed by: ANESTHESIOLOGY

## 2022-10-07 PROCEDURE — 36000708 HC OR TIME LEV III 1ST 15 MIN: Performed by: SURGERY

## 2022-10-07 PROCEDURE — 37000009 HC ANESTHESIA EA ADD 15 MINS: Performed by: SURGERY

## 2022-10-07 PROCEDURE — 71000039 HC RECOVERY, EACH ADD'L HOUR: Performed by: SURGERY

## 2022-10-07 PROCEDURE — 25000003 PHARM REV CODE 250: Performed by: SURGERY

## 2022-10-07 PROCEDURE — 71000033 HC RECOVERY, INTIAL HOUR: Performed by: SURGERY

## 2022-10-07 PROCEDURE — 47562 LAPAROSCOPIC CHOLECYSTECTOMY: CPT | Mod: ,,, | Performed by: SURGERY

## 2022-10-07 PROCEDURE — 88304 TISSUE EXAM BY PATHOLOGIST: CPT | Performed by: PATHOLOGY

## 2022-10-07 PROCEDURE — 88304 PR  SURG PATH,LEVEL III: ICD-10-PCS | Mod: 26,,, | Performed by: PATHOLOGY

## 2022-10-07 PROCEDURE — 37000008 HC ANESTHESIA 1ST 15 MINUTES: Performed by: SURGERY

## 2022-10-07 PROCEDURE — 63600175 PHARM REV CODE 636 W HCPCS: Performed by: NURSE ANESTHETIST, CERTIFIED REGISTERED

## 2022-10-07 PROCEDURE — 88304 TISSUE EXAM BY PATHOLOGIST: CPT | Mod: 26,,, | Performed by: PATHOLOGY

## 2022-10-07 PROCEDURE — C1887 CATHETER, GUIDING: HCPCS | Performed by: SURGERY

## 2022-10-07 PROCEDURE — 36000709 HC OR TIME LEV III EA ADD 15 MIN: Performed by: SURGERY

## 2022-10-07 PROCEDURE — 25000003 PHARM REV CODE 250: Performed by: ANESTHESIOLOGY

## 2022-10-07 PROCEDURE — D9220A PRA ANESTHESIA: Mod: CRNA,,, | Performed by: NURSE ANESTHETIST, CERTIFIED REGISTERED

## 2022-10-07 PROCEDURE — 94761 N-INVAS EAR/PLS OXIMETRY MLT: CPT

## 2022-10-07 PROCEDURE — D9220A PRA ANESTHESIA: Mod: ANES,,, | Performed by: ANESTHESIOLOGY

## 2022-10-07 PROCEDURE — 47562 PR LAP,CHOLECYSTECTOMY: ICD-10-PCS | Mod: ,,, | Performed by: SURGERY

## 2022-10-07 PROCEDURE — D9220A PRA ANESTHESIA: ICD-10-PCS | Mod: ANES,,, | Performed by: ANESTHESIOLOGY

## 2022-10-07 PROCEDURE — 71000015 HC POSTOP RECOV 1ST HR: Performed by: SURGERY

## 2022-10-07 PROCEDURE — D9220A PRA ANESTHESIA: ICD-10-PCS | Mod: CRNA,,, | Performed by: NURSE ANESTHETIST, CERTIFIED REGISTERED

## 2022-10-07 PROCEDURE — 25000003 PHARM REV CODE 250: Performed by: NURSE ANESTHETIST, CERTIFIED REGISTERED

## 2022-10-07 PROCEDURE — 99900035 HC TECH TIME PER 15 MIN (STAT)

## 2022-10-07 RX ORDER — ONDANSETRON 2 MG/ML
4 INJECTION INTRAMUSCULAR; INTRAVENOUS DAILY PRN
Status: DISCONTINUED | OUTPATIENT
Start: 2022-10-07 | End: 2022-10-07 | Stop reason: HOSPADM

## 2022-10-07 RX ORDER — SCOLOPAMINE TRANSDERMAL SYSTEM 1 MG/1
PATCH, EXTENDED RELEASE TRANSDERMAL
Status: DISCONTINUED
Start: 2022-10-07 | End: 2022-10-07 | Stop reason: HOSPADM

## 2022-10-07 RX ORDER — OXYCODONE HYDROCHLORIDE 5 MG/1
5 TABLET ORAL
Status: DISCONTINUED | OUTPATIENT
Start: 2022-10-07 | End: 2022-10-07 | Stop reason: HOSPADM

## 2022-10-07 RX ORDER — MIDAZOLAM HYDROCHLORIDE 1 MG/ML
INJECTION INTRAMUSCULAR; INTRAVENOUS
Status: DISCONTINUED | OUTPATIENT
Start: 2022-10-07 | End: 2022-10-07

## 2022-10-07 RX ORDER — ACETAMINOPHEN 500 MG
1000 TABLET ORAL
Status: COMPLETED | OUTPATIENT
Start: 2022-10-07 | End: 2022-10-07

## 2022-10-07 RX ORDER — LIDOCAINE HYDROCHLORIDE 10 MG/ML
1 INJECTION, SOLUTION EPIDURAL; INFILTRATION; INTRACAUDAL; PERINEURAL ONCE
Status: DISCONTINUED | OUTPATIENT
Start: 2022-10-07 | End: 2022-11-01

## 2022-10-07 RX ORDER — NEOSTIGMINE METHYLSULFATE 1 MG/ML
INJECTION, SOLUTION INTRAVENOUS
Status: DISCONTINUED | OUTPATIENT
Start: 2022-10-07 | End: 2022-10-07

## 2022-10-07 RX ORDER — KETOROLAC TROMETHAMINE 30 MG/ML
30 INJECTION, SOLUTION INTRAMUSCULAR; INTRAVENOUS ONCE
Status: COMPLETED | OUTPATIENT
Start: 2022-10-07 | End: 2022-10-07

## 2022-10-07 RX ORDER — PROPOFOL 10 MG/ML
VIAL (ML) INTRAVENOUS
Status: DISCONTINUED | OUTPATIENT
Start: 2022-10-07 | End: 2022-10-07

## 2022-10-07 RX ORDER — OXYCODONE HYDROCHLORIDE 5 MG/1
5 TABLET ORAL EVERY 4 HOURS PRN
Qty: 5 TABLET | Refills: 0 | Status: SHIPPED | OUTPATIENT
Start: 2022-10-07 | End: 2022-11-01

## 2022-10-07 RX ORDER — CLINDAMYCIN PHOSPHATE 900 MG/50ML
900 INJECTION, SOLUTION INTRAVENOUS
Status: COMPLETED | OUTPATIENT
Start: 2022-10-07 | End: 2022-10-07

## 2022-10-07 RX ORDER — SODIUM CHLORIDE 9 MG/ML
INJECTION, SOLUTION INTRAVENOUS CONTINUOUS
Status: DISCONTINUED | OUTPATIENT
Start: 2022-10-07 | End: 2022-10-07 | Stop reason: HOSPADM

## 2022-10-07 RX ORDER — BUPIVACAINE HYDROCHLORIDE 2.5 MG/ML
INJECTION, SOLUTION EPIDURAL; INFILTRATION; INTRACAUDAL
Status: DISCONTINUED | OUTPATIENT
Start: 2022-10-07 | End: 2022-10-07 | Stop reason: HOSPADM

## 2022-10-07 RX ORDER — SODIUM CHLORIDE 0.9 % (FLUSH) 0.9 %
10 SYRINGE (ML) INJECTION
Status: DISCONTINUED | OUTPATIENT
Start: 2022-10-07 | End: 2022-10-07 | Stop reason: HOSPADM

## 2022-10-07 RX ORDER — DEXAMETHASONE SODIUM PHOSPHATE 4 MG/ML
INJECTION, SOLUTION INTRA-ARTICULAR; INTRALESIONAL; INTRAMUSCULAR; INTRAVENOUS; SOFT TISSUE
Status: DISCONTINUED | OUTPATIENT
Start: 2022-10-07 | End: 2022-10-07

## 2022-10-07 RX ORDER — HALOPERIDOL 5 MG/ML
0.5 INJECTION INTRAMUSCULAR EVERY 10 MIN PRN
Status: DISCONTINUED | OUTPATIENT
Start: 2022-10-07 | End: 2022-10-07 | Stop reason: HOSPADM

## 2022-10-07 RX ORDER — SODIUM CHLORIDE 0.9 % (FLUSH) 0.9 %
SYRINGE (ML) INJECTION
Status: CANCELLED | OUTPATIENT
Start: 2022-10-07

## 2022-10-07 RX ORDER — SCOLOPAMINE TRANSDERMAL SYSTEM 1 MG/1
1 PATCH, EXTENDED RELEASE TRANSDERMAL
Status: DISCONTINUED | OUTPATIENT
Start: 2022-10-07 | End: 2022-10-07 | Stop reason: HOSPADM

## 2022-10-07 RX ORDER — LIDOCAINE HCL/PF 100 MG/5ML
SYRINGE (ML) INTRAVENOUS
Status: DISCONTINUED | OUTPATIENT
Start: 2022-10-07 | End: 2022-10-07

## 2022-10-07 RX ORDER — FENTANYL CITRATE 50 UG/ML
25 INJECTION, SOLUTION INTRAMUSCULAR; INTRAVENOUS EVERY 5 MIN PRN
Status: COMPLETED | OUTPATIENT
Start: 2022-10-07 | End: 2022-10-07

## 2022-10-07 RX ORDER — METHOCARBAMOL 500 MG/1
1000 TABLET, FILM COATED ORAL ONCE
Status: COMPLETED | OUTPATIENT
Start: 2022-10-07 | End: 2022-10-07

## 2022-10-07 RX ORDER — ROCURONIUM BROMIDE 10 MG/ML
INJECTION, SOLUTION INTRAVENOUS
Status: DISCONTINUED | OUTPATIENT
Start: 2022-10-07 | End: 2022-10-07

## 2022-10-07 RX ORDER — FAMOTIDINE 10 MG/ML
INJECTION INTRAVENOUS
Status: DISCONTINUED | OUTPATIENT
Start: 2022-10-07 | End: 2022-10-07

## 2022-10-07 RX ORDER — DEXMEDETOMIDINE HYDROCHLORIDE 100 UG/ML
INJECTION, SOLUTION INTRAVENOUS
Status: DISCONTINUED | OUTPATIENT
Start: 2022-10-07 | End: 2022-10-07

## 2022-10-07 RX ORDER — ONDANSETRON HYDROCHLORIDE 2 MG/ML
INJECTION, SOLUTION INTRAMUSCULAR; INTRAVENOUS
Status: DISCONTINUED | OUTPATIENT
Start: 2022-10-07 | End: 2022-10-07

## 2022-10-07 RX ADMIN — SODIUM CHLORIDE, SODIUM GLUCONATE, SODIUM ACETATE, POTASSIUM CHLORIDE, MAGNESIUM CHLORIDE, SODIUM PHOSPHATE, DIBASIC, AND POTASSIUM PHOSPHATE: .53; .5; .37; .037; .03; .012; .00082 INJECTION, SOLUTION INTRAVENOUS at 10:10

## 2022-10-07 RX ADMIN — NEOSTIGMINE METHYLSULFATE 4 MG: 1 INJECTION INTRAVENOUS at 09:10

## 2022-10-07 RX ADMIN — ONDANSETRON 4 MG: 2 INJECTION INTRAMUSCULAR; INTRAVENOUS at 09:10

## 2022-10-07 RX ADMIN — FENTANYL CITRATE 25 MCG: 0.05 INJECTION, SOLUTION INTRAMUSCULAR; INTRAVENOUS at 10:10

## 2022-10-07 RX ADMIN — GLYCOPYRROLATE 0.4 MG: 0.2 INJECTION, SOLUTION INTRAMUSCULAR; INTRAVENOUS at 09:10

## 2022-10-07 RX ADMIN — SODIUM CHLORIDE: 9 INJECTION, SOLUTION INTRAVENOUS at 08:10

## 2022-10-07 RX ADMIN — DEXAMETHASONE SODIUM PHOSPHATE 8 MG: 4 INJECTION, SOLUTION INTRAMUSCULAR; INTRAVENOUS at 09:10

## 2022-10-07 RX ADMIN — PROPOFOL 160 MG: 10 INJECTION, EMULSION INTRAVENOUS at 08:10

## 2022-10-07 RX ADMIN — SODIUM CHLORIDE: 0.9 INJECTION, SOLUTION INTRAVENOUS at 08:10

## 2022-10-07 RX ADMIN — HALOPERIDOL LACTATE 0.5 MG: 5 INJECTION, SOLUTION INTRAMUSCULAR at 10:10

## 2022-10-07 RX ADMIN — METHOCARBAMOL 1000 MG: 500 TABLET ORAL at 11:10

## 2022-10-07 RX ADMIN — MIDAZOLAM HYDROCHLORIDE 2 MG: 1 INJECTION, SOLUTION INTRAMUSCULAR; INTRAVENOUS at 08:10

## 2022-10-07 RX ADMIN — ROCURONIUM BROMIDE 50 MG: 10 INJECTION INTRAVENOUS at 08:10

## 2022-10-07 RX ADMIN — SCOPALAMINE 1 PATCH: 1 PATCH, EXTENDED RELEASE TRANSDERMAL at 08:10

## 2022-10-07 RX ADMIN — KETOROLAC TROMETHAMINE 30 MG: 30 INJECTION, SOLUTION INTRAMUSCULAR at 11:10

## 2022-10-07 RX ADMIN — CLINDAMYCIN IN 5 PERCENT DEXTROSE 900 MG: 18 INJECTION, SOLUTION INTRAVENOUS at 08:10

## 2022-10-07 RX ADMIN — FAMOTIDINE 20 MG: 10 INJECTION, SOLUTION INTRAVENOUS at 09:10

## 2022-10-07 RX ADMIN — LIDOCAINE HYDROCHLORIDE 75 MG: 20 INJECTION, SOLUTION INTRAVENOUS at 08:10

## 2022-10-07 RX ADMIN — PROPOFOL 200 MCG/KG/MIN: 10 INJECTION, EMULSION INTRAVENOUS at 09:10

## 2022-10-07 RX ADMIN — ACETAMINOPHEN 1000 MG: 500 TABLET ORAL at 08:10

## 2022-10-07 RX ADMIN — DEXMEDETOMIDINE HYDROCHLORIDE 12 MCG: 100 INJECTION, SOLUTION INTRAVENOUS at 09:10

## 2022-10-07 RX ADMIN — DEXMEDETOMIDINE HYDROCHLORIDE 8 MCG: 100 INJECTION, SOLUTION INTRAVENOUS at 08:10

## 2022-10-07 RX ADMIN — ONDANSETRON 4 MG: 2 INJECTION INTRAMUSCULAR; INTRAVENOUS at 12:10

## 2022-10-07 NOTE — PLAN OF CARE
Patient care complete. Mother at bedside.  Patient has post op N/V request emesis bag at the ready in post op.  States she feels better after she vomits usually.

## 2022-10-07 NOTE — ANESTHESIA PREPROCEDURE EVALUATION
10/07/2022  Armida Medina is a 32 y.o., female.    Procedure Summary    Case: [id number] Date/Time: 10/07/22 0946   Procedure: CHOLECYSTECTOMY, LAPAROSCOPIC (Abdomen)   Anesthesia type: General   Diagnosis: Gallbladder disease [K82.9]     Patient Active Problem List   Diagnosis    Hashimoto's thyroiditis    Childhood asthma without complication    Iron deficiency anemia    Obesity (BMI 35.0-39.9 without comorbidity)    Mixed hyperlipidemia    Statin myopathy    Vitamin D deficiency    History of depression    History of myomectomy    Elevated blood pressure reading in office without diagnosis of hypertension     Past Surgical History:   Procedure Laterality Date     SECTION N/A 2018    Procedure:  SECTION;  Surgeon: Sawyer Lake Jr., MD;  Location: [facility] L&D;  Service: OB/GYN;  Laterality: N/A;     SECTION N/A 2022    Procedure:  SECTION;  Surgeon: Fiorella Carpenter DO;  Location: [facility] L&D;  Service: OB/GYN;  Laterality: N/A;     SECTION N/A 2022    Procedure:  SECTION;  Surgeon: Fiorella Carpenter DO;  Location: [facility] L&D;  Service: OB/GYN;  Laterality: N/A;     SECTION      CYST REMOVAL      Left ear @ age 2    DILATION AND CURETTAGE OF UTERUS USING SUCTION N/A 2021    Procedure: DILATION AND CURETTAGE, UTERUS, USING SUCTION;  Surgeon: Fiorella Carpenter DO;  Location: [facility] OR;  Service: OB/GYN;  Laterality: N/A;    ROBOT-ASSISTED LAPAROSCOPIC UTERINE MYOMECTOMY N/A 2020    Procedure: ROBOTIC MYOMECTOMY, UTERUS;  Surgeon: Sylvie Zavaleta MD;  Location: [facility] OR;  Service: OB/GYN;  Laterality: N/A;    TONSILLECTOMY, ADENOIDECTOMY       Current Discharge Medication List      CONTINUE these medications which have NOT CHANGED    Details   buPROPion (WELLBUTRIN XL) 150 MG TB24 tablet Take 1 tablet  (150 mg total) by mouth every morning.  Qty: 90 tablet, Refills: 3      cetirizine (ZYRTEC) 10 MG tablet Take 10 mg by mouth once daily.      dulaglutide (TRULICITY) 0.75 mg/0.5 mL pen injector Inject 0.75 mg into the skin once a week.  Qty: 4 pen, Refills: 11    Associated Diagnoses: Obesity (BMI 35.0-39.9 without comorbidity)      ergocalciferol (ERGOCALCIFEROL) 50,000 unit Cap Take 1 capsule (50,000 Units total) by mouth twice a week.  Qty: 24 capsule, Refills: 3    Associated Diagnoses: Vitamin D deficiency      famotidine (PEPCID) 20 MG tablet Take 20 mg by mouth 2 (two) times daily as needed for Heartburn.      ferrous sulfate 325 (65 FE) MG EC tablet Take 1 tablet (325 mg total) by mouth every other day.  Qty: 45 tablet, Refills: 3    Associated Diagnoses: Iron deficiency anemia, unspecified iron deficiency anemia type      ketoconazole (NIZORAL) 2 % shampoo Wash scalp with medicated shampoo at least 2x/week. Let sit on scalp at least 5 minutes prior to rinsing  Qty: 240 mL, Refills: 5    Associated Diagnoses: Seborrheic dermatitis      !! ondansetron (ZOFRAN) 4 MG tablet Take 1 tablet (4 mg total) by mouth every 6 (six) hours.  Qty: 12 tablet, Refills: 0      !! ondansetron (ZOFRAN) 4 MG tablet Take 1 tablet (4 mg total) by mouth every 6 (six) hours.  Qty: 12 tablet, Refills: 0      semaglutide (OZEMPIC) 0.25 mg or 0.5 mg(2 mg/1.5 mL) pen injector Inject 0.25 mg into the skin every 7 days.  Qty: 1 pen, Refills: 5    Associated Diagnoses: Obesity (BMI 35.0-39.9 without comorbidity)       !! - Potential duplicate medications found. Please discuss with provider.          Pre-op Assessment    I have reviewed the Patient Summary Reports.     I have reviewed the Nursing Notes. I have reviewed the NPO Status.      Review of Systems  Anesthesia Hx:  Denies Family Hx of Anesthesia complications.  Personal Hx of Anesthesia complications, Post-Operative Nausea/Vomiting   Social:  Non-Smoker, No Alcohol Use     Cardiovascular:   hyperlipidemia    Pulmonary:   Asthma    Psych:   depression          Physical Exam    Airway:  Mallampati: II / II  Mouth Opening: Normal  Tongue: Normal  Neck ROM: Normal ROM    Dental:  Intact        Anesthesia Plan  Type of Anesthesia, risks & benefits discussed:    Anesthesia Type: Gen ETT  Intra-op Monitoring Plan: Standard ASA Monitors  Post Op Pain Control Plan: multimodal analgesia and IV/PO Opioids PRN  Induction:  IV  Airway Plan: Direct  ASA Score: 3    Ready For Surgery From Anesthesia Perspective.     .

## 2022-10-07 NOTE — OP NOTE
DATE OF PROCEDURE: 10/7/2022    PRE OP DIAGNOSIS: Gallbladder disease [K82.9]  Biliary colic    POST OP DIAGNOSIS: Gallbladder disease [K82.9]  Biliary colic    PROCEDURE: Procedure(s) (LRB):  CHOLECYSTECTOMY, LAPAROSCOPIC (N/A)    Surgeon(s) and Role:     * Armida Ferraro MD - Primary     * Riley Miramontes MD - Resident - Assisting    ANESTHESIA: General    INDICATION: Patient is a 32 year-old woman who presented with colicky RUQ abdominal pain. Work-up revealed gallbladder sludge and stones without cholecystitis. After discussing the risks, benefits and alternatives to laparoscopic cholecystectomy, she elected to proceed. Informed consent was obtained.     FINDINGS:  Uncomplicated cholecystectomy performed after complete dissection of the critical view of safety.     PROCEDURE IN DETAIL: The patient was met in the pre-op area and her identity and consent confirmed. She was then brought back to the Operating Room and placed in the supine position. General anesthesia was induced and she was intubated without incident. SCDs and a warming blanket were placed and IV antibiotic prophylaxis infused within 30 minutes of the incision. Her abdomen was prepped and draped in sterile fashion and a pre-procedural pause was performed by all members of the surgical and anesthesia teams. Access to peritoneum was gained via a curvilineal infraumbilical incision using Blackman technique with a 12-mm trocar. A 0-Vicryl suture was placed through the fascial edge on both sides and a blunt trocar inserted. Pneumoperitoneum to 15 mmHg with CO2 gas was obtained. Three 5 mm trocars were placed under the costal margin under direct vision at midline, the midclavicular line and the anterior axillary line. The gallbladder was pulled up over the liver, exposing the triangle of Calot. The peritoneum was stripped off the base of the gallbladder, exposing the cystic duct and artery as they entered the gallbladder. The critical view was  obtained demonstrating two and only two structures entering the gallbladder, with a view of the liver through the window both anterior and posteriorly, after 1/3 of the cystic plate was dissected. A Hemo-nahum clip was placed high on the specimen side of the cystic duct and artery and two Hemo-nahum clips were placed on the stay-side of each which were then sharply divided in between leaving a small stump. The gallbladder was removed from the gallbladder bed using the hook cautery, placed in an EndoCatch bag and removed from the abdomen through the navel. The abdomen was inspected and hemostasis ensured. The previously placed clips were noted to be in excellent position. The trocars were removed under direct vision. Prior to removing the last trocar, pneumoperitoneum was allowed to escape. The fascia at the naval was closed with 0 Vicryl in a figure-of-eight fashion. The skin incisions were closed with 4-0 Monocryl and reinforced with Dermabond. The patient was awoken from anesthesia and extubated without complication. She was brought to the PACU in good condition having tolerated the operation well. Sponge and needle counts were correct at the end of the case.      EBL: 1 mL  Specimen: Gallbladder  Drains: None  Complications: None apparent  Dispo: Home following PACU     I was present and scrubbed for the entirety of this operation.     Armida Ferraro  10/7/2022

## 2022-10-07 NOTE — ANESTHESIA PROCEDURE NOTES
Intubation    Date/Time: 10/7/2022 8:59 AM  Performed by: Miguelina Isabel CRNA  Authorized by: Miguelina Isabel CRNA     Intubation:     Induction:  Intravenous    Intubated:  Postinduction    Attempts:  1    Attempted By:  CRNA    Method of Intubation:  Direct    Blade:  Alegria 2    Laryngeal View Grade: Grade I - full view of cords      Difficult Airway Encountered?: No      Complications:  None    Airway Device:  Oral endotracheal tube    Airway Device Size:  7.5    Style/Cuff Inflation:  Cuffed    Inflation Amount (mL):  6    Tube secured:  23    Secured at:  The lips    Placement Verified By:  Capnometry    Complicating Factors:  None    Findings Post-Intubation:  BS equal bilateral

## 2022-10-07 NOTE — PLAN OF CARE
Patient is AAO and VSS.  Tolerating PO and states pain is tolerable.  Dressing CDI.  Patient states they are ready for d/c.  IV removed.  Catheter tip intact.  Caregiver at bedside.  Discharge instructions reviewed and copy given to the patient and caregiver.  Questions answered.  Both verbalized understanding.  Paper rx given No DME needed.  Patient wheeled to car by RN/PCT.

## 2022-10-07 NOTE — TRANSFER OF CARE
"Anesthesia Transfer of Care Note    Patient: Armida Medina    Procedure(s) Performed: Procedure(s) (LRB):  CHOLECYSTECTOMY, LAPAROSCOPIC (N/A)    Patient location: PACU    Anesthesia Type: general    Transport from OR: Transported from OR on 100% O2 by closed face mask with adequate spontaneous ventilation    Post pain: adequate analgesia    Post assessment: no apparent anesthetic complications    Post vital signs: stable    Level of consciousness: awake and responds to stimulation    Nausea/Vomiting: no nausea/vomiting    Complications: none    Transfer of care protocol was followed      Last vitals:   Visit Vitals  BP (!) 148/75 (BP Location: Right arm, Patient Position: Lying)   Pulse 87   Temp 36.6 °C (97.8 °F) (Temporal)   Resp 16   Ht 5' 2" (1.575 m)   Wt 102.5 kg (226 lb)   LMP 10/05/2022 (Approximate)   SpO2 96%   Breastfeeding No   BMI 41.34 kg/m²     "

## 2022-10-11 ENCOUNTER — PATIENT MESSAGE (OUTPATIENT)
Dept: SURGERY | Facility: CLINIC | Age: 32
End: 2022-10-11
Payer: COMMERCIAL

## 2022-10-11 DIAGNOSIS — R19.7 DIARRHEA, UNSPECIFIED TYPE: Primary | ICD-10-CM

## 2022-10-11 NOTE — ANESTHESIA POSTPROCEDURE EVALUATION
Anesthesia Post Evaluation    Patient: Armida Medina    Procedure(s) Performed: Procedure(s) (LRB):  CHOLECYSTECTOMY, LAPAROSCOPIC (N/A)    Final Anesthesia Type: general      Patient location during evaluation: PACU  Patient participation: Yes- Able to Participate  Level of consciousness: awake and alert and oriented  Post-procedure vital signs: reviewed and stable  Pain management: adequate  Airway patency: patent    PONV status at discharge: No PONV  Anesthetic complications: no      Cardiovascular status: hemodynamically stable  Respiratory status: nasal cannula  Hydration status: euvolemic  Follow-up not needed.          Vitals Value Taken Time   /79 10/07/22 1131   Temp 36.6 °C (97.9 °F) 10/07/22 1130   Pulse 82 10/07/22 1141   Resp 18 10/07/22 1139   SpO2 100 % 10/07/22 1141   Vitals shown include unvalidated device data.      Event Time   Out of Recovery 12:00:08         Pain/David Score: No data recorded

## 2022-10-12 ENCOUNTER — LAB VISIT (OUTPATIENT)
Dept: LAB | Facility: HOSPITAL | Age: 32
End: 2022-10-12
Attending: SURGERY
Payer: COMMERCIAL

## 2022-10-12 DIAGNOSIS — R19.7 DIARRHEA, UNSPECIFIED TYPE: ICD-10-CM

## 2022-10-12 LAB
C DIFF GDH STL QL: NEGATIVE
C DIFF TOX A+B STL QL IA: NEGATIVE
FINAL PATHOLOGIC DIAGNOSIS: NORMAL
GROSS: NORMAL
Lab: NORMAL

## 2022-10-12 PROCEDURE — 87449 NOS EACH ORGANISM AG IA: CPT | Performed by: SURGERY

## 2022-10-18 ENCOUNTER — OFFICE VISIT (OUTPATIENT)
Dept: SURGERY | Facility: CLINIC | Age: 32
End: 2022-10-18
Payer: COMMERCIAL

## 2022-10-18 VITALS
WEIGHT: 218 LBS | DIASTOLIC BLOOD PRESSURE: 74 MMHG | BODY MASS INDEX: 38.62 KG/M2 | SYSTOLIC BLOOD PRESSURE: 128 MMHG | HEIGHT: 63 IN | HEART RATE: 78 BPM

## 2022-10-18 DIAGNOSIS — Z90.49 S/P LAPAROSCOPIC CHOLECYSTECTOMY: Primary | ICD-10-CM

## 2022-10-18 DIAGNOSIS — Z98.890 DIARRHEA FOLLOWING GASTROINTESTINAL SURGERY: ICD-10-CM

## 2022-10-18 DIAGNOSIS — R19.7 DIARRHEA FOLLOWING GASTROINTESTINAL SURGERY: ICD-10-CM

## 2022-10-18 PROCEDURE — 99999 PR PBB SHADOW E&M-EST. PATIENT-LVL III: ICD-10-PCS | Mod: PBBFAC,,, | Performed by: SURGERY

## 2022-10-18 PROCEDURE — 3074F PR MOST RECENT SYSTOLIC BLOOD PRESSURE < 130 MM HG: ICD-10-PCS | Mod: CPTII,S$GLB,, | Performed by: SURGERY

## 2022-10-18 PROCEDURE — 99024 PR POST-OP FOLLOW-UP VISIT: ICD-10-PCS | Mod: S$GLB,,, | Performed by: SURGERY

## 2022-10-18 PROCEDURE — 3044F PR MOST RECENT HEMOGLOBIN A1C LEVEL <7.0%: ICD-10-PCS | Mod: CPTII,S$GLB,, | Performed by: SURGERY

## 2022-10-18 PROCEDURE — 3044F HG A1C LEVEL LT 7.0%: CPT | Mod: CPTII,S$GLB,, | Performed by: SURGERY

## 2022-10-18 PROCEDURE — 3078F DIAST BP <80 MM HG: CPT | Mod: CPTII,S$GLB,, | Performed by: SURGERY

## 2022-10-18 PROCEDURE — 3074F SYST BP LT 130 MM HG: CPT | Mod: CPTII,S$GLB,, | Performed by: SURGERY

## 2022-10-18 PROCEDURE — 3008F BODY MASS INDEX DOCD: CPT | Mod: CPTII,S$GLB,, | Performed by: SURGERY

## 2022-10-18 PROCEDURE — 3078F PR MOST RECENT DIASTOLIC BLOOD PRESSURE < 80 MM HG: ICD-10-PCS | Mod: CPTII,S$GLB,, | Performed by: SURGERY

## 2022-10-18 PROCEDURE — 99024 POSTOP FOLLOW-UP VISIT: CPT | Mod: S$GLB,,, | Performed by: SURGERY

## 2022-10-18 PROCEDURE — 3008F PR BODY MASS INDEX (BMI) DOCUMENTED: ICD-10-PCS | Mod: CPTII,S$GLB,, | Performed by: SURGERY

## 2022-10-18 PROCEDURE — 99999 PR PBB SHADOW E&M-EST. PATIENT-LVL III: CPT | Mod: PBBFAC,,, | Performed by: SURGERY

## 2022-10-18 NOTE — PROGRESS NOTES
"[facility]  Post Op Visit    SUBJECTIVE:  Armida Medina is a 32 y.o. female who presents to clinic today for post op follow up after Cholecystectomy, Laparoscopic on 10/7/2022 . She has been having diarrhea since the surgery but notes it has been getting better. She only has diarrhea once a day now and describes it as "acid coming out". She  denies pain, fevers, chills, nausea, vomiting, and constipation and is returning to her usual activity level. She is avoiding fatty foods as she noticed fatty foods worsen the diarrhea and denies redness around or drainage from incisions.    OBJECTIVE:  Vitals:    10/18/22 1621   BP: 128/74   Pulse: 78     Body mass index is 38.62 kg/m².    General: female in NAD. Not toxic appearing.   HENT: Normocephalic and atraumatic. EOM intact.   Pulmonary: No respiratory distress. Effort normal.  Cardiovascular: Regular rate.   Abdomen: soft, non-tender, non-distended  Skin: Incisions are healing well without signs of infection. No erythema, drainage, or increased warmth noted.   MSK: normal range of motion  Neurological: No focal deficit present; alert and oriented to person, place, and time.  Psychiatric: normal mood and behavior      Path:   Specimens (From admission, onward)   1. Gallbladder, cholecystectomy:   Gallbladder with cholelithiasis and cholesterolosis.       ASSESSMENT/PLAN:    Armida Medina is a 32 y.o. y/o female who presents to clinic today for f/u s/p Cholecystectomy, Laparoscopic on 10/7/2022. Clinically improving and meeting all post op milestones. Diarrhea is improving; will not give cholestyramine at this time.      - Patient is advised to avoid heavy lifting or strenuous activity for another 4 weeks.   - Path reviewed  - Follow-up PRN. Call clinic with any questions or concerns  - All questions answered; patient is comfortable with the above follow-up plan and verbalized understanding.    Sven Augustin M.D.  General Surgery PGY-1  [facility]

## 2022-10-25 ENCOUNTER — LAB VISIT (OUTPATIENT)
Dept: LAB | Facility: OTHER | Age: 32
End: 2022-10-25
Attending: INTERNAL MEDICINE
Payer: COMMERCIAL

## 2022-10-25 DIAGNOSIS — Z00.00 ANNUAL PHYSICAL EXAM: ICD-10-CM

## 2022-10-25 DIAGNOSIS — E55.9 VITAMIN D DEFICIENCY: ICD-10-CM

## 2022-10-25 DIAGNOSIS — D50.9 IRON DEFICIENCY ANEMIA, UNSPECIFIED IRON DEFICIENCY ANEMIA TYPE: ICD-10-CM

## 2022-10-25 LAB
25(OH)D3+25(OH)D2 SERPL-MCNC: 26 NG/ML (ref 30–96)
FERRITIN SERPL-MCNC: 30 NG/ML (ref 20–300)
IRON SERPL-MCNC: 39 UG/DL (ref 30–160)
SATURATED IRON: 9 % (ref 20–50)
TOTAL IRON BINDING CAPACITY: 417 UG/DL (ref 250–450)
TRANSFERRIN SERPL-MCNC: 282 MG/DL (ref 200–375)

## 2022-10-25 PROCEDURE — 84466 ASSAY OF TRANSFERRIN: CPT | Performed by: INTERNAL MEDICINE

## 2022-10-25 PROCEDURE — 82306 VITAMIN D 25 HYDROXY: CPT | Performed by: INTERNAL MEDICINE

## 2022-10-25 PROCEDURE — 82728 ASSAY OF FERRITIN: CPT | Performed by: INTERNAL MEDICINE

## 2022-10-25 PROCEDURE — 36415 COLL VENOUS BLD VENIPUNCTURE: CPT | Performed by: INTERNAL MEDICINE

## 2022-11-01 ENCOUNTER — OFFICE VISIT (OUTPATIENT)
Dept: INTERNAL MEDICINE | Facility: CLINIC | Age: 32
End: 2022-11-01
Payer: COMMERCIAL

## 2022-11-01 VITALS
DIASTOLIC BLOOD PRESSURE: 76 MMHG | WEIGHT: 217.13 LBS | HEART RATE: 69 BPM | BODY MASS INDEX: 38.47 KG/M2 | HEIGHT: 63 IN | SYSTOLIC BLOOD PRESSURE: 128 MMHG | OXYGEN SATURATION: 99 %

## 2022-11-01 DIAGNOSIS — G72.0 STATIN MYOPATHY: ICD-10-CM

## 2022-11-01 DIAGNOSIS — R03.0 ELEVATED BLOOD PRESSURE READING IN OFFICE WITHOUT DIAGNOSIS OF HYPERTENSION: ICD-10-CM

## 2022-11-01 DIAGNOSIS — E06.3 HASHIMOTO'S THYROIDITIS: ICD-10-CM

## 2022-11-01 DIAGNOSIS — Z23 INFLUENZA VACCINE NEEDED: ICD-10-CM

## 2022-11-01 DIAGNOSIS — Z23 HIGH PRIORITY FOR COVID-19 VACCINATION: ICD-10-CM

## 2022-11-01 DIAGNOSIS — D50.9 IRON DEFICIENCY ANEMIA, UNSPECIFIED IRON DEFICIENCY ANEMIA TYPE: ICD-10-CM

## 2022-11-01 DIAGNOSIS — T46.6X5A STATIN MYOPATHY: ICD-10-CM

## 2022-11-01 DIAGNOSIS — E55.9 VITAMIN D DEFICIENCY: ICD-10-CM

## 2022-11-01 DIAGNOSIS — Z00.00 ANNUAL PHYSICAL EXAM: ICD-10-CM

## 2022-11-01 DIAGNOSIS — E66.9 OBESITY (BMI 35.0-39.9 WITHOUT COMORBIDITY): Primary | ICD-10-CM

## 2022-11-01 PROCEDURE — 1160F RVW MEDS BY RX/DR IN RCRD: CPT | Mod: CPTII,S$GLB,, | Performed by: INTERNAL MEDICINE

## 2022-11-01 PROCEDURE — 3078F DIAST BP <80 MM HG: CPT | Mod: CPTII,S$GLB,, | Performed by: INTERNAL MEDICINE

## 2022-11-01 PROCEDURE — 3078F PR MOST RECENT DIASTOLIC BLOOD PRESSURE < 80 MM HG: ICD-10-PCS | Mod: CPTII,S$GLB,, | Performed by: INTERNAL MEDICINE

## 2022-11-01 PROCEDURE — 99215 PR OFFICE/OUTPT VISIT, EST, LEVL V, 40-54 MIN: ICD-10-PCS | Mod: S$GLB,,, | Performed by: INTERNAL MEDICINE

## 2022-11-01 PROCEDURE — 1160F PR REVIEW ALL MEDS BY PRESCRIBER/CLIN PHARMACIST DOCUMENTED: ICD-10-PCS | Mod: CPTII,S$GLB,, | Performed by: INTERNAL MEDICINE

## 2022-11-01 PROCEDURE — 3074F SYST BP LT 130 MM HG: CPT | Mod: CPTII,S$GLB,, | Performed by: INTERNAL MEDICINE

## 2022-11-01 PROCEDURE — 3044F PR MOST RECENT HEMOGLOBIN A1C LEVEL <7.0%: ICD-10-PCS | Mod: CPTII,S$GLB,, | Performed by: INTERNAL MEDICINE

## 2022-11-01 PROCEDURE — 1159F MED LIST DOCD IN RCRD: CPT | Mod: CPTII,S$GLB,, | Performed by: INTERNAL MEDICINE

## 2022-11-01 PROCEDURE — 99999 PR PBB SHADOW E&M-EST. PATIENT-LVL III: CPT | Mod: PBBFAC,,, | Performed by: INTERNAL MEDICINE

## 2022-11-01 PROCEDURE — 99215 OFFICE O/P EST HI 40 MIN: CPT | Mod: S$GLB,,, | Performed by: INTERNAL MEDICINE

## 2022-11-01 PROCEDURE — 1159F PR MEDICATION LIST DOCUMENTED IN MEDICAL RECORD: ICD-10-PCS | Mod: CPTII,S$GLB,, | Performed by: INTERNAL MEDICINE

## 2022-11-01 PROCEDURE — 3074F PR MOST RECENT SYSTOLIC BLOOD PRESSURE < 130 MM HG: ICD-10-PCS | Mod: CPTII,S$GLB,, | Performed by: INTERNAL MEDICINE

## 2022-11-01 PROCEDURE — 99999 PR PBB SHADOW E&M-EST. PATIENT-LVL III: ICD-10-PCS | Mod: PBBFAC,,, | Performed by: INTERNAL MEDICINE

## 2022-11-01 PROCEDURE — 3044F HG A1C LEVEL LT 7.0%: CPT | Mod: CPTII,S$GLB,, | Performed by: INTERNAL MEDICINE

## 2022-11-01 NOTE — PROGRESS NOTES
Subjective:       Patient ID: Armida Medina is a 32 y.o. female who  has a past medical history of Abnormal Pap smear of cervix (), Acne, Allergy, Asthma, Benign paroxysmal vertigo, bilateral, Hashimoto's thyroiditis (2017), History of  section (2021), PONV (postoperative nausea and vomiting), Postpartum hemorrhage (2022), Preeclampsia, severe, third trimester (2018), and Severe pre-eclampsia in third trimester (2022).    Chief Complaint: Follow-up (3 month) and Obesity     History was obtained from the patient and supplemented through chart review.  S/p cholecystectomy .    Has a 3 y/o child and a .  Is not breast feeding.    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  Referred by Mendy and Dr. Anton.      HPI     Obesity:    H/o HA from TMJ/bruxism.     H/o depression, anxiety.  Did not like Lexpro in the past.  Restarted Wellbutrin to 300 after she gave birth.  Mood improved.  No SE.  Helps with appetite, cravings.    Lost about 10 lbs. Was able to fill Ozempic and is on 0.5mg.  Ozempic is not on her insurance formulary, but she met her out of pocket.  Helps with appetite, cravings.    Diet: Has seen a dietitian through her workplace.      Exercise: Barre3 2/week.   BMI Readings from Last 3 Encounters:   22 38.47 kg/m²   10/18/22 38.62 kg/m²   10/07/22 41.34 kg/m²     Lab Results   Component Value Date/Time    HGBA1C 5.1 2022 08:12 AM    HGBA1C 5.1 12/10/2020 07:34 AM    HGBA1C 5.2 2019 07:07 AM     Elevated BP:  Was on labetalol after pregnancy .  She was able to discontinue since home BP was controlled.    HLD, statin myopathy:    H/o joint aches with pravastatin 20.  W/u for myopathy revealed low Vit D. Diet as above.    Lab Results   Component Value Date    LDLCALC 135.8 2022     The ASCVD Risk score ([place] DK, et al., 2019) failed to calculate for the following reasons:    The 2019 ASCVD risk score is only valid for ages 40  to 79    Vitamin D deficiency:    Discovered during workup for myopathy as above.  Level 16 in 2019.    Taking 50K 2/week. Was briefly off this during her torsten.  Lab Results   Component Value Date    MMHABRHG05CI 26 (L) 10/25/2022    QZQSREBT18DI 26 (L) 08/12/2022     STACIE:  Noted during pregnancy.  Has had heavier menstrual cycles since pregnancy few years ago.  Regular menstrual cycles. Ultrasound with uterine fibroid.  S/p myomectomy with OBGYN.  Menstrual cycles are largely unchanged. Still a little heavy.  On iron supplement about 2/week.    Pt previously declined testing for celiac d/t cost.  No constipation.  Lab Results   Component Value Date    IRON 39 10/25/2022    TIBC 417 10/25/2022    FERRITIN 30 10/25/2022     Lab Results   Component Value Date    AIVFOCZP57 339 04/12/2019     No results found for: FOLATE    Hashimoto's thyroiditis:  Prior to pregnancy.  Was monitored during pregnancy.  Had mild elevation of TPO to 7.7.  TFTs have been normal.  Mom and maternal grandmother with hypothyroidism.  Lab Results   Component Value Date    TSH 0.717 03/14/2022    FREET4 1.05 11/18/2021             Not addressed today.  Childhood asthma:    Symptoms are well controlled.  Has not needed to use her inhaler.  No acute issues.    Well controlled.  Continue inhaler p.r.n..    Fibroid:  Following with OBGYN. S/p myomectomy.    Following with [facility] for TBSE. S/p shave biopsy.    Review of Systems   Constitutional:  Positive for appetite change. Negative for activity change.   Respiratory:  Negative for wheezing.    Cardiovascular:  Negative for leg swelling.   Gastrointestinal:  Negative for nausea.   Musculoskeletal:  Negative for gait problem.   Skin:  Negative for rash and wound.   Hematological:  Negative for adenopathy.   Psychiatric/Behavioral:  Negative for confusion. The patient is not nervous/anxious.        I personally reviewed Past Medical History, Past Surgical History, Social History, and  "Family History.    Objective:      Vitals:    11/01/22 0814   BP: 128/76   BP Location: Left arm   Patient Position: Sitting   BP Method: Large (Manual)   Pulse: 69   SpO2: 99%   Weight: 98.5 kg (217 lb 2.5 oz)   Height: 5' 3" (1.6 m)        Physical Exam  Constitutional:       General: She is not in acute distress.     Appearance: She is well-developed. She is not diaphoretic.   HENT:      Head: Normocephalic and atraumatic.      Nose: Nose normal.      Mouth/Throat:      Pharynx: No oropharyngeal exudate.   Eyes:      General: No scleral icterus.        Right eye: No discharge.         Left eye: No discharge.   Neck:      Thyroid: No thyromegaly.      Trachea: No tracheal deviation.   Cardiovascular:      Rate and Rhythm: Normal rate and regular rhythm.      Heart sounds: Normal heart sounds. No murmur heard.  Pulmonary:      Effort: Pulmonary effort is normal. No respiratory distress.      Breath sounds: Normal breath sounds. No wheezing.   Abdominal:      General: Bowel sounds are normal. There is no distension.      Palpations: Abdomen is soft.      Tenderness: There is no abdominal tenderness.   Musculoskeletal:         General: No deformity.      Cervical back: Neck supple.      Right lower leg: No edema.      Left lower leg: No edema.   Lymphadenopathy:      Cervical: No cervical adenopathy.   Skin:     General: Skin is warm and dry.      Findings: No erythema.   Neurological:      Mental Status: She is alert.      Gait: Gait normal.   Psychiatric:         Behavior: Behavior normal.         Lab Results   Component Value Date    WBC 10.99 05/09/2022    HGB 12.0 05/09/2022    HCT 38.7 05/09/2022     05/09/2022    CHOL 219 (H) 08/12/2022    TRIG 181 (H) 08/12/2022    HDL 47 08/12/2022    ALT 21 05/09/2022    AST 16 05/09/2022     05/09/2022    K 3.8 05/09/2022     05/09/2022    CREATININE 0.7 05/09/2022    BUN 11 05/09/2022    CO2 22 (L) 05/09/2022    TSH 0.717 03/14/2022    INR 0.9 " 04/29/2022    HGBA1C 5.1 08/12/2022       The ASCVD Risk score (Darryl DK, et al., 2019) failed to calculate for the following reasons:    The 2019 ASCVD risk score is only valid for ages 40 to 79    (Imaging have been independently reviewed)  Abdominal ultrasound with small gallstones.  No cholecystitis.  Slightly dilated CBD.    Assessment:       1. Obesity (BMI 35.0-39.9 without comorbidity)    2. Elevated blood pressure reading in office without diagnosis of hypertension    3. Statin myopathy    4. Vitamin D deficiency    5. Iron deficiency anemia, unspecified iron deficiency anemia type    6. Hashimoto's thyroiditis    7. Influenza vaccine needed    8. High priority for COVID-19 vaccination    9. Annual physical exam            Plan:       Armida was seen today for follow-up and obesity.    Diagnoses and all orders for this visit:    Obesity (BMI 35.0-39.9 without comorbidity)  Comments:  Improved. Cont Wellbutrin. Titrate Ozempic to 1 mg; discussed SE. Is helping with diet. Consider Topamax given h/o HA. A1c wnl.   Orders:  -     semaglutide (OZEMPIC) 1 mg/dose (2 mg/1.5 mL) PnIj; Inject 1 mg into the skin every 7 days.  -     Vitamin D; Future  -     Lipid Panel; Future  -     Lipid Panel    Elevated blood pressure reading in office without diagnosis of hypertension  Comments:  BP controlled.  Monitor.    Statin myopathy  Comments:  FLP borderline high. Not on statin.  H/o myalgias; on Vit D. Monitor FLP annually.  Orders:  -     Comprehensive Metabolic Panel; Future  -     Lipid Panel; Future  -     Vitamin D  -     Comprehensive Metabolic Panel    Vitamin D deficiency  Comments:  H/o statin myalgias as above. Vit D level persistently borderline low, possibly d/t torsten. Cont ergocalciferol 2/week for now; monitor lab.   Orders:  -     Vitamin D; Future  -     Vitamin D    Iron deficiency anemia, unspecified iron deficiency anemia type  Comments:  2/2 menorrhagia. Anemia resolved.  Ferritin improved. Cont  iron qOD for now.  Orders:  -     CBC Auto Differential; Future  -     CBC Auto Differential    Hashimoto's thyroiditis  Comments:  +FHx. Elevated TPO.  TSH WNL; monitor annually.  A1C wnl.  Orders:  -     TSH; Future  -     TSH    Influenza vaccine needed  Comments:  Advised to obtain vaccine at Pharmacy.    High priority for COVID-19 vaccination  Comments:  Qualifies for the COVID booster vaccine. Advised to obtain vaccine at Pharmacy.    Annual physical exam  -     Vitamin D; Future  -     Comprehensive Metabolic Panel; Future  -     CBC Auto Differential; Future  -     Lipid Panel; Future  -     TSH; Future  -     Vitamin D  -     Comprehensive Metabolic Panel  -     CBC Auto Differential  -     Lipid Panel  -     TSH         Side effects of medication(s) were discussed in detail and patient voiced understanding.  Patient will call back for any issues or complications.     I have spent a total of 40 minutes with the patient as well as reviewing the chart/medical record and placing orders on the day of the visit. Discussed weight, lab results, vitamin deficiencies, vaccines.     RTC in 4 month(s) or sooner PRN for weight c labs prior.

## 2022-12-13 ENCOUNTER — PATIENT MESSAGE (OUTPATIENT)
Dept: INTERNAL MEDICINE | Facility: CLINIC | Age: 32
End: 2022-12-13
Payer: COMMERCIAL

## 2023-01-01 NOTE — LETTER
May 31, 2018      Fiorella Carpenter,   [address] [address]           [facility]  [address]  [facility] [address]  Phone: [phone]          Patient: Armida Medina   MR Number: [id number]   YOB: 1990   Date of Visit: 5/31/2018       Dear Dr. Fiorella Carpenter:    Thank you for referring Armida Medina to me for evaluation. Attached you will find relevant portions of my assessment and plan of care.    If you have questions, please do not hesitate to call me. I look forward to following Armida Medina along with you.    Sincerely,    Madhav Gold MD    Enclosure  CC:  No Recipients    If you would like to receive this communication electronically, please contact [email or web address] or [phone] to request more information on [employer] access.    For providers and/or their staff who would like to refer a patient to [facility], please contact us through our one-stop-shop provider referral line, [facility], at [phone].    If you feel you have received this communication in error or would no longer like to receive these types of communications, please e-mail [email or web address]         
Statement Selected

## 2023-01-12 ENCOUNTER — PATIENT MESSAGE (OUTPATIENT)
Dept: INTERNAL MEDICINE | Facility: CLINIC | Age: 33
End: 2023-01-12
Payer: COMMERCIAL

## 2023-01-12 DIAGNOSIS — E66.9 OBESITY (BMI 35.0-39.9 WITHOUT COMORBIDITY): Primary | ICD-10-CM

## 2023-02-15 ENCOUNTER — PATIENT OUTREACH (OUTPATIENT)
Dept: ADMINISTRATIVE | Facility: HOSPITAL | Age: 33
End: 2023-02-15
Payer: COMMERCIAL

## 2023-02-28 LAB
25(OH)D3 SERPL-MCNC: 88 NG/ML (ref 30–100)
ALBUMIN SERPL-MCNC: 4.3 G/DL (ref 3.6–5.1)
ALBUMIN/GLOB SERPL: 1.7 (CALC) (ref 1–2.5)
ALP SERPL-CCNC: 112 U/L (ref 31–125)
ALT SERPL-CCNC: 20 U/L (ref 6–29)
AST SERPL-CCNC: 18 U/L (ref 10–30)
BASOPHILS # BLD AUTO: 47 CELLS/UL (ref 0–200)
BASOPHILS NFR BLD AUTO: 0.6 %
BILIRUB SERPL-MCNC: 0.4 MG/DL (ref 0.2–1.2)
BUN SERPL-MCNC: 14 MG/DL (ref 7–25)
BUN/CREAT SERPL: NORMAL (CALC) (ref 6–22)
CALCIUM SERPL-MCNC: 9.1 MG/DL (ref 8.6–10.2)
CHLORIDE SERPL-SCNC: 104 MMOL/L (ref 98–110)
CHOLEST SERPL-MCNC: 182 MG/DL
CHOLEST/HDLC SERPL: 4.2 (CALC)
CO2 SERPL-SCNC: 26 MMOL/L (ref 20–32)
CREAT SERPL-MCNC: 0.77 MG/DL (ref 0.5–0.97)
EGFR: 104 ML/MIN/1.73M2
EOSINOPHIL # BLD AUTO: 119 CELLS/UL (ref 15–500)
EOSINOPHIL NFR BLD AUTO: 1.5 %
ERYTHROCYTE [DISTWIDTH] IN BLOOD BY AUTOMATED COUNT: 14.2 % (ref 11–15)
GLOBULIN SER CALC-MCNC: 2.6 G/DL (CALC) (ref 1.9–3.7)
GLUCOSE SERPL-MCNC: 71 MG/DL (ref 65–99)
HCT VFR BLD AUTO: 43.3 % (ref 35–45)
HDLC SERPL-MCNC: 43 MG/DL
HGB BLD-MCNC: 14 G/DL (ref 11.7–15.5)
LDLC SERPL CALC-MCNC: 116 MG/DL (CALC)
LYMPHOCYTES # BLD AUTO: 1541 CELLS/UL (ref 850–3900)
LYMPHOCYTES NFR BLD AUTO: 19.5 %
MCH RBC QN AUTO: 27.7 PG (ref 27–33)
MCHC RBC AUTO-ENTMCNC: 32.3 G/DL (ref 32–36)
MCV RBC AUTO: 85.6 FL (ref 80–100)
MONOCYTES # BLD AUTO: 521 CELLS/UL (ref 200–950)
MONOCYTES NFR BLD AUTO: 6.6 %
NEUTROPHILS # BLD AUTO: 5672 CELLS/UL (ref 1500–7800)
NEUTROPHILS NFR BLD AUTO: 71.8 %
NONHDLC SERPL-MCNC: 139 MG/DL (CALC)
PLATELET # BLD AUTO: 266 THOUSAND/UL (ref 140–400)
PMV BLD REES-ECKER: 12 FL (ref 7.5–12.5)
POTASSIUM SERPL-SCNC: 4 MMOL/L (ref 3.5–5.3)
PROT SERPL-MCNC: 6.9 G/DL (ref 6.1–8.1)
RBC # BLD AUTO: 5.06 MILLION/UL (ref 3.8–5.1)
SODIUM SERPL-SCNC: 141 MMOL/L (ref 135–146)
TRIGL SERPL-MCNC: 122 MG/DL
TSH SERPL-ACNC: 1.92 MIU/L
WBC # BLD AUTO: 7.9 THOUSAND/UL (ref 3.8–10.8)

## 2023-03-01 ENCOUNTER — OFFICE VISIT (OUTPATIENT)
Dept: INTERNAL MEDICINE | Facility: CLINIC | Age: 33
End: 2023-03-01
Payer: COMMERCIAL

## 2023-03-01 VITALS
OXYGEN SATURATION: 98 % | DIASTOLIC BLOOD PRESSURE: 70 MMHG | WEIGHT: 194.88 LBS | BODY MASS INDEX: 34.53 KG/M2 | HEART RATE: 65 BPM | SYSTOLIC BLOOD PRESSURE: 128 MMHG | HEIGHT: 63 IN

## 2023-03-01 DIAGNOSIS — E66.9 OBESITY (BMI 35.0-39.9 WITHOUT COMORBIDITY): ICD-10-CM

## 2023-03-01 DIAGNOSIS — Z00.00 ANNUAL PHYSICAL EXAM: Primary | ICD-10-CM

## 2023-03-01 DIAGNOSIS — E55.9 VITAMIN D DEFICIENCY: ICD-10-CM

## 2023-03-01 DIAGNOSIS — Z23 HIGH PRIORITY FOR COVID-19 VACCINATION: ICD-10-CM

## 2023-03-01 DIAGNOSIS — E06.3 HASHIMOTO'S THYROIDITIS: ICD-10-CM

## 2023-03-01 DIAGNOSIS — Z12.4 CERVICAL CANCER SCREENING: ICD-10-CM

## 2023-03-01 DIAGNOSIS — T46.6X5A STATIN MYOPATHY: ICD-10-CM

## 2023-03-01 DIAGNOSIS — D50.9 IRON DEFICIENCY ANEMIA, UNSPECIFIED IRON DEFICIENCY ANEMIA TYPE: ICD-10-CM

## 2023-03-01 DIAGNOSIS — G72.0 STATIN MYOPATHY: ICD-10-CM

## 2023-03-01 DIAGNOSIS — Z86.59 HISTORY OF DEPRESSION: ICD-10-CM

## 2023-03-01 PROCEDURE — 3078F DIAST BP <80 MM HG: CPT | Mod: CPTII,S$GLB,, | Performed by: INTERNAL MEDICINE

## 2023-03-01 PROCEDURE — 1160F PR REVIEW ALL MEDS BY PRESCRIBER/CLIN PHARMACIST DOCUMENTED: ICD-10-PCS | Mod: CPTII,S$GLB,, | Performed by: INTERNAL MEDICINE

## 2023-03-01 PROCEDURE — 3078F PR MOST RECENT DIASTOLIC BLOOD PRESSURE < 80 MM HG: ICD-10-PCS | Mod: CPTII,S$GLB,, | Performed by: INTERNAL MEDICINE

## 2023-03-01 PROCEDURE — 99395 PREV VISIT EST AGE 18-39: CPT | Mod: S$GLB,,, | Performed by: INTERNAL MEDICINE

## 2023-03-01 PROCEDURE — 99395 PR PREVENTIVE VISIT,EST,18-39: ICD-10-PCS | Mod: S$GLB,,, | Performed by: INTERNAL MEDICINE

## 2023-03-01 PROCEDURE — 1160F RVW MEDS BY RX/DR IN RCRD: CPT | Mod: CPTII,S$GLB,, | Performed by: INTERNAL MEDICINE

## 2023-03-01 PROCEDURE — 99999 PR PBB SHADOW E&M-EST. PATIENT-LVL III: CPT | Mod: PBBFAC,,, | Performed by: INTERNAL MEDICINE

## 2023-03-01 PROCEDURE — 99999 PR PBB SHADOW E&M-EST. PATIENT-LVL III: ICD-10-PCS | Mod: PBBFAC,,, | Performed by: INTERNAL MEDICINE

## 2023-03-01 PROCEDURE — 3074F SYST BP LT 130 MM HG: CPT | Mod: CPTII,S$GLB,, | Performed by: INTERNAL MEDICINE

## 2023-03-01 PROCEDURE — 3008F BODY MASS INDEX DOCD: CPT | Mod: CPTII,S$GLB,, | Performed by: INTERNAL MEDICINE

## 2023-03-01 PROCEDURE — 1159F MED LIST DOCD IN RCRD: CPT | Mod: CPTII,S$GLB,, | Performed by: INTERNAL MEDICINE

## 2023-03-01 PROCEDURE — 3074F PR MOST RECENT SYSTOLIC BLOOD PRESSURE < 130 MM HG: ICD-10-PCS | Mod: CPTII,S$GLB,, | Performed by: INTERNAL MEDICINE

## 2023-03-01 PROCEDURE — 1159F PR MEDICATION LIST DOCUMENTED IN MEDICAL RECORD: ICD-10-PCS | Mod: CPTII,S$GLB,, | Performed by: INTERNAL MEDICINE

## 2023-03-01 PROCEDURE — 3008F PR BODY MASS INDEX (BMI) DOCUMENTED: ICD-10-PCS | Mod: CPTII,S$GLB,, | Performed by: INTERNAL MEDICINE

## 2023-03-01 RX ORDER — ERGOCALCIFEROL 1.25 MG/1
50000 CAPSULE ORAL
Qty: 12 CAPSULE | Refills: 3 | Status: SHIPPED | OUTPATIENT
Start: 2023-03-01 | End: 2024-02-29

## 2023-03-01 NOTE — PROGRESS NOTES
Subjective:       Patient ID: Armida Medina is a 32 y.o. female who  has a past medical history of Abnormal Pap smear of cervix (), Acne, Allergy, Asthma, Benign paroxysmal vertigo, bilateral, Hashimoto's thyroiditis (2017), History of  section (2021), PONV (postoperative nausea and vomiting), Postpartum hemorrhage (2022), Preeclampsia, severe, third trimester (2018), and Severe pre-eclampsia in third trimester (2022).    Chief Complaint: Obesity    History was obtained from the patient and supplemented through chart review.  There were no ER or clinic visits since our last appointment.    Has a 3 y/o child and a .  Is not breast feeding.    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  Referred by Mendy and Dr. Anton.      HPI     Obesity:    H/o HA from TMJ/bruxism.     H/o depression, anxiety as below. Off Wellbutrin as below.    Was able to fill Ozempic. Helps with appetite, cravings. Ozempic is $800. Mounjaro is $25. She recently finished her Ozempic and started Mounjaro.  No SE.  Lost over 20 lb since !  Helps with portions. Eating smaller portions. More veggies, protein. Decreasing carbs. Has a cookie once in a while.    Diet: Has seen a dietitian through her workplace.      Exercise: Barre3 2/week.   BMI Readings from Last 3 Encounters:   23 34.52 kg/m²   22 38.47 kg/m²   10/18/22 38.62 kg/m²     Lab Results   Component Value Date/Time    HGBA1C 5.1 2022 08:12 AM    HGBA1C 5.1 12/10/2020 07:34 AM    HGBA1C 5.2 2019 07:07 AM     H/o depression, anxiety:  Did not like Lexpro in the past.  Restarted Wellbutrin 300 after she gave birth and felt well initially.  No SE.  Helped with appetite, cravings.  She later stopped it as she felt that it was weighing her down.  Was perhaps due to stress from her baby not sleeping, returning to work.    HLD, statin myopathy:    H/o joint aches with pravastatin 20.  W/u for myopathy revealed low Vit D.  Diet as above.    Lab Results   Component Value Date    LDLCALC 116 (H) 02/27/2023     The ASCVD Risk score ([place] COTE, et al., 2019) failed to calculate for the following reasons:    The 2019 ASCVD risk score is only valid for ages 40 to 79    Vitamin D deficiency:    Discovered during workup for myopathy as above.  Level 16 in 2019.    Taking 50K 2/week.   Lab Results   Component Value Date    [id number] 26 (L) 10/25/2022    CAPRKCLY38SS 26 (L) 08/12/2022     STACIE:  Noted during pregnancy.  Has had heavier menstrual cycles since pregnancy few years ago.  Regular menstrual cycles. Ultrasound with uterine fibroid.  S/p myomectomy with OBGYN.  Menstrual cycles are largely unchanged. Still a little heavy.  On iron supplement about 2-3/week.    Pt previously declined testing for celiac d/t cost.  No constipation.  Lab Results   Component Value Date    IRON 39 10/25/2022    TIBC 417 10/25/2022    FERRITIN 30 10/25/2022     Lab Results   Component Value Date    NVDHBPGB90 339 04/12/2019     No results found for: FOLATE    Hashimoto's thyroiditis:  Prior to pregnancy.  Was monitored during pregnancy.  Had mild elevation of TPO to 7.7.  TFTs have been normal.  Mom and maternal grandmother with hypothyroidism.  Lab Results   Component Value Date    TSH 1.92 02/27/2023    FREET4 1.05 11/18/2021     H/o abnormal pap in 2013?            Not addressed today.  Elevated BP:  Was on labetalol after pregnancy .  She was able to discontinue since home BP was controlled.  BP controlled.  Monitor.    Childhood asthma:    Symptoms are well controlled.  Has not needed to use her inhaler.  No acute issues.    Well controlled.  Continue inhaler p.r.n..    Fibroid:  Following with OBGYN. S/p myomectomy.    Following with [facility] for TBSE. S/p shave biopsy.    Review of Systems   Constitutional:  Positive for appetite change. Negative for activity change.   Respiratory:  Negative for shortness of breath and wheezing.   "  Cardiovascular:  Negative for chest pain and leg swelling.   Gastrointestinal:  Negative for constipation and nausea.   Genitourinary:  Negative for difficulty urinating and dysuria.   Musculoskeletal:  Negative for gait problem.   Skin:  Negative for rash and wound.   Neurological:  Negative for light-headedness.   Hematological:  Negative for adenopathy.   Psychiatric/Behavioral:  Negative for confusion and dysphoric mood. The patient is not nervous/anxious.        I personally reviewed Past Medical History, Past Surgical History, Social History, and Family History.    Objective:      Vitals:    03/01/23 0831   BP: 128/70   Pulse: 65   SpO2: 98%   Weight: 88.4 kg (194 lb 14.2 oz)   Height: 5' 3" (1.6 m)        Physical Exam  Constitutional:       General: She is not in acute distress.     Appearance: She is well-developed. She is not diaphoretic.   HENT:      Head: Normocephalic and atraumatic.      Nose: Nose normal.      Mouth/Throat:      Pharynx: No oropharyngeal exudate.   Eyes:      General: No scleral icterus.        Right eye: No discharge.         Left eye: No discharge.   Neck:      Thyroid: No thyromegaly.      Trachea: No tracheal deviation.   Cardiovascular:      Rate and Rhythm: Normal rate and regular rhythm.      Heart sounds: Normal heart sounds. No murmur heard.  Pulmonary:      Effort: Pulmonary effort is normal. No respiratory distress.      Breath sounds: Normal breath sounds. No wheezing.   Abdominal:      General: Bowel sounds are normal. There is no distension.      Palpations: Abdomen is soft.      Tenderness: There is no abdominal tenderness.   Musculoskeletal:         General: No deformity.      Cervical back: Neck supple.      Right lower leg: No edema.      Left lower leg: No edema.   Lymphadenopathy:      Cervical: No cervical adenopathy.   Skin:     General: Skin is warm and dry.      Findings: No erythema.   Neurological:      Mental Status: She is alert.      Gait: Gait normal. "   Psychiatric:         Behavior: Behavior normal.         Lab Results   Component Value Date    WBC 7.9 02/27/2023    HGB 14.0 02/27/2023    HCT 43.3 02/27/2023     02/27/2023    CHOL 182 02/27/2023    TRIG 122 02/27/2023    HDL 43 (L) 02/27/2023    ALT 20 02/27/2023    AST 18 02/27/2023     02/27/2023    K 4.0 02/27/2023     02/27/2023    CREATININE 0.77 02/27/2023    BUN 14 02/27/2023    CO2 26 02/27/2023    TSH 1.92 02/27/2023    INR 0.9 04/29/2022    HGBA1C 5.1 08/12/2022       The ASCVD Risk score ([place] DK, et al., 2019) failed to calculate for the following reasons:    The 2019 ASCVD risk score is only valid for ages 40 to 79    (Imaging have been independently reviewed)  Abdominal ultrasound with small gallstones.  No cholecystitis.  Slightly dilated CBD.    Assessment:       1. Annual physical exam    2. Obesity (BMI 35.0-39.9 without comorbidity)    3. History of depression    4. Statin myopathy    5. Vitamin D deficiency    6. Iron deficiency anemia, unspecified iron deficiency anemia type    7. Hashimoto's thyroiditis    8. Cervical cancer screening    9. High priority for COVID-19 vaccination              Plan:       Armida was seen today for obesity.    Diagnoses and all orders for this visit:    Annual physical exam    Obesity (BMI 35.0-39.9 without comorbidity)  Comments:  Improved! Cont Mounjaro, dietary changes. Could consider Topamax given h/o HA. A1c wnl.     History of depression  Comments:  Doing well off of meds.  Monitor.  Could consider fluoxetine for mood, weight.    Statin myopathy  Comments:  FLP borderline high, improved. Not on statin.  Working on diet. H/o myalgias; on Vit D. Monitor FLP annually.    Vitamin D deficiency  Comments:  H/o statin myalgias. Vit D level persistently borderline low, possibly d/t torsten.  Vitamin-D level much improved.  Decrease ergocalciferol 2->1/week.  Orders:  -     ergocalciferol (ERGOCALCIFEROL) 50,000 unit Cap; Take 1 capsule  (50,000 Units total) by mouth every 7 days.    Iron deficiency anemia, unspecified iron deficiency anemia type  Comments:  2/2 menorrhagia. Anemia resolved.  Ferritin improved. Cont iron MWF for now.    Hashimoto's thyroiditis  Comments:  +FHx. Elevated TPO.  TSH WNL; monitor annually.  A1C wnl.    Cervical cancer screening  Comments:  Reschedule pap with OBGYN.    High priority for COVID-19 vaccination  Comments:  Qualifies for the COVID booster vaccine. Advised to obtain vaccine at Pharmacy.    Other orders  The following orders have not been finalized:  -     Cancel: tirzepatide 7.5 mg/0.5 mL PnIj             Side effects of medication(s) were discussed in detail and patient voiced understanding.  Patient will call back for any issues or complications.     Health Maintenance   Topic Date Due    TETANUS VACCINE  03/30/2032    Hepatitis C Screening  Completed    Lipid Panel  Completed     RTC in 6 month(s) or sooner PRN for weight.  May alternate visits virtually and in person.

## 2023-05-10 ENCOUNTER — PATIENT MESSAGE (OUTPATIENT)
Dept: OBSTETRICS AND GYNECOLOGY | Facility: CLINIC | Age: 33
End: 2023-05-10
Payer: COMMERCIAL

## 2023-05-17 ENCOUNTER — OFFICE VISIT (OUTPATIENT)
Dept: OBSTETRICS AND GYNECOLOGY | Facility: CLINIC | Age: 33
End: 2023-05-17
Attending: OBSTETRICS & GYNECOLOGY
Payer: COMMERCIAL

## 2023-05-17 VITALS
SYSTOLIC BLOOD PRESSURE: 112 MMHG | DIASTOLIC BLOOD PRESSURE: 62 MMHG | WEIGHT: 180.75 LBS | BODY MASS INDEX: 32.02 KG/M2

## 2023-05-17 DIAGNOSIS — Z01.419 WELL WOMAN EXAM WITH ROUTINE GYNECOLOGICAL EXAM: Primary | ICD-10-CM

## 2023-05-17 DIAGNOSIS — R10.2 PELVIC PAIN IN FEMALE: ICD-10-CM

## 2023-05-17 PROCEDURE — 3008F BODY MASS INDEX DOCD: CPT | Mod: CPTII,S$GLB,, | Performed by: OBSTETRICS & GYNECOLOGY

## 2023-05-17 PROCEDURE — 1160F RVW MEDS BY RX/DR IN RCRD: CPT | Mod: CPTII,S$GLB,, | Performed by: OBSTETRICS & GYNECOLOGY

## 2023-05-17 PROCEDURE — 99395 PREV VISIT EST AGE 18-39: CPT | Mod: S$GLB,,, | Performed by: OBSTETRICS & GYNECOLOGY

## 2023-05-17 PROCEDURE — 99999 PR PBB SHADOW E&M-EST. PATIENT-LVL III: ICD-10-PCS | Mod: PBBFAC,,, | Performed by: OBSTETRICS & GYNECOLOGY

## 2023-05-17 PROCEDURE — 3074F SYST BP LT 130 MM HG: CPT | Mod: CPTII,S$GLB,, | Performed by: OBSTETRICS & GYNECOLOGY

## 2023-05-17 PROCEDURE — 99999 PR PBB SHADOW E&M-EST. PATIENT-LVL III: CPT | Mod: PBBFAC,,, | Performed by: OBSTETRICS & GYNECOLOGY

## 2023-05-17 PROCEDURE — 1159F PR MEDICATION LIST DOCUMENTED IN MEDICAL RECORD: ICD-10-PCS | Mod: CPTII,S$GLB,, | Performed by: OBSTETRICS & GYNECOLOGY

## 2023-05-17 PROCEDURE — 3078F DIAST BP <80 MM HG: CPT | Mod: CPTII,S$GLB,, | Performed by: OBSTETRICS & GYNECOLOGY

## 2023-05-17 PROCEDURE — 1160F PR REVIEW ALL MEDS BY PRESCRIBER/CLIN PHARMACIST DOCUMENTED: ICD-10-PCS | Mod: CPTII,S$GLB,, | Performed by: OBSTETRICS & GYNECOLOGY

## 2023-05-17 PROCEDURE — 99395 PR PREVENTIVE VISIT,EST,18-39: ICD-10-PCS | Mod: S$GLB,,, | Performed by: OBSTETRICS & GYNECOLOGY

## 2023-05-17 PROCEDURE — 3074F PR MOST RECENT SYSTOLIC BLOOD PRESSURE < 130 MM HG: ICD-10-PCS | Mod: CPTII,S$GLB,, | Performed by: OBSTETRICS & GYNECOLOGY

## 2023-05-17 PROCEDURE — 1159F MED LIST DOCD IN RCRD: CPT | Mod: CPTII,S$GLB,, | Performed by: OBSTETRICS & GYNECOLOGY

## 2023-05-17 PROCEDURE — 3078F PR MOST RECENT DIASTOLIC BLOOD PRESSURE < 80 MM HG: ICD-10-PCS | Mod: CPTII,S$GLB,, | Performed by: OBSTETRICS & GYNECOLOGY

## 2023-05-17 PROCEDURE — 87624 HPV HI-RISK TYP POOLED RSLT: CPT | Performed by: OBSTETRICS & GYNECOLOGY

## 2023-05-17 PROCEDURE — 88142 CYTOPATH C/V THIN LAYER: CPT | Performed by: OBSTETRICS & GYNECOLOGY

## 2023-05-17 PROCEDURE — 3008F PR BODY MASS INDEX (BMI) DOCUMENTED: ICD-10-PCS | Mod: CPTII,S$GLB,, | Performed by: OBSTETRICS & GYNECOLOGY

## 2023-05-17 NOTE — PROGRESS NOTES
CC: Well woman exam    Armida Medina is a 32 y.o. female  presents for well woman exam.  LMP: Patient's last menstrual period was 2023 (exact date)..  reports feeling a lot of low back discomfort, has had more constpation with munjaro. Some pain with intercourse, low back, sacrum.    Past Medical History:   Diagnosis Date    Abnormal Pap smear of cervix     Acne     Allergy     Asthma     Benign paroxysmal vertigo, bilateral     Hashimoto's thyroiditis 2017    History of  section 2021    PONV (postoperative nausea and vomiting)     Postpartum hemorrhage 2022    Preeclampsia, severe, third trimester 2018    Severe pre-eclampsia in third trimester 2022    Admitted  for concern for preeclampsia with SF (BP).  Patient had two severe range blood pressures in the RENATA requiring labetalol 20 mg IV x 1 Her pre E labs on admit and repeat during her two day admission were all within normal limits Admit labs : PC TLC Plt 204, Cr 0.7, AST/ALT 11/10 Repeat labs : Plt 212, CR 0.7, AST/ALT 10/11 During the 48 hours, patient blood pressures were all      Past Surgical History:   Procedure Laterality Date     SECTION N/A 2018    Procedure:  SECTION;  Surgeon: Sawyer Lake Jr., MD;  [facility]: [facility];  Service: OB/GYN;  Laterality: N/A;     SECTION N/A 2022    Procedure:  SECTION;  Surgeon: Fiorella Carpenter DO;  Location: [facility] L&D;  Service: OB/GYN;  Laterality: N/A;     SECTION N/A 2022    Procedure:  SECTION;  Surgeon: Fiorella Carpenter DO;  Location: [facility] L&D;  Service: OB/GYN;  Laterality: N/A;     SECTION      CHOLECYSTECTOMY      CYST REMOVAL      Left ear @ age 2    DILATION AND CURETTAGE OF UTERUS USING SUCTION N/A 2021    Procedure: DILATION AND CURETTAGE, UTERUS, USING SUCTION;  Surgeon: Fiorella Carpenter DO;  Location: [facility] OR;  Service: OB/GYN;  Laterality: N/A;     LAPAROSCOPIC CHOLECYSTECTOMY N/A 10/07/2022    Procedure: CHOLECYSTECTOMY, LAPAROSCOPIC;  Surgeon: Armida Ferraro MD;  Location: [facility] OR;  Service: General;  Laterality: N/A;    ROBOT-ASSISTED LAPAROSCOPIC UTERINE MYOMECTOMY N/A 12/22/2020    Procedure: ROBOTIC MYOMECTOMY, UTERUS;  Surgeon: Sylvie Zavaleta MD;  Location: [facility] OR;  Service: OB/GYN;  Laterality: N/A;    TONSILLECTOMY, ADENOIDECTOMY       Social History     Socioeconomic History    Marital status:    Tobacco Use    Smoking status: Never    Smokeless tobacco: Never   Substance and Sexual Activity    Alcohol use: Not Currently     Comment: social     Drug use: No    Sexual activity: Yes     Partners: Male     Birth control/protection: Condom, None   Other Topics Concern    Are you pregnant or think you may be? No    Breast-feeding No     Social Determinants of Health     Financial Resource Strain: Low Risk     Difficulty of Paying Living Expenses: Not hard at all   Food Insecurity: No Food Insecurity    Worried About Running Out of Food in the Last Year: Never true    Ran Out of Food in the Last Year: Never true   Transportation Needs: No Transportation Needs    Lack of Transportation (Medical): No    Lack of Transportation (Non-Medical): No   Physical Activity: Sufficiently Active    Days of Exercise per Week: 3 days    Minutes of Exercise per Session: 60 min   Stress: No Stress Concern Present    Feeling of Stress : Only a little   Social Connections: Unknown    Frequency of Communication with Friends and Family: More than three times a week    Frequency of Social Gatherings with Friends and Family: Once a week    Active Member of Clubs or Organizations: No    Attends Club or Organization Meetings: Never    Marital Status:    Housing Stability: Low Risk     Unable to Pay for Housing in the Last Year: No    Number of Places Lived in the Last Year: 1    Unstable Housing in the Last Year: No     Family History   Problem  Relation Age of Onset    No Known Problems Father     Miscarriages / Stillbirths Mother     Hypothyroidism Mother     Arthritis Mother     Coronary artery disease Maternal Grandmother         s/p CABG 4v    Hypothyroidism Maternal Grandmother     Coronary artery disease Paternal Grandmother         s/p CABG    Breast cancer Neg Hx     Cancer Neg Hx     Colon cancer Neg Hx     Diabetes Neg Hx     Eclampsia Neg Hx     Hypertension Neg Hx     Stroke Neg Hx      labor Neg Hx     Ovarian cancer Neg Hx     Melanoma Neg Hx      OB History          3    Para   2    Term   1       1    AB   1    Living   2         SAB   1    IAB   0    Ectopic   0    Multiple   0    Live Births   2           Obstetric Comments   Second pregnancy -Blighted ovum               /62   Wt 82 kg (180 lb 12.4 oz)   LMP 2023 (Exact Date)   Breastfeeding No   BMI 32.02 kg/m²       ROS:  GENERAL: Denies weight gain or weight loss. Feeling well overall.   SKIN: Denies rash or lesions.   HEAD: Denies head injury or headache.   NODES: Denies enlarged lymph nodes.   CHEST: Denies chest pain or shortness of breath.   CARDIOVASCULAR: Denies palpitations or left sided chest pain.   ABDOMEN: No abdominal pain, constipation, diarrhea, nausea, vomiting or rectal bleeding.   URINARY: No frequency, dysuria, hematuria, or burning on urination.  REPRODUCTIVE: See HPI.   BREASTS: The patient performs breast self-examination and denies pain, lumps, or nipple discharge.   HEMATOLOGIC: No easy bruisability or excessive bleeding.   MUSCULOSKELETAL: Denies joint pain or swelling.   NEUROLOGIC: Denies syncope or weakness.   PSYCHIATRIC: Denies depression, anxiety or mood swings.    PHYSICAL EXAM:  APPEARANCE: Well nourished, well developed, in no acute distress.  AFFECT: WNL, alert and oriented x 3  SKIN: No acne or hirsutism  NECK: Neck symmetric without masses or thyromegaly  NODES: No inguinal, cervical, axillary, or femoral lymph  node enlargement  CHEST: Good respiratory effect  ABDOMEN: Soft.  No tenderness or masses.  No hepatosplenomegaly.  No hernias.  BREASTS: Symmetrical, no skin changes or visible lesions.  No palpable masses, nipple discharge bilaterally.  PELVIC: Normal external genitalia without lesions.  Normal hair distribution.  Adequate perineal body, normal urethral meatus.  Vagina moist and well rugated without lesions or discharge.  Cervix pink, without lesions, discharge or tenderness.  No significant cystocele or rectocele.  Bimanual exam shows uterus to be normal size, regular, mobile and nontender.  Adnexa without masses or tenderness.    EXTREMITIES: No edema.    Well woman exam with routine gynecological exam  -     Liquid-Based Pap Smear, Screening  -     HPV High Risk Genotypes, PCR    Pelvic pain in female  -     Ambulatory referral/consult to Physical/Occupational Therapy; Future; Expected date: 05/24/2023  -     US Pelvis Comp with Transvag NON-OB (xpd; Future; Expected date: 05/17/2023            Patient was counseled today on A.C.S. Pap guidelines and recommendations for yearly pelvic exams, mammograms and monthly self breast exams; to see her PCP for other health maintenance.     No follow-ups on file.

## 2023-05-24 LAB
FINAL PATHOLOGIC DIAGNOSIS: NORMAL
Lab: NORMAL

## 2023-05-25 LAB
HPV HR 12 DNA SPEC QL NAA+PROBE: NEGATIVE
HPV16 AG SPEC QL: NEGATIVE
HPV18 DNA SPEC QL NAA+PROBE: NEGATIVE

## 2023-08-18 ENCOUNTER — OFFICE VISIT (OUTPATIENT)
Dept: FAMILY MEDICINE | Facility: CLINIC | Age: 33
End: 2023-08-18
Payer: COMMERCIAL

## 2023-08-18 ENCOUNTER — TELEPHONE (OUTPATIENT)
Dept: FAMILY MEDICINE | Facility: CLINIC | Age: 33
End: 2023-08-18
Payer: COMMERCIAL

## 2023-08-18 VITALS
SYSTOLIC BLOOD PRESSURE: 102 MMHG | DIASTOLIC BLOOD PRESSURE: 60 MMHG | HEIGHT: 63 IN | WEIGHT: 170.75 LBS | HEART RATE: 61 BPM | OXYGEN SATURATION: 98 % | BODY MASS INDEX: 30.25 KG/M2 | TEMPERATURE: 98 F

## 2023-08-18 DIAGNOSIS — Z86.59 HISTORY OF DEPRESSION: ICD-10-CM

## 2023-08-18 DIAGNOSIS — Z02.89 ENCOUNTER FOR COMPLETION OF FORM WITH PATIENT: Primary | ICD-10-CM

## 2023-08-18 DIAGNOSIS — E78.2 MIXED HYPERLIPIDEMIA: ICD-10-CM

## 2023-08-18 DIAGNOSIS — D50.9 IRON DEFICIENCY ANEMIA, UNSPECIFIED IRON DEFICIENCY ANEMIA TYPE: ICD-10-CM

## 2023-08-18 DIAGNOSIS — E06.3 HASHIMOTO'S THYROIDITIS: ICD-10-CM

## 2023-08-18 PROCEDURE — 99214 OFFICE O/P EST MOD 30 MIN: CPT | Mod: S$GLB,,,

## 2023-08-18 PROCEDURE — 3008F BODY MASS INDEX DOCD: CPT | Mod: CPTII,S$GLB,,

## 2023-08-18 PROCEDURE — 1159F PR MEDICATION LIST DOCUMENTED IN MEDICAL RECORD: ICD-10-PCS | Mod: CPTII,S$GLB,,

## 2023-08-18 PROCEDURE — 3008F PR BODY MASS INDEX (BMI) DOCUMENTED: ICD-10-PCS | Mod: CPTII,S$GLB,,

## 2023-08-18 PROCEDURE — 99214 PR OFFICE/OUTPT VISIT, EST, LEVL IV, 30-39 MIN: ICD-10-PCS | Mod: S$GLB,,,

## 2023-08-18 PROCEDURE — 3074F PR MOST RECENT SYSTOLIC BLOOD PRESSURE < 130 MM HG: ICD-10-PCS | Mod: CPTII,S$GLB,,

## 2023-08-18 PROCEDURE — 99999 PR PBB SHADOW E&M-EST. PATIENT-LVL IV: CPT | Mod: PBBFAC,,,

## 2023-08-18 PROCEDURE — 99999 PR PBB SHADOW E&M-EST. PATIENT-LVL IV: ICD-10-PCS | Mod: PBBFAC,,,

## 2023-08-18 PROCEDURE — 3078F DIAST BP <80 MM HG: CPT | Mod: CPTII,S$GLB,,

## 2023-08-18 PROCEDURE — 3074F SYST BP LT 130 MM HG: CPT | Mod: CPTII,S$GLB,,

## 2023-08-18 PROCEDURE — 3078F PR MOST RECENT DIASTOLIC BLOOD PRESSURE < 80 MM HG: ICD-10-PCS | Mod: CPTII,S$GLB,,

## 2023-08-18 PROCEDURE — 1159F MED LIST DOCD IN RCRD: CPT | Mod: CPTII,S$GLB,,

## 2023-08-18 NOTE — PROGRESS NOTES
HPI     Chief Complaint:  Chief Complaint   Patient presents with    Annual Exam       Armiad Medina is a 33 y.o. female with multiple medical diagnoses as listed in the medical history and problem list that presents for f/u and form completion for insurance.  Pt is new to me.    Hospitals in [place]    Insurance is no longer covering tirzepatide end of septmeber, pt requesting wegovy. Advised pt I am unable to prescribe for weight loss. Will have to reach out to previous provider and encouraged pt to establish care with PCP in clinic.     Reviewed recent labs completed 8/17/2023 and completing form for insurance. Pt denies any other concerns or complaints. Works as nurse for [employer]. Has two children at home.     HDL: , has tried statin in the past but didn't  tolerate well because cause bilateral muscle pain to feet. Pain stopped when she stopped the medication. Agrees to diet/exercise for now and will monitor. Advised could benefit from omega 3 and heart healthy diet.     Other concerns below  Assessment & Plan       Problem List Items Addressed This Visit          Psychiatric    History of depression    Current Assessment & Plan     No medication. The current medical regimen is effective;  continue present plan and medications.              Cardiac/Vascular    Mixed hyperlipidemia    Current Assessment & Plan     Statin intolerant per pt.     The current medical regimen is effective;  continue present plan and medications.    Heart healthy diet. [place] 3            Oncology    Iron deficiency anemia    Current Assessment & Plan     The current medical regimen is effective;  continue present plan and medications.    Compliant with iron supplement. Denies s/s.             Endocrine    BMI 30  -Aerobic Exercise for at least 30 minutes on most days should accompany dietary changes  -Eat smaller portions   -Eat at home.   -Increase Omega-3 Fatty Acid intake  -Eat fish twice weekly or Supplement with Omega-3 Fatty  Acids  -Increase fruit and vegetable intake to drop CAD risk  -Fruits and Vegetables with greatest risk reduction  -Green leafy vegetables  -Cruciferous vegetables (broccoli, cauliflower)  -Fruits and vegetables high in Vitamin C  -Increase whole grains and high Dietary Fiber foods    LIMIT:   -saturated fat and trans-Fatty Acid intake (replace with olive oil, canola oil, nuts)  -Alcohol intake (2 drinks per day for men, and 1 drink per day for women)  -sugar intake  -salt intake (<1.5 to 2 grams per day is optimal)  Hashimoto's thyroiditis    Current Assessment & Plan     Lab Results   Component Value Date    TSH 1.92 02/27/2023   No meds          Other Visit Diagnoses       Encounter for completion of form with patient    -  Primary                --------------------------------------------      Health Maintenance:  Health Maintenance         Date Due Completion Date    COVID-19 Vaccine (4 - Pfizer series) 02/22/2022 12/28/2021    Influenza Vaccine (1) 09/01/2023 10/1/2022 (Done)    Override on 10/1/2022: Done    Cervical Cancer Screening 05/17/2028 5/17/2023    TETANUS VACCINE 03/30/2032 3/30/2022            Follow Up:  Follow up if symptoms worsen or fail to improve.    Discussed DDx, condition, and treatment.   Education sent to patient portal/included in after visit summary.  ED precautions given.   Notify provider if symptoms do not resolve or increase in severity.   Patient verbalizes understanding and agrees with plan of care.      Exam     Review of Systems:  (as noted above)  Review of Systems   Constitutional:  Negative for activity change and appetite change.   Eyes:  Negative for visual disturbance.   Respiratory:  Negative for chest tightness and shortness of breath.    Cardiovascular:  Negative for chest pain.   Gastrointestinal:  Negative for blood in stool.       Physical Exam:   Physical Exam  Vitals reviewed.   Constitutional:       General: She is not in acute distress.     Appearance: Normal  "appearance. She is obese. She is not toxic-appearing.   HENT:      Head: Normocephalic and atraumatic.   Cardiovascular:      Rate and Rhythm: Normal rate and regular rhythm.      Pulses: Normal pulses.      Heart sounds: Normal heart sounds. No murmur heard.  Pulmonary:      Effort: Pulmonary effort is normal. No respiratory distress.      Breath sounds: Normal breath sounds.   Abdominal:      General: Bowel sounds are normal.      Palpations: Abdomen is soft.   Musculoskeletal:         General: Normal range of motion.   Skin:     General: Skin is warm.      Capillary Refill: Capillary refill takes less than 2 seconds.   Neurological:      General: No focal deficit present.      Mental Status: She is alert.   Psychiatric:         Mood and Affect: Mood normal.       Vitals:    23 0811   BP: 102/60   Pulse: 61   Temp: 98.1 °F (36.7 °C)   TempSrc: Oral   SpO2: 98%   Weight: 77.4 kg (170 lb 11.9 oz)   Height: 5' 3" (1.6 m)      Body mass index is 30.25 kg/m².        History     Past Medical History:  Past Medical History:   Diagnosis Date    Abnormal Pap smear of cervix     Acne     Allergy     Asthma     Benign paroxysmal vertigo, bilateral     Hashimoto's thyroiditis 2017    History of  section 2021    PONV (postoperative nausea and vomiting)     Postpartum hemorrhage 2022    Preeclampsia, severe, third trimester 2018    Severe pre-eclampsia in third trimester 2022    Admitted  for concern for preeclampsia with SF (BP).  Patient had two severe range blood pressures in the RENATA requiring labetalol 20 mg IV x 1 Her pre E labs on admit and repeat during her two day admission were all within normal limits Admit labs : PC TLC Plt 204, Cr 0.7, AST/ALT 11/10 Repeat labs : Plt 212, CR 0.7, AST/ALT 10 During the 48 hours, patient blood pressures were all        Past Surgical History:  Past Surgical History:   Procedure Laterality Date     SECTION N/A " 2018    Procedure:  SECTION;  Surgeon: Sawyer Lake Jr., MD;  Location: [facility] L&D;  Service: OB/GYN;  Laterality: N/A;     SECTION N/A 2022    Procedure:  SECTION;  Surgeon: Fiorella Carpenter DO;  Location: [facility] L&D;  Service: OB/GYN;  Laterality: N/A;     SECTION N/A 2022    Procedure:  SECTION;  Surgeon: Fiorella Carpenter DO;  Location: [facility] L&D;  Service: OB/GYN;  Laterality: N/A;     SECTION      CHOLECYSTECTOMY      CYST REMOVAL      Left ear @ age 2    DILATION AND CURETTAGE OF UTERUS USING SUCTION N/A 2021    Procedure: DILATION AND CURETTAGE, UTERUS, USING SUCTION;  Surgeon: Fiorella Carpenter DO;  Location: [facility] OR;  Service: OB/GYN;  Laterality: N/A;    LAPAROSCOPIC CHOLECYSTECTOMY N/A 10/07/2022    Procedure: CHOLECYSTECTOMY, LAPAROSCOPIC;  Surgeon: Armida Ferraro MD;  Location: [facility] OR;  Service: General;  Laterality: N/A;    ROBOT-ASSISTED LAPAROSCOPIC UTERINE MYOMECTOMY N/A 2020    Procedure: ROBOTIC MYOMECTOMY, UTERUS;  Surgeon: Sylvie Zavaleta MD;  Location: [facility] OR;  Service: OB/GYN;  Laterality: N/A;    TONSILLECTOMY, ADENOIDECTOMY         Social History:  Social History     Socioeconomic History    Marital status:    Tobacco Use    Smoking status: Never    Smokeless tobacco: Never   Substance and Sexual Activity    Alcohol use: Not Currently     Comment: social     Drug use: No    Sexual activity: Yes     Partners: Male     Birth control/protection: Condom, None   Other Topics Concern    Are you pregnant or think you may be? No    Breast-feeding No     Social Determinants of Health     Financial Resource Strain: Low Risk  (2023)    Overall Financial Resource Strain (CARDIA)     Difficulty of Paying Living Expenses: Not hard at all   Food Insecurity: No Food Insecurity (2023)    Hunger Vital Sign     Worried About Running Out of Food in the Last Year: Never true     Ran Out of  Food in the Last Year: Never true   Transportation Needs: No Transportation Needs (2023)    PRAPARE - Transportation     Lack of Transportation (Medical): No     Lack of Transportation (Non-Medical): No   Physical Activity: Insufficiently Active (2023)    Exercise Vital Sign     Days of Exercise per Week: 2 days     Minutes of Exercise per Session: 40 min   Stress: No Stress Concern Present (2023)    [place] [employer] - Occupational Stress Questionnaire     Feeling of Stress : Only a little   Social Connections: Unknown (2023)    Social Connection and Isolation Panel [NHANES]     Frequency of Communication with Friends and Family: More than three times a week     Frequency of Social Gatherings with Friends and Family: More than three times a week     Active Member of Clubs or Organizations: No     Attends Club or Organization Meetings: Never     Marital Status:    Housing Stability: Low Risk  (2023)    Housing Stability Vital Sign     Unable to Pay for Housing in the Last Year: No     Number of Places Lived in the Last Year: 1     Unstable Housing in the Last Year: No       Family History:  Family History   Problem Relation Age of Onset    No Known Problems Father     Miscarriages / Stillbirths Mother     Hypothyroidism Mother     Arthritis Mother     Coronary artery disease Maternal Grandmother         s/p CABG 4v    Hypothyroidism Maternal Grandmother     Coronary artery disease Paternal Grandmother         s/p CABG    Breast cancer Neg Hx     Cancer Neg Hx     Colon cancer Neg Hx     Diabetes Neg Hx     Eclampsia Neg Hx     Hypertension Neg Hx     Stroke Neg Hx      labor Neg Hx     Ovarian cancer Neg Hx     Melanoma Neg Hx        Allergies and Medications: (updated and reviewed)  Review of patient's allergies indicates:   Allergen Reactions    Ibuprofen Hives and Shortness Of Breath     bronchospasm    Ancef [cefazolin] Rash     Current Outpatient  Medications   Medication Sig Dispense Refill    cetirizine (ZYRTEC) 10 MG tablet Take 10 mg by mouth once daily.      ergocalciferol (ERGOCALCIFEROL) 50,000 unit Cap Take 1 capsule (50,000 Units total) by mouth every 7 days. 12 capsule 3    ferrous sulfate 325 (65 FE) MG EC tablet Take 1 tablet (325 mg total) by mouth every other day. 45 tablet 3    ketoconazole (NIZORAL) 2 % shampoo Wash scalp with medicated shampoo at least 2x/week. Let sit on scalp at least 5 minutes prior to rinsing 240 mL 5    tirzepatide 5 mg/0.5 mL PnIj Inject 5 mg into the skin every 7 days. 2.5 mg weekly for 1 month, then 5mg weekly. 4 pen 11     No current facility-administered medications for this visit.       Patient Care Team:  No, Primary Doctor as PCP - Kimberlee Riggs MA (Inactive) as Care Coordinator  Bessy Moseley MA as Care Coordinator         - The patient is given an After Visit Summary that lists all medications with directions, allergies, education, orders placed during this encounter and follow-up instructions.      - I have reviewed the patient's medical information including past medical, family, and social history sections including the medications and allergies.      - We discussed the patient's current medications.     This note was created by combination of typed  and MModal dictation.  Transcription errors may be present.  If there are any questions, please contact me.

## 2023-08-18 NOTE — ASSESSMENT & PLAN NOTE
Statin intolerant per pt.     The current medical regimen is effective;  continue present plan and medications.    Heart healthy diet. Omega 3

## 2023-08-18 NOTE — ASSESSMENT & PLAN NOTE
No medication. The current medical regimen is effective;  continue present plan and medications.

## 2023-08-18 NOTE — PATIENT INSTRUCTIONS
Reach out to Dr. Gutierrez regarding Wevogy rx.     Follow heart healthy diet to manage LDL.     I recommend starting omega 3 fish oil daily.     Guidelines: Heart Healthy Diet (AHA Guidelines)    -Aerobic Exercise for at least 30 minutes on most days should accompany dietary changes  -Eat smaller portions   -Eat at home.   -Increase Omega-3 Fatty Acid intake  -Eat fish twice weekly or Supplement with Omega-3 Fatty Acids  -Increase fruit and vegetable intake to drop CAD risk  -Fruits and Vegetables with greatest risk reduction  -Green leafy vegetables  -Cruciferous vegetables (broccoli, cauliflower)  -Fruits and vegetables high in Vitamin C  -Increase whole grains and high Dietary Fiber foods    LIMIT:   -saturated fat and trans-Fatty Acid intake (replace with olive oil, canola oil, nuts)  -Alcohol intake (2 drinks per day for men, and 1 drink per day for women)  -sugar intake  -salt intake (<1.5 to 2 grams per day is optimal)

## 2023-08-18 NOTE — ASSESSMENT & PLAN NOTE
The current medical regimen is effective;  continue present plan and medications.    Compliant with iron supplement. Denies s/s.

## 2023-08-28 NOTE — TELEPHONE ENCOUNTER
I think that she established care with Radha Reed.  Please offer a virtual visit with me for weight. Thank you!

## 2023-08-29 ENCOUNTER — OFFICE VISIT (OUTPATIENT)
Dept: INTERNAL MEDICINE | Facility: CLINIC | Age: 33
End: 2023-08-29
Payer: COMMERCIAL

## 2023-08-29 ENCOUNTER — PATIENT MESSAGE (OUTPATIENT)
Dept: INTERNAL MEDICINE | Facility: CLINIC | Age: 33
End: 2023-08-29

## 2023-08-29 DIAGNOSIS — F33.0 MILD EPISODE OF RECURRENT MAJOR DEPRESSIVE DISORDER: ICD-10-CM

## 2023-08-29 DIAGNOSIS — E66.9 OBESITY (BMI 35.0-39.9 WITHOUT COMORBIDITY): Primary | ICD-10-CM

## 2023-08-29 DIAGNOSIS — F41.1 GAD (GENERALIZED ANXIETY DISORDER): ICD-10-CM

## 2023-08-29 PROBLEM — F33.9 RECURRENT MAJOR DEPRESSIVE DISORDER: Status: ACTIVE | Noted: 2023-08-29

## 2023-08-29 PROCEDURE — 99215 PR OFFICE/OUTPT VISIT, EST, LEVL V, 40-54 MIN: ICD-10-PCS | Mod: 95,,, | Performed by: INTERNAL MEDICINE

## 2023-08-29 PROCEDURE — 1160F PR REVIEW ALL MEDS BY PRESCRIBER/CLIN PHARMACIST DOCUMENTED: ICD-10-PCS | Mod: CPTII,95,, | Performed by: INTERNAL MEDICINE

## 2023-08-29 PROCEDURE — 1160F RVW MEDS BY RX/DR IN RCRD: CPT | Mod: CPTII,95,, | Performed by: INTERNAL MEDICINE

## 2023-08-29 PROCEDURE — 1159F MED LIST DOCD IN RCRD: CPT | Mod: CPTII,95,, | Performed by: INTERNAL MEDICINE

## 2023-08-29 PROCEDURE — 1159F PR MEDICATION LIST DOCUMENTED IN MEDICAL RECORD: ICD-10-PCS | Mod: CPTII,95,, | Performed by: INTERNAL MEDICINE

## 2023-08-29 PROCEDURE — 99215 OFFICE O/P EST HI 40 MIN: CPT | Mod: 95,,, | Performed by: INTERNAL MEDICINE

## 2023-08-29 RX ORDER — SEMAGLUTIDE 1 MG/.5ML
1 INJECTION, SOLUTION SUBCUTANEOUS
Qty: 2 ML | Refills: 11 | Status: SHIPPED | OUTPATIENT
Start: 2023-10-29 | End: 2023-08-30

## 2023-08-29 RX ORDER — FLUOXETINE HYDROCHLORIDE 20 MG/1
20 CAPSULE ORAL DAILY
Qty: 30 CAPSULE | Refills: 11 | Status: SHIPPED | OUTPATIENT
Start: 2023-08-29 | End: 2024-02-21

## 2023-08-29 NOTE — PROGRESS NOTES
The patient location is: [place]  The chief complaint leading to consultation is: Obesity, Depression, and Anxiety    Visit type: Virtual visit with synchronous audio and video  Contact time spent with patient: 27 minutes  Each patient to whom he or she provides medical services by telemedicine is:  (1) informed of the relationship between the physician and patient and the respective role of any other health care provider with respect to management of the patient; and (2) notified that he or she may decline to receive medical services by telemedicine and may withdraw from such care at any time.  Subjective:       Patient ID: Armida Medina is a 33 y.o. female who  has a past medical history of Abnormal Pap smear of cervix (), Acne, Allergy, Asthma, Benign paroxysmal vertigo, bilateral, Hashimoto's thyroiditis (2017), History of  section (2021), PONV (postoperative nausea and vomiting), Postpartum hemorrhage (2022), Preeclampsia, severe, third trimester (2018), and Severe pre-eclampsia in third trimester (2022).    Chief Complaint: Obesity, Depression, and Anxiety    History was obtained from the patient and supplemented through chart review.  -Saw OBGYN for well-woman exam.    Has a 3 y/o child and a .  Is not breast feeding.    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  Referred by Mendy and Dr. Anton.      HPI     She saw a NP to complete a medical form for her insurance.  NP was unable to prescribe meds for weight loss.  She has an appt to establish care with a new PCP in 2023.    Obesity:    H/o HA from TMJ/bruxism.     H/o depression, anxiety as below. Off Wellbutrin as below.    Took Ozempic in the past with success, but caused SE.  Mounjaro has been very helpful.  Helps with stress eating, portions.  Weight 226 about 1 year ago -> 170!  No SE.  However, she received a letter that Mounjaro was no longer covered. It didn't list alternatives.    Diet: Has seen a  "dietitian through her workplace.      Exercise: Barre3   BMI Readings from Last 3 Encounters:   08/18/23 30.25 kg/m²   05/17/23 32.02 kg/m²   03/01/23 34.52 kg/m²     Lab Results   Component Value Date/Time    HGBA1C 5.1 08/12/2022 08:12 AM    HGBA1C 5.1 12/10/2020 07:34 AM    HGBA1C 5.2 04/12/2019 07:07 AM     H/o depression, anxiety:  Did not like Lexapro in the past.  H/o sexual SE.  Also took Zoloft in the past. Doesn't recall if she had SE or weight gain at the time.    Restarted Wellbutrin 300 after she gave birth and felt well initially.  No SE.  Helped with appetite, cravings.  She later stopped it as she felt that it was weighing her down.  H/o postpartum depression.  Felt "flat" with dose titration.    She is concerned about OCD. Sometimes it's hard to sleep d/t persistent thoughts, hard to turn off her brain.            Not addressed today.  HLD, statin myopathy:    H/o joint aches with pravastatin 20.  W/u for myopathy revealed low Vit D. Diet as above.    FLP borderline high, improved. Not on statin.  Working on diet. H/o myalgias; on Vit D supplement. Monitor FLP annually.  Lab Results   Component Value Date    LDLCALC 116 (H) 02/27/2023     The ASCVD Risk score (Blairsden Graeagle DK, et al., 2019) failed to calculate for the following reasons:    The 2019 ASCVD risk score is only valid for ages 40 to 79    Vitamin D deficiency:    Discovered during workup for myopathy as above.  Level 16 in 2019.    Taking 50K 2/week.   H/o statin myalgias. Vit D level persistently borderline low, possibly d/t torsten.  Vitamin-D level much improved.  Decrease ergocalciferol 2->1/week.  Lab Results   Component Value Date    EFHNUNOK15BM 26 (L) 10/25/2022    QAZDEBXJ28TE 26 (L) 08/12/2022     STACIE:  Noted during pregnancy.  Has had heavier menstrual cycles since pregnancy few years ago.  Regular menstrual cycles. Ultrasound with uterine fibroid.  S/p myomectomy with OBGYN.  Menstrual cycles are largely unchanged. Still a little " "heavy.  On iron supplement about 2-3/week.    Pt previously declined testing for celiac d/t cost.  No constipation.  2/2 menorrhagia. Anemia resolved.  Ferritin improved. Cont iron MWF for now.  Lab Results   Component Value Date    IRON 39 10/25/2022    TIBC 417 10/25/2022    FERRITIN 30 10/25/2022     Lab Results   Component Value Date    GKECMDKV95 339 04/12/2019     No results found for: "FOLATE"    Hashimoto's thyroiditis:  Prior to pregnancy.  Was monitored during pregnancy.  Had mild elevation of TPO to 7.7.  TFTs have been normal.  Mom and maternal grandmother with hypothyroidism.  +FHx. Elevated TPO.  TSH WNL; monitor annually.  A1C wnl.  Lab Results   Component Value Date    TSH 1.92 02/27/2023    FREET4 1.05 11/18/2021     Elevated BP:  Was on labetalol after pregnancy .  She was able to discontinue since home BP was controlled.  BP controlled.  Monitor.    Childhood asthma:    Symptoms are well controlled.  Has not needed to use her inhaler.  No acute issues.    Well controlled.  Continue inhaler p.r.n..    Fibroid:  Following with OBGYN. S/p myomectomy.    Following with [facility] for TBSE. S/p shave biopsy.    Review of Systems   Constitutional:  Negative for activity change and unexpected weight change.   HENT:  Negative for hearing loss, rhinorrhea and trouble swallowing.    Eyes:  Negative for discharge and visual disturbance.   Respiratory:  Negative for chest tightness and wheezing.    Cardiovascular:  Negative for chest pain and palpitations.   Gastrointestinal:  Negative for blood in stool, constipation, diarrhea and vomiting.   Endocrine: Negative for polydipsia and polyuria.   Genitourinary:  Negative for difficulty urinating, dysuria, hematuria and menstrual problem.   Musculoskeletal:  Negative for arthralgias, joint swelling and neck pain.   Neurological:  Negative for weakness and headaches.   Psychiatric/Behavioral:  Positive for dysphoric mood. Negative for confusion. " The patient is nervous/anxious.        I personally reviewed Past Medical History, Past Surgical History, Social History, and Family History.    Objective:      There were no vitals filed for this visit.    Physical Exam  Constitutional:       General: She is not in acute distress.     Appearance: She is well-developed. She is not diaphoretic.   HENT:      Head: Normocephalic and atraumatic.   Eyes:      General:         Right eye: No discharge.         Left eye: No discharge.   Pulmonary:      Effort: Pulmonary effort is normal. No respiratory distress.   Skin:     Coloration: Skin is not pale.      Findings: No erythema.   Neurological:      Mental Status: She is alert.   Psychiatric:         Behavior: Behavior normal.           Lab Results   Component Value Date    WBC 7.9 02/27/2023    HGB 14.0 02/27/2023    HCT 43.3 02/27/2023     02/27/2023    CHOL 182 02/27/2023    TRIG 122 02/27/2023    HDL 43 (L) 02/27/2023    ALT 20 02/27/2023    AST 18 02/27/2023     02/27/2023    K 4.0 02/27/2023     02/27/2023    CREATININE 0.77 02/27/2023    BUN 14 02/27/2023    CO2 26 02/27/2023    TSH 1.92 02/27/2023    INR 0.9 04/29/2022    HGBA1C 5.1 08/12/2022       The ASCVD Risk score (Piercy DK, et al., 2019) failed to calculate for the following reasons:    The 2019 ASCVD risk score is only valid for ages 40 to 79    (Imaging have been independently reviewed)  Abdominal ultrasound with small gallstones.  No cholecystitis.  Slightly dilated CBD.    Assessment:       1. Obesity (BMI 35.0-39.9 without comorbidity)    2. Mild episode of recurrent major depressive disorder    3. NICCI (generalized anxiety disorder)          Plan:       Armida was seen today for obesity, depression and anxiety.    Diagnoses and all orders for this visit:    Obesity (BMI 35.0-39.9 without comorbidity)  Comments:  Improved! Switch Wegovy d/t cost. Discussed SE. Trulicity might be on her formulary; advised to contact insurance for  alternatives. Addtl notes below.  Orders:  -     semaglutide, weight loss, (WEGOVY) 1 mg/0.5 mL PnIj; Inject 1 mg into the skin every 7 days.  -     semaglutide, weight loss, 0.25 mg/0.5 mL PnIj; Inject 0.25 mg into the skin every 7 days. Increase to 0.5 mg once weekly if tolerated after 1 month    Mild episode of recurrent major depressive disorder  Comments:  Persistent. Discussed tx options. Start fluoxetine for mood, weight. RTC 1 mo to reassess meds. Refer to therapy with Dr. Smith.   Orders:  -     Ambulatory referral/consult to Psychology; Future  -     FLUoxetine 20 MG capsule; Take 1 capsule (20 mg total) by mouth once daily.    NICCI (generalized anxiety disorder)  Comments:  Tx as above. Would greatly benefit from therapy.  Orders:  -     Ambulatory referral/consult to Psychology; Future  -     FLUoxetine 20 MG capsule; Take 1 capsule (20 mg total) by mouth once daily.    Other orders  The following orders have not been finalized:  -     Cancel: semaglutide (OZEMPIC) 0.25 mg or 0.5 mg (2 mg/3 mL) pen injector           Cont dietary changes. Could consider Topamax given h/o HA. A1c wnl.     Side effects of medication(s) were discussed in detail and patient voiced understanding.  Patient will call back for any issues or complications.     I have spent a total of 50 minutes with the patient as well as reviewing the chart/medical record and placing orders on the day of the visit. Discussed weight, mental health, tx options.     RTC in 1 month(s) or sooner PRN for mental health. Virtual

## 2023-08-29 NOTE — PATIENT INSTRUCTIONS
All of your core healthy metrics are met.      Geovani Huffman,     If you are due for any health screening(s) below please notify me so we can arrange them to be ordered and scheduled to maintain your health. Most healthy patients complete it. Don't lose out on improving your health.     All of your core healthy metrics are met.

## 2023-08-30 RX ORDER — DULAGLUTIDE 0.75 MG/.5ML
0.75 INJECTION, SOLUTION SUBCUTANEOUS WEEKLY
Qty: 4 PEN | Refills: 11 | Status: SHIPPED | OUTPATIENT
Start: 2023-08-30 | End: 2023-12-08

## 2023-08-30 RX ORDER — DULAGLUTIDE 1.5 MG/.5ML
1.5 INJECTION, SOLUTION SUBCUTANEOUS
Qty: 4 PEN | Refills: 11 | Status: SHIPPED | OUTPATIENT
Start: 2023-08-30 | End: 2023-12-08 | Stop reason: SDUPTHER

## 2023-10-11 ENCOUNTER — OFFICE VISIT (OUTPATIENT)
Dept: PSYCHIATRY | Facility: OTHER | Age: 33
End: 2023-10-11
Payer: COMMERCIAL

## 2023-10-11 DIAGNOSIS — F41.1 GAD (GENERALIZED ANXIETY DISORDER): Primary | ICD-10-CM

## 2023-10-11 PROCEDURE — 99499 NO LOS: ICD-10-PCS | Mod: ,,, | Performed by: STUDENT IN AN ORGANIZED HEALTH CARE EDUCATION/TRAINING PROGRAM

## 2023-10-11 PROCEDURE — 99499 UNLISTED E&M SERVICE: CPT | Mod: ,,, | Performed by: STUDENT IN AN ORGANIZED HEALTH CARE EDUCATION/TRAINING PROGRAM

## 2023-10-11 NOTE — PROGRESS NOTES
Type of service: Individual    Content of session: Discussed pt goals for therapy. Pt indicated she would like to find a better way to communicate with her  and define the goals and expectations for household chores. Pt has significant anxiety and understands how that can negatively impact her and those around her. Pt expressed frustration with her 's lack of communication and sense of shared responsibilities. Will f/u in one week.      Therapeutic techniques and approaches: behavior modification and supportive counseling. Rationale: appropriate for presenting issues.     Pt denies SI/HI. Mood was euthymic, affect matches verbal content. AAOx3, participated fully in session.     Time spent in counselinmins

## 2023-11-07 ENCOUNTER — OFFICE VISIT (OUTPATIENT)
Dept: FAMILY MEDICINE | Facility: CLINIC | Age: 33
End: 2023-11-07
Payer: COMMERCIAL

## 2023-11-07 VITALS
DIASTOLIC BLOOD PRESSURE: 68 MMHG | WEIGHT: 159.63 LBS | BODY MASS INDEX: 28.29 KG/M2 | TEMPERATURE: 99 F | OXYGEN SATURATION: 97 % | HEART RATE: 66 BPM | HEIGHT: 63 IN | SYSTOLIC BLOOD PRESSURE: 102 MMHG

## 2023-11-07 DIAGNOSIS — Z76.89 ENCOUNTER TO ESTABLISH CARE: Primary | ICD-10-CM

## 2023-11-07 DIAGNOSIS — E06.3 HASHIMOTO'S THYROIDITIS: ICD-10-CM

## 2023-11-07 PROCEDURE — 3078F PR MOST RECENT DIASTOLIC BLOOD PRESSURE < 80 MM HG: ICD-10-PCS | Mod: CPTII,S$GLB,, | Performed by: FAMILY MEDICINE

## 2023-11-07 PROCEDURE — 3074F PR MOST RECENT SYSTOLIC BLOOD PRESSURE < 130 MM HG: ICD-10-PCS | Mod: CPTII,S$GLB,, | Performed by: FAMILY MEDICINE

## 2023-11-07 PROCEDURE — 99214 PR OFFICE/OUTPT VISIT, EST, LEVL IV, 30-39 MIN: ICD-10-PCS | Mod: S$GLB,,, | Performed by: FAMILY MEDICINE

## 2023-11-07 PROCEDURE — 99999 PR PBB SHADOW E&M-EST. PATIENT-LVL IV: CPT | Mod: PBBFAC,,, | Performed by: FAMILY MEDICINE

## 2023-11-07 PROCEDURE — 1160F PR REVIEW ALL MEDS BY PRESCRIBER/CLIN PHARMACIST DOCUMENTED: ICD-10-PCS | Mod: CPTII,S$GLB,, | Performed by: FAMILY MEDICINE

## 2023-11-07 PROCEDURE — 3008F PR BODY MASS INDEX (BMI) DOCUMENTED: ICD-10-PCS | Mod: CPTII,S$GLB,, | Performed by: FAMILY MEDICINE

## 2023-11-07 PROCEDURE — 1159F MED LIST DOCD IN RCRD: CPT | Mod: CPTII,S$GLB,, | Performed by: FAMILY MEDICINE

## 2023-11-07 PROCEDURE — 99214 OFFICE O/P EST MOD 30 MIN: CPT | Mod: S$GLB,,, | Performed by: FAMILY MEDICINE

## 2023-11-07 PROCEDURE — 1159F PR MEDICATION LIST DOCUMENTED IN MEDICAL RECORD: ICD-10-PCS | Mod: CPTII,S$GLB,, | Performed by: FAMILY MEDICINE

## 2023-11-07 PROCEDURE — 3008F BODY MASS INDEX DOCD: CPT | Mod: CPTII,S$GLB,, | Performed by: FAMILY MEDICINE

## 2023-11-07 PROCEDURE — 3078F DIAST BP <80 MM HG: CPT | Mod: CPTII,S$GLB,, | Performed by: FAMILY MEDICINE

## 2023-11-07 PROCEDURE — 1160F RVW MEDS BY RX/DR IN RCRD: CPT | Mod: CPTII,S$GLB,, | Performed by: FAMILY MEDICINE

## 2023-11-07 PROCEDURE — 3074F SYST BP LT 130 MM HG: CPT | Mod: CPTII,S$GLB,, | Performed by: FAMILY MEDICINE

## 2023-11-07 PROCEDURE — 99999 PR PBB SHADOW E&M-EST. PATIENT-LVL IV: ICD-10-PCS | Mod: PBBFAC,,, | Performed by: FAMILY MEDICINE

## 2023-11-07 RX ORDER — TIRZEPATIDE 5 MG/.5ML
INJECTION, SOLUTION SUBCUTANEOUS
COMMUNITY
Start: 2023-09-02 | End: 2023-12-08

## 2023-11-07 NOTE — PROGRESS NOTES
Assessment & Plan  Problem List Items Addressed This Visit          Endocrine    Hashimoto's thyroiditis    Relevant Orders    Comprehensive Metabolic Panel    CBC Auto Differential    Hemoglobin A1C    Lipid Panel    TSH    Vitamin D     Other Visit Diagnoses       Encounter to establish care    -  Primary    Relevant Orders    Comprehensive Metabolic Panel    CBC Auto Differential    Hemoglobin A1C    Lipid Panel    TSH    Vitamin D              Health Maintenance reviewed.    Follow-up: No follow-ups on file.    ______________________________________________________________________    Chief Complaint  Chief Complaint   Patient presents with    Establish Care       HPI  Armida Medina is a 33 y.o. female with multiple medical diagnoses as listed in the medical history and problem list that presents for establish care.  Pt is new to me.   Preeclampsia with both pregnancies  We discussed her past medical history     PAST MEDICAL HISTORY:  Past Medical History:   Diagnosis Date    Abnormal Pap smear of cervix     Acne     Allergy     Asthma     Benign paroxysmal vertigo, bilateral     Hashimoto's thyroiditis 2017    History of  section 2021    PONV (postoperative nausea and vomiting)     Postpartum hemorrhage 2022    Preeclampsia, severe, third trimester 2018    Severe pre-eclampsia in third trimester 2022    Admitted  for concern for preeclampsia with SF (BP).  Patient had two severe range blood pressures in the RENATA requiring labetalol 20 mg IV x 1 Her pre E labs on admit and repeat during her two day admission were all within normal limits Admit labs : PC TLC Plt 204, Cr 0.7, AST/ALT 11/10 Repeat labs : Plt 212, CR 0.7, AST/ALT 10/11 During the 48 hours, patient blood pressures were all        PAST SURGICAL HISTORY:  Past Surgical History:   Procedure Laterality Date     SECTION N/A 2018    Procedure:  SECTION;  Surgeon: Sawyer PEREYRA  Jaya Leung MD;  Location: [facility] L&D;  Service: OB/GYN;  Laterality: N/A;     SECTION N/A 2022    Procedure:  SECTION;  Surgeon: Fiorella Carpenter DO;  Location: [facility] L&D;  Service: OB/GYN;  Laterality: N/A;     SECTION N/A 2022    Procedure:  SECTION;  Surgeon: Fiorella Carpenter DO;  Location: [facility] L&D;  Service: OB/GYN;  Laterality: N/A;     SECTION      CHOLECYSTECTOMY      CYST REMOVAL      Left ear @ age 2    DILATION AND CURETTAGE OF UTERUS USING SUCTION N/A 2021    Procedure: DILATION AND CURETTAGE, UTERUS, USING SUCTION;  Surgeon: Fiorella Carpenter DO;  Location: [facility] OR;  Service: OB/GYN;  Laterality: N/A;    LAPAROSCOPIC CHOLECYSTECTOMY N/A 10/07/2022    Procedure: CHOLECYSTECTOMY, LAPAROSCOPIC;  Surgeon: Armida Ferraro MD;  Location: [facility] OR;  Service: General;  Laterality: N/A;    ROBOT-ASSISTED LAPAROSCOPIC UTERINE MYOMECTOMY N/A 2020    Procedure: ROBOTIC MYOMECTOMY, UTERUS;  Surgeon: Sylvie Zavaleta MD;  Location: [facility] OR;  Service: OB/GYN;  Laterality: N/A;    TONSILLECTOMY, ADENOIDECTOMY         SOCIAL HISTORY:  Social History     Socioeconomic History    Marital status:    Tobacco Use    Smoking status: Never    Smokeless tobacco: Never   Substance and Sexual Activity    Alcohol use: Not Currently     Comment: social     Drug use: No    Sexual activity: Yes     Partners: Male     Birth control/protection: Condom, None   Other Topics Concern    Are you pregnant or think you may be? No    Breast-feeding No     Social Determinants of Health     Financial Resource Strain: Low Risk  (2023)    Overall Financial Resource Strain (CARDIA)     Difficulty of Paying Living Expenses: Not hard at all   Food Insecurity: No Food Insecurity (2023)    Hunger Vital Sign     Worried About Running Out of Food in the Last Year: Never true     Ran Out of Food in the Last Year: Never true   Transportation Needs: No  Transportation Needs (2023)    PRAPARE - Transportation     Lack of Transportation (Medical): No     Lack of Transportation (Non-Medical): No   Physical Activity: Insufficiently Active (2023)    Exercise Vital Sign     Days of Exercise per Week: 2 days     Minutes of Exercise per Session: 30 min   Stress: No Stress Concern Present (2023)    [employer] - Occupational Stress Questionnaire     Feeling of Stress : Not at all   Social Connections: Unknown (2023)    Social Connection and Isolation Panel [NHANES]     Frequency of Communication with Friends and Family: Three times a week     Frequency of Social Gatherings with Friends and Family: Three times a week     Active Member of Clubs or Organizations: No     Attends Club or Organization Meetings: Never     Marital Status:    Housing Stability: Low Risk  (2023)    Housing Stability Vital Sign     Unable to Pay for Housing in the Last Year: No     Number of Places Lived in the Last Year: 1     Unstable Housing in the Last Year: No       FAMILY HISTORY:  Family History   Problem Relation Age of Onset    No Known Problems Father     Miscarriages / Stillbirths Mother     Hypothyroidism Mother     Arthritis Mother     Coronary artery disease Maternal Grandmother         s/p CABG 4v    Hypothyroidism Maternal Grandmother     Coronary artery disease Paternal Grandmother         s/p CABG    Breast cancer Neg Hx     Cancer Neg Hx     Colon cancer Neg Hx     Diabetes Neg Hx     Eclampsia Neg Hx     Hypertension Neg Hx     Stroke Neg Hx      labor Neg Hx     Ovarian cancer Neg Hx     Melanoma Neg Hx        ALLERGIES AND MEDICATIONS: updated and reviewed.  Review of patient's allergies indicates:   Allergen Reactions    Ibuprofen Hives and Shortness Of Breath     bronchospasm    Ancef [cefazolin] Rash     Current Outpatient Medications   Medication Sig Dispense Refill    cetirizine (ZYRTEC) 10 MG tablet Take 10  "mg by mouth once daily.      dulaglutide (TRULICITY) 0.75 mg/0.5 mL pen injector Inject 0.75 mg into the skin once a week. 4 pen 11    ergocalciferol (ERGOCALCIFEROL) 50,000 unit Cap Take 1 capsule (50,000 Units total) by mouth every 7 days. 12 capsule 3    FLUoxetine 20 MG capsule Take 1 capsule (20 mg total) by mouth once daily. 30 capsule 11    MOUNJARO 5 mg/0.5 mL PnIj Inject into the skin.      dulaglutide (TRULICITY) 1.5 mg/0.5 mL pen injector Inject 1.5 mg into the skin every 7 days. May increase to 1.5 mg weekly if tolerated after 1 month 4 pen 11    ketoconazole (NIZORAL) 2 % shampoo Wash scalp with medicated shampoo at least 2x/week. Let sit on scalp at least 5 minutes prior to rinsing (Patient not taking: Reported on 11/7/2023) 240 mL 5     No current facility-administered medications for this visit.         ROS  Review of Systems   Constitutional:  Negative for activity change, appetite change, fatigue, fever and unexpected weight change.   HENT: Negative.  Negative for ear discharge, ear pain, rhinorrhea and sore throat.    Eyes: Negative.    Respiratory:  Negative for apnea, cough, chest tightness, shortness of breath and wheezing.    Cardiovascular:  Negative for chest pain, palpitations and leg swelling.   Gastrointestinal:  Negative for abdominal distention, abdominal pain, constipation, diarrhea and vomiting.   Endocrine: Negative for cold intolerance, heat intolerance, polydipsia and polyuria.   Genitourinary:  Negative for decreased urine volume and urgency.   Musculoskeletal: Negative.    Skin:  Negative for rash.   Neurological:  Negative for dizziness and headaches.   Hematological:  Does not bruise/bleed easily.   Psychiatric/Behavioral:  Negative for agitation, sleep disturbance and suicidal ideas.            Physical Exam  Vitals:    11/07/23 0726   BP: 102/68   Pulse: 66   Temp: 98.6 °F (37 °C)   TempSrc: Oral   SpO2: 97%   Weight: 72.4 kg (159 lb 9.8 oz)   Height: 5' 3" (1.6 m)    Body " "mass index is 28.27 kg/m².  Weight: 72.4 kg (159 lb 9.8 oz)   Height: 5' 3" (160 cm)   Physical Exam  Vitals reviewed.   Constitutional:       Appearance: Normal appearance. She is well-developed.   HENT:      Head: Normocephalic and atraumatic.      Right Ear: External ear normal.      Left Ear: External ear normal.      Nose: Nose normal.      Mouth/Throat:      Mouth: Mucous membranes are moist.      Pharynx: Oropharynx is clear.   Eyes:      Extraocular Movements: Extraocular movements intact.      Conjunctiva/sclera: Conjunctivae normal.      Pupils: Pupils are equal, round, and reactive to light.   Cardiovascular:      Rate and Rhythm: Normal rate and regular rhythm.      Heart sounds: Normal heart sounds.   Pulmonary:      Effort: Pulmonary effort is normal.      Breath sounds: Normal breath sounds.   Skin:     General: Skin is warm and dry.   Neurological:      Mental Status: She is alert and oriented to person, place, and time.           Health Maintenance         Date Due Completion Date    Influenza Vaccine (1) 09/01/2023 10/1/2022 (Done)    Override on 10/1/2022: Done    COVID-19 Vaccine (4 - 2023-24 season) 09/01/2023 12/28/2021    Cervical Cancer Screening 05/17/2028 5/17/2023    TETANUS VACCINE 03/30/2032 3/30/2022                Patient note was created using [employer].  Any errors in syntax or even information may not have been identified and edited on initial review prior to signing this note.  "

## 2023-11-28 ENCOUNTER — OFFICE VISIT (OUTPATIENT)
Dept: FAMILY MEDICINE | Facility: CLINIC | Age: 33
End: 2023-11-28
Payer: COMMERCIAL

## 2023-11-28 VITALS
HEIGHT: 63 IN | SYSTOLIC BLOOD PRESSURE: 108 MMHG | TEMPERATURE: 99 F | BODY MASS INDEX: 27.62 KG/M2 | OXYGEN SATURATION: 96 % | DIASTOLIC BLOOD PRESSURE: 70 MMHG | HEART RATE: 68 BPM | WEIGHT: 155.88 LBS

## 2023-11-28 DIAGNOSIS — R05.1 ACUTE COUGH: Primary | ICD-10-CM

## 2023-11-28 PROCEDURE — 3074F SYST BP LT 130 MM HG: CPT | Mod: CPTII,S$GLB,, | Performed by: INTERNAL MEDICINE

## 2023-11-28 PROCEDURE — 99999 PR PBB SHADOW E&M-EST. PATIENT-LVL IV: CPT | Mod: PBBFAC,,, | Performed by: INTERNAL MEDICINE

## 2023-11-28 PROCEDURE — 3008F BODY MASS INDEX DOCD: CPT | Mod: CPTII,S$GLB,, | Performed by: INTERNAL MEDICINE

## 2023-11-28 PROCEDURE — 3078F PR MOST RECENT DIASTOLIC BLOOD PRESSURE < 80 MM HG: ICD-10-PCS | Mod: CPTII,S$GLB,, | Performed by: INTERNAL MEDICINE

## 2023-11-28 PROCEDURE — 1159F PR MEDICATION LIST DOCUMENTED IN MEDICAL RECORD: ICD-10-PCS | Mod: CPTII,S$GLB,, | Performed by: INTERNAL MEDICINE

## 2023-11-28 PROCEDURE — 1160F PR REVIEW ALL MEDS BY PRESCRIBER/CLIN PHARMACIST DOCUMENTED: ICD-10-PCS | Mod: CPTII,S$GLB,, | Performed by: INTERNAL MEDICINE

## 2023-11-28 PROCEDURE — 99213 OFFICE O/P EST LOW 20 MIN: CPT | Mod: S$GLB,,, | Performed by: INTERNAL MEDICINE

## 2023-11-28 PROCEDURE — 3074F PR MOST RECENT SYSTOLIC BLOOD PRESSURE < 130 MM HG: ICD-10-PCS | Mod: CPTII,S$GLB,, | Performed by: INTERNAL MEDICINE

## 2023-11-28 PROCEDURE — 3008F PR BODY MASS INDEX (BMI) DOCUMENTED: ICD-10-PCS | Mod: CPTII,S$GLB,, | Performed by: INTERNAL MEDICINE

## 2023-11-28 PROCEDURE — 1159F MED LIST DOCD IN RCRD: CPT | Mod: CPTII,S$GLB,, | Performed by: INTERNAL MEDICINE

## 2023-11-28 PROCEDURE — 99999 PR PBB SHADOW E&M-EST. PATIENT-LVL IV: ICD-10-PCS | Mod: PBBFAC,,, | Performed by: INTERNAL MEDICINE

## 2023-11-28 PROCEDURE — 3078F DIAST BP <80 MM HG: CPT | Mod: CPTII,S$GLB,, | Performed by: INTERNAL MEDICINE

## 2023-11-28 PROCEDURE — 99213 PR OFFICE/OUTPT VISIT, EST, LEVL III, 20-29 MIN: ICD-10-PCS | Mod: S$GLB,,, | Performed by: INTERNAL MEDICINE

## 2023-11-28 PROCEDURE — 1160F RVW MEDS BY RX/DR IN RCRD: CPT | Mod: CPTII,S$GLB,, | Performed by: INTERNAL MEDICINE

## 2023-11-28 RX ORDER — PROMETHAZINE HYDROCHLORIDE AND DEXTROMETHORPHAN HYDROBROMIDE 6.25; 15 MG/5ML; MG/5ML
5 SYRUP ORAL EVERY 4 HOURS PRN
Qty: 120 ML | Refills: 0 | Status: SHIPPED | OUTPATIENT
Start: 2023-11-28 | End: 2023-12-08

## 2023-11-28 RX ORDER — BENZONATATE 100 MG/1
100 CAPSULE ORAL 3 TIMES DAILY PRN
Qty: 30 CAPSULE | Refills: 0 | Status: SHIPPED | OUTPATIENT
Start: 2023-11-28 | End: 2023-12-08

## 2023-11-28 NOTE — PROGRESS NOTES
This note was created by combination of typed  and M-Modal dictation.  Transcription errors may be present.  If there are any questions, please contact me.    Assessment and Plan:   Assessment and Plan    Acute cough  - history most consistent with postinfectious cough.  Normal lung exam, normal oxygen level no fevers.  Supportive care with Tessalon and Phenergan DM.  She can call if symptoms worsen-doxycycline or similar  -     benzonatate (TESSALON) 100 MG capsule; Take 1 capsule (100 mg total) by mouth 3 (three) times daily as needed for Cough.  Dispense: 30 capsule; Refill: 0  -     promethazine-dextromethorphan (PROMETHAZINE-DM) 6.25-15 mg/5 mL Syrp; Take 5 mLs by mouth every 4 (four) hours as needed.  Dispense: 120 mL; Refill: 0             There are no discontinued medications.    meds sent this encounter:  Medications Ordered This Encounter   Medications    benzonatate (TESSALON) 100 MG capsule     Sig: Take 1 capsule (100 mg total) by mouth 3 (three) times daily as needed for Cough.     Dispense:  30 capsule     Refill:  0    promethazine-dextromethorphan (PROMETHAZINE-DM) 6.25-15 mg/5 mL Syrp     Sig: Take 5 mLs by mouth every 4 (four) hours as needed.     Dispense:  120 mL     Refill:  0         Follow Up:   Future Appointments   Date Time Provider Department Center   2/20/2024  7:00 AM Keli Mcclellan MD [facility]         Subjective:   Subjective   Chief Complaint   Patient presents with    Cough     A week       HPI  Armida is a 33 y.o. female.    Social History     Tobacco Use    Smoking status: Never    Smokeless tobacco: Never   Substance Use Topics    Alcohol use: Yes     Comment: social           Social History     Social History Narrative    Not on file       Patient's last menstrual period was 10/20/2023 (exact date).    Last appointment with this clinic was 11/7/2023. Last visit with me Visit date not found   To summarize last visit and events leading up to  today:  Asthma   Hashimoto's thyroiditis  Dyslipidemia with statin intolerance   Obesity.  GLP1 not covered by insurance  NICCI, MDD, followed by Psychiatry    Today's visit:    1 week of symptoms  Cough  Initial subjective fever  Did not test for covid  Other symptoms improved but cough remains.  Cough worse if takes deep breath  Gives her headache  Makes her vomit a few times  Worse at night  Tried mucinex, robitussin.  Mother's phenergan DM with some help but the following day felt like it just built up.   Some sensation of dyspnea with this.    Normally takes zyrtec at night.     Taking mounjaro, and plans to transition to trulicity when she finishes it.        Patient Care Team:  Keli Mcclellan MD as PCP - General (Family Medicine)  Kimberlee Dave MA (Inactive) as Care Coordinator  Bessy Moseley MA as Care Coordinator      Patient Active Problem List    Diagnosis Date Noted    Recurrent major depressive disorder 08/29/2023    NICCI (generalized anxiety disorder) 08/29/2023    Elevated blood pressure reading in office without diagnosis of hypertension 08/11/2022    History of myomectomy 12/22/2020    History of depression 11/25/2020    Statin myopathy 09/03/2019    Vitamin D deficiency 09/03/2019    Mixed hyperlipidemia 04/19/2019    Obesity (BMI 35.0-39.9 without comorbidity) 04/04/2019    Iron deficiency anemia 03/13/2019    Childhood asthma without complication 10/24/2018    Hashimoto's thyroiditis 06/01/2017       PAST MEDICAL PROBLEMS, PAST SURGICAL HISTORY: please see relevant portions of the electronic medical record    ALLERGIES AND MEDICATIONS: updated and reviewed.  Medication List with Changes/Refills   Current Medications    CETIRIZINE (ZYRTEC) 10 MG TABLET    Take 10 mg by mouth once daily.    DULAGLUTIDE (TRULICITY) 0.75 MG/0.5 ML PEN INJECTOR    Inject 0.75 mg into the skin once a week.    DULAGLUTIDE (TRULICITY) 1.5 MG/0.5 ML PEN INJECTOR    Inject 1.5 mg into the skin every 7  "days. May increase to 1.5 mg weekly if tolerated after 1 month    ERGOCALCIFEROL (ERGOCALCIFEROL) 50,000 UNIT CAP    Take 1 capsule (50,000 Units total) by mouth every 7 days.    FLUOXETINE 20 MG CAPSULE    Take 1 capsule (20 mg total) by mouth once daily.    KETOCONAZOLE (NIZORAL) 2 % SHAMPOO    Wash scalp with medicated shampoo at least 2x/week. Let sit on scalp at least 5 minutes prior to rinsing    MOUNJARO 5 MG/0.5 ML PNIJ    Inject into the skin.         Objective:   Objective   Physical Exam   Vitals:    11/28/23 1457   BP: 108/70   Pulse: 68   Temp: 98.5 °F (36.9 °C)   SpO2: 96%   Weight: 70.7 kg (155 lb 13.8 oz)   Height: 5' 3" (1.6 m)    Body mass index is 27.61 kg/m².  Weight: 70.7 kg (155 lb 13.8 oz)   Height: 5' 3" (160 cm)     Physical Exam  Constitutional:       Appearance: She is well-developed.   HENT:      Head: Normocephalic.      Right Ear: Tympanic membrane and ear canal normal.      Left Ear: Tympanic membrane and ear canal normal.      Mouth/Throat:      Pharynx: No oropharyngeal exudate or posterior oropharyngeal erythema.   Eyes:      General: No scleral icterus.        Right eye: No discharge.         Left eye: No discharge.   Cardiovascular:      Rate and Rhythm: Normal rate and regular rhythm.      Heart sounds: Normal heart sounds.   Pulmonary:      Effort: Pulmonary effort is normal.      Breath sounds: Normal breath sounds. No wheezing.   Musculoskeletal:      Cervical back: Neck supple.   Lymphadenopathy:      Cervical: No cervical adenopathy.   Skin:     General: Skin is warm and dry.   Neurological:      Mental Status: She is alert and oriented to person, place, and time.   Psychiatric:         Behavior: Behavior normal.                "

## 2023-12-08 ENCOUNTER — PATIENT MESSAGE (OUTPATIENT)
Dept: FAMILY MEDICINE | Facility: CLINIC | Age: 33
End: 2023-12-08
Payer: COMMERCIAL

## 2023-12-08 DIAGNOSIS — E66.9 OBESITY (BMI 35.0-39.9 WITHOUT COMORBIDITY): ICD-10-CM

## 2023-12-08 RX ORDER — DULAGLUTIDE 1.5 MG/.5ML
1.5 INJECTION, SOLUTION SUBCUTANEOUS
Qty: 12 PEN | Refills: 3 | Status: SHIPPED | OUTPATIENT
Start: 2023-12-08 | End: 2023-12-15 | Stop reason: SDUPTHER

## 2023-12-08 RX ORDER — PREDNISONE 20 MG/1
20 TABLET ORAL
COMMUNITY
Start: 2023-12-01 | End: 2024-02-21

## 2023-12-08 RX ORDER — AMOXICILLIN 500 MG/1
1000 CAPSULE ORAL EVERY 8 HOURS
COMMUNITY
Start: 2023-12-01 | End: 2024-02-21

## 2023-12-08 NOTE — TELEPHONE ENCOUNTER
No care due was identified.  [facility] Embedded Care Due Messages. Reference number: [id number].   12/08/2023 10:13:33 AM CST

## 2023-12-14 ENCOUNTER — PATIENT MESSAGE (OUTPATIENT)
Dept: FAMILY MEDICINE | Facility: CLINIC | Age: 33
End: 2023-12-14
Payer: COMMERCIAL

## 2023-12-15 ENCOUNTER — TELEPHONE (OUTPATIENT)
Dept: FAMILY MEDICINE | Facility: CLINIC | Age: 33
End: 2023-12-15
Payer: COMMERCIAL

## 2023-12-15 DIAGNOSIS — E66.9 OBESITY (BMI 35.0-39.9 WITHOUT COMORBIDITY): ICD-10-CM

## 2023-12-15 RX ORDER — DULAGLUTIDE 1.5 MG/.5ML
1.5 INJECTION, SOLUTION SUBCUTANEOUS
Qty: 12 PEN | Refills: 0 | Status: SHIPPED | OUTPATIENT
Start: 2023-12-15 | End: 2024-02-21

## 2023-12-15 NOTE — TELEPHONE ENCOUNTER
Spoke with  Joana Sauceda with [facility] stated another prescriptions is needed due to the amount of pens and the possibility of the future dose change. Please advise Dr Mcclellan , thank you.

## 2023-12-15 NOTE — TELEPHONE ENCOUNTER
----- Message from Amalia Contreras sent at 12/15/2023 10:23 AM CST -----  Regarding: Pharmacy Call Back  Type:  Pharmacy Calling to Clarify an RX        Pharmacy Name: [facility]     Prescription Name: dulaglutide (TRULICITY) 1.5 mg/0.5 mL pen injector    What do they need to clarify? Needs to clarify directions     Can you be contacted via [facility]? No     Best Call Back Number: [phone] ref #[id number]

## 2024-02-20 LAB
ALBUMIN SERPL-MCNC: 3.9 G/DL (ref 3.6–5.1)
ALBUMIN/GLOB SERPL: 1.6 (CALC) (ref 1–2.5)
ALP SERPL-CCNC: 83 U/L (ref 31–125)
ALT SERPL-CCNC: 29 U/L (ref 6–29)
AST SERPL-CCNC: 22 U/L (ref 10–30)
BASOPHILS # BLD AUTO: 42 CELLS/UL (ref 0–200)
BASOPHILS NFR BLD AUTO: 0.7 %
BILIRUB SERPL-MCNC: 0.3 MG/DL (ref 0.2–1.2)
BUN SERPL-MCNC: 13 MG/DL (ref 7–25)
BUN/CREAT SERPL: NORMAL (CALC) (ref 6–22)
CALCIUM SERPL-MCNC: 8.9 MG/DL (ref 8.6–10.2)
CHLORIDE SERPL-SCNC: 105 MMOL/L (ref 98–110)
CHOLEST SERPL-MCNC: 177 MG/DL
CHOLEST/HDLC SERPL: 3.2 (CALC)
CO2 SERPL-SCNC: 30 MMOL/L (ref 20–32)
CREAT SERPL-MCNC: 0.71 MG/DL (ref 0.5–0.97)
EGFR: 114 ML/MIN/1.73M2
EOSINOPHIL # BLD AUTO: 120 CELLS/UL (ref 15–500)
EOSINOPHIL NFR BLD AUTO: 2 %
ERYTHROCYTE [DISTWIDTH] IN BLOOD BY AUTOMATED COUNT: 14.4 % (ref 11–15)
GLOBULIN SER CALC-MCNC: 2.4 G/DL (CALC) (ref 1.9–3.7)
GLUCOSE SERPL-MCNC: 73 MG/DL (ref 65–99)
HCT VFR BLD AUTO: 40.6 % (ref 35–45)
HDLC SERPL-MCNC: 55 MG/DL
HGB BLD-MCNC: 13 G/DL (ref 11.7–15.5)
LDLC SERPL CALC-MCNC: 107 MG/DL (CALC)
LYMPHOCYTES # BLD AUTO: 1728 CELLS/UL (ref 850–3900)
LYMPHOCYTES NFR BLD AUTO: 28.8 %
MCH RBC QN AUTO: 28.1 PG (ref 27–33)
MCHC RBC AUTO-ENTMCNC: 32 G/DL (ref 32–36)
MCV RBC AUTO: 87.7 FL (ref 80–100)
MONOCYTES # BLD AUTO: 420 CELLS/UL (ref 200–950)
MONOCYTES NFR BLD AUTO: 7 %
NEUTROPHILS # BLD AUTO: 3690 CELLS/UL (ref 1500–7800)
NEUTROPHILS NFR BLD AUTO: 61.5 %
NONHDLC SERPL-MCNC: 122 MG/DL (CALC)
PLATELET # BLD AUTO: 245 THOUSAND/UL (ref 140–400)
PMV BLD REES-ECKER: 12.1 FL (ref 7.5–12.5)
POTASSIUM SERPL-SCNC: 4.4 MMOL/L (ref 3.5–5.3)
PROT SERPL-MCNC: 6.3 G/DL (ref 6.1–8.1)
RBC # BLD AUTO: 4.63 MILLION/UL (ref 3.8–5.1)
SODIUM SERPL-SCNC: 141 MMOL/L (ref 135–146)
TRIGL SERPL-MCNC: 60 MG/DL
TSH SERPL-ACNC: 1.39 MIU/L
WBC # BLD AUTO: 6 THOUSAND/UL (ref 3.8–10.8)

## 2024-02-21 ENCOUNTER — OFFICE VISIT (OUTPATIENT)
Dept: FAMILY MEDICINE | Facility: CLINIC | Age: 34
End: 2024-02-21
Payer: COMMERCIAL

## 2024-02-21 VITALS
DIASTOLIC BLOOD PRESSURE: 60 MMHG | TEMPERATURE: 98 F | BODY MASS INDEX: 29.57 KG/M2 | SYSTOLIC BLOOD PRESSURE: 100 MMHG | HEIGHT: 63 IN | HEART RATE: 60 BPM | OXYGEN SATURATION: 95 % | WEIGHT: 166.88 LBS

## 2024-02-21 DIAGNOSIS — R63.5 WEIGHT GAIN: ICD-10-CM

## 2024-02-21 DIAGNOSIS — E78.2 MIXED HYPERLIPIDEMIA: Primary | ICD-10-CM

## 2024-02-21 PROCEDURE — 1160F RVW MEDS BY RX/DR IN RCRD: CPT | Mod: CPTII,S$GLB,, | Performed by: FAMILY MEDICINE

## 2024-02-21 PROCEDURE — 3074F SYST BP LT 130 MM HG: CPT | Mod: CPTII,S$GLB,, | Performed by: FAMILY MEDICINE

## 2024-02-21 PROCEDURE — 3078F DIAST BP <80 MM HG: CPT | Mod: CPTII,S$GLB,, | Performed by: FAMILY MEDICINE

## 2024-02-21 PROCEDURE — 99999 PR PBB SHADOW E&M-EST. PATIENT-LVL IV: CPT | Mod: PBBFAC,,, | Performed by: FAMILY MEDICINE

## 2024-02-21 PROCEDURE — 99215 OFFICE O/P EST HI 40 MIN: CPT | Mod: S$GLB,,, | Performed by: FAMILY MEDICINE

## 2024-02-21 PROCEDURE — 1159F MED LIST DOCD IN RCRD: CPT | Mod: CPTII,S$GLB,, | Performed by: FAMILY MEDICINE

## 2024-02-21 PROCEDURE — 3008F BODY MASS INDEX DOCD: CPT | Mod: CPTII,S$GLB,, | Performed by: FAMILY MEDICINE

## 2024-02-21 RX ORDER — TIRZEPATIDE 2.5 MG/.5ML
2.5 INJECTION, SOLUTION SUBCUTANEOUS
Qty: 4 PEN | Refills: 0 | Status: SHIPPED | OUTPATIENT
Start: 2024-02-21 | End: 2024-03-21

## 2024-02-21 NOTE — PROGRESS NOTES
Assessment & Plan  Problem List Items Addressed This Visit          Cardiac/Vascular    Mixed hyperlipidemia - Primary     Other Visit Diagnoses       BMI 30.0-30.9,adult        Relevant Medications    tirzepatide, weight loss, (ZEPBOUND) 2.5 mg/0.5 mL PnIj    tirzepatide 5 mg/0.5 mL PnIj (Start on 3/22/2024)    tirzepatide 7.5 mg/0.5 mL PnIj (Start on 4/21/2024)    Weight gain             1.  Eat slowly.  Double the time it takes to finish your meals   2.  Eat until your are 80% full   3.  Focus on healthy protein:  Tuna, salmon, tofu, tempeh, beans   4.  Focus on triggers that make you feel bloated   5.  Return to clinic in 3 months     Assessment & Plan  1. Weight management.  She has a diagnosis of asthma with a BMI over 25. She has lost 8 pounds. I will resubmit the Xepbound. She was advised to get a home scale. We discussed different medication options including phentermine, diethylpropion, Contrave, Qsymia, and Contrave.    Follow-up  The patient will follow up in 3 months.    Results        Health Maintenance reviewed.      ______________________________________________________________________    Chief Complaint  Chief Complaint   Patient presents with    Weight Loss       HPI  Armida Medina is a 33 y.o. female with multiple medical diagnoses as listed in the medical history and problem list that presents for weight loss.  Pt is known to me with last appointment 11/7/2023.    History of Present Illness  The patient presents for evaluation of weight management.    She took Ozempic and Mounjaro for 1 year and lost 80 pounds. She was switched to Trulicity, but she was diagnosed with diabetes. She tried the PA for Wegovy, but she was told that she did not meet the requirement. Even though she lost weight, her BMI is still over the threshold. She had a lot of side effects with Ozempic. She had nausea, headaches, and was extremely exhausted. It did get a little bit better once she increased the dose to 1  mg, but she still did not feel great on it. She did not have any side effects with Mounjaro. She would like to get down to 150 pounds. On her scale at work, she has gained about 5 or 6 pounds. She was 165 pounds when she stopped taking the medications. She is eating great and exercising more than she was before ever on the medications. She checks her weight in the morning. She is open to other medications. She has been on Wellbutrin before. She is scared of taking phentermine. Her nutrition is much better. She still has some of the residual effects of cutting a lot of the stuff that she craved before. She does not eat a lot of junk food. She is meal prepping now. They eat a lot more vegetables and fruits. She is struggling with carbs. She measures her carbs out in half a cup. She is trying to add more protein. She will add pickled onions and green peppers. She will have a cup of brown rice. She would like to lose another 20 pounds. She has access to dietitians at work.           PAST MEDICAL HISTORY:  Past Medical History:   Diagnosis Date    Abnormal Pap smear of cervix     Acne     Allergy     Asthma     Benign paroxysmal vertigo, bilateral     Hashimoto's thyroiditis 2017    History of  section 2021    PONV (postoperative nausea and vomiting)     Postpartum hemorrhage 2022    Preeclampsia, severe, third trimester 2018    Severe pre-eclampsia in third trimester 2022    Admitted  for concern for preeclampsia with SF (BP).  Patient had two severe range blood pressures in the RENATA requiring labetalol 20 mg IV x 1 Her pre E labs on admit and repeat during her two day admission were all within normal limits Admit labs : PC TLC Plt 204, Cr 0.7, AST/ALT 11/10 Repeat labs : Plt 212, CR 0.7, AST/ALT 10/11 During the 48 hours, patient blood pressures were all        PAST SURGICAL HISTORY:  Past Surgical History:   Procedure Laterality Date     SECTION N/A 2018     Procedure:  SECTION;  Surgeon: Sawyer Lake Jr., MD;  Location: [facility] L&D;  Service: OB/GYN;  Laterality: N/A;     SECTION N/A 2022    Procedure:  SECTION;  Surgeon: Fiorella Carpenter DO;  Location: [facility] L&D;  Service: OB/GYN;  Laterality: N/A;     SECTION N/A 2022    Procedure:  SECTION;  Surgeon: Fiorella Carpenter DO;  Location: [facility] L&D;  Service: OB/GYN;  Laterality: N/A;     SECTION      CHOLECYSTECTOMY      CYST REMOVAL      Left ear @ age 2    DILATION AND CURETTAGE OF UTERUS USING SUCTION N/A 2021    Procedure: DILATION AND CURETTAGE, UTERUS, USING SUCTION;  Surgeon: Fiorella Carpenter DO;  Location: [facility] OR;  Service: OB/GYN;  Laterality: N/A;    LAPAROSCOPIC CHOLECYSTECTOMY N/A 10/07/2022    Procedure: CHOLECYSTECTOMY, LAPAROSCOPIC;  Surgeon: Armida Ferraro MD;  Location: [facility] OR;  Service: General;  Laterality: N/A;    ROBOT-ASSISTED LAPAROSCOPIC UTERINE MYOMECTOMY N/A 2020    Procedure: ROBOTIC MYOMECTOMY, UTERUS;  Surgeon: Sylvie Zavaleta MD;  Location: [facility] OR;  Service: OB/GYN;  Laterality: N/A;    TONSILLECTOMY, ADENOIDECTOMY         SOCIAL HISTORY:  Social History     Socioeconomic History    Marital status:    Tobacco Use    Smoking status: Never    Smokeless tobacco: Never   Substance and Sexual Activity    Alcohol use: Yes     Comment: social     Drug use: No    Sexual activity: Yes     Partners: Male     Birth control/protection: Condom, None   Other Topics Concern    Are you pregnant or think you may be? No    Breast-feeding No     Social Determinants of Health     Financial Resource Strain: Low Risk  (2024)    Overall Financial Resource Strain (CARDIA)     Difficulty of Paying Living Expenses: Not hard at all   Food Insecurity: No Food Insecurity (2024)    Hunger Vital Sign     Worried About Running Out of Food in the Last Year: Never true     Ran Out of Food in the Last Year:  Never true   Transportation Needs: No Transportation Needs (2024)    PRAPARE - Transportation     Lack of Transportation (Medical): No     Lack of Transportation (Non-Medical): No   Physical Activity: Sufficiently Active (2024)    Exercise Vital Sign     Days of Exercise per Week: 3 days     Minutes of Exercise per Session: 50 min   Stress: No Stress Concern Present (2024)    [place] [employer] - Occupational Stress Questionnaire     Feeling of Stress : Not at all   Social Connections: Unknown (2024)    Social Connection and Isolation Panel [NHANES]     Frequency of Communication with Friends and Family: Twice a week     Frequency of Social Gatherings with Friends and Family: Twice a week     Active Member of Clubs or Organizations: No     Attends Club or Organization Meetings: Never     Marital Status:    Housing Stability: Low Risk  (2024)    Housing Stability Vital Sign     Unable to Pay for Housing in the Last Year: No     Number of Places Lived in the Last Year: 1     Unstable Housing in the Last Year: No       FAMILY HISTORY:  Family History   Problem Relation Age of Onset    No Known Problems Father     Miscarriages / Stillbirths Mother     Hypothyroidism Mother     Arthritis Mother     Coronary artery disease Maternal Grandmother         s/p CABG 4v    Hypothyroidism Maternal Grandmother     Coronary artery disease Paternal Grandmother         s/p CABG    Breast cancer Neg Hx     Cancer Neg Hx     Colon cancer Neg Hx     Diabetes Neg Hx     Eclampsia Neg Hx     Hypertension Neg Hx     Stroke Neg Hx      labor Neg Hx     Ovarian cancer Neg Hx     Melanoma Neg Hx        ALLERGIES AND MEDICATIONS: updated and reviewed.  Review of patient's allergies indicates:   Allergen Reactions    Ibuprofen Hives and Shortness Of Breath     bronchospasm    Ancef [cefazolin] Rash     Current Outpatient Medications   Medication Sig Dispense Refill    ergocalciferol  "(ERGOCALCIFEROL) 50,000 unit Cap Take 1 capsule (50,000 Units total) by mouth every 7 days. 12 capsule 3    cetirizine (ZYRTEC) 10 MG tablet Take 10 mg by mouth once daily.      [START ON 3/22/2024] tirzepatide 5 mg/0.5 mL PnIj Inject 5 mg into the skin every 7 days. 4 pen 0    [START ON 4/21/2024] tirzepatide 7.5 mg/0.5 mL PnIj Inject 7.5 mg into the skin every 7 days. 4 pen 0    tirzepatide, weight loss, (ZEPBOUND) 2.5 mg/0.5 mL PnIj Inject 2.5 mg into the skin every 7 days. 4 Pen 0     No current facility-administered medications for this visit.         ROS  Review of Systems   Constitutional:  Negative for activity change, appetite change, fatigue, fever and unexpected weight change.   HENT: Negative.  Negative for ear discharge, ear pain, rhinorrhea and sore throat.    Eyes: Negative.    Respiratory:  Negative for apnea, cough, chest tightness, shortness of breath and wheezing.    Cardiovascular:  Negative for chest pain, palpitations and leg swelling.   Gastrointestinal:  Negative for abdominal distention, abdominal pain, constipation, diarrhea and vomiting.   Endocrine: Negative for cold intolerance, heat intolerance, polydipsia and polyuria.   Genitourinary:  Negative for decreased urine volume and urgency.   Musculoskeletal: Negative.    Skin:  Negative for rash.   Neurological:  Negative for dizziness and headaches.   Hematological:  Does not bruise/bleed easily.   Psychiatric/Behavioral:  Negative for agitation, sleep disturbance and suicidal ideas.          Physical Exam  Vitals:    02/21/24 1008   BP: 100/60   Pulse: 60   Temp: 97.6 °F (36.4 °C)   TempSrc: Oral   SpO2: 95%   Weight: 75.7 kg (166 lb 14.2 oz)   Height: 5' 3" (1.6 m)    Body mass index is 29.56 kg/m².  Weight: 75.7 kg (166 lb 14.2 oz)   Height: 5' 3" (160 cm)   Physical Exam  Vitals reviewed.   Constitutional:       Appearance: Normal appearance. She is well-developed.   HENT:      Head: Normocephalic and atraumatic.      Right Ear: " External ear normal.      Left Ear: External ear normal.      Nose: Nose normal.      Mouth/Throat:      Mouth: Mucous membranes are moist.      Pharynx: Oropharynx is clear.   Eyes:      Extraocular Movements: Extraocular movements intact.      Conjunctiva/sclera: Conjunctivae normal.      Pupils: Pupils are equal, round, and reactive to light.   Cardiovascular:      Rate and Rhythm: Normal rate and regular rhythm.      Heart sounds: Normal heart sounds.   Pulmonary:      Effort: Pulmonary effort is normal.      Breath sounds: Normal breath sounds.   Skin:     General: Skin is warm and dry.   Neurological:      Mental Status: She is alert and oriented to person, place, and time.        Physical Exam  Ears are clear.  Clear.  Heart rate is normal.         Health Maintenance         Date Due Completion Date    COVID-19 Vaccine (4 - 2023-24 season) 09/01/2023 12/28/2021    Influenza Vaccine (1) 02/28/2024 (Originally 9/1/2023) 10/1/2022 (Done)    Override on 10/1/2022: Done    Cervical Cancer Screening 05/17/2028 5/17/2023    TETANUS VACCINE 03/30/2032 3/30/2022                Patient note was created using Yip[employer].  Any errors in syntax or even information may not have been identified and edited on initial review prior to signing this note.

## 2024-03-21 ENCOUNTER — PATIENT MESSAGE (OUTPATIENT)
Dept: FAMILY MEDICINE | Facility: CLINIC | Age: 34
End: 2024-03-21
Payer: COMMERCIAL

## 2024-03-21 RX ORDER — TIRZEPATIDE 5 MG/.5ML
5 INJECTION, SOLUTION SUBCUTANEOUS
Qty: 4 PEN | Refills: 0 | Status: SHIPPED | OUTPATIENT
Start: 2024-03-21 | End: 2024-04-17 | Stop reason: SDUPTHER

## 2024-04-17 RX ORDER — TIRZEPATIDE 5 MG/.5ML
5 INJECTION, SOLUTION SUBCUTANEOUS
Qty: 4 PEN | Refills: 0 | Status: SHIPPED | OUTPATIENT
Start: 2024-04-17 | End: 2024-05-01 | Stop reason: SDUPTHER

## 2024-04-23 ENCOUNTER — PATIENT MESSAGE (OUTPATIENT)
Dept: FAMILY MEDICINE | Facility: CLINIC | Age: 34
End: 2024-04-23
Payer: COMMERCIAL

## 2024-05-01 RX ORDER — TIRZEPATIDE 5 MG/.5ML
5 INJECTION, SOLUTION SUBCUTANEOUS
Qty: 4 PEN | Refills: 0 | Status: SHIPPED | OUTPATIENT
Start: 2024-05-01 | End: 2024-05-29

## 2024-05-08 ENCOUNTER — PATIENT MESSAGE (OUTPATIENT)
Dept: FAMILY MEDICINE | Facility: CLINIC | Age: 34
End: 2024-05-08
Payer: COMMERCIAL

## 2024-05-08 DIAGNOSIS — R63.5 WEIGHT GAIN: Primary | ICD-10-CM

## 2024-05-10 RX ORDER — TIRZEPATIDE 2.5 MG/.5ML
2.5 INJECTION, SOLUTION SUBCUTANEOUS
Qty: 2 ML | Refills: 1 | Status: SHIPPED | OUTPATIENT
Start: 2024-05-10 | End: 2024-05-29

## 2024-05-29 ENCOUNTER — OFFICE VISIT (OUTPATIENT)
Dept: FAMILY MEDICINE | Facility: CLINIC | Age: 34
End: 2024-05-29
Payer: COMMERCIAL

## 2024-05-29 VITALS
HEART RATE: 61 BPM | HEIGHT: 63 IN | DIASTOLIC BLOOD PRESSURE: 70 MMHG | TEMPERATURE: 98 F | WEIGHT: 160.5 LBS | OXYGEN SATURATION: 96 % | SYSTOLIC BLOOD PRESSURE: 106 MMHG | BODY MASS INDEX: 28.44 KG/M2

## 2024-05-29 DIAGNOSIS — E78.2 MIXED HYPERLIPIDEMIA: Primary | ICD-10-CM

## 2024-05-29 DIAGNOSIS — E66.3 OVERWEIGHT (BMI 25.0-29.9): ICD-10-CM

## 2024-05-29 PROCEDURE — 1160F RVW MEDS BY RX/DR IN RCRD: CPT | Mod: CPTII,S$GLB,, | Performed by: FAMILY MEDICINE

## 2024-05-29 PROCEDURE — 3008F BODY MASS INDEX DOCD: CPT | Mod: CPTII,S$GLB,, | Performed by: FAMILY MEDICINE

## 2024-05-29 PROCEDURE — 99214 OFFICE O/P EST MOD 30 MIN: CPT | Mod: S$GLB,,, | Performed by: FAMILY MEDICINE

## 2024-05-29 PROCEDURE — 3074F SYST BP LT 130 MM HG: CPT | Mod: CPTII,S$GLB,, | Performed by: FAMILY MEDICINE

## 2024-05-29 PROCEDURE — 3078F DIAST BP <80 MM HG: CPT | Mod: CPTII,S$GLB,, | Performed by: FAMILY MEDICINE

## 2024-05-29 PROCEDURE — 99999 PR PBB SHADOW E&M-EST. PATIENT-LVL III: CPT | Mod: PBBFAC,,, | Performed by: FAMILY MEDICINE

## 2024-05-29 PROCEDURE — 1159F MED LIST DOCD IN RCRD: CPT | Mod: CPTII,S$GLB,, | Performed by: FAMILY MEDICINE

## 2024-05-29 RX ORDER — TIRZEPATIDE 7.5 MG/.5ML
7.5 INJECTION, SOLUTION SUBCUTANEOUS
Qty: 2 EACH | Refills: 2 | Status: SHIPPED | OUTPATIENT
Start: 2024-05-29

## 2024-05-29 RX ORDER — FLUOXETINE HYDROCHLORIDE 20 MG/1
20 CAPSULE ORAL
COMMUNITY
Start: 2024-03-20

## 2024-05-29 NOTE — PROGRESS NOTES
Assessment & Plan  Problem List Items Addressed This Visit          Cardiac/Vascular    Mixed hyperlipidemia - Primary     Other Visit Diagnoses       Overweight (BMI 25.0-29.9)        Relevant Medications    tirzepatide, weight loss, (ZEPBOUND) 7.5 mg/0.5 mL PnIj           Assessment & Plan  1. Undergoing a weight loss regimen.  The patient's weight loss progress is commendable, and she is successfully achieving her set goals. A prescription for a 7.5 mg dosage has been issued, with two refills provided, sufficient for a three-month period. The patient has been advised to maintain adequate hydration, preferably with electrolyte-rich fluids, to counteract dizziness potentially associated with caloric deficit. She has also been encouraged to incorporate protein-based powder into her oatmeal breakfast to ensure a balanced diet. Should the patient experience a plateau in her weight loss journey, she is to communicate this, and we will consider an increase in her medication dosage.    Results        Health Maintenance reviewed.      ______________________________________________________________________    Chief Complaint  Chief Complaint   Patient presents with    Weight Check       HPI  Armida Medina is a 33 y.o. female with multiple medical diagnoses as listed in the medical history and problem list that presents for weight check.  Pt is known to me with last appointment 2/21/2024.    History of Present Illness  The patient presents for evaluation of weight loss.    The patient has been successfully managing her weight loss, having procured a 5 mg dosage from the [place] pharmacy approximately 2 weeks ago. She is scheduled to administer the third injection from this batch tomorrow. She has expressed interest in obtaining the subsequent 7.5 mg dosage from the [facility] pharmacy. Despite her concerns about potential side effects, she has not experienced any adverse reactions to the 5 mg dosage. Her nutritional  intake has been satisfactory, with adherence to a diet plan and meal prepping. She maintains a regular exercise regimen, attending bar classes three to four times a week, contingent on her work schedule. She has recently experienced episodes of dizziness upon standing, which she suspects may be related to her medication. Her dietary habits include three meals a day, with breakfast typically consisting of oatmeal or a protein bar, particularly on workout days. She occasionally consumes eggs for breakfast. She strives to consume at least 40 ounces of water daily, although she finds this challenging at times. Her water intake does not include electrolyte-enhanced beverages.           PAST MEDICAL HISTORY:  Past Medical History:   Diagnosis Date    Abnormal Pap smear of cervix     Acne     Allergy     Asthma     Benign paroxysmal vertigo, bilateral     Hashimoto's thyroiditis 2017    History of  section 2021    PONV (postoperative nausea and vomiting)     Postpartum hemorrhage 2022    Preeclampsia, severe, third trimester 2018    Severe pre-eclampsia in third trimester 2022    Admitted  for concern for preeclampsia with SF (BP).  Patient had two severe range blood pressures in the RENATA requiring labetalol 20 mg IV x 1 Her pre E labs on admit and repeat during her two day admission were all within normal limits Admit labs : PC TLC Plt 204, Cr 0.7, AST/ALT 11/10 Repeat labs : Plt 212, CR 0.7, AST/ALT 10/11 During the 48 hours, patient blood pressures were all        PAST SURGICAL HISTORY:  Past Surgical History:   Procedure Laterality Date     SECTION N/A 2018    Procedure:  SECTION;  Surgeon: Sawyer Lake Jr., MD;  Location: [facility] L&D;  Service: OB/GYN;  Laterality: N/A;     SECTION N/A 2022    Procedure:  SECTION;  Surgeon: Fiorella Carpenter DO;  Location: [facility] L&D;  Service: OB/GYN;  Laterality: N/A;     SECTION  N/A 2022    Procedure:  SECTION;  Surgeon: Fiorella Carpenter DO;  Location: [facility] L&D;  Service: OB/GYN;  Laterality: N/A;     SECTION      CHOLECYSTECTOMY      CYST REMOVAL      Left ear @ age 2    DILATION AND CURETTAGE OF UTERUS USING SUCTION N/A 2021    Procedure: DILATION AND CURETTAGE, UTERUS, USING SUCTION;  Surgeon: Fiorella Carpenter DO;  Location: [facility] OR;  Service: OB/GYN;  Laterality: N/A;    LAPAROSCOPIC CHOLECYSTECTOMY N/A 10/07/2022    Procedure: CHOLECYSTECTOMY, LAPAROSCOPIC;  Surgeon: Armida Ferraro MD;  Location: [facility];  Service: General;  Laterality: N/A;    ROBOT-ASSISTED LAPAROSCOPIC UTERINE MYOMECTOMY N/A 2020    Procedure: ROBOTIC MYOMECTOMY, UTERUS;  Surgeon: Sylvie Zavaleta MD;  Location: [facility] OR;  Service: OB/GYN;  Laterality: N/A;    TONSILLECTOMY, ADENOIDECTOMY         SOCIAL HISTORY:  Social History     Socioeconomic History    Marital status:    Tobacco Use    Smoking status: Never    Smokeless tobacco: Never   Substance and Sexual Activity    Alcohol use: Yes     Comment: social     Drug use: No    Sexual activity: Yes     Partners: Male     Birth control/protection: Condom, None   Other Topics Concern    Are you pregnant or think you may be? No    Breast-feeding No     Social Determinants of Health     Financial Resource Strain: Low Risk  (2024)    Overall Financial Resource Strain (CARDIA)     Difficulty of Paying Living Expenses: Not hard at all   Food Insecurity: No Food Insecurity (2024)    Hunger Vital Sign     Worried About Running Out of Food in the Last Year: Never true     Ran Out of Food in the Last Year: Never true   Transportation Needs: No Transportation Needs (2024)    PRAPARE - Transportation     Lack of Transportation (Medical): No     Lack of Transportation (Non-Medical): No   Physical Activity: Sufficiently Active (2024)    Exercise Vital Sign     Days of Exercise per Week: 3 days      Minutes of Exercise per Session: 50 min   Stress: No Stress Concern Present (2024)    [employer] [place] - Occupational Stress Questionnaire     Feeling of Stress : Not at all   Housing Stability: Low Risk  (2024)    Housing Stability Vital Sign     Unable to Pay for Housing in the Last Year: No     Number of Places Lived in the Last Year: 1     Unstable Housing in the Last Year: No       FAMILY HISTORY:  Family History   Problem Relation Name Age of Onset    No Known Problems Father      Miscarriages / Stillbirths Mother Marycruz     Hypothyroidism Mother Marycruz     Arthritis Mother Marycruz     Coronary artery disease Maternal Grandmother          s/p CABG 4v    Hypothyroidism Maternal Grandmother      Coronary artery disease Paternal Grandmother          s/p CABG    Breast cancer Neg Hx      Cancer Neg Hx      Colon cancer Neg Hx      Diabetes Neg Hx      Eclampsia Neg Hx      Hypertension Neg Hx      Stroke Neg Hx       labor Neg Hx      Ovarian cancer Neg Hx      Melanoma Neg Hx         ALLERGIES AND MEDICATIONS: updated and reviewed.  Review of patient's allergies indicates:   Allergen Reactions    Ibuprofen Hives and Shortness Of Breath     bronchospasm    Ancef [cefazolin] Rash     Current Outpatient Medications   Medication Sig Dispense Refill    cetirizine (ZYRTEC) 10 MG tablet Take 10 mg by mouth once daily.      FLUoxetine 20 MG capsule Take 20 mg by mouth.      tirzepatide, weight loss, (ZEPBOUND) 7.5 mg/0.5 mL PnIj Inject 7.5 mg into the skin every 7 days. 2 each 2     No current facility-administered medications for this visit.         ROS  Review of Systems   Constitutional:  Negative for activity change, appetite change, fatigue, fever and unexpected weight change.   HENT: Negative.  Negative for ear discharge, ear pain, rhinorrhea and sore throat.    Eyes: Negative.    Respiratory:  Negative for apnea, cough, chest tightness, shortness of breath and  "wheezing.    Cardiovascular:  Negative for chest pain, palpitations and leg swelling.   Gastrointestinal:  Negative for abdominal distention, abdominal pain, constipation, diarrhea and vomiting.   Endocrine: Negative for cold intolerance, heat intolerance, polydipsia and polyuria.   Genitourinary:  Negative for decreased urine volume and urgency.   Musculoskeletal: Negative.    Skin:  Negative for rash.   Neurological:  Negative for dizziness and headaches.   Hematological:  Does not bruise/bleed easily.   Psychiatric/Behavioral:  Negative for agitation, sleep disturbance and suicidal ideas.            Physical Exam  Vitals:    05/29/24 0711   BP: 106/70   Pulse: 61   Temp: 98.3 °F (36.8 °C)   TempSrc: Oral   SpO2: 96%   Weight: 72.8 kg (160 lb 7.9 oz)   Height: 5' 3" (1.6 m)    Body mass index is 28.43 kg/m².  Weight: 72.8 kg (160 lb 7.9 oz)   Height: 5' 3" (160 cm)   Physical Exam  Vitals reviewed.   Constitutional:       Appearance: Normal appearance. She is well-developed.   HENT:      Head: Normocephalic and atraumatic.      Right Ear: External ear normal.      Left Ear: External ear normal.      Nose: Nose normal.      Mouth/Throat:      Mouth: Mucous membranes are moist.      Pharynx: Oropharynx is clear.   Eyes:      Extraocular Movements: Extraocular movements intact.      Conjunctiva/sclera: Conjunctivae normal.      Pupils: Pupils are equal, round, and reactive to light.   Cardiovascular:      Rate and Rhythm: Normal rate and regular rhythm.      Heart sounds: Normal heart sounds.   Pulmonary:      Effort: Pulmonary effort is normal.      Breath sounds: Normal breath sounds.   Skin:     General: Skin is warm and dry.   Neurological:      Mental Status: She is alert and oriented to person, place, and time.        Physical Exam         Health Maintenance         Date Due Completion Date    COVID-19 Vaccine (4 - 2023-24 season) 09/01/2023 12/28/2021    Influenza Vaccine (Season Ended) 09/01/2024 10/1/2022 " (Done)    Override on 10/1/2022: Done    Cervical Cancer Screening 05/17/2028 5/17/2023    TETANUS VACCINE 03/30/2032 3/30/2022                Patient note was created using [employer].  Any errors in syntax or even information may not have been identified and edited on initial review prior to signing this note.

## 2024-06-04 ENCOUNTER — PATIENT MESSAGE (OUTPATIENT)
Dept: OBSTETRICS AND GYNECOLOGY | Facility: CLINIC | Age: 34
End: 2024-06-04
Payer: COMMERCIAL

## 2024-06-04 DIAGNOSIS — B37.31 VAGINAL YEAST INFECTION: Primary | ICD-10-CM

## 2024-06-04 RX ORDER — FLUCONAZOLE 200 MG/1
200 TABLET ORAL
Qty: 2 TABLET | Refills: 0 | Status: SHIPPED | OUTPATIENT
Start: 2024-06-04 | End: 2024-06-08

## 2024-06-06 ENCOUNTER — OFFICE VISIT (OUTPATIENT)
Dept: DERMATOLOGY | Facility: CLINIC | Age: 34
End: 2024-06-06
Payer: COMMERCIAL

## 2024-06-06 DIAGNOSIS — L71.9 ROSACEA: ICD-10-CM

## 2024-06-06 DIAGNOSIS — L70.0 ACNE VULGARIS: Primary | ICD-10-CM

## 2024-06-06 DIAGNOSIS — L40.9 PSORIASIS: ICD-10-CM

## 2024-06-06 PROCEDURE — 1159F MED LIST DOCD IN RCRD: CPT | Mod: CPTII,S$GLB,, | Performed by: DERMATOLOGY

## 2024-06-06 PROCEDURE — 1160F RVW MEDS BY RX/DR IN RCRD: CPT | Mod: CPTII,S$GLB,, | Performed by: DERMATOLOGY

## 2024-06-06 PROCEDURE — 99214 OFFICE O/P EST MOD 30 MIN: CPT | Mod: S$GLB,,, | Performed by: DERMATOLOGY

## 2024-06-06 RX ORDER — TRIAMCINOLONE ACETONIDE 1 MG/G
CREAM TOPICAL
Qty: 80 G | Refills: 1 | Status: SHIPPED | OUTPATIENT
Start: 2024-06-06

## 2024-06-06 RX ORDER — TRETINOIN 0.25 MG/G
CREAM TOPICAL
Qty: 30 G | Refills: 5 | Status: SHIPPED | OUTPATIENT
Start: 2024-06-06

## 2024-06-06 NOTE — PROGRESS NOTES
Patient Information  Name: Armida Medina  : 1990  MRN: [id number]     Referring Physician:  No ref. provider found   Primary Care Physician:  Keli Mcclellan MD   Date of Visit: 2024      Subjective:     History of Present lllness:    Armida Medina is a 33 y.o. female who presents with a chief complaint of acne scarring, redness and texture change.  Location: face  Signs/Symptoms: redness, acne scarring and texture change  Relieving factors/Prior treatments: OTC sal acid gentle cleanser, sunscreen moisturizer, adapalene/babs's choice retinol and vit c    She would also like something to treat her recent flare of psoriasis.    Patient was last seen: 2022.  Prior notes by myself reviewed.   Clinical documentation obtained by nursing staff reviewed.    Review of Systems    Objective:   Physical Exam   Constitutional: She appears well-developed and well-nourished. No distress.   Neurological: She is alert and oriented to person, place, and time. She is not disoriented.   Psychiatric: She has a normal mood and affect.   Skin:   Areas Examined (abnormalities noted in diagram):   Head / Face Inspection Performed  Abdomen Inspection Performed                 Diagram Legend     Erythematous scaling macule/papule c/w actinic keratosis       Vascular papule c/w angioma      Pigmented verrucoid papule/plaque c/w seborrheic keratosis      Yellow umbilicated papule c/w sebaceous hyperplasia      Irregularly shaped tan macule c/w lentigo     1-2 mm smooth white papules consistent with Milia      Movable subcutaneous cyst with punctum c/w epidermal inclusion cyst      Subcutaneous movable cyst c/w pilar cyst      Firm pink to brown papule c/w dermatofibroma      Pedunculated fleshy papule(s) c/w skin tag(s)      Evenly pigmented macule c/w junctional nevus     Mildly variegated pigmented, slightly irregular-bordered macule c/w mildly atypical nevus      Flesh colored to evenly pigmented papule  c/w intradermal nevus       Pink pearly papule/plaque c/w basal cell carcinoma      Erythematous hyperkeratotic cursted plaque c/w SCC      Surgical scar with no sign of skin cancer recurrence      Open and closed comedones      Inflammatory papules and pustules      Verrucoid papule consistent consistent with wart     Erythematous eczematous patches and plaques     Dystrophic onycholytic nail with subungual debris c/w onychomycosis     Umbilicated papule    Erythematous-base heme-crusted tan verrucoid plaque consistent with inflamed seborrheic keratosis     Erythematous Silvery Scaling Plaque c/w Psoriasis     See annotation    No images are attached to the encounter or orders placed in the encounter.      [] Data reviewed  [] Prior external notes reviewed  [] Independent review of test  [] Management discussed with another provider  [] Independent historian    Assessment / Plan:        Acne vulgaris   - stable and chronic  -     tretinoin (RETIN-A) 0.025 % cream; Compound tretinoin 0.025% / azelaic acid 8% / niacinamide 2% cream. Apply a pea-sized amount to entire face qhs.  Dispense: 30 g; Refill: 5    Rosacea  - chronic problem, not at treatment goal  Rosacea is a chronic condition without a definitive cure.  There are several well-known triggers, such as exercise, temperature extremes, alcohol, and spicy foods, that should be avoided to prevent a rosacea flare.  Use gentle, ceramide-containing products (such as CeraVe Moisturizing Cream or La Roche-Posay Toleriane Double Repair Face Moisturizer) to repair the skin barrier and to minimize irritation. Avoid harsh soaps, exfoliants, and scrubs.  -     tretinoin (RETIN-A) 0.025 % cream; Compound tretinoin 0.025% / azelaic acid 8% / niacinamide 2% cream. Apply a pea-sized amount to entire face qhs.  Dispense: 30 g; Refill: 5    Psoriasis  - chronic problem, not at treatment goal  -     triamcinolone acetonide 0.1% (KENALOG) 0.1 % cream; Apply to affected areas of  body BID prn rash.  Dispense: 80 g; Refill: 1  Side effects from the overuse of topical steroids include thinning of skin, easy tearing/bruising of skin, stretch marks, spider veins, etc. Use the topical steroid no more than 2 days per week if used long-term and/or take breaks from the topical steroid, especially if any of the above side effects are noticed.        Follow up in about 3 months (around 9/6/2024).      Teresa Landaverde MD, FAAD  [facility]

## 2024-06-06 NOTE — PATIENT INSTRUCTIONS
Your prescription has been sent to the [facility].  They are located in [place] at [address] just before the [address] [place].  If you have any questions, please call the pharmacy at [phone].  Website: [email or web address]     Moisturizer options:  For oily to combo skin: La Roche Posay Toleriane Double Repair Matte Face Moisturizer, CeraVe PM lotion  For normal to dry skin: Vanicream Daily Facial Moisturizer, CeraVe moisturizing cream, La Roche Posay Toleriane Double Repair Face Moisturizer               RETINOIDS   Your Doctor has prescribed a topical retinoid for your skin.  A retinoid is a vitamin A-derived product used to treat a variety of skin conditions including acne, actinic keratoses (pre-skin cancers), uneven pigmentation from sun damage, fine lines and wrinkles, and enlarged pores.      How do they work?   Retinoids increase skin cell turn over from the normal 30 days to five or six days, minimizing clogged pores--the major factor in acne.  Retinoids can also repair the DNA in cells damaged by the sun, helping to even out skin pigmentation and clear pre-skin cancers.  They stimulate collagen remodeling and repair, reversing signs of maturing skin.   They can shrink oil glands and minimize the appearance of large pores. These effects can not be appreciated unless the medication is used on a consistent basis!    How do I use a retinoid?   After washing with a mild cleanser (CeraVe, Cetaphil, Neutrogena, Purpose), the skin should be moisturized with a non-retinol containing moisturizer such as Cerave cream or PM lotion. Then, a thin layer of medication is applied to the forehead, nose, cheeks, and chin (and around eyes if treating fine wrinkles) at night.  The amount of medication needed to cover the entire face should be no more than the size of a green pea. Irritation around the eyes can be treated with Vaseline at night.     What if my skin appears dry, red,  and is peeling?   Retinoids do not cause dry skin but rather they cause the top layer of the skin to shed, giving an appearance of dry skin.  In fact, new healthy skin cells are replacing the older, damaged cells on the surface. This usually occurs the first 2-4 weeks as the skin is adjusting to the medication.  It is reasonable to use the medication every other night or even every three nights until your skin adjusts.  You can use a MILD exfoliant to remove the peeling skin (Aveeno daily clarifying pads, Aqua glycolic face cream) and can apply a moisturizer throughout the day as needed. Retinoids come in a variety of strengths and vehicles, and your doctor can find one best for you.  If you cannot tolerate prescription strength retinoids, over the counter products with retinol may be beneficial (Olay ProX wrinkle cream, JACKELIN deep wrinkle cream, Green Cream at [employer])    Will my skin be more sensitive in the sun?   You will need to use a sunscreen with SPF 30 daily.  Retinoids will cause the outermost layer of the skin to be thinner and thus more sensitive to ultraviolet rays.  However, remember that over time, retinoids actually make the skin thicker by enhancing collagen deposition which protects the skin from sun damage.     When will I see results?   If you are using a retinoid for acne, you should see some improvement in 6-8 weeks.  Do not be alarmed if you find that your acne gets WORSE before it gets better- KEEP USING THE MEDICATION- this is a normal response and your acne will improve if you can stick with it.     If you are using the medication for anti-aging and skin dyspigmentation, you may see results in 3 months, but most effects are not visible until 6 months.  Retinoids are clinically proven to reverse signs of aging, but only if used on a CONSISTENT BASIS!   *Remember that retinoids should not be used if you are pregnant.  *Discontinue use 1 week prior to waxing, as skin is more likely to tear.

## 2024-07-03 DIAGNOSIS — E66.3 OVERWEIGHT (BMI 25.0-29.9): ICD-10-CM

## 2024-07-03 RX ORDER — TIRZEPATIDE 7.5 MG/.5ML
7.5 INJECTION, SOLUTION SUBCUTANEOUS
Qty: 4 PEN | Refills: 0 | Status: SHIPPED | OUTPATIENT
Start: 2024-07-03

## 2024-07-03 RX ORDER — TIRZEPATIDE 7.5 MG/.5ML
7.5 INJECTION, SOLUTION SUBCUTANEOUS
Qty: 2 PEN | Refills: 2 | Status: CANCELLED | OUTPATIENT
Start: 2024-07-03

## 2024-07-03 NOTE — TELEPHONE ENCOUNTER
Care Due:                  Date            Visit Type   Department     Provider  --------------------------------------------------------------------------------                                EP [facility]                              PRIMARY      MED/ INTERNAL  Last Visit: 05-      CARE ([facility])   MED/ PEDS      Keli Aguilar  Next Visit: None Scheduled  None         None Found                                                            Last  Test          Frequency    Reason                     Performed    Due Date  --------------------------------------------------------------------------------    HBA1C.......  6 months...  tirzepatide,.............  08- 02-    [employer] Embedded Care Due Messages. Reference number: [id number].   7/03/2024 9:42:22 AM CDT

## 2024-07-11 ENCOUNTER — PATIENT MESSAGE (OUTPATIENT)
Dept: FAMILY MEDICINE | Facility: CLINIC | Age: 34
End: 2024-07-11
Payer: COMMERCIAL

## 2024-07-11 DIAGNOSIS — E66.3 OVERWEIGHT (BMI 25.0-29.9): ICD-10-CM

## 2024-07-11 RX ORDER — TIRZEPATIDE 7.5 MG/.5ML
7.5 INJECTION, SOLUTION SUBCUTANEOUS
Qty: 4 PEN | Refills: 0 | Status: CANCELLED | OUTPATIENT
Start: 2024-07-11

## 2024-07-11 NOTE — TELEPHONE ENCOUNTER
No care due was identified.  [facility] Embedded Care Due Messages. Reference number: [id number].   7/11/2024 4:15:57 PM CDT

## 2024-07-15 RX ORDER — TIRZEPATIDE 5 MG/.5ML
5 INJECTION, SOLUTION SUBCUTANEOUS
Qty: 2 ML | Refills: 1 | Status: SHIPPED | OUTPATIENT
Start: 2024-07-15

## 2024-08-08 ENCOUNTER — PATIENT MESSAGE (OUTPATIENT)
Dept: FAMILY MEDICINE | Facility: CLINIC | Age: 34
End: 2024-08-08
Payer: COMMERCIAL

## 2024-08-08 DIAGNOSIS — E66.3 OVERWEIGHT (BMI 25.0-29.9): ICD-10-CM

## 2024-08-08 RX ORDER — TIRZEPATIDE 5 MG/.5ML
INJECTION, SOLUTION SUBCUTANEOUS
Refills: 0 | OUTPATIENT
Start: 2024-08-08

## 2024-08-08 RX ORDER — TIRZEPATIDE 5 MG/.5ML
5 INJECTION, SOLUTION SUBCUTANEOUS
Qty: 2 ML | Refills: 0 | Status: SHIPPED | OUTPATIENT
Start: 2024-08-08

## 2024-09-04 ENCOUNTER — OFFICE VISIT (OUTPATIENT)
Dept: FAMILY MEDICINE | Facility: CLINIC | Age: 34
End: 2024-09-04
Payer: COMMERCIAL

## 2024-09-04 VITALS
DIASTOLIC BLOOD PRESSURE: 62 MMHG | BODY MASS INDEX: 27.23 KG/M2 | TEMPERATURE: 97 F | SYSTOLIC BLOOD PRESSURE: 90 MMHG | HEIGHT: 63 IN | HEART RATE: 67 BPM | WEIGHT: 153.69 LBS | OXYGEN SATURATION: 95 %

## 2024-09-04 DIAGNOSIS — E66.3 OVERWEIGHT (BMI 25.0-29.9): ICD-10-CM

## 2024-09-04 DIAGNOSIS — F33.0 MILD EPISODE OF RECURRENT MAJOR DEPRESSIVE DISORDER: ICD-10-CM

## 2024-09-04 DIAGNOSIS — Z00.00 ANNUAL PHYSICAL EXAM: Primary | ICD-10-CM

## 2024-09-04 PROCEDURE — 99999 PR PBB SHADOW E&M-EST. PATIENT-LVL III: CPT | Mod: PBBFAC,,, | Performed by: FAMILY MEDICINE

## 2024-09-04 PROCEDURE — 3078F DIAST BP <80 MM HG: CPT | Mod: CPTII,S$GLB,, | Performed by: FAMILY MEDICINE

## 2024-09-04 PROCEDURE — 1160F RVW MEDS BY RX/DR IN RCRD: CPT | Mod: CPTII,S$GLB,, | Performed by: FAMILY MEDICINE

## 2024-09-04 PROCEDURE — 3008F BODY MASS INDEX DOCD: CPT | Mod: CPTII,S$GLB,, | Performed by: FAMILY MEDICINE

## 2024-09-04 PROCEDURE — 1159F MED LIST DOCD IN RCRD: CPT | Mod: CPTII,S$GLB,, | Performed by: FAMILY MEDICINE

## 2024-09-04 PROCEDURE — 3074F SYST BP LT 130 MM HG: CPT | Mod: CPTII,S$GLB,, | Performed by: FAMILY MEDICINE

## 2024-09-04 PROCEDURE — 99214 OFFICE O/P EST MOD 30 MIN: CPT | Mod: S$GLB,,, | Performed by: FAMILY MEDICINE

## 2024-09-04 RX ORDER — FLUOXETINE HYDROCHLORIDE 20 MG/1
20 CAPSULE ORAL DAILY
Qty: 90 CAPSULE | Refills: 3 | Status: SHIPPED | OUTPATIENT
Start: 2024-09-04

## 2024-09-04 NOTE — PROGRESS NOTES
Assessment & Plan  Problem List Items Addressed This Visit          Psychiatric    Recurrent major depressive disorder     Other Visit Diagnoses       Overweight (BMI 25.0-29.9)               Assessment & Plan  1. Weight Loss Management.  She is currently on a 5 mg dosage and prefers to stay on this dose as she did not like the 7.5 mg dosage. She has figured out that her insurance will cover a 3-month supply if prescribed as such. She has 10 syringes left, which will last for 10 weeks. She is advised to call before she needs a refill as the provider will be on maternity leave soon.    2. Medication Management.  A 90-day supply of Prozac 20 mg has been prescribed. The prescription will be sent to [facility].    3. Annual Blood Work.  Blood work will be sent to [facility] for her yearly check-up.        Results        Health Maintenance reviewed.      ______________________________________________________________________    Chief Complaint  Chief Complaint   Patient presents with    Weight Check       HPI  Armida Medina is a 34 y.o. female with multiple medical diagnoses as listed in the medical history and problem list that presents for weight check.  Pt is known to me with last appointment 5/29/2024.    History of Present Illness  The patient presents for evaluation of weight loss.    She reports positive progress in her weight loss journey, attributing this success to her meal preparation strategy. She has no concerns regarding her medication or meal plan. Currently, she is on a 5 mg dosage of her weight loss medication, having previously tried 7.5 mg. However, she experienced difficulties with insurance coverage at the higher dosage. She prefers the 5 mg dosage as she does not aim to lose an additional 100 pounds.     She recently discovered that her insurance covers a 3-month supply of the medication, which can be obtained from a retail pharmacy. She secured a 3-month supply approximately 2 weeks ago, leaving her  with a 10-week supply at present.    She needs blood work sent to "[facility]," for her yearly check-up. [facility] was trying to refill her Prozac 20 mg.           PAST MEDICAL HISTORY:  Past Medical History:   Diagnosis Date    Abnormal Pap smear of cervix     Acne     Allergy     Asthma     Benign paroxysmal vertigo, bilateral     Hashimoto's thyroiditis 2017    History of  section 2021    PONV (postoperative nausea and vomiting)     Postpartum hemorrhage 2022    Preeclampsia, severe, third trimester 2018    Severe pre-eclampsia in third trimester 2022    Admitted  for concern for preeclampsia with SF (BP).  Patient had two severe range blood pressures in the RENATA requiring labetalol 20 mg IV x 1 Her pre E labs on admit and repeat during her two day admission were all within normal limits Admit labs : PC TLC Plt 204, Cr 0.7, AST/ALT 11/10 Repeat labs : Plt 212, CR 0.7, AST/ALT 10/11 During the 48 hours, patient blood pressures were all        PAST SURGICAL HISTORY:  Past Surgical History:   Procedure Laterality Date     SECTION N/A 2018    Procedure:  SECTION;  Surgeon: Sawyer Lake Jr., MD;  Location: [facility] L&D;  Service: OB/GYN;  Laterality: N/A;     SECTION N/A 2022    Procedure:  SECTION;  Surgeon: Fiorella Carpenter DO;  Location: [facility] L&D;  Service: OB/GYN;  Laterality: N/A;     SECTION N/A 2022    Procedure:  SECTION;  Surgeon: Fiorella Carpenter DO;  Location: [facility] L&D;  Service: OB/GYN;  Laterality: N/A;     SECTION      CHOLECYSTECTOMY      CYST REMOVAL      Left ear @ age 2    DILATION AND CURETTAGE OF UTERUS USING SUCTION N/A 2021    Procedure: DILATION AND CURETTAGE, UTERUS, USING SUCTION;  Surgeon: Fiorella Carpenter DO;  Location: [facility] OR;  Service: OB/GYN;  Laterality: N/A;    LAPAROSCOPIC CHOLECYSTECTOMY N/A 10/07/2022    Procedure: CHOLECYSTECTOMY, LAPAROSCOPIC;  Surgeon:  Armida Ferraro MD;  Location: [facility] OR;  Service: General;  Laterality: N/A;    ROBOT-ASSISTED LAPAROSCOPIC UTERINE MYOMECTOMY N/A 12/22/2020    Procedure: ROBOTIC MYOMECTOMY, UTERUS;  Surgeon: Sylvie Zavaleta MD;  Location: [facility] OR;  Service: OB/GYN;  Laterality: N/A;    TONSILLECTOMY, ADENOIDECTOMY         SOCIAL HISTORY:  Social History     Socioeconomic History    Marital status:    Tobacco Use    Smoking status: Never    Smokeless tobacco: Never   Substance and Sexual Activity    Alcohol use: Yes     Comment: social     Drug use: No    Sexual activity: Yes     Partners: Male     Birth control/protection: Condom, None   Other Topics Concern    Are you pregnant or think you may be? No    Breast-feeding No     Social Determinants of Health     Financial Resource Strain: Low Risk  (2/20/2024)    Overall Financial Resource Strain (CARDIA)     Difficulty of Paying Living Expenses: Not hard at all   Food Insecurity: No Food Insecurity (2/20/2024)    Hunger Vital Sign     Worried About Running Out of Food in the Last Year: Never true     Ran Out of Food in the Last Year: Never true   Transportation Needs: No Transportation Needs (2/20/2024)    PRAPARE - Transportation     Lack of Transportation (Medical): No     Lack of Transportation (Non-Medical): No   Physical Activity: Sufficiently Active (2/20/2024)    Exercise Vital Sign     Days of Exercise per Week: 3 days     Minutes of Exercise per Session: 50 min   Stress: No Stress Concern Present (2/20/2024)    [employer] - Occupational Stress Questionnaire     Feeling of Stress : Not at all   Housing Stability: Low Risk  (2/20/2024)    Housing Stability Vital Sign     Unable to Pay for Housing in the Last Year: No     Number of Places Lived in the Last Year: 1     Unstable Housing in the Last Year: No       FAMILY HISTORY:  Family History   Problem Relation Name Age of Onset    No Known Problems Father       Miscarriages / Stillbirths Mother Marycruz     Hypothyroidism Mother Marycruz     Arthritis Mother Marycruz     Coronary artery disease Maternal Grandmother          s/p CABG 4v    Hypothyroidism Maternal Grandmother      Coronary artery disease Paternal Grandmother          s/p CABG    Breast cancer Neg Hx      Cancer Neg Hx      Colon cancer Neg Hx      Diabetes Neg Hx      Eclampsia Neg Hx      Hypertension Neg Hx      Stroke Neg Hx       labor Neg Hx      Ovarian cancer Neg Hx      Melanoma Neg Hx         ALLERGIES AND MEDICATIONS: updated and reviewed.  Review of patient's allergies indicates:   Allergen Reactions    Ibuprofen Hives and Shortness Of Breath     bronchospasm    Ancef [cefazolin] Rash     Current Outpatient Medications   Medication Sig Dispense Refill    tirzepatide, weight loss, (ZEPBOUND) 5 mg/0.5 mL PnIj Inject 5 mg into the skin every 7 days. 6 mL 0    cetirizine (ZYRTEC) 10 MG tablet Take 10 mg by mouth once daily.      FLUoxetine 20 MG capsule Take 1 capsule (20 mg total) by mouth once daily. 90 capsule 3    tretinoin (RETIN-A) 0.025 % cream Compound tretinoin 0.025% / azelaic acid 8% / niacinamide 2% cream. Apply a pea-sized amount to entire face qhs. 30 g 5    triamcinolone acetonide 0.1% (KENALOG) 0.1 % cream Apply to affected areas of body BID prn rash. 80 g 1     No current facility-administered medications for this visit.         ROS  Review of Systems   Constitutional:  Negative for activity change, appetite change, fatigue, fever and unexpected weight change.   HENT: Negative.  Negative for ear discharge, ear pain, rhinorrhea and sore throat.    Eyes: Negative.    Respiratory:  Negative for apnea, cough, chest tightness, shortness of breath and wheezing.    Cardiovascular:  Negative for chest pain, palpitations and leg swelling.   Gastrointestinal:  Negative for abdominal distention, abdominal pain, constipation, diarrhea and vomiting.   Endocrine: Negative for cold  "intolerance, heat intolerance, polydipsia and polyuria.   Genitourinary:  Negative for decreased urine volume and urgency.   Musculoskeletal: Negative.    Skin:  Negative for rash.   Neurological:  Negative for dizziness and headaches.   Hematological:  Does not bruise/bleed easily.   Psychiatric/Behavioral:  Negative for agitation, sleep disturbance and suicidal ideas.            Physical Exam  Vitals:    09/04/24 0725   BP: 90/62   Pulse: 67   Temp: 97.4 °F (36.3 °C)   TempSrc: Oral   SpO2: 95%   Weight: 69.7 kg (153 lb 10.6 oz)   Height: 5' 3" (1.6 m)    Body mass index is 27.22 kg/m².  Weight: 69.7 kg (153 lb 10.6 oz)   Height: 5' 3" (160 cm)   Physical Exam  Vitals reviewed.   Constitutional:       Appearance: Normal appearance. She is well-developed.   HENT:      Head: Normocephalic and atraumatic.      Right Ear: External ear normal.      Left Ear: External ear normal.      Nose: Nose normal.      Mouth/Throat:      Mouth: Mucous membranes are moist.      Pharynx: Oropharynx is clear.   Eyes:      Extraocular Movements: Extraocular movements intact.      Conjunctiva/sclera: Conjunctivae normal.      Pupils: Pupils are equal, round, and reactive to light.   Cardiovascular:      Rate and Rhythm: Normal rate and regular rhythm.      Heart sounds: Normal heart sounds.   Pulmonary:      Effort: Pulmonary effort is normal.      Breath sounds: Normal breath sounds.   Skin:     General: Skin is warm and dry.   Neurological:      Mental Status: She is alert and oriented to person, place, and time.        Physical Exam  Normal lung function.  Normal heart function.         Health Maintenance         Date Due Completion Date    Influenza Vaccine (1) 09/01/2024 10/1/2022 (Done)    Override on 10/1/2022: Done    COVID-19 Vaccine (4 - 2023-24 season) 09/01/2024 12/28/2021    Hemoglobin A1c (Diabetic Prevention Screening) 02/19/2027 2/19/2024    Cervical Cancer Screening 05/17/2028 5/17/2023    TETANUS VACCINE 03/30/2032 " 3/30/2022                Patient note was created using BRAINDIGIT.  Any errors in syntax or even information may not have been identified and edited on initial review prior to signing this note.

## 2024-09-19 ENCOUNTER — OFFICE VISIT (OUTPATIENT)
Dept: DERMATOLOGY | Facility: CLINIC | Age: 34
End: 2024-09-19
Payer: COMMERCIAL

## 2024-09-19 DIAGNOSIS — B35.1 ONYCHOMYCOSIS: ICD-10-CM

## 2024-09-19 DIAGNOSIS — L70.0 ACNE VULGARIS: ICD-10-CM

## 2024-09-19 DIAGNOSIS — L30.9 HAND ECZEMA: Primary | ICD-10-CM

## 2024-09-19 PROCEDURE — 99214 OFFICE O/P EST MOD 30 MIN: CPT | Mod: S$GLB,,, | Performed by: DERMATOLOGY

## 2024-09-19 PROCEDURE — G2211 COMPLEX E/M VISIT ADD ON: HCPCS | Mod: S$GLB,,, | Performed by: DERMATOLOGY

## 2024-09-19 PROCEDURE — 1159F MED LIST DOCD IN RCRD: CPT | Mod: CPTII,S$GLB,, | Performed by: DERMATOLOGY

## 2024-09-19 PROCEDURE — 1160F RVW MEDS BY RX/DR IN RCRD: CPT | Mod: CPTII,S$GLB,, | Performed by: DERMATOLOGY

## 2024-09-19 RX ORDER — TRETINOIN 0.5 MG/G
CREAM TOPICAL
Qty: 30 G | Refills: 5 | Status: SHIPPED | OUTPATIENT
Start: 2024-09-19

## 2024-09-19 RX ORDER — TERBINAFINE HYDROCHLORIDE 250 MG/1
250 TABLET ORAL DAILY
Qty: 30 TABLET | Refills: 2 | Status: SHIPPED | OUTPATIENT
Start: 2024-09-19 | End: 2024-12-18

## 2024-09-19 RX ORDER — TRIAMCINOLONE ACETONIDE 1 MG/G
CREAM TOPICAL
Qty: 80 G | Refills: 3 | Status: SHIPPED | OUTPATIENT
Start: 2024-09-19

## 2024-09-19 NOTE — PROGRESS NOTES
Patient Information  Name: Armida Medina  : 1990  MRN: [id number]     Referring Physician:  No ref. provider found   Primary Care Physician:  Keli Mcclellan MD   Date of Visit: 2024      Subjective:     History of Present lllness:    Armida Medina is a 34 y.o. female who presents with a chief complaint of toenail fell off and R thumb skin texture.    Location: R great toenail  Duration: not sure, had acrylic nails on toes for a couple of months, but this past week on Monday felt weird and Tuesday fell off  Signs/Symptoms: toenail fell off  and now looks like fungus  Relieving factors/Prior treatments: none    Location: R thumb  Duration: 1 mth  Signs/Symptoms: texture is different and more area is being affected  Relieving factors/Prior treatments: none    Patient was last seen: 2024.  Prior notes by myself reviewed.   Clinical documentation obtained by nursing staff reviewed.    Review of Systems    Objective:   Physical Exam   Constitutional: She appears well-developed and well-nourished. No distress.   Neurological: She is alert and oriented to person, place, and time. She is not disoriented.   Psychiatric: She has a normal mood and affect.   Skin:   Areas Examined (abnormalities noted in diagram):   Head / Face Inspection Performed  RUE Inspected  LUE Inspection Performed  Nails and Digits Inspection Performed               Diagram Legend     Erythematous scaling macule/papule c/w actinic keratosis       Vascular papule c/w angioma      Pigmented verrucoid papule/plaque c/w seborrheic keratosis      Yellow umbilicated papule c/w sebaceous hyperplasia      Irregularly shaped tan macule c/w lentigo     1-2 mm smooth white papules consistent with Milia      Movable subcutaneous cyst with punctum c/w epidermal inclusion cyst      Subcutaneous movable cyst c/w pilar cyst      Firm pink to brown papule c/w dermatofibroma      Pedunculated fleshy papule(s) c/w skin tag(s)      Evenly  pigmented macule c/w junctional nevus     Mildly variegated pigmented, slightly irregular-bordered macule c/w mildly atypical nevus      Flesh colored to evenly pigmented papule c/w intradermal nevus       Pink pearly papule/plaque c/w basal cell carcinoma      Erythematous hyperkeratotic cursted plaque c/w SCC      Surgical scar with no sign of skin cancer recurrence      Open and closed comedones      Inflammatory papules and pustules      Verrucoid papule consistent consistent with wart     Erythematous eczematous patches and plaques     Dystrophic onycholytic nail with subungual debris c/w onychomycosis     Umbilicated papule    Erythematous-base heme-crusted tan verrucoid plaque consistent with inflamed seborrheic keratosis     Erythematous Silvery Scaling Plaque c/w Psoriasis     See annotation    No images are attached to the encounter or orders placed in the encounter.      [] Data reviewed  [] Prior external notes reviewed  [] Independent review of test  [] Management discussed with another provider  [] Independent historian    Assessment / Plan:        Hand eczema  - chronic problem, not at treatment goal  - Discussed the importance of frequent hand moisturization every hour and after every hand washing. Wash hands with Dove Sensitive Skin Beauty Bar or other fragrance-free cleanser and moisturize with OTC CeraVe healing ointment or Neutrogena Norwegian Formula Hand Cream.   - Minimize hand washing unless visibly dirty, and use an alcohol-based hand  that contains moisturizers and lacks common allergens, such as Hello Lange, SupplyAID, SanitizeRx, or Hydra Sammi.  - Can apply the prescribed topical steroid followed by white cotton gloves or a warm, damp towel for severe flares.   -     triamcinolone acetonide 0.1% (KENALOG) 0.1 % cream; Apply to affected areas of hands BID prn rash. Do not use on face, underarms, or groin.  Dispense: 80 g; Refill: 3    Onychomycosis  Skin scraping was performed on  lesion(s) on the nail for a Chlorazol E + KOH prep. Fungal elements were visualized under the microscope.  Discussed treatment options with the patient, including risks, benefits, and side effects of oral Lamisil vs. topical treatments. Common side effects may include headache, skin rash, GI upset, and taste disturbances. Rare side effects may include severe skin rash or hepatitis.  Discussed with patient that condition may recur after clearance with the medication. Patient elected to proceed with oral Lamisil treatment.     If long term use warranted, will check labs monthly or after 6 weeks of use. Lamisil may interfere with certain medications including tricyclic antidepressants, cimetidine, rifampin. Minimize alcohol intake while on Lamisil, and stop statin medications if cholesterol is within normal limits.     -     terbinafine HCL (LAMISIL) 250 mg tablet; Take 1 tablet (250 mg total) by mouth once daily.  Dispense: 30 tablet; Refill: 2  -     Hepatic Function Panel; Future; Expected date: 10/31/2024    Acne vulgaris   - stable and chronic  -     tretinoin (RETIN-A) 0.05 % cream; Compound tretinoin 0.05% / azelaic acid 8% / niacinamide 2% cream. Apply a pea-sized amount to entire face qhs.  Dispense: 30 g; Refill: 5      Follow up in about 6 months (around 3/19/2025) for follow up, or sooner if symptoms worsening or not improving.      Teresa Landaverde MD, FAAD  [facility]

## 2024-09-30 ENCOUNTER — OFFICE VISIT (OUTPATIENT)
Dept: FAMILY MEDICINE | Facility: CLINIC | Age: 34
End: 2024-09-30
Payer: COMMERCIAL

## 2024-09-30 VITALS
WEIGHT: 153.69 LBS | HEART RATE: 63 BPM | HEIGHT: 63 IN | OXYGEN SATURATION: 99 % | SYSTOLIC BLOOD PRESSURE: 102 MMHG | DIASTOLIC BLOOD PRESSURE: 58 MMHG | BODY MASS INDEX: 27.23 KG/M2 | TEMPERATURE: 98 F

## 2024-09-30 DIAGNOSIS — E66.3 OVERWEIGHT (BMI 25.0-29.9): ICD-10-CM

## 2024-09-30 DIAGNOSIS — Z00.00 ANNUAL PHYSICAL EXAM: Primary | ICD-10-CM

## 2024-09-30 DIAGNOSIS — E78.2 MIXED HYPERLIPIDEMIA: ICD-10-CM

## 2024-09-30 PROCEDURE — 99395 PREV VISIT EST AGE 18-39: CPT | Mod: S$GLB,,, | Performed by: FAMILY MEDICINE

## 2024-09-30 PROCEDURE — 3074F SYST BP LT 130 MM HG: CPT | Mod: CPTII,S$GLB,, | Performed by: FAMILY MEDICINE

## 2024-09-30 PROCEDURE — 3044F HG A1C LEVEL LT 7.0%: CPT | Mod: CPTII,S$GLB,, | Performed by: FAMILY MEDICINE

## 2024-09-30 PROCEDURE — 3008F BODY MASS INDEX DOCD: CPT | Mod: CPTII,S$GLB,, | Performed by: FAMILY MEDICINE

## 2024-09-30 PROCEDURE — 1160F RVW MEDS BY RX/DR IN RCRD: CPT | Mod: CPTII,S$GLB,, | Performed by: FAMILY MEDICINE

## 2024-09-30 PROCEDURE — 1159F MED LIST DOCD IN RCRD: CPT | Mod: CPTII,S$GLB,, | Performed by: FAMILY MEDICINE

## 2024-09-30 PROCEDURE — 3078F DIAST BP <80 MM HG: CPT | Mod: CPTII,S$GLB,, | Performed by: FAMILY MEDICINE

## 2024-09-30 PROCEDURE — 99999 PR PBB SHADOW E&M-EST. PATIENT-LVL IV: CPT | Mod: PBBFAC,,, | Performed by: FAMILY MEDICINE

## 2024-09-30 RX ORDER — TIRZEPATIDE 5 MG/.5ML
5 INJECTION, SOLUTION SUBCUTANEOUS
Qty: 6 ML | Refills: 0 | Status: SHIPPED | OUTPATIENT
Start: 2024-09-30

## 2024-09-30 NOTE — PROGRESS NOTES
Assessment & Plan  Problem List Items Addressed This Visit          Cardiac/Vascular    Mixed hyperlipidemia     Other Visit Diagnoses       Annual physical exam    -  Primary    Overweight (BMI 25.0-29.9)        Relevant Medications    tirzepatide, weight loss, (ZEPBOUND) 5 mg/0.5 mL PnIj         I addressed all major concerns as it related to health maintenance.  All were ordered and scheduled based on the patients wishes.  Any additional health maintenance will be readdressed at the next physical if declined or deferred by the patient.    Assessment & Plan  1. Weight loss.  She reports good progress with weight loss and no issues with nutrition. She is currently on 5 mg of her medication and does not wish to increase the dosage. A prescription for a 90-day supply of her current medication will be sent to her retail pharmacy. She has been advised to continue meal prepping to manage cravings.    2. Jolting sensation in right leg.  She reports experiencing a jolting sensation in her right leg, which occurs frequently and is not related to muscle spasms. She has been advised to take magnesium more consistently to help alleviate this sensation. If the symptoms persist, further evaluation may be necessary.    Follow-up  Patient is scheduled for a follow-up visit in January.    Results        Health Maintenance reviewed.      ______________________________________________________________________    Chief Complaint  Chief Complaint   Patient presents with    Annual Exam       HPI  Armida Medina is a 34 y.o. female with multiple medical diagnoses as listed in the medical history and problem list that presents for annual exam.  Pt is known to me with last appointment 9/4/2024 .    History of Present Illness  The patient presents for evaluation of weight loss.    She reports a satisfactory progress in her weight loss journey, attributing it to effective meal planning and the use of a 5 mg medication, which she does  not wish to increase. She has been experiencing frequent jolts, particularly in her right leg, but also throughout her body. She was previously consuming an electrolyte powder mixed with water, but discontinued its use due to itching. She also takes magnesium supplements intermittently.           PAST MEDICAL HISTORY:  Past Medical History:   Diagnosis Date    Abnormal Pap smear of cervix     Acne     Allergy     Asthma     Benign paroxysmal vertigo, bilateral     Hashimoto's thyroiditis 2017    History of  section 2021    PONV (postoperative nausea and vomiting)     Postpartum hemorrhage 2022    Preeclampsia, severe, third trimester 2018    Severe pre-eclampsia in third trimester 2022    Admitted  for concern for preeclampsia with SF (BP).  Patient had two severe range blood pressures in the RENATA requiring labetalol 20 mg IV x 1 Her pre E labs on admit and repeat during her two day admission were all within normal limits Admit labs : PC TLC Plt 204, Cr 0.7, AST/ALT 11/10 Repeat labs : Plt 212, CR 0.7, AST/ALT 10/11 During the 48 hours, patient blood pressures were all        PAST SURGICAL HISTORY:  Past Surgical History:   Procedure Laterality Date     SECTION N/A 2018    Procedure:  SECTION;  Surgeon: Sawyer Lake Jr., MD;  Location: [facility];  Service: OB/GYN;  Laterality: N/A;     SECTION N/A 2022    Procedure:  SECTION;  Surgeon: Fiorella Carpenter DO;  Location: [facility] L&D;  Service: OB/GYN;  Laterality: N/A;     SECTION N/A 2022    Procedure:  SECTION;  Surgeon: Fiorella Carpenter DO;  Location: [facility];  Service: OB/GYN;  Laterality: N/A;     SECTION      CHOLECYSTECTOMY      CYST REMOVAL      Left ear @ age 2    DILATION AND CURETTAGE OF UTERUS USING SUCTION N/A 2021    Procedure: DILATION AND CURETTAGE, UTERUS, USING SUCTION;  Surgeon: Fiorella Carpenter DO;  Location:  [facility] OR;  Service: OB/GYN;  Laterality: N/A;    LAPAROSCOPIC CHOLECYSTECTOMY N/A 10/07/2022    Procedure: CHOLECYSTECTOMY, LAPAROSCOPIC;  Surgeon: Armida Ferraro MD;  Location: [facility] OR;  Service: General;  Laterality: N/A;    ROBOT-ASSISTED LAPAROSCOPIC UTERINE MYOMECTOMY N/A 12/22/2020    Procedure: ROBOTIC MYOMECTOMY, UTERUS;  Surgeon: Sylvie Zavaleta MD;  Location: [facility] OR;  Service: OB/GYN;  Laterality: N/A;    TONSILLECTOMY, ADENOIDECTOMY         SOCIAL HISTORY:  Social History     Socioeconomic History    Marital status:    Tobacco Use    Smoking status: Never    Smokeless tobacco: Never   Substance and Sexual Activity    Alcohol use: Yes     Comment: social     Drug use: No    Sexual activity: Yes     Partners: Male     Birth control/protection: Condom, None   Other Topics Concern    Are you pregnant or think you may be? No    Breast-feeding No     Social Drivers of Health     Financial Resource Strain: Low Risk  (2/20/2024)    Overall Financial Resource Strain (CARDIA)     Difficulty of Paying Living Expenses: Not hard at all   Food Insecurity: No Food Insecurity (2/20/2024)    Hunger Vital Sign     Worried About Running Out of Food in the Last Year: Never true     Ran Out of Food in the Last Year: Never true   Transportation Needs: No Transportation Needs (2/20/2024)    PRAPARE - Transportation     Lack of Transportation (Medical): No     Lack of Transportation (Non-Medical): No   Physical Activity: Sufficiently Active (2/20/2024)    Exercise Vital Sign     Days of Exercise per Week: 3 days     Minutes of Exercise per Session: 50 min   Stress: No Stress Concern Present (2/20/2024)    [employer] - Occupational Stress Questionnaire     Feeling of Stress : Not at all   Housing Stability: Low Risk  (2/20/2024)    Housing Stability Vital Sign     Unable to Pay for Housing in the Last Year: No     Number of Places Lived in the Last Year: 1     Unstable  Housing in the Last Year: No       FAMILY HISTORY:  Family History   Problem Relation Name Age of Onset    No Known Problems Father      Miscarriages / Stillbirths Mother Marycruz     Hypothyroidism Mother Marycruz     Arthritis Mother Marycruz     Coronary artery disease Maternal Grandmother          s/p CABG 4v    Hypothyroidism Maternal Grandmother      Coronary artery disease Paternal Grandmother          s/p CABG    Breast cancer Neg Hx      Cancer Neg Hx      Colon cancer Neg Hx      Diabetes Neg Hx      Eclampsia Neg Hx      Hypertension Neg Hx      Stroke Neg Hx       labor Neg Hx      Ovarian cancer Neg Hx      Melanoma Neg Hx         ALLERGIES AND MEDICATIONS: updated and reviewed.  Review of patient's allergies indicates:   Allergen Reactions    Ibuprofen Hives and Shortness Of Breath     bronchospasm    Ancef [cefazolin] Rash     Current Outpatient Medications   Medication Sig Dispense Refill    cetirizine (ZYRTEC) 10 MG tablet Take 10 mg by mouth once daily.      FLUoxetine 20 MG capsule Take 1 capsule (20 mg total) by mouth once daily. 90 capsule 3    terbinafine HCL (LAMISIL) 250 mg tablet Take 1 tablet (250 mg total) by mouth once daily. 30 tablet 2    tretinoin (RETIN-A) 0.025 % cream Compound tretinoin 0.025% / azelaic acid 8% / niacinamide 2% cream. Apply a pea-sized amount to entire face qhs. 30 g 5    tretinoin (RETIN-A) 0.05 % cream Compound tretinoin 0.05% / azelaic acid 8% / niacinamide 2% cream. Apply a pea-sized amount to entire face qhs. 30 g 5    triamcinolone acetonide 0.1% (KENALOG) 0.1 % cream Apply to affected areas of body BID prn rash. 80 g 1    triamcinolone acetonide 0.1% (KENALOG) 0.1 % cream Apply to affected areas of hands BID prn rash. Do not use on face, underarms, or groin. 80 g 3    tirzepatide, weight loss, (ZEPBOUND) 5 mg/0.5 mL PnIj Inject 5 mg into the skin every 7 days. 6 mL 0     No current facility-administered medications for this visit.         ROS  Review  "of Systems   Constitutional:  Negative for activity change, appetite change, fatigue, fever and unexpected weight change.   HENT: Negative.  Negative for ear discharge, ear pain, rhinorrhea and sore throat.    Eyes: Negative.    Respiratory:  Negative for apnea, cough, chest tightness, shortness of breath and wheezing.    Cardiovascular:  Negative for chest pain, palpitations and leg swelling.   Gastrointestinal:  Negative for abdominal distention, abdominal pain, constipation, diarrhea and vomiting.   Endocrine: Negative for cold intolerance, heat intolerance, polydipsia and polyuria.   Genitourinary:  Negative for decreased urine volume and urgency.   Musculoskeletal: Negative.    Skin:  Negative for rash.   Neurological:  Negative for dizziness and headaches.   Hematological:  Does not bruise/bleed easily.   Psychiatric/Behavioral:  Negative for agitation, sleep disturbance and suicidal ideas.            Physical Exam  Vitals:    09/30/24 0717   BP: (!) 102/58   Pulse: 63   Temp: 97.6 °F (36.4 °C)   TempSrc: Oral   SpO2: 99%   Weight: 69.7 kg (153 lb 10.6 oz)   Height: 5' 3" (1.6 m)    Body mass index is 27.22 kg/m².  Weight: 69.7 kg (153 lb 10.6 oz)   Height: 5' 3" (160 cm)   Physical Exam  Vitals reviewed.   Constitutional:       Appearance: Normal appearance. She is well-developed.   HENT:      Head: Normocephalic and atraumatic.      Right Ear: External ear normal.      Left Ear: External ear normal.      Nose: Nose normal.      Mouth/Throat:      Mouth: Mucous membranes are moist.      Pharynx: Oropharynx is clear.   Eyes:      Extraocular Movements: Extraocular movements intact.      Conjunctiva/sclera: Conjunctivae normal.      Pupils: Pupils are equal, round, and reactive to light.   Cardiovascular:      Rate and Rhythm: Normal rate and regular rhythm.      Heart sounds: Normal heart sounds.   Pulmonary:      Effort: Pulmonary effort is normal.      Breath sounds: Normal breath sounds.   Skin:     " General: Skin is warm and dry.   Neurological:      Mental Status: She is alert and oriented to person, place, and time.        Physical Exam  Clear lungs.         Health Maintenance         Date Due Completion Date    Influenza Vaccine (1) 09/01/2024 10/1/2022 (Done)    Override on 10/1/2022: Done    COVID-19 Vaccine (4 - 2024-25 season) 09/01/2024 12/28/2021    Hemoglobin A1c (Diabetic Prevention Screening) 09/25/2027 9/25/2024    Cervical Cancer Screening 05/17/2028 5/17/2023    TETANUS VACCINE 03/30/2032 3/30/2022    RSV Vaccine (Age 60+ and Pregnant patients) (1 - 1-dose 75+ series) 06/19/2064 ---                Patient note was created using [employer].  Any errors in syntax or even information may not have been identified and edited on initial review prior to signing this note.

## 2024-10-21 ENCOUNTER — PATIENT MESSAGE (OUTPATIENT)
Dept: OBSTETRICS AND GYNECOLOGY | Facility: CLINIC | Age: 34
End: 2024-10-21
Payer: COMMERCIAL

## 2024-11-04 ENCOUNTER — TELEPHONE (OUTPATIENT)
Dept: DERMATOLOGY | Facility: CLINIC | Age: 34
End: 2024-11-04
Payer: COMMERCIAL

## 2024-11-04 DIAGNOSIS — R74.01 ELEVATED ALANINE AMINOTRANSFERASE (ALT) LEVEL: ICD-10-CM

## 2024-11-04 DIAGNOSIS — B35.1 ONYCHOMYCOSIS: Primary | ICD-10-CM

## 2024-11-04 NOTE — TELEPHONE ENCOUNTER
Spoke with pt re: slightly elevated LFTs. She states that she was sick last week and was taking cold meds that contained tylenol. She does not drink alcohol.  Rec'd that she hold terbinafine for now, and we will recheck labs next week.

## 2024-11-07 ENCOUNTER — PATIENT MESSAGE (OUTPATIENT)
Dept: DERMATOLOGY | Facility: CLINIC | Age: 34
End: 2024-11-07
Payer: COMMERCIAL

## 2024-11-07 DIAGNOSIS — R74.01 ELEVATED ALANINE AMINOTRANSFERASE (ALT) LEVEL: Primary | ICD-10-CM

## 2024-11-19 ENCOUNTER — PATIENT MESSAGE (OUTPATIENT)
Dept: OBSTETRICS AND GYNECOLOGY | Facility: CLINIC | Age: 34
End: 2024-11-19
Payer: COMMERCIAL

## 2024-11-22 RX ORDER — FLUCONAZOLE 150 MG/1
150 TABLET ORAL
Qty: 3 TABLET | Refills: 0 | Status: SHIPPED | OUTPATIENT
Start: 2024-11-22 | End: 2024-11-29

## 2024-12-19 ENCOUNTER — PATIENT MESSAGE (OUTPATIENT)
Dept: DERMATOLOGY | Facility: CLINIC | Age: 34
End: 2024-12-19
Payer: COMMERCIAL

## 2025-01-14 ENCOUNTER — OFFICE VISIT (OUTPATIENT)
Dept: FAMILY MEDICINE | Facility: CLINIC | Age: 35
End: 2025-01-14
Payer: COMMERCIAL

## 2025-01-14 VITALS
SYSTOLIC BLOOD PRESSURE: 100 MMHG | HEART RATE: 60 BPM | OXYGEN SATURATION: 100 % | HEIGHT: 63 IN | WEIGHT: 151.69 LBS | DIASTOLIC BLOOD PRESSURE: 68 MMHG | BODY MASS INDEX: 26.88 KG/M2 | TEMPERATURE: 98 F

## 2025-01-14 DIAGNOSIS — E78.2 MIXED HYPERLIPIDEMIA: ICD-10-CM

## 2025-01-14 DIAGNOSIS — T46.6X5A STATIN MYOPATHY: ICD-10-CM

## 2025-01-14 DIAGNOSIS — B35.1 ONYCHOMYCOSIS: ICD-10-CM

## 2025-01-14 DIAGNOSIS — F33.0 MILD EPISODE OF RECURRENT MAJOR DEPRESSIVE DISORDER: Primary | ICD-10-CM

## 2025-01-14 DIAGNOSIS — E66.3 OVERWEIGHT (BMI 25.0-29.9): ICD-10-CM

## 2025-01-14 DIAGNOSIS — G72.0 STATIN MYOPATHY: ICD-10-CM

## 2025-01-14 DIAGNOSIS — M62.838 MUSCLE SPASM: ICD-10-CM

## 2025-01-14 PROCEDURE — 1160F RVW MEDS BY RX/DR IN RCRD: CPT | Mod: CPTII,S$GLB,, | Performed by: FAMILY MEDICINE

## 2025-01-14 PROCEDURE — 3078F DIAST BP <80 MM HG: CPT | Mod: CPTII,S$GLB,, | Performed by: FAMILY MEDICINE

## 2025-01-14 PROCEDURE — 3008F BODY MASS INDEX DOCD: CPT | Mod: CPTII,S$GLB,, | Performed by: FAMILY MEDICINE

## 2025-01-14 PROCEDURE — 99214 OFFICE O/P EST MOD 30 MIN: CPT | Mod: S$GLB,,, | Performed by: FAMILY MEDICINE

## 2025-01-14 PROCEDURE — G2211 COMPLEX E/M VISIT ADD ON: HCPCS | Mod: S$GLB,,, | Performed by: FAMILY MEDICINE

## 2025-01-14 PROCEDURE — 1159F MED LIST DOCD IN RCRD: CPT | Mod: CPTII,S$GLB,, | Performed by: FAMILY MEDICINE

## 2025-01-14 PROCEDURE — 3074F SYST BP LT 130 MM HG: CPT | Mod: CPTII,S$GLB,, | Performed by: FAMILY MEDICINE

## 2025-01-14 PROCEDURE — 99999 PR PBB SHADOW E&M-EST. PATIENT-LVL III: CPT | Mod: PBBFAC,,, | Performed by: FAMILY MEDICINE

## 2025-01-14 RX ORDER — TIRZEPATIDE 5 MG/.5ML
5 INJECTION, SOLUTION SUBCUTANEOUS
Qty: 12 PEN | Refills: 0 | Status: SHIPPED | OUTPATIENT
Start: 2025-01-14

## 2025-01-14 NOTE — PROGRESS NOTES
Routine Office Visit     Patient Name: Armida Medina    : 1990  MRN: [id number]    Subjective     History of Present Illness    CHIEF COMPLAINT:  Weight management   Muscle spasms - Patient presents for a follow-up visit to discuss ongoing symptoms of involuntary muscle spasms and to review medications.    HPI:  Patient reports involuntary muscle spasms, particularly in her legs, which started less than 6 months ago. These spasms occur daily and are noticeable throughout the day, but are more prominent when the patient is recumbent. Patient also has occasional jerking movements in her arms and intermittent tingling sensations in a localized area of her upper spine. The relationship of this symptom to her physical activities is unclear to the patient.    Patient has been taking magnesium supplements every other day, as previously suggested by Dr. Mcclellan, but is unable to take them daily due to GI side effects. She reports no noticeable improvement in symptoms on the days she takes magnesium.    Patient notes that symptoms have decreased in intensity somewhat in recent weeks, but approximately 1 month ago, the twitching was more severe. She denies any association between the onset of these symptoms and her use of Zepbound, which she has been taking for a longer duration.    For weight management, the patient exercises 3-4 times per week, focusing on resistance training and cardiovascular activities. Her current weight is 151 lbs, decreased from a previous 166 lbs. Her goal weight is approximately 140 lbs, though she acknowledges potential skin laxity might affect the scale reading. She feels she is doing well on current regimen. Side effects are manageable with Zepbound 5mg dose. She continues to monitor her diet.     Patient mentions a previous fungal infection on her toenail, for which she completed 2 bottles of Lamisil. The treatment was temporarily discontinued due to elevated liver enzymes, which  "subsequently normalized after cessation. She reports improvement in the appearance of her toe.    Patient denies any side effects from her current 5mg dose of Zepfound.    MEDICATIONS:  - Retin-A 0.5%, higher dose (previously lower dose)  - Zepfound 5 mg  - Fluoxetine 20 mg  - Triamcinolone cream, as needed for rough patches on skin  - Magnesium supplement 500 mg, every other day    MEDICAL HISTORY:  - Fungal infection: toenail  - Restless leg syndrome: suspected    TEST RESULTS:  - Liver enzymes: Elevated at some point in the past, then returned to normal after stopping Lamisil    ALLERGIES:  - Ibuprofen  - Ancef    SOCIAL HISTORY:  - Never smoked  - Never chewed tobacco      ROS:  General: no fever, no chills, no fatigue, no weight gain, no weight loss  Eyes: no vision changes, no redness, no discharge  ENT: no ear pain, no nasal congestion, no sore throat  Cardiovascular: no chest pain, no palpitations, no lower extremity edema  Respiratory: no cough, no shortness of breath  Gastrointestinal: no abdominal pain, no nausea, no vomiting, no diarrhea, no constipation, no blood in stool, no change in bowel habits  Genitourinary: no dysuria, no hematuria, no frequency  Musculoskeletal: no joint pain, no muscle pain, +muscle spasms  Skin: no rash, no lesion  Neurological: no headache, no dizziness, no numbness, no tingling, no tremors  Psychiatric: no anxiety, no depression, no sleep difficulty           Objective     /68 (BP Location: Right arm, Patient Position: Sitting)   Pulse 60   Temp 97.8 °F (36.6 °C) (Oral)   Ht 5' 3" (1.6 m)   Wt 68.8 kg (151 lb 10.8 oz)   SpO2 100%   BMI 26.87 kg/m²   Physical Exam  Constitutional:       Appearance: She is well-developed.   HENT:      Head: Normocephalic and atraumatic.   Eyes:      Conjunctiva/sclera: Conjunctivae normal.      Pupils: Pupils are equal, round, and reactive to light.   Cardiovascular:      Rate and Rhythm: Normal rate and regular rhythm.      Heart " sounds: Normal heart sounds. No murmur heard.     No friction rub. No gallop.   Pulmonary:      Effort: No respiratory distress.      Breath sounds: Normal breath sounds.   Abdominal:      General: Bowel sounds are normal. There is no distension.      Palpations: Abdomen is soft.      Tenderness: There is no abdominal tenderness.   Musculoskeletal:         General: Normal range of motion.      Cervical back: Normal range of motion and neck supple.   Lymphadenopathy:      Cervical: No cervical adenopathy.   Skin:     General: Skin is warm.   Neurological:      Mental Status: She is alert and oriented to person, place, and time.           Assessment     Assessment & Plan      MEDICATIONS:   Continued Zepfound 5 mg   Continued Retin-A 0.5% (higher dose)   Continued fluoxetine 20 mg   Continued triamcinolone cream as needed for rough skin patches   Refilled Zepfound for 90-day supply     FOLLOW UP:   Follow up with Dr. Mcclellan in approximately 4 months   Contact the office if muscle spasms worsen or do not improve with current recommendations         Problem List Items Addressed This Visit          Psychiatric    Recurrent major depressive disorder - Primary  The current medical regimen is effective;  continue present plan and medications.          Cardiac/Vascular    Mixed hyperlipidemia  Statin myopathy noted   Continue with weight loss          Endocrine    Overweight (BMI 25.0-29.9)    Relevant Medications    tirzepatide, weight loss, (ZEPBOUND) 5 mg/0.5 mL PnIj  Common side effects of this medication were discussed with the patient. Questions regarding medications were discussed during this visit.     Assessed patient's weight loss progress and muscle mass in relation to Zepfound use   Patient to continue current exercise routine of working out 3-4 times per week       Orthopedic    Statin myopathy  Not on statin      Other Visit Diagnoses       Muscle spasm       Discussed potential muscle loss associated with  Zepfound use and importance of continued exercise   Explained possible causes of muscle spasms, including dehydration, magnesium deficiency, and restless leg syndrome   Recommend incorporating stretching or yoga into exercise routine to balance muscle contractions   Recommend increasing water intake to address potential dehydration      Onychomycosis       Patient can try using Vicks VapoRub for toenail fungus                This note was generated with the assistance of ambient listening technology. Verbal consent was obtained by the patient and accompanying visitor(s) for the recording of patient appointment to facilitate this note. I attest to having reviewed and edited the generated note for accuracy, though some syntax or spelling errors may persist. Please contact the author of this note for any clarification.

## 2025-04-14 ENCOUNTER — OFFICE VISIT (OUTPATIENT)
Dept: FAMILY MEDICINE | Facility: CLINIC | Age: 35
End: 2025-04-14
Payer: COMMERCIAL

## 2025-04-14 VITALS
DIASTOLIC BLOOD PRESSURE: 62 MMHG | SYSTOLIC BLOOD PRESSURE: 108 MMHG | HEART RATE: 60 BPM | OXYGEN SATURATION: 97 % | WEIGHT: 149.69 LBS | TEMPERATURE: 98 F | HEIGHT: 63 IN | BODY MASS INDEX: 26.52 KG/M2

## 2025-04-14 DIAGNOSIS — E66.3 OVERWEIGHT (BMI 25.0-29.9): ICD-10-CM

## 2025-04-14 DIAGNOSIS — E78.2 MIXED HYPERLIPIDEMIA: Primary | ICD-10-CM

## 2025-04-14 PROCEDURE — 1160F RVW MEDS BY RX/DR IN RCRD: CPT | Mod: CPTII,S$GLB,, | Performed by: FAMILY MEDICINE

## 2025-04-14 PROCEDURE — 99213 OFFICE O/P EST LOW 20 MIN: CPT | Mod: S$GLB,,, | Performed by: FAMILY MEDICINE

## 2025-04-14 PROCEDURE — 3074F SYST BP LT 130 MM HG: CPT | Mod: CPTII,S$GLB,, | Performed by: FAMILY MEDICINE

## 2025-04-14 PROCEDURE — 1159F MED LIST DOCD IN RCRD: CPT | Mod: CPTII,S$GLB,, | Performed by: FAMILY MEDICINE

## 2025-04-14 PROCEDURE — 3078F DIAST BP <80 MM HG: CPT | Mod: CPTII,S$GLB,, | Performed by: FAMILY MEDICINE

## 2025-04-14 PROCEDURE — 99999 PR PBB SHADOW E&M-EST. PATIENT-LVL III: CPT | Mod: PBBFAC,,, | Performed by: FAMILY MEDICINE

## 2025-04-14 PROCEDURE — 3008F BODY MASS INDEX DOCD: CPT | Mod: CPTII,S$GLB,, | Performed by: FAMILY MEDICINE

## 2025-04-14 RX ORDER — TOPIRAMATE 25 MG/1
25 TABLET ORAL DAILY
Qty: 30 TABLET | Refills: 0 | Status: SHIPPED | OUTPATIENT
Start: 2025-04-14 | End: 2026-04-14

## 2025-04-14 RX ORDER — TIRZEPATIDE 7.5 MG/.5ML
7.5 INJECTION, SOLUTION SUBCUTANEOUS
Qty: 6 ML | Refills: 0 | Status: SHIPPED | OUTPATIENT
Start: 2025-04-14

## 2025-04-14 NOTE — PROGRESS NOTES
Assessment & Plan  Problem List Items Addressed This Visit          Cardiac/Vascular    Mixed hyperlipidemia - Primary       Endocrine    Overweight (BMI 25.0-29.9)    Relevant Medications    tirzepatide, weight loss, (ZEPBOUND) 7.5 mg/0.5 mL PnIj    topiramate (TOPAMAX) 25 MG tablet      Assessment & Plan  1. Weight management.  Her BMI is currently 26.5, indicating she is nearing a normal range. She has successfully lost at least 2 pounds since her last consultation with Dr. Au. A prescription for tirzepatide 7.5 mg will be issued, with the expectation that she may need to progress to the next dosage level in the subsequent month. The prescription will be sent to [facility]. Additionally, a 30-day supply of topiramate will be provided to help overcome her weight loss plateau. Potential side effects of topiramate, including brain fog, were discussed.    2. Respiratory infection.  She reported a recent respiratory virus with symptoms of coughing up mucus but no fever, body aches, or nasal congestion. Her lungs sound clear today, and she is on the mend.    Results        Health Maintenance reviewed.      ______________________________________________________________________    Chief Complaint  Chief Complaint   Patient presents with    Weight Check       HPI  Armida Medina is a 34 y.o. female with multiple medical diagnoses as listed in the medical history and problem list that presents for weight check.  Pt is known to me with last appointment 9/30/2024 .    History of Present Illness  The patient presents for weight loss.    She reports a satisfactory progress in her weight loss journey, with a current weight of 149 pounds. She acknowledges the challenges associated with maintaining a consistent diet, particularly during festive seasons such as Easter, which are characterized by an abundance of sweet treats. Despite these challenges, she believes her overall nutritional intake remains balanced. She  has been adhering to her medication regimen without any issues and expresses interest in increasing the dosage. She recalls a previous attempt to escalate the dosage to 7.5, which was only approved once by her insurance provider. She currently has 7 pens remaining and has not yet tried doubling the dosage of her current 5 mg medication. She is open to trying a 90-day supply of the 7.5 mg dosage. She has not previously used stimulants such as phentermine or diethylpropion. She is considering the use of topiramate but is concerned about potential side effects, particularly those that may disrupt her sleep pattern.    She also mentions a recent episode of respiratory illness, characterized by a productive cough, but without any other symptoms such as fever, body aches, or nasal congestion. She was able to continue her regular activities, including exercise, without experiencing shortness of breath. The nature of her cough varied between dry and wet, but was predominantly wet.    MEDICATIONS  Current: tirzepatide         PAST MEDICAL HISTORY:  Past Medical History:   Diagnosis Date    Abnormal Pap smear of cervix     Acne     Allergy     Asthma     Benign paroxysmal vertigo, bilateral     Hashimoto's thyroiditis 2017    History of  section 2021    PONV (postoperative nausea and vomiting)     Postpartum hemorrhage 2022    Preeclampsia, severe, third trimester 2018    Severe pre-eclampsia in third trimester 2022    Admitted  for concern for preeclampsia with SF (BP).  Patient had two severe range blood pressures in the RENATA requiring labetalol 20 mg IV x 1 Her pre E labs on admit and repeat during her two day admission were all within normal limits Admit labs : PC TLC Plt 204, Cr 0.7, AST/ALT 11/10 Repeat labs : Plt 212, CR 0.7, AST/ALT 10/11 During the 48 hours, patient blood pressures were all        PAST SURGICAL HISTORY:  Past Surgical History:   Procedure Laterality  Date     SECTION N/A 2018    Procedure:  SECTION;  Surgeon: Sawyer Lake Jr., MD;  Location: [facility] L&D;  Service: OB/GYN;  Laterality: N/A;     SECTION N/A 2022    Procedure:  SECTION;  Surgeon: Fiorella Carpenter DO;  Location: [facility] L&D;  Service: OB/GYN;  Laterality: N/A;     SECTION N/A 2022    Procedure:  SECTION;  Surgeon: Fiorella Carpenter DO;  Location: [facility] L&D;  Service: OB/GYN;  Laterality: N/A;     SECTION      CHOLECYSTECTOMY      CYST REMOVAL      Left ear @ age 2    DILATION AND CURETTAGE OF UTERUS USING SUCTION N/A 2021    Procedure: DILATION AND CURETTAGE, UTERUS, USING SUCTION;  Surgeon: Fiorella Carpenter DO;  Location: [facility] OR;  Service: OB/GYN;  Laterality: N/A;    LAPAROSCOPIC CHOLECYSTECTOMY N/A 10/07/2022    Procedure: CHOLECYSTECTOMY, LAPAROSCOPIC;  Surgeon: Armida Ferraro MD;  Location: [facility] OR;  Service: General;  Laterality: N/A;    ROBOT-ASSISTED LAPAROSCOPIC UTERINE MYOMECTOMY N/A 2020    Procedure: ROBOTIC MYOMECTOMY, UTERUS;  Surgeon: Sylvie Zavaleta MD;  Location: [facility] OR;  Service: OB/GYN;  Laterality: N/A;    TONSILLECTOMY, ADENOIDECTOMY         SOCIAL HISTORY:  Social History[1]    FAMILY HISTORY:  Family History   Problem Relation Name Age of Onset    No Known Problems Father      Miscarriages / Stillbirths Mother Marycruz     Hypothyroidism Mother Marycruz     Arthritis Mother Marycruz     Coronary artery disease Maternal Grandmother          s/p CABG 4v    Hypothyroidism Maternal Grandmother      Coronary artery disease Paternal Grandmother          s/p CABG    Breast cancer Neg Hx      Cancer Neg Hx      Colon cancer Neg Hx      Diabetes Neg Hx      Eclampsia Neg Hx      Hypertension Neg Hx      Stroke Neg Hx       labor Neg Hx      Ovarian cancer Neg Hx      Melanoma Neg Hx         ALLERGIES AND MEDICATIONS: updated and reviewed.  Review of patient's allergies  "indicates:   Allergen Reactions    Ibuprofen Hives and Shortness Of Breath     bronchospasm    Ancef [cefazolin] Rash     Current Medications[2]      ROS  Review of Systems   Constitutional:  Negative for activity change, appetite change, fatigue, fever and unexpected weight change.   HENT: Negative.  Negative for ear discharge, ear pain, rhinorrhea and sore throat.    Eyes: Negative.    Respiratory:  Negative for apnea, cough, chest tightness, shortness of breath and wheezing.    Cardiovascular:  Negative for chest pain, palpitations and leg swelling.   Gastrointestinal:  Negative for abdominal distention, abdominal pain, constipation, diarrhea and vomiting.   Endocrine: Negative for cold intolerance, heat intolerance, polydipsia and polyuria.   Genitourinary:  Negative for decreased urine volume and urgency.   Musculoskeletal: Negative.    Skin:  Negative for rash.   Neurological:  Negative for dizziness and headaches.   Hematological:  Does not bruise/bleed easily.   Psychiatric/Behavioral:  Negative for agitation, sleep disturbance and suicidal ideas.            Physical Exam  Vitals:    04/14/25 0719   BP: 108/62   BP Location: Right arm   Patient Position: Sitting   Pulse: 60   Temp: 98.1 °F (36.7 °C)   TempSrc: Oral   SpO2: 97%   Weight: 67.9 kg (149 lb 11.1 oz)   Height: 5' 3" (1.6 m)    Body mass index is 26.52 kg/m².  Weight: 67.9 kg (149 lb 11.1 oz)   Height: 5' 3" (160 cm)   Physical Exam  Vitals reviewed.   Constitutional:       Appearance: Normal appearance. She is well-developed.   HENT:      Head: Normocephalic and atraumatic.      Right Ear: External ear normal.      Left Ear: External ear normal.      Nose: Nose normal.      Mouth/Throat:      Mouth: Mucous membranes are moist.      Pharynx: Oropharynx is clear.   Eyes:      Extraocular Movements: Extraocular movements intact.      Conjunctiva/sclera: Conjunctivae normal.      Pupils: Pupils are equal, round, and reactive to light. "   Cardiovascular:      Rate and Rhythm: Normal rate and regular rhythm.      Heart sounds: Normal heart sounds.   Pulmonary:      Effort: Pulmonary effort is normal.      Breath sounds: Normal breath sounds.   Skin:     General: Skin is warm and dry.   Neurological:      Mental Status: She is alert and oriented to person, place, and time.        Physical Exam  Ears appear normal.  Lungs sound clear.  Heart sounds normal.    Vital Signs  BMI is 26.5.         Health Maintenance         Date Due Completion Date    Influenza Vaccine (1) 09/01/2024 10/1/2022 (Done)    Override on 10/1/2022: Done    COVID-19 Vaccine (4 - 2024-25 season) 09/01/2024 12/28/2021    Hemoglobin A1c (Diabetic Prevention Screening) 09/25/2027 9/25/2024    Cervical Cancer Screening 05/17/2028 5/17/2023    TETANUS VACCINE 03/30/2032 3/30/2022    RSV Vaccine (Age 60+ and Pregnant patients) (1 - 1-dose 75+ series) 06/19/2064 ---                Patient note was created using [employer].  Any errors in syntax or even information may not have been identified and edited on initial review prior to signing this note.         [1]   Social History  Socioeconomic History    Marital status:    Tobacco Use    Smoking status: Never    Smokeless tobacco: Never   Substance and Sexual Activity    Alcohol use: Yes     Comment: social     Drug use: No    Sexual activity: Yes     Partners: Male     Birth control/protection: Condom, None   Other Topics Concern    Are you pregnant or think you may be? No    Breast-feeding No     Social Drivers of Health     Financial Resource Strain: Low Risk  (2/20/2024)    Overall Financial Resource Strain (CARDIA)     Difficulty of Paying Living Expenses: Not hard at all   Food Insecurity: No Food Insecurity (2/20/2024)    Hunger Vital Sign     Worried About Running Out of Food in the Last Year: Never true     Ran Out of Food in the Last Year: Never true   Transportation Needs: No Transportation Needs (2/20/2024)    PRAPARE -  Transportation     Lack of Transportation (Medical): No     Lack of Transportation (Non-Medical): No   Physical Activity: Sufficiently Active (2/20/2024)    Exercise Vital Sign     Days of Exercise per Week: 3 days     Minutes of Exercise per Session: 50 min   Stress: No Stress Concern Present (2/20/2024)    [employer] - Occupational Stress Questionnaire     Feeling of Stress : Not at all   Housing Stability: Low Risk  (2/20/2024)    Housing Stability Vital Sign     Unable to Pay for Housing in the Last Year: No     Number of Places Lived in the Last Year: 1     Unstable Housing in the Last Year: No   [2]   Current Outpatient Medications   Medication Sig Dispense Refill    cetirizine (ZYRTEC) 10 MG tablet Take 10 mg by mouth once daily.      FLUoxetine 20 MG capsule Take 1 capsule (20 mg total) by mouth once daily. 90 capsule 3    tretinoin (RETIN-A) 0.05 % cream Compound tretinoin 0.05% / azelaic acid 8% / niacinamide 2% cream. Apply a pea-sized amount to entire face qhs. 30 g 5    tirzepatide, weight loss, (ZEPBOUND) 7.5 mg/0.5 mL PnIj Inject 7.5 mg into the skin every 7 days. 6 mL 0    topiramate (TOPAMAX) 25 MG tablet Take 1 tablet (25 mg total) by mouth once daily. 30 tablet 0    triamcinolone acetonide 0.1% (KENALOG) 0.1 % cream Apply to affected areas of body BID prn rash. 80 g 1    triamcinolone acetonide 0.1% (KENALOG) 0.1 % cream Apply to affected areas of hands BID prn rash. Do not use on face, underarms, or groin. 80 g 3     No current facility-administered medications for this visit.

## 2025-04-16 NOTE — PLAN OF CARE
Problem:  Fall Injury Risk  Goal: Absence of Fall, Infant Drop and Related Injury  Outcome: Ongoing, Progressing     Problem: Adult Inpatient Plan of Care  Goal: Plan of Care Review  Outcome: Ongoing, Progressing  Goal: Patient-Specific Goal (Individualized)  Outcome: Ongoing, Progressing  Goal: Absence of Hospital-Acquired Illness or Injury  Outcome: Ongoing, Progressing  Goal: Optimal Comfort and Wellbeing  Outcome: Ongoing, Progressing  Goal: Readiness for Transition of Care  Outcome: Ongoing, Progressing     Problem: Bariatric Environmental Safety  Goal: Safety Maintained with Care  Outcome: Ongoing, Progressing     Problem: Maternal-Fetal Wellbeing  Goal: Optimal Maternal-Fetal Wellbeing  Outcome: Ongoing, Progressing     Problem: Hypertensive Disorders in Pregnancy  Goal: Maternal-Fetal Stabilization  Outcome: Ongoing, Progressing     Problem:  Labor  Goal: Delayed  Delivery  Outcome: Ongoing, Progressing      unk

## 2025-05-08 RX ORDER — FLUOXETINE HYDROCHLORIDE 20 MG/1
20 CAPSULE ORAL DAILY
Qty: 90 CAPSULE | Refills: 3 | Status: SHIPPED | OUTPATIENT
Start: 2025-05-08

## 2025-05-08 NOTE — TELEPHONE ENCOUNTER
Care Due:                  Date            Visit Type   Department     Provider  --------------------------------------------------------------------------------                                 -         [facility]                              PRIMARY      / INTERNAL MED  Last Visit: 04-      CARE ([facility])   / ALMAS Aguilar                               -         [facility]                              PRIMARY      / INTERNAL MED  Next Visit: 08-      CARE ([facility])   / PEDGREGG Aguilar                                                            Last  Test          Frequency    Reason                     Performed    Due Date  --------------------------------------------------------------------------------    HBA1C.......  6 months...  tirzepatide,.............  09- 03-    [employer] Embedded Care Due Messages. Reference number: [id number].   5/08/2025 7:40:25 AM CDT

## 2025-05-20 ENCOUNTER — PATIENT MESSAGE (OUTPATIENT)
Dept: OBSTETRICS AND GYNECOLOGY | Facility: CLINIC | Age: 35
End: 2025-05-20
Payer: COMMERCIAL

## 2025-05-21 ENCOUNTER — OFFICE VISIT (OUTPATIENT)
Dept: OBSTETRICS AND GYNECOLOGY | Facility: CLINIC | Age: 35
End: 2025-05-21
Attending: OBSTETRICS & GYNECOLOGY
Payer: COMMERCIAL

## 2025-05-21 VITALS — SYSTOLIC BLOOD PRESSURE: 116 MMHG | DIASTOLIC BLOOD PRESSURE: 82 MMHG | BODY MASS INDEX: 26.52 KG/M2 | HEIGHT: 63 IN

## 2025-05-21 DIAGNOSIS — Z01.419 WELL WOMAN EXAM WITH ROUTINE GYNECOLOGICAL EXAM: Primary | ICD-10-CM

## 2025-05-21 DIAGNOSIS — N63.0 BREAST MASS IN FEMALE: ICD-10-CM

## 2025-05-21 DIAGNOSIS — L28.0 LICHEN SIMPLEX CHRONICUS: ICD-10-CM

## 2025-05-21 DIAGNOSIS — E55.9 VITAMIN D DEFICIENCY: ICD-10-CM

## 2025-05-21 DIAGNOSIS — E06.3 HASHIMOTO'S THYROIDITIS: ICD-10-CM

## 2025-05-21 PROCEDURE — 3074F SYST BP LT 130 MM HG: CPT | Mod: CPTII,S$GLB,, | Performed by: OBSTETRICS & GYNECOLOGY

## 2025-05-21 PROCEDURE — 99395 PREV VISIT EST AGE 18-39: CPT | Mod: S$GLB,,, | Performed by: OBSTETRICS & GYNECOLOGY

## 2025-05-21 PROCEDURE — 3008F BODY MASS INDEX DOCD: CPT | Mod: CPTII,S$GLB,, | Performed by: OBSTETRICS & GYNECOLOGY

## 2025-05-21 PROCEDURE — 1159F MED LIST DOCD IN RCRD: CPT | Mod: CPTII,S$GLB,, | Performed by: OBSTETRICS & GYNECOLOGY

## 2025-05-21 PROCEDURE — 3079F DIAST BP 80-89 MM HG: CPT | Mod: CPTII,S$GLB,, | Performed by: OBSTETRICS & GYNECOLOGY

## 2025-05-21 PROCEDURE — 99999 PR PBB SHADOW E&M-EST. PATIENT-LVL III: CPT | Mod: PBBFAC,,, | Performed by: OBSTETRICS & GYNECOLOGY

## 2025-05-21 RX ORDER — CLOBETASOL PROPIONATE 0.5 MG/G
OINTMENT TOPICAL
Qty: 60 G | Refills: 1 | Status: SHIPPED | OUTPATIENT
Start: 2025-05-21 | End: 2025-07-02

## 2025-05-21 NOTE — PROGRESS NOTES
CC: Well woman exam    Armida Medina is a 34 y.o. female  presents for well woman exam.  LMP: Patient's last menstrual period was 2025..  Periods somewhat lighter.  Has noted cold feet and some muscle twitching closer to periods. Decreased sex drive.    Has had some external vulvar irritation, feels pretty constant, no notable discharge.  Hard lump on right labia.  History of mild psoriasis associated with hashimotos flare.   Pap UTD.    Past Medical History:   Diagnosis Date    Abnormal Pap smear of cervix     Acne     Allergy     Asthma     Benign paroxysmal vertigo, bilateral     Hashimoto's thyroiditis 2017    History of  section 2021    PONV (postoperative nausea and vomiting)     Postpartum hemorrhage 2022    Preeclampsia, severe, third trimester 2018    Severe pre-eclampsia in third trimester 2022    Admitted  for concern for preeclampsia with SF (BP).  Patient had two severe range blood pressures in the RENATA requiring labetalol 20 mg IV x 1 Her pre E labs on admit and repeat during her two day admission were all within normal limits Admit labs : PC TLC Plt 204, Cr 0.7, AST/ALT 11/10 Repeat labs : Plt 212, CR 0.7, AST/ALT 10/11 During the 48 hours, patient blood pressures were all      Past Surgical History:   Procedure Laterality Date     SECTION N/A 2018    Procedure:  SECTION;  Surgeon: Sawyer Lake Jr., MD;  Location: [facility] L&D;  Service: OB/GYN;  Laterality: N/A;     SECTION N/A 2022    Procedure:  SECTION;  Surgeon: Fiorella Carpenter DO;  Location: [facility] L&D;  Service: OB/GYN;  Laterality: N/A;     SECTION N/A 2022    Procedure:  SECTION;  Surgeon: Fiorella Carpenter DO;  Location: [facility] L&D;  Service: OB/GYN;  Laterality: N/A;     SECTION      CHOLECYSTECTOMY      CYST REMOVAL      Left ear @ age 2    DILATION AND CURETTAGE OF UTERUS USING SUCTION N/A  "2021    Procedure: DILATION AND CURETTAGE, UTERUS, USING SUCTION;  Surgeon: Fiorella Carpenter DO;  Location: [facility] OR;  Service: OB/GYN;  Laterality: N/A;    LAPAROSCOPIC CHOLECYSTECTOMY N/A 10/07/2022    Procedure: CHOLECYSTECTOMY, LAPAROSCOPIC;  Surgeon: Armida Ferraro MD;  Location: [facility] OR;  Service: General;  Laterality: N/A;    ROBOT-ASSISTED LAPAROSCOPIC UTERINE MYOMECTOMY N/A 2020    Procedure: ROBOTIC MYOMECTOMY, UTERUS;  Surgeon: Sylvie Zavaleta MD;  Location: [facility] OR;  Service: OB/GYN;  Laterality: N/A;    TONSILLECTOMY, ADENOIDECTOMY       Social History[1]  Family History   Problem Relation Name Age of Onset    No Known Problems Father      Miscarriages / Stillbirths Mother Marycruz     Hypothyroidism Mother Marycruz     Arthritis Mother Marycruz     Coronary artery disease Maternal Grandmother          s/p CABG 4v    Hypothyroidism Maternal Grandmother      Coronary artery disease Paternal Grandmother          s/p CABG    Breast cancer Neg Hx      Cancer Neg Hx      Colon cancer Neg Hx      Diabetes Neg Hx      Eclampsia Neg Hx      Hypertension Neg Hx      Stroke Neg Hx       labor Neg Hx      Ovarian cancer Neg Hx      Melanoma Neg Hx       OB History          3    Para   2    Term   1       1    AB   1    Living   2         SAB   1    IAB   0    Ectopic   0    Multiple   0    Live Births   2           Obstetric Comments   Second pregnancy -Blighted ovum               /82 (Patient Position: Sitting)   Ht 5' 3" (1.6 m)   LMP 2025   BMI 26.52 kg/m²       ROS:  GENERAL: Denies weight gain or weight loss. Feeling well overall.   SKIN: Denies rash or lesions.   HEAD: Denies head injury or headache.   NODES: Denies enlarged lymph nodes.   CHEST: Denies chest pain or shortness of breath.   CARDIOVASCULAR: Denies palpitations or left sided chest pain.   ABDOMEN: No abdominal pain, constipation, diarrhea, nausea, vomiting or rectal bleeding. "   URINARY: No frequency, dysuria, hematuria, or burning on urination.  REPRODUCTIVE: See HPI.   BREASTS: The patient performs breast self-examination and denies pain, lumps, or nipple discharge.   HEMATOLOGIC: No easy bruisability or excessive bleeding.   MUSCULOSKELETAL: Denies joint pain or swelling.   NEUROLOGIC: Denies syncope or weakness.   PSYCHIATRIC: Denies depression, anxiety or mood swings.    PHYSICAL EXAM:  APPEARANCE: Well nourished, well developed, in no acute distress.  AFFECT: WNL, alert and oriented x 3  SKIN: No acne or hirsutism  NECK: Neck symmetric without masses or thyromegaly  NODES: No inguinal, cervical, axillary, or femoral lymph node enlargement  CHEST: Good respiratory effect  ABDOMEN: Soft.  No tenderness or masses.  No hepatosplenomegaly.  No hernias.  BREASTS: Symmetrical, no skin changes or visible lesions.  Palpable density right breast UOQnipple discharge bilaterally.  PELVIC: Normal external genitalia without lesions. Small sebaceous cyst right labia majora, stable.  Thickening o vulva c/w LSC  Normal hair distribution.  Adequate perineal body, normal urethral meatus.  Vagina moist and well rugated without lesions or discharge.  Cervix pink, without lesions, discharge or tenderness.  No significant cystocele or rectocele.  Bimanual exam shows uterus to be normal size, regular, mobile and nontender.  Adnexa without masses or tenderness.    EXTREMITIES: No edema.    Well woman exam with routine gynecological exam  -     CBC Auto Differential; Future; Expected date: 05/21/2025  -     Comprehensive Metabolic Panel; Future; Expected date: 05/21/2025  -     Hemoglobin A1C; Future; Expected date: 05/21/2025  -     TSH; Future; Expected date: 05/21/2025  -     T4, Free; Future; Expected date: 05/21/2025  -     Vitamin D; Future; Expected date: 05/21/2025  -     Ferritin; Future; Expected date: 05/21/2025  -     Iron and TIBC; Future; Expected date: 05/21/2025  -     Thyroid Peroxidase  Antibody; Future; Expected date: 05/21/2025    Hashimoto's thyroiditis  -     TSH; Future; Expected date: 05/21/2025  -     T4, Free; Future; Expected date: 05/21/2025  -     Thyroid Peroxidase Antibody; Future; Expected date: 05/21/2025    Vitamin D deficiency            Patient was counseled today on A.C.S. Pap guidelines and recommendations for yearly pelvic exams, mammograms and monthly self breast exams; to see her PCP for other health maintenance.     No follow-ups on file.                         [1]   Social History  Socioeconomic History    Marital status:    Tobacco Use    Smoking status: Never    Smokeless tobacco: Never   Substance and Sexual Activity    Alcohol use: Yes     Comment: social     Drug use: No    Sexual activity: Yes     Partners: Male     Birth control/protection: Condom, None   Other Topics Concern    Are you pregnant or think you may be? No    Breast-feeding No     Social Drivers of Health     Financial Resource Strain: Low Risk  (2/20/2024)    Overall Financial Resource Strain (CARDIA)     Difficulty of Paying Living Expenses: Not hard at all   Food Insecurity: No Food Insecurity (2/20/2024)    Hunger Vital Sign     Worried About Running Out of Food in the Last Year: Never true     Ran Out of Food in the Last Year: Never true   Transportation Needs: No Transportation Needs (2/20/2024)    PRAPARE - Transportation     Lack of Transportation (Medical): No     Lack of Transportation (Non-Medical): No   Physical Activity: Sufficiently Active (2/20/2024)    Exercise Vital Sign     Days of Exercise per Week: 3 days     Minutes of Exercise per Session: 50 min   Stress: No Stress Concern Present (2/20/2024)    [employer] - Occupational Stress Questionnaire     Feeling of Stress : Not at all   Housing Stability: Low Risk  (2/20/2024)    Housing Stability Vital Sign     Unable to Pay for Housing in the Last Year: No     Number of Places Lived in the Last Year:  1     Unstable Housing in the Last Year: No

## 2025-06-02 ENCOUNTER — PATIENT MESSAGE (OUTPATIENT)
Dept: OBSTETRICS AND GYNECOLOGY | Facility: CLINIC | Age: 35
End: 2025-06-02
Payer: COMMERCIAL

## 2025-06-10 ENCOUNTER — HOSPITAL ENCOUNTER (OUTPATIENT)
Dept: RADIOLOGY | Facility: OTHER | Age: 35
Discharge: HOME | End: 2025-06-10
Attending: OBSTETRICS & GYNECOLOGY
Payer: COMMERCIAL

## 2025-06-10 DIAGNOSIS — N63.0 BREAST MASS IN FEMALE: ICD-10-CM

## 2025-06-10 PROCEDURE — 77062 BREAST TOMOSYNTHESIS BI: CPT | Mod: TC

## 2025-06-10 PROCEDURE — 76642 ULTRASOUND BREAST LIMITED: CPT | Mod: 26,RT,, | Performed by: RADIOLOGY

## 2025-06-10 PROCEDURE — 76642 ULTRASOUND BREAST LIMITED: CPT | Mod: TC,RT

## 2025-06-12 ENCOUNTER — PATIENT MESSAGE (OUTPATIENT)
Dept: OBSTETRICS AND GYNECOLOGY | Facility: CLINIC | Age: 35
End: 2025-06-12
Payer: COMMERCIAL

## 2025-06-12 ENCOUNTER — PATIENT MESSAGE (OUTPATIENT)
Dept: FAMILY MEDICINE | Facility: CLINIC | Age: 35
End: 2025-06-12
Payer: COMMERCIAL

## 2025-06-16 ENCOUNTER — OFFICE VISIT (OUTPATIENT)
Dept: INTERNAL MEDICINE | Facility: CLINIC | Age: 35
End: 2025-06-16
Payer: COMMERCIAL

## 2025-06-16 VITALS
DIASTOLIC BLOOD PRESSURE: 84 MMHG | BODY MASS INDEX: 26.6 KG/M2 | OXYGEN SATURATION: 98 % | WEIGHT: 150.13 LBS | SYSTOLIC BLOOD PRESSURE: 114 MMHG | HEART RATE: 60 BPM | HEIGHT: 63 IN

## 2025-06-16 DIAGNOSIS — E66.3 OVERWEIGHT (BMI 25.0-29.9): ICD-10-CM

## 2025-06-16 DIAGNOSIS — R79.0 LOW FERRITIN: Primary | ICD-10-CM

## 2025-06-16 PROCEDURE — 3074F SYST BP LT 130 MM HG: CPT | Mod: CPTII,S$GLB,,

## 2025-06-16 PROCEDURE — 99999 PR PBB SHADOW E&M-EST. PATIENT-LVL III: CPT | Mod: PBBFAC,,,

## 2025-06-16 PROCEDURE — 1160F RVW MEDS BY RX/DR IN RCRD: CPT | Mod: CPTII,S$GLB,,

## 2025-06-16 PROCEDURE — 1159F MED LIST DOCD IN RCRD: CPT | Mod: CPTII,S$GLB,,

## 2025-06-16 PROCEDURE — 3079F DIAST BP 80-89 MM HG: CPT | Mod: CPTII,S$GLB,,

## 2025-06-16 PROCEDURE — 99214 OFFICE O/P EST MOD 30 MIN: CPT | Mod: S$GLB,,,

## 2025-06-16 PROCEDURE — 3008F BODY MASS INDEX DOCD: CPT | Mod: CPTII,S$GLB,,

## 2025-06-16 RX ORDER — FERROUS SULFATE 325(65) MG
325 TABLET ORAL EVERY OTHER DAY
COMMUNITY

## 2025-06-16 NOTE — PROGRESS NOTES
HPI     Chief Complaint:  Chief Complaint   Patient presents with    lab results      Discuss labs     Results       Armida Medina is a 34 y.o. female with multiple medical diagnoses as listed in the medical history and problem list that presents for   Chief Complaint   Patient presents with    lab results      Discuss labs     Results   .   Patient is not known to me.      HPI    History of Present Illness    CHIEF COMPLAINT:  Armida presents today for follow up of heavy menstrual cycles and shortness of breath    MENSTRUAL HISTORY:  She reports heavy menstrual cycles lasting 5-7 days, requiring simultaneous pad and tampon use with changes every 3 hours even with super plus or ultra absorbency. The bleeding tapers off but continues for 6-7 days. She experiences menstrual cramps during her periods. She has a history of large fibroid removal in 2020 during her first pregnancy.    CURRENT SYMPTOMS:  She reports experiencing shortness of breath during menstruation, which has improved since last week. She can now exercise without difficulty. She experiences dizziness upon standing, brain fog, and feeling cold, which she attributes to anemia. She denies shortness of breath at rest or with normal activities. She denies hematemesis and hematochezia.    MEDICAL HISTORY:  She has a history of hemorrhoids since pregnancy and reports easy bruising with frequent unexplained bruises after minor trauma.    LABS:  Blood work shows low ferritin levels with otherwise normal results, including TIBC, suggestive of acute blood loss.    CURRENT MEDICATIONS:  She recently started OTC iron supplements. She takes magnesium glycinate 250 mg, reduced from 400-500 mg due to GI side effects. The higher dose could only be tolerated once or twice weekly due to stomach discomfort.         Recent labs after well women exam with OBGYN, indicated low ferritin. Pt reports SOB last week and was on cycle last week. Has to wear pad/tampon at  the same time, changes every 3 hours with super plus. Lasts for 5 days. Picked OTC 35mg dose. SOB improved this week.     No birth control, doesn't like hormone changes  No desire for pregnancy  Has discussed with OBGYN, will consider further, history of fibroid removed during first pregnancy    Zepbound for weight loss  -trying to ear iron rich foods  -discussed concern for constipation and iron supplements  -magnesium supplement 200 a few times per week    Collected: 06/05/2025 CBC (INCLUDES DIFF/PLT)  Analyte Value  WHITE BLOOD CELL COUNT 4.8 Reference Range: 3.8-10.8 Thousand/uL  RED BLOOD CELL COUNT 4.64 Reference Range: 3.80-5.10 Million/uL  HEMOGLOBIN 13.1 Reference Range: 11.7-15.5 g/dL  HEMATOCRIT 42.3 Reference Range: 35.0-45.0 %  MCV 91.2 Reference Range: 80.0-100.0 fL  MCH 28.2 Reference Range: 27.0-33.0 pg  MCHC 31.0 L Reference Range: 32.0-36.0 g/dL  RDW 13.6 Reference Range: 11.0-15.0 %  PLATELET COUNT 209 Reference Range: 140-400 Thousand/uL  MPV 12.0 Reference Range: 7.5-12.5 fL  ABSOLUTE NEUTROPHILS 2909 Reference Range: 0361-6603 cells/uL  ABSOLUTE LYMPHOCYTES 1373 Reference Range: 850-3900 cells/uL  ABSOLUTE MONOCYTES 365 Reference Range: 200-950 cells/uL  ABSOLUTE EOSINOPHILS 101 Reference Range:  cells/uL  ABSOLUTE BASOPHILS 53 Reference Range: 0-200 cells/uL  NEUTROPHILS 60.6 %  LYMPHOCYTES 28.6 %  MONOCYTES 7.6 %  EOSINOPHILS 2.1 %  BASOPHILS 1.1 %  FERRITIN  Analyte Value  FERRITIN 7 L Reference Range:  ng/mL  GLUCOSE 74 Reference Range: 65-99 mg/dL  Fasting reference interval  UREA NITROGEN (BUN) 12 Reference Range: 7-25 mg/dL  CREATININE 0.74 Reference Range: 0.50-0.97 mg/dL  EGFR 108 Reference Range: > OR = 60 mL/min/1.73m2  BUN/CREATININE RATIO SEE NOTE: Reference Range: 6-22 (calc)  Not Reported: BUN and Creatinine are within  reference range.  SODIUM 139 Reference Range: 135-146 mmol/L  POTASSIUM 4.0 Reference Range: 3.5-5.3 mmol/L  CHLORIDE 106 Reference Range:   mmol/L  CARBON DIOXIDE 27 Reference Range: 20-32 mmol/L  CALCIUM 8.6 Reference Range: 8.6-10.2 mg/dL  PROTEIN, TOTAL 6.3 Reference Range: 6.1-8.1 g/dL  ALBUMIN 4.1 Reference Range: 3.6-5.1 g/dL  GLOBULIN 2.2 Reference Range: 1.9-3.7 g/dL (calc)  ALBUMIN/GLOBULIN RATIO 1.9 Reference Range: 1.0-2.5 (calc)  BILIRUBIN, TOTAL 0.3 Reference Range: 0.2-1.2 mg/dL  ALKALINE PHOSPHATASE 45 Reference Range:  U/L  AST 16 Reference Range: 10-30 U/L  ALT 15 Reference Range: 6-29 U/L  VITAMIN D,25-OH,TOTAL,IA 39 Reference Range:  ng/mL  IRON, TOTAL 65 Reference Range:  mcg/dL  IRON BINDING CAPACITY 345 Reference Range: 250-450 mcg/dL (calc)  T4, FREE 1.1 Reference Range: 0.8-1.8 ng/dL  TSH 1.31 mIU/L  HEMOGLOBIN A1c 4.9   Assessment & Plan       ## EXCESSIVE AND FREQUENT MENSTRUATION:  - Monitored the patient's heavy menstrual cycles lasting approximately 5 days, requiring pad and tampon changes every 3 hours with super plus or ultra absorbency.  - Noted history of fibroid removal in 2020, considering potential recurrence.  - Discussed the possibility of birth control to manage heavy cycles, but the patient is not currently using any due to past hormonal changes.  - Evaluated the patient's shortness of breath during menstrual cycle, which has improved this week after starting iron supplements.  - Evaluated need for further GYN workup if symptoms persist despite iron supplementation.  - Instructed the patient to contact the office if heavy cycles continue after consistent iron supplementation for 1-2 months, or if shortness of breath persists or occurs when not on menstrual cycle.    ## IRON DEFICIENCY ANEMIA:  - Assessed low ferritin levels likely due to heavy menstrual cycles, indicating acute blood loss.  - Other lab parameters including iron studies (TIBC) are normal.  - Prescribed 325 mg iron supplements (65 mg elemental iron) every other day with food and vitamin C to improve absorption and iron storage.  -  Advised to avoid taking supplements with milk products.  - Explained importance of iron-rich diet and food sources including legumes, leafy greens, and red meat.  - Discussed potential side effects including constipation and reviewed alternative iron supplement options (liquid form) that may cause less constipation.  - Armida to maintain iron-rich diet and stay well hydrated.    ## FOLLOW-UP:  - Ordered labs to recheck iron levels in 8 weeks (August 16th) at [facility].  - Scheduled follow up appointment after labs are completed.         1. Low ferritin  -iron supplement every other day, with vit c  -avoid constipation, continue magnesium or consider liquid, etc  If SOB returns, inform clinic, mostly likely due to blood loss from heavy cycle  Repeat labs in 8 weeks after starting supplement  -     CBC Auto Differential; Future; Expected date: 08/16/2025  -     Iron and TIBC; Future; Expected date: 08/16/2025  -     Reticulocytes; Future; Expected date: 08/16/2025  -     Ferritin; Future; Expected date: 08/16/2025    2. Overweight (BMI 25.0-29.9)  Wt Readings from Last 3 Encounters:   06/16/25 1613 68.1 kg (150 lb 2.1 oz)   04/14/25 0719 67.9 kg (149 lb 11.1 oz)   01/14/25 0817 68.8 kg (151 lb 10.8 oz)     Continue medication but monitor for constipation with iron and GLP-1  Continue magnesium        --------------------------------------------      Health Maintenance:  Health Maintenance         Date Due Completion Date    COVID-19 Vaccine (4 - 2024-25 season) 09/01/2024 12/28/2021    Influenza Vaccine (Season Ended) 09/01/2025 10/1/2022 (Done)    Override on 10/1/2022: Done    Hemoglobin A1c (Diabetic Prevention Screening) 09/25/2027 9/25/2024    Cervical Cancer Screening 05/17/2028 5/17/2023    TETANUS VACCINE 03/30/2032 3/30/2022    RSV Vaccine (Age 60+ and Pregnant patients) (1 - 1-dose 75+ series) 06/19/2064 ---            Health maintenance reviewed    Follow Up:  No follow-ups on file.    Discussed  "DDx, condition, and treatment.   Education sent to patient portal/included in after visit summary.  ED precautions given.   Notify provider if symptoms do not resolve or increase in severity.   Patient verbalizes understanding and agrees with plan of care.    Exam     Review of Systems:  (as noted above)  Review of Systems    Physical Exam:   Physical Exam  Vitals reviewed.   Constitutional:       General: She is not in acute distress.     Appearance: Normal appearance. She is not toxic-appearing.   HENT:      Head: Normocephalic and atraumatic.      Right Ear: Tympanic membrane normal.      Left Ear: Tympanic membrane normal.      Mouth/Throat:      Mouth: Mucous membranes are moist.      Pharynx: No oropharyngeal exudate.   Eyes:      General: No scleral icterus.     Conjunctiva/sclera: Conjunctivae normal.   Cardiovascular:      Rate and Rhythm: Normal rate and regular rhythm.      Pulses: Normal pulses.      Heart sounds: Normal heart sounds. No murmur heard.  Pulmonary:      Effort: Pulmonary effort is normal. No respiratory distress.      Breath sounds: Normal breath sounds.   Abdominal:      General: Bowel sounds are normal.      Palpations: Abdomen is soft. There is no mass.      Tenderness: There is no abdominal tenderness. There is no right CVA tenderness, left CVA tenderness or guarding.   Musculoskeletal:      Cervical back: Normal range of motion. No rigidity.   Lymphadenopathy:      Cervical: No cervical adenopathy.   Skin:     General: Skin is warm.      Capillary Refill: Capillary refill takes less than 2 seconds.   Neurological:      General: No focal deficit present.      Mental Status: She is alert and oriented to person, place, and time.   Psychiatric:         Mood and Affect: Mood normal.       Vitals:    06/16/25 1613   BP: 114/84   BP Location: Left arm   Patient Position: Sitting   Pulse: 60   SpO2: 98%   Weight: 68.1 kg (150 lb 2.1 oz)   Height: 5' 3" (1.6 m)      Body mass index is 26.59 " kg/m².    Lab Results   Component Value Date    WBC 7.2 2024    HGB 14.2 2024    HCT 45.0 2024     2024    CHOL 178 2024    TRIG 61 2024    HDL 49 (L) 2024    ALT 26 2024    AST 24 2024     2024    K 4.1 2024     2024    CREATININE 0.75 2024    BUN 13 2024    CO2 27 2024    TSH 1.76 2024    INR 0.9 2022    HGBA1C 4.9 2024       The ASCVD Risk score (Darryl DK, et al., 2019) failed to calculate for the following reasons:    The 2019 ASCVD risk score is only valid for ages 40 to 79    (Imaging have been independently reviewed)    History     Past Medical History:  Past Medical History:   Diagnosis Date    Abnormal Pap smear of cervix     Acne     Allergy     Asthma     Benign paroxysmal vertigo, bilateral     Hashimoto's thyroiditis 2017    History of  section 2021    PONV (postoperative nausea and vomiting)     Postpartum hemorrhage 2022    Preeclampsia, severe, third trimester 2018    Severe pre-eclampsia in third trimester 2022    Admitted  for concern for preeclampsia with SF (BP).  Patient had two severe range blood pressures in the RENATA requiring labetalol 20 mg IV x 1 Her pre E labs on admit and repeat during her two day admission were all within normal limits Admit labs : PC TLC Plt 204, Cr 0.7, AST/ALT 11/10 Repeat labs : Plt 212, CR 0.7, AST/ALT 10/11 During the 48 hours, patient blood pressures were all        Past Surgical History:  Past Surgical History:   Procedure Laterality Date     SECTION N/A 2018    Procedure:  SECTION;  Surgeon: Sawyer Lake Jr., MD;  Location: [facility] L&D;  Service: OB/GYN;  Laterality: N/A;     SECTION N/A 2022    Procedure:  SECTION;  Surgeon: Fiorella Carpenter DO;  Location: [facility] L&D;  Service: OB/GYN;  Laterality: N/A;     SECTION N/A 2022     Procedure:  SECTION;  Surgeon: Fiorella Carpenter DO;  Location: [facility] L&D;  Service: OB/GYN;  Laterality: N/A;     SECTION      CHOLECYSTECTOMY      CYST REMOVAL      Left ear @ age 2    DILATION AND CURETTAGE OF UTERUS USING SUCTION N/A 2021    Procedure: DILATION AND CURETTAGE, UTERUS, USING SUCTION;  Surgeon: Fiorella Carpenter DO;  Location: [facility] OR;  Service: OB/GYN;  Laterality: N/A;    LAPAROSCOPIC CHOLECYSTECTOMY N/A 10/07/2022    Procedure: CHOLECYSTECTOMY, LAPAROSCOPIC;  Surgeon: Armida Ferraro MD;  Location: [facility] OR;  Service: General;  Laterality: N/A;    ROBOT-ASSISTED LAPAROSCOPIC UTERINE MYOMECTOMY N/A 2020    Procedure: ROBOTIC MYOMECTOMY, UTERUS;  Surgeon: Sylvie Zavaleta MD;  Location: [facility] OR;  Service: OB/GYN;  Laterality: N/A;    TONSILLECTOMY, ADENOIDECTOMY         Social History:  Social History[1]    Family History:  Family History   Problem Relation Name Age of Onset    No Known Problems Father      Miscarriages / Stillbirths Mother Marycruz     Hypothyroidism Mother Marycruz     Arthritis Mother Marycruz     Coronary artery disease Maternal Grandmother          s/p CABG 4v    Hypothyroidism Maternal Grandmother      Coronary artery disease Paternal Grandmother          s/p CABG    Breast cancer Neg Hx      Cancer Neg Hx      Colon cancer Neg Hx      Diabetes Neg Hx      Eclampsia Neg Hx      Hypertension Neg Hx      Stroke Neg Hx       labor Neg Hx      Ovarian cancer Neg Hx      Melanoma Neg Hx         Allergies and Medications: (updated and reviewed)  Review of patient's allergies indicates:   Allergen Reactions    Ibuprofen Hives and Shortness Of Breath     bronchospasm    Ancef [cefazolin] Rash     Current Medications[2]    Patient Care Team:  Keli Mcclellan MD as PCP - General (Family Medicine)         - The patient is given an After Visit Summary that lists all medications with directions, allergies, education, orders  placed during this encounter and follow-up instructions.      - I have reviewed the patient's medical information including past medical, family, and social history sections including the medications and allergies.      - We discussed the patient's current medications.     This note was created by combination of typed  and MModal dictation.  Transcription errors may be present.  If there are any questions, please contact me.          This note was generated with the assistance of ambient listening technology. Verbal consent was obtained by the patient and accompanying visitor(s) for the recording of patient appointment to facilitate this note. I attest to having reviewed and edited the generated note for accuracy, though some syntax or spelling errors may persist. Please contact the author of this note for any clarification.         [1]   Social History  Socioeconomic History    Marital status:    Tobacco Use    Smoking status: Never    Smokeless tobacco: Never   Substance and Sexual Activity    Alcohol use: Yes     Comment: social     Drug use: No    Sexual activity: Yes     Partners: Male     Birth control/protection: Condom, None   Other Topics Concern    Are you pregnant or think you may be? No    Breast-feeding No     Social Drivers of Health     Financial Resource Strain: Low Risk  (6/16/2025)    Overall Financial Resource Strain (CARDIA)     Difficulty of Paying Living Expenses: Not hard at all   Food Insecurity: No Food Insecurity (6/16/2025)    Hunger Vital Sign     Worried About Running Out of Food in the Last Year: Never true     Ran Out of Food in the Last Year: Never true   Transportation Needs: No Transportation Needs (6/16/2025)    PRAPARE - Transportation     Lack of Transportation (Medical): No     Lack of Transportation (Non-Medical): No   Physical Activity: Sufficiently Active (6/16/2025)    Exercise Vital Sign     Days of Exercise per Week: 4 days     Minutes of Exercise per  Session: 50 min   Stress: No Stress Concern Present (6/16/2025)    [employer] - Occupational Stress Questionnaire     Feeling of Stress : Not at all   Housing Stability: Low Risk  (6/16/2025)    Housing Stability Vital Sign     Unable to Pay for Housing in the Last Year: No     Number of Times Moved in the Last Year: 0     Homeless in the Last Year: No   [2]   Current Outpatient Medications   Medication Sig Dispense Refill    cetirizine (ZYRTEC) 10 MG tablet Take 10 mg by mouth once daily.      clobetasol 0.05% (TEMOVATE) 0.05 % Oint Apply 1 Application topically 2 (two) times daily for 14 days, THEN 1 Application once daily for 14 days, THEN 1 Application 3 (three) times a week for 14 days. 60 g 1    ferrous sulfate (FEOSOL) 325 mg (65 mg iron) Tab tablet Take 325 mg by mouth every other day.      FLUoxetine 20 MG capsule Take 1 capsule (20 mg total) by mouth once daily. 90 capsule 3    tirzepatide, weight loss, (ZEPBOUND) 7.5 mg/0.5 mL PnIj Inject 7.5 mg into the skin every 7 days. 6 mL 0    topiramate (TOPAMAX) 25 MG tablet Take 1 tablet (25 mg total) by mouth once daily. 30 tablet 0    tretinoin (RETIN-A) 0.05 % cream Compound tretinoin 0.05% / azelaic acid 8% / niacinamide 2% cream. Apply a pea-sized amount to entire face qhs. 30 g 5     No current facility-administered medications for this visit.

## 2025-08-15 ENCOUNTER — OFFICE VISIT (OUTPATIENT)
Dept: FAMILY MEDICINE | Facility: CLINIC | Age: 35
End: 2025-08-15
Payer: COMMERCIAL

## 2025-08-15 VITALS
OXYGEN SATURATION: 99 % | BODY MASS INDEX: 25.86 KG/M2 | DIASTOLIC BLOOD PRESSURE: 74 MMHG | HEIGHT: 63 IN | TEMPERATURE: 98 F | SYSTOLIC BLOOD PRESSURE: 122 MMHG | WEIGHT: 145.94 LBS | HEART RATE: 60 BPM

## 2025-08-15 DIAGNOSIS — R63.5 WEIGHT GAIN: Primary | ICD-10-CM

## 2025-08-15 DIAGNOSIS — E66.3 OVERWEIGHT (BMI 25.0-29.9): ICD-10-CM

## 2025-08-15 DIAGNOSIS — D50.0 IRON DEFICIENCY ANEMIA DUE TO CHRONIC BLOOD LOSS: ICD-10-CM

## 2025-08-15 PROCEDURE — 1160F RVW MEDS BY RX/DR IN RCRD: CPT | Mod: CPTII,S$GLB,, | Performed by: FAMILY MEDICINE

## 2025-08-15 PROCEDURE — 99214 OFFICE O/P EST MOD 30 MIN: CPT | Mod: S$GLB,,, | Performed by: FAMILY MEDICINE

## 2025-08-15 PROCEDURE — 3078F DIAST BP <80 MM HG: CPT | Mod: CPTII,S$GLB,, | Performed by: FAMILY MEDICINE

## 2025-08-15 PROCEDURE — 99999 PR PBB SHADOW E&M-EST. PATIENT-LVL IV: CPT | Mod: PBBFAC,,, | Performed by: FAMILY MEDICINE

## 2025-08-15 PROCEDURE — 3008F BODY MASS INDEX DOCD: CPT | Mod: CPTII,S$GLB,, | Performed by: FAMILY MEDICINE

## 2025-08-15 PROCEDURE — 3074F SYST BP LT 130 MM HG: CPT | Mod: CPTII,S$GLB,, | Performed by: FAMILY MEDICINE

## 2025-08-15 PROCEDURE — 1159F MED LIST DOCD IN RCRD: CPT | Mod: CPTII,S$GLB,, | Performed by: FAMILY MEDICINE

## 2025-08-15 RX ORDER — TOPIRAMATE 25 MG/1
25 TABLET, FILM COATED ORAL DAILY
Qty: 30 TABLET | Refills: 0 | Status: SHIPPED | OUTPATIENT
Start: 2025-08-15 | End: 2026-08-15

## 2025-08-15 RX ORDER — TIRZEPATIDE 7.5 MG/.5ML
7.5 INJECTION, SOLUTION SUBCUTANEOUS
Qty: 6 ML | Refills: 0 | Status: SHIPPED | OUTPATIENT
Start: 2025-08-15

## (undated) DEVICE — CLIP HEMO-LOK ML

## (undated) DEVICE — DRAPE C-ARM ELAS CLIP 42X120IN

## (undated) DEVICE — GOWN POLY REINF BRTH SLV LG

## (undated) DEVICE — SUT MCRYL PLUS 4-0 PS2 27IN

## (undated) DEVICE — DRESSING AQUACEL SACRAL LARGE

## (undated) DEVICE — IRRIGATOR ENDOSCOPY DISP.

## (undated) DEVICE — SEE MEDLINE ITEM 154981

## (undated) DEVICE — APPLICATOR CHLORAPREP ORN 26ML

## (undated) DEVICE — PAD PINK TRENDELENBURG POS

## (undated) DEVICE — SUT VICRYL PLUS 0 CT1 36IN

## (undated) DEVICE — GLOVE BIOGEL PIMICRO INDIC 6.5

## (undated) DEVICE — SET DECANTER MEDICHOICE

## (undated) DEVICE — DRAPE LAVH LAPAROSCOPY W/FLUID

## (undated) DEVICE — TROCAR ENDOPATH XCEL 5X100MM

## (undated) DEVICE — GLOVE BIOGEL SKINSENSE PI 6.5

## (undated) DEVICE — DRAPE STERI INSTRUMENT 1018

## (undated) DEVICE — HANDLE CURETTE W/TUBING

## (undated) DEVICE — VACURETTE 10MM CURVED

## (undated) DEVICE — BAG TISS RETRV MONARCH 10MM

## (undated) DEVICE — DRAPE ARM DAVINCI XI

## (undated) DEVICE — SET CYSTO IRRIGATION UNIV SPIK

## (undated) DEVICE — SCISSOR 5MMX35CM DIRECT DRIVE

## (undated) DEVICE — Device

## (undated) DEVICE — TROCAR ENDOPATH XCEL 12X100MM

## (undated) DEVICE — DRAPE SCOPE PILLOW WARMER

## (undated) DEVICE — UNDERGLOVES BIOGEL PI SZ 7 LF

## (undated) DEVICE — ELECTRODE REM PLYHSV RETURN 9

## (undated) DEVICE — SOL 9P NACL IRR PIC IL

## (undated) DEVICE — NDL HYPO REG 25G X 1 1/2

## (undated) DEVICE — SUT SILK 3-0 BLK BR SH 30IN

## (undated) DEVICE — CLOSURE SKIN STERI STRIP 1/2X4

## (undated) DEVICE — TRAY FOLEY 16FR INFECTION CONT

## (undated) DEVICE — SOL IRR STRL WATER 500ML

## (undated) DEVICE — TUBING INSUFFLATOR W/FILTR 10

## (undated) DEVICE — ADHESIVE DERMABOND ADVANCED

## (undated) DEVICE — SHEET DRAPE MEDIUM

## (undated) DEVICE — SUT MONOCRYL 4-0 PS-1 UND

## (undated) DEVICE — CANNULA ENDOPATH XCEL 5X100MM

## (undated) DEVICE — SUT 0 VICRYL / UR6 (J603)

## (undated) DEVICE — SOL ELECTROLUBE ANTI-STIC

## (undated) DEVICE — JELLY LUBRICANT STERILE 4 OZ

## (undated) DEVICE — DRAPE ABDOMINAL TIBURON 14X11

## (undated) DEVICE — SET INTRO CHOLANGIO 4FR 60CM

## (undated) DEVICE — SEE MEDLINE ITEM 156923

## (undated) DEVICE — SEAL UNIVERSAL 5MM-8MM XI

## (undated) DEVICE — PORT AIRSEAL 12/120MM LPI

## (undated) DEVICE — DRAPE COLUMN DAVINCI XI

## (undated) DEVICE — SOL NACL IRR 3000ML

## (undated) DEVICE — NDL INSUF ULTRA VERESS 120MM

## (undated) DEVICE — SOL CLEARIFY VISUALIZATION LAP

## (undated) DEVICE — TRAY MINOR GEN SURG OMC

## (undated) DEVICE — NDL SPINAL SPINOCAN 22GX3.5

## (undated) DEVICE — SET TRI-LUMEN FILTERED TUBE

## (undated) DEVICE — SYR 30CC LUER LOCK

## (undated) DEVICE — BANDAGE ADHESIVE

## (undated) DEVICE — SYS CLSR WND ENDOSCP XL

## (undated) DEVICE — DRESSING LEUKOPLAST FLEX 1X3IN

## (undated) DEVICE — TIP RUMI BLUE DISPOSABLE 5/BX

## (undated) DEVICE — KIT ANTIFOG W/SPONG & FLUID

## (undated) DEVICE — COVER TIP CURVED SCISSORS XI

## (undated) DEVICE — BLADE SURG STAINLESS STEEL #11

## (undated) DEVICE — GLOVE BIOGEL SKINSENSE PI 7.0

## (undated) DEVICE — OBTURATOR BLADELESS 8MM XI CLR